# Patient Record
Sex: FEMALE | Race: BLACK OR AFRICAN AMERICAN | Employment: OTHER | URBAN - METROPOLITAN AREA
[De-identification: names, ages, dates, MRNs, and addresses within clinical notes are randomized per-mention and may not be internally consistent; named-entity substitution may affect disease eponyms.]

---

## 2017-01-10 ENCOUNTER — PROCEDURE VISIT (OUTPATIENT)
Dept: CARDIOLOGY CLINIC | Age: 82
End: 2017-01-10

## 2017-01-10 ENCOUNTER — HOSPITAL ENCOUNTER (OUTPATIENT)
Dept: CARDIOLOGY | Age: 82
Discharge: HOME | End: 2017-01-10
Attending: NURSE PRACTITIONER | Admitting: NURSE PRACTITIONER

## 2017-01-10 DIAGNOSIS — I10 ESSENTIAL HYPERTENSION: Primary | ICD-10-CM

## 2017-01-10 LAB
LV EF: 55 %
LVEF MODALITY: NORMAL

## 2017-03-30 ENCOUNTER — TELEPHONE (OUTPATIENT)
Dept: PRIMARY CARE CLINIC | Age: 82
End: 2017-03-30

## 2017-03-30 DIAGNOSIS — I10 ESSENTIAL HYPERTENSION: ICD-10-CM

## 2017-03-30 RX ORDER — SPIRONOLACTONE 25 MG/1
TABLET ORAL
Qty: 90 TABLET | Refills: 3 | Status: SHIPPED | OUTPATIENT
Start: 2017-03-30 | End: 2018-03-14 | Stop reason: SDUPTHER

## 2017-04-20 ENCOUNTER — OFFICE VISIT (OUTPATIENT)
Dept: CARDIOLOGY CLINIC | Age: 82
End: 2017-04-20

## 2017-04-20 VITALS
WEIGHT: 148.2 LBS | DIASTOLIC BLOOD PRESSURE: 74 MMHG | HEART RATE: 60 BPM | BODY MASS INDEX: 21.89 KG/M2 | SYSTOLIC BLOOD PRESSURE: 140 MMHG

## 2017-04-20 DIAGNOSIS — Z86.39 HISTORY OF GRAVES' DISEASE: ICD-10-CM

## 2017-04-20 DIAGNOSIS — I35.1 AORTIC VALVE INSUFFICIENCY, UNSPECIFIED ETIOLOGY: ICD-10-CM

## 2017-04-20 DIAGNOSIS — I10 ESSENTIAL HYPERTENSION: Primary | ICD-10-CM

## 2017-04-20 DIAGNOSIS — K90.0 CELIAC DISEASE: ICD-10-CM

## 2017-04-20 DIAGNOSIS — E05.00 GRAVES' DISEASE: ICD-10-CM

## 2017-04-20 PROCEDURE — 1123F ACP DISCUSS/DSCN MKR DOCD: CPT | Performed by: NURSE PRACTITIONER

## 2017-04-20 PROCEDURE — 99214 OFFICE O/P EST MOD 30 MIN: CPT | Performed by: NURSE PRACTITIONER

## 2017-04-20 PROCEDURE — 1036F TOBACCO NON-USER: CPT | Performed by: NURSE PRACTITIONER

## 2017-04-20 PROCEDURE — G8427 DOCREV CUR MEDS BY ELIG CLIN: HCPCS | Performed by: NURSE PRACTITIONER

## 2017-04-20 PROCEDURE — 4040F PNEUMOC VAC/ADMIN/RCVD: CPT | Performed by: NURSE PRACTITIONER

## 2017-04-20 PROCEDURE — 1090F PRES/ABSN URINE INCON ASSESS: CPT | Performed by: NURSE PRACTITIONER

## 2017-04-20 PROCEDURE — G8419 CALC BMI OUT NRM PARAM NOF/U: HCPCS | Performed by: NURSE PRACTITIONER

## 2017-04-27 RX ORDER — FELODIPINE 5 MG/1
TABLET, EXTENDED RELEASE ORAL
Qty: 90 TABLET | Refills: 2 | Status: SHIPPED | OUTPATIENT
Start: 2017-04-27 | End: 2017-07-03

## 2017-05-03 ENCOUNTER — OFFICE VISIT (OUTPATIENT)
Dept: PRIMARY CARE CLINIC | Age: 82
End: 2017-05-03

## 2017-05-03 VITALS
BODY MASS INDEX: 21.5 KG/M2 | SYSTOLIC BLOOD PRESSURE: 150 MMHG | TEMPERATURE: 97.8 F | RESPIRATION RATE: 14 BRPM | DIASTOLIC BLOOD PRESSURE: 80 MMHG | HEART RATE: 67 BPM | WEIGHT: 145.6 LBS | OXYGEN SATURATION: 98 %

## 2017-05-03 DIAGNOSIS — E55.9 VITAMIN D DEFICIENCY: ICD-10-CM

## 2017-05-03 DIAGNOSIS — D47.2 MGUS (MONOCLONAL GAMMOPATHY OF UNKNOWN SIGNIFICANCE): ICD-10-CM

## 2017-05-03 DIAGNOSIS — K21.9 GASTROESOPHAGEAL REFLUX DISEASE WITHOUT ESOPHAGITIS: ICD-10-CM

## 2017-05-03 DIAGNOSIS — J30.89 OTHER ALLERGIC RHINITIS: ICD-10-CM

## 2017-05-03 DIAGNOSIS — K64.9 ACUTE HEMORRHOID: ICD-10-CM

## 2017-05-03 DIAGNOSIS — E21.0 HYPERPARATHYROIDISM, PRIMARY (HCC): ICD-10-CM

## 2017-05-03 DIAGNOSIS — I10 ESSENTIAL HYPERTENSION: ICD-10-CM

## 2017-05-03 DIAGNOSIS — E53.8 VITAMIN B 12 DEFICIENCY: ICD-10-CM

## 2017-05-03 DIAGNOSIS — E21.3 HYPERPARATHYROIDISM (HCC): ICD-10-CM

## 2017-05-03 DIAGNOSIS — E46 PROTEIN MALNUTRITION (HCC): ICD-10-CM

## 2017-05-03 DIAGNOSIS — J45.20 MILD INTERMITTENT ASTHMA WITHOUT COMPLICATION: ICD-10-CM

## 2017-05-03 DIAGNOSIS — I10 ESSENTIAL HYPERTENSION: Primary | ICD-10-CM

## 2017-05-03 DIAGNOSIS — E05.00 GRAVES' DISEASE: ICD-10-CM

## 2017-05-03 DIAGNOSIS — I35.1 AORTIC VALVE INSUFFICIENCY, UNSPECIFIED ETIOLOGY: ICD-10-CM

## 2017-05-03 LAB
A/G RATIO: 1.8 (ref 1.1–2.2)
ALBUMIN SERPL-MCNC: 4.2 G/DL (ref 3.4–5)
ALP BLD-CCNC: 154 U/L (ref 40–129)
ALT SERPL-CCNC: 6 U/L (ref 10–40)
ANION GAP SERPL CALCULATED.3IONS-SCNC: 17 MMOL/L (ref 3–16)
AST SERPL-CCNC: 14 U/L (ref 15–37)
BILIRUB SERPL-MCNC: 0.8 MG/DL (ref 0–1)
BUN BLDV-MCNC: 11 MG/DL (ref 7–20)
CALCIUM SERPL-MCNC: 10.3 MG/DL (ref 8.3–10.6)
CHLORIDE BLD-SCNC: 104 MMOL/L (ref 99–110)
CO2: 23 MMOL/L (ref 21–32)
CREAT SERPL-MCNC: 0.8 MG/DL (ref 0.6–1.2)
GFR AFRICAN AMERICAN: >60
GFR NON-AFRICAN AMERICAN: >60
GLOBULIN: 2.3 G/DL
GLUCOSE BLD-MCNC: 82 MG/DL (ref 70–99)
PARATHYROID HORMONE INTACT: 79.2 PG/ML (ref 14–72)
POTASSIUM SERPL-SCNC: 4.3 MMOL/L (ref 3.5–5.1)
PREALBUMIN: 19.3 MG/DL (ref 20–40)
SODIUM BLD-SCNC: 144 MMOL/L (ref 136–145)
TOTAL PROTEIN: 6.5 G/DL (ref 6.4–8.2)
TSH REFLEX FT4: 4.21 UIU/ML (ref 0.27–4.2)

## 2017-05-03 PROCEDURE — 1090F PRES/ABSN URINE INCON ASSESS: CPT | Performed by: INTERNAL MEDICINE

## 2017-05-03 PROCEDURE — 99214 OFFICE O/P EST MOD 30 MIN: CPT | Performed by: INTERNAL MEDICINE

## 2017-05-03 PROCEDURE — 1036F TOBACCO NON-USER: CPT | Performed by: INTERNAL MEDICINE

## 2017-05-03 PROCEDURE — 4040F PNEUMOC VAC/ADMIN/RCVD: CPT | Performed by: INTERNAL MEDICINE

## 2017-05-03 PROCEDURE — 1123F ACP DISCUSS/DSCN MKR DOCD: CPT | Performed by: INTERNAL MEDICINE

## 2017-05-03 PROCEDURE — G8419 CALC BMI OUT NRM PARAM NOF/U: HCPCS | Performed by: INTERNAL MEDICINE

## 2017-05-03 PROCEDURE — G8427 DOCREV CUR MEDS BY ELIG CLIN: HCPCS | Performed by: INTERNAL MEDICINE

## 2017-05-03 RX ORDER — LORAZEPAM 0.5 MG/1
0.5 TABLET ORAL EVERY 6 HOURS PRN
Qty: 56 TABLET | Refills: 3 | Status: CANCELLED | OUTPATIENT
Start: 2017-05-03

## 2017-05-03 ASSESSMENT — PATIENT HEALTH QUESTIONNAIRE - PHQ9
1. LITTLE INTEREST OR PLEASURE IN DOING THINGS: 1
2. FEELING DOWN, DEPRESSED OR HOPELESS: 1
SUM OF ALL RESPONSES TO PHQ QUESTIONS 1-9: 2
SUM OF ALL RESPONSES TO PHQ9 QUESTIONS 1 & 2: 2

## 2017-05-04 DIAGNOSIS — E05.00 GRAVES' DISEASE: Primary | ICD-10-CM

## 2017-05-04 LAB
T4 FREE: 0.8 NG/DL (ref 0.9–1.8)
TSH REFLEX FT4: 4.2 UIU/ML (ref 0.27–4.2)
VITAMIN B-12: 219 PG/ML (ref 211–911)
VITAMIN D 25-HYDROXY: 50.1 NG/ML

## 2017-05-04 RX ORDER — METHIMAZOLE 5 MG/1
5 TABLET ORAL DAILY
Qty: 30 TABLET | Refills: 1 | Status: SHIPPED | OUTPATIENT
Start: 2017-05-04 | End: 2017-07-03

## 2017-05-07 RX ORDER — HYDROCORTISONE ACETATE 25 MG/1
25 SUPPOSITORY RECTAL 2 TIMES DAILY PRN
Qty: 90 SUPPOSITORY | Refills: 3 | Status: SHIPPED | OUTPATIENT
Start: 2017-05-07 | End: 2020-10-18

## 2017-05-07 RX ORDER — IBANDRONATE SODIUM 150 MG/1
150 TABLET, FILM COATED ORAL
Qty: 3 TABLET | Refills: 3 | Status: SHIPPED | OUTPATIENT
Start: 2017-05-07 | End: 2017-07-19 | Stop reason: SDUPTHER

## 2017-05-07 RX ORDER — METHIMAZOLE 5 MG/1
2.5 TABLET ORAL DAILY
Qty: 15 TABLET | Refills: 1 | Status: SHIPPED | OUTPATIENT
Start: 2017-05-07 | End: 2017-07-20 | Stop reason: SDUPTHER

## 2017-05-07 RX ORDER — PANTOPRAZOLE SODIUM 40 MG/1
TABLET, DELAYED RELEASE ORAL
Qty: 90 TABLET | Refills: 3 | Status: SHIPPED | OUTPATIENT
Start: 2017-05-07 | End: 2017-05-31 | Stop reason: SDUPTHER

## 2017-05-07 RX ORDER — ATENOLOL 50 MG/1
TABLET ORAL
Qty: 90 TABLET | Refills: 3 | Status: SHIPPED | OUTPATIENT
Start: 2017-05-07 | End: 2017-07-06

## 2017-05-07 RX ORDER — LORATADINE 10 MG/1
10 TABLET ORAL DAILY
Qty: 90 TABLET | Refills: 3 | Status: SHIPPED | OUTPATIENT
Start: 2017-05-07 | End: 2021-01-29 | Stop reason: SDUPTHER

## 2017-05-07 ASSESSMENT — ENCOUNTER SYMPTOMS
EYE ITCHING: 0
BACK PAIN: 0
EYE REDNESS: 0
STRIDOR: 0
EYE DISCHARGE: 0
NAUSEA: 0
VOMITING: 0
SHORTNESS OF BREATH: 0
DIARRHEA: 0
RHINORRHEA: 0
CHEST TIGHTNESS: 0
EYE PAIN: 0
ABDOMINAL PAIN: 0
BLOOD IN STOOL: 0
FACIAL SWELLING: 0
SORE THROAT: 0
COUGH: 0
TROUBLE SWALLOWING: 0
APNEA: 0
ALLERGIC/IMMUNOLOGIC COMMENTS: ALLERGY ON GLUTEN-FREE DIET
CONSTIPATION: 0
WHEEZING: 0
CHOKING: 0
ANAL BLEEDING: 0

## 2017-05-08 ENCOUNTER — TELEPHONE (OUTPATIENT)
Dept: PRIMARY CARE CLINIC | Age: 82
End: 2017-05-08

## 2017-05-09 ENCOUNTER — OFFICE VISIT (OUTPATIENT)
Dept: ENT CLINIC | Age: 82
End: 2017-05-09

## 2017-05-09 VITALS
DIASTOLIC BLOOD PRESSURE: 73 MMHG | TEMPERATURE: 97.4 F | BODY MASS INDEX: 21.41 KG/M2 | SYSTOLIC BLOOD PRESSURE: 155 MMHG | WEIGHT: 145 LBS

## 2017-05-09 DIAGNOSIS — H90.8 HEARING LOSS, MIXED CONDUCTIVE AND SENSORINEURAL: ICD-10-CM

## 2017-05-09 DIAGNOSIS — H61.23 BILATERAL IMPACTED CERUMEN: ICD-10-CM

## 2017-05-09 DIAGNOSIS — N89.8 VAGINAL IRRITATION: Primary | ICD-10-CM

## 2017-05-09 DIAGNOSIS — R30.0 DYSURIA: ICD-10-CM

## 2017-05-09 PROCEDURE — 1036F TOBACCO NON-USER: CPT | Performed by: OTOLARYNGOLOGY

## 2017-05-09 PROCEDURE — 69210 REMOVE IMPACTED EAR WAX UNI: CPT | Performed by: OTOLARYNGOLOGY

## 2017-05-09 RX ORDER — NYSTATIN 100000 U/G
CREAM TOPICAL
Qty: 15 G | Refills: 3 | Status: SHIPPED | OUTPATIENT
Start: 2017-05-09 | End: 2017-12-20

## 2017-05-31 RX ORDER — PANTOPRAZOLE SODIUM 40 MG/1
TABLET, DELAYED RELEASE ORAL
Qty: 90 TABLET | Refills: 2 | Status: SHIPPED | OUTPATIENT
Start: 2017-05-31 | End: 2018-02-27 | Stop reason: SDUPTHER

## 2017-06-26 ENCOUNTER — TELEPHONE (OUTPATIENT)
Dept: PRIMARY CARE CLINIC | Age: 82
End: 2017-06-26

## 2017-07-03 ENCOUNTER — OFFICE VISIT (OUTPATIENT)
Dept: PRIMARY CARE CLINIC | Age: 82
End: 2017-07-03

## 2017-07-03 VITALS
BODY MASS INDEX: 21.33 KG/M2 | HEIGHT: 69 IN | WEIGHT: 144 LBS | OXYGEN SATURATION: 99 % | HEART RATE: 69 BPM | TEMPERATURE: 97.8 F | DIASTOLIC BLOOD PRESSURE: 80 MMHG | SYSTOLIC BLOOD PRESSURE: 130 MMHG

## 2017-07-03 DIAGNOSIS — Z23 NEED FOR PROPHYLACTIC VACCINATION AGAINST DIPHTHERIA-TETANUS-PERTUSSIS (DTP): Primary | ICD-10-CM

## 2017-07-03 DIAGNOSIS — A04.72 C. DIFFICILE DIARRHEA: ICD-10-CM

## 2017-07-03 DIAGNOSIS — K90.0 CELIAC DISEASE: ICD-10-CM

## 2017-07-03 DIAGNOSIS — E05.00 GRAVES' DISEASE: ICD-10-CM

## 2017-07-03 DIAGNOSIS — K58.2 IRRITABLE BOWEL SYNDROME WITH BOTH CONSTIPATION AND DIARRHEA: ICD-10-CM

## 2017-07-03 DIAGNOSIS — I10 ESSENTIAL HYPERTENSION: ICD-10-CM

## 2017-07-03 PROCEDURE — G8420 CALC BMI NORM PARAMETERS: HCPCS | Performed by: INTERNAL MEDICINE

## 2017-07-03 PROCEDURE — 90715 TDAP VACCINE 7 YRS/> IM: CPT | Performed by: INTERNAL MEDICINE

## 2017-07-03 PROCEDURE — 1036F TOBACCO NON-USER: CPT | Performed by: INTERNAL MEDICINE

## 2017-07-03 PROCEDURE — 4040F PNEUMOC VAC/ADMIN/RCVD: CPT | Performed by: INTERNAL MEDICINE

## 2017-07-03 PROCEDURE — 1090F PRES/ABSN URINE INCON ASSESS: CPT | Performed by: INTERNAL MEDICINE

## 2017-07-03 PROCEDURE — 99214 OFFICE O/P EST MOD 30 MIN: CPT | Performed by: INTERNAL MEDICINE

## 2017-07-03 PROCEDURE — 90471 IMMUNIZATION ADMIN: CPT | Performed by: INTERNAL MEDICINE

## 2017-07-03 PROCEDURE — G8427 DOCREV CUR MEDS BY ELIG CLIN: HCPCS | Performed by: INTERNAL MEDICINE

## 2017-07-03 PROCEDURE — 1123F ACP DISCUSS/DSCN MKR DOCD: CPT | Performed by: INTERNAL MEDICINE

## 2017-07-03 RX ORDER — FELODIPINE 2.5 MG/1
2.5 TABLET, EXTENDED RELEASE ORAL DAILY
Qty: 40 TABLET | Refills: 3 | Status: SHIPPED | OUTPATIENT
Start: 2017-07-03 | End: 2017-08-09

## 2017-07-03 RX ORDER — METRONIDAZOLE 500 MG/1
TABLET ORAL
COMMUNITY
Start: 2017-06-26 | End: 2017-07-19

## 2017-07-03 ASSESSMENT — PATIENT HEALTH QUESTIONNAIRE - PHQ9
2. FEELING DOWN, DEPRESSED OR HOPELESS: 0
SUM OF ALL RESPONSES TO PHQ9 QUESTIONS 1 & 2: 0
SUM OF ALL RESPONSES TO PHQ QUESTIONS 1-9: 0
1. LITTLE INTEREST OR PLEASURE IN DOING THINGS: 0

## 2017-07-06 DIAGNOSIS — I10 ESSENTIAL HYPERTENSION: ICD-10-CM

## 2017-07-06 RX ORDER — ATENOLOL 50 MG/1
TABLET ORAL
Qty: 90 TABLET | Refills: 2 | Status: SHIPPED | OUTPATIENT
Start: 2017-07-06 | End: 2018-04-02 | Stop reason: SDUPTHER

## 2017-07-09 ASSESSMENT — ENCOUNTER SYMPTOMS
EYE REDNESS: 0
STRIDOR: 0
CHEST TIGHTNESS: 0
FACIAL SWELLING: 0
RHINORRHEA: 0
ANAL BLEEDING: 0
ABDOMINAL PAIN: 0
WHEEZING: 0
APNEA: 0
BLOOD IN STOOL: 0
DIARRHEA: 0
BACK PAIN: 0
CONSTIPATION: 0
ALLERGIC/IMMUNOLOGIC COMMENTS: ALLERGY ON GLUTEN-FREE DIET
TROUBLE SWALLOWING: 0
EYE ITCHING: 0
VOMITING: 0
CHOKING: 0
EYE PAIN: 0
COUGH: 0
SHORTNESS OF BREATH: 0
EYE DISCHARGE: 0
NAUSEA: 0
SORE THROAT: 0

## 2017-07-19 ENCOUNTER — OFFICE VISIT (OUTPATIENT)
Dept: PRIMARY CARE CLINIC | Age: 82
End: 2017-07-19

## 2017-07-19 VITALS
TEMPERATURE: 97.7 F | DIASTOLIC BLOOD PRESSURE: 72 MMHG | OXYGEN SATURATION: 100 % | HEIGHT: 69 IN | WEIGHT: 144 LBS | RESPIRATION RATE: 16 BRPM | HEART RATE: 78 BPM | SYSTOLIC BLOOD PRESSURE: 140 MMHG | BODY MASS INDEX: 21.33 KG/M2

## 2017-07-19 DIAGNOSIS — E05.00 GRAVES' DISEASE: ICD-10-CM

## 2017-07-19 DIAGNOSIS — R60.0 BILATERAL LEG EDEMA: ICD-10-CM

## 2017-07-19 DIAGNOSIS — R60.0 BILATERAL LEG EDEMA: Primary | ICD-10-CM

## 2017-07-19 DIAGNOSIS — E21.3 HYPERPARATHYROIDISM (HCC): ICD-10-CM

## 2017-07-19 LAB
ALBUMIN SERPL-MCNC: 3.7 G/DL (ref 3.4–5)
ANION GAP SERPL CALCULATED.3IONS-SCNC: 15 MMOL/L (ref 3–16)
BUN BLDV-MCNC: 17 MG/DL (ref 7–20)
CALCIUM SERPL-MCNC: 10.7 MG/DL (ref 8.3–10.6)
CHLORIDE BLD-SCNC: 107 MMOL/L (ref 99–110)
CO2: 23 MMOL/L (ref 21–32)
CREAT SERPL-MCNC: 0.6 MG/DL (ref 0.6–1.2)
GFR AFRICAN AMERICAN: >60
GFR NON-AFRICAN AMERICAN: >60
GLUCOSE BLD-MCNC: 114 MG/DL (ref 70–99)
PHOSPHORUS: 4 MG/DL (ref 2.5–4.9)
POTASSIUM SERPL-SCNC: 5 MMOL/L (ref 3.5–5.1)
PREALBUMIN: 11.3 MG/DL (ref 20–40)
SODIUM BLD-SCNC: 145 MMOL/L (ref 136–145)
TSH REFLEX FT4: <0.01 UIU/ML (ref 0.27–4.2)

## 2017-07-19 PROCEDURE — G8420 CALC BMI NORM PARAMETERS: HCPCS | Performed by: INTERNAL MEDICINE

## 2017-07-19 PROCEDURE — 99213 OFFICE O/P EST LOW 20 MIN: CPT | Performed by: INTERNAL MEDICINE

## 2017-07-19 PROCEDURE — 1123F ACP DISCUSS/DSCN MKR DOCD: CPT | Performed by: INTERNAL MEDICINE

## 2017-07-19 PROCEDURE — 4040F PNEUMOC VAC/ADMIN/RCVD: CPT | Performed by: INTERNAL MEDICINE

## 2017-07-19 PROCEDURE — G8427 DOCREV CUR MEDS BY ELIG CLIN: HCPCS | Performed by: INTERNAL MEDICINE

## 2017-07-19 PROCEDURE — 1090F PRES/ABSN URINE INCON ASSESS: CPT | Performed by: INTERNAL MEDICINE

## 2017-07-19 PROCEDURE — 1036F TOBACCO NON-USER: CPT | Performed by: INTERNAL MEDICINE

## 2017-07-19 RX ORDER — IBANDRONATE SODIUM 150 MG/1
150 TABLET, FILM COATED ORAL
Qty: 3 TABLET | Refills: 11 | Status: SHIPPED | OUTPATIENT
Start: 2017-07-19 | End: 2017-10-19 | Stop reason: SDUPTHER

## 2017-07-19 ASSESSMENT — ENCOUNTER SYMPTOMS
NAUSEA: 0
EYE ITCHING: 0
SORE THROAT: 0
RHINORRHEA: 0
CHEST TIGHTNESS: 0
DIARRHEA: 0
CHOKING: 0
BACK PAIN: 0
ABDOMINAL PAIN: 0
VOMITING: 0
SHORTNESS OF BREATH: 0
EYE REDNESS: 0
COUGH: 0
ANAL BLEEDING: 0
STRIDOR: 0
EYE DISCHARGE: 0
BLOOD IN STOOL: 0
TROUBLE SWALLOWING: 0
APNEA: 0
EYE PAIN: 0
CONSTIPATION: 0
WHEEZING: 0
FACIAL SWELLING: 0
ALLERGIC/IMMUNOLOGIC COMMENTS: ALLERGY ON GLUTEN-FREE DIET

## 2017-07-20 ENCOUNTER — TELEPHONE (OUTPATIENT)
Dept: PRIMARY CARE CLINIC | Age: 82
End: 2017-07-20

## 2017-07-20 DIAGNOSIS — E05.00 GRAVES' DISEASE: ICD-10-CM

## 2017-07-20 LAB — T4 FREE: >7.8 NG/DL (ref 0.9–1.8)

## 2017-07-20 RX ORDER — METHIMAZOLE 5 MG/1
5 TABLET ORAL DAILY
Qty: 30 TABLET | Refills: 1 | Status: SHIPPED | OUTPATIENT
Start: 2017-07-20 | End: 2017-09-07 | Stop reason: SDUPTHER

## 2017-07-21 ENCOUNTER — TELEPHONE (OUTPATIENT)
Dept: PRIMARY CARE CLINIC | Age: 82
End: 2017-07-21

## 2017-07-24 ENCOUNTER — TELEPHONE (OUTPATIENT)
Dept: PRIMARY CARE CLINIC | Age: 82
End: 2017-07-24

## 2017-07-24 DIAGNOSIS — R19.7 DIARRHEA, UNSPECIFIED TYPE: Primary | ICD-10-CM

## 2017-07-24 DIAGNOSIS — R53.1 WEAKNESS GENERALIZED: ICD-10-CM

## 2017-07-24 ASSESSMENT — PATIENT HEALTH QUESTIONNAIRE - PHQ9
SUM OF ALL RESPONSES TO PHQ QUESTIONS 1-9: 0
SUM OF ALL RESPONSES TO PHQ9 QUESTIONS 1 & 2: 0
1. LITTLE INTEREST OR PLEASURE IN DOING THINGS: 0
2. FEELING DOWN, DEPRESSED OR HOPELESS: 0

## 2017-07-26 DIAGNOSIS — A04.72 C. DIFFICILE DIARRHEA: Primary | ICD-10-CM

## 2017-07-26 LAB — C DIFFICILE TOXIN, EIA: NORMAL

## 2017-07-26 RX ORDER — VANCOMYCIN HYDROCHLORIDE 125 MG/1
125 CAPSULE ORAL 3 TIMES DAILY
Qty: 30 CAPSULE | Refills: 0 | Status: SHIPPED | OUTPATIENT
Start: 2017-07-26 | End: 2017-08-05

## 2017-07-27 ENCOUNTER — TELEPHONE (OUTPATIENT)
Dept: PRIMARY CARE CLINIC | Age: 82
End: 2017-07-27

## 2017-07-27 DIAGNOSIS — A04.71 RECURRENT CLOSTRIDIUM DIFFICILE DIARRHEA: Primary | ICD-10-CM

## 2017-07-27 LAB
CLOSTRIDIUM DIFFICILE DNA AMPLIFICATION: ABNORMAL
ORGANISM: ABNORMAL

## 2017-07-28 ENCOUNTER — CARE COORDINATION (OUTPATIENT)
Dept: CARE COORDINATION | Age: 82
End: 2017-07-28

## 2017-08-04 ENCOUNTER — TELEPHONE (OUTPATIENT)
Dept: PRIMARY CARE CLINIC | Age: 82
End: 2017-08-04

## 2017-08-07 DIAGNOSIS — L98.9 SKIN LESION: Primary | ICD-10-CM

## 2017-08-09 ENCOUNTER — OFFICE VISIT (OUTPATIENT)
Dept: PRIMARY CARE CLINIC | Age: 82
End: 2017-08-09

## 2017-08-09 VITALS
SYSTOLIC BLOOD PRESSURE: 133 MMHG | OXYGEN SATURATION: 99 % | DIASTOLIC BLOOD PRESSURE: 69 MMHG | HEART RATE: 62 BPM | RESPIRATION RATE: 16 BRPM | HEIGHT: 69 IN | TEMPERATURE: 97.8 F

## 2017-08-09 DIAGNOSIS — I35.1 NONRHEUMATIC AORTIC VALVE INSUFFICIENCY: ICD-10-CM

## 2017-08-09 DIAGNOSIS — Z91.81 AT HIGH RISK FOR FALLS: ICD-10-CM

## 2017-08-09 DIAGNOSIS — I10 ESSENTIAL HYPERTENSION: ICD-10-CM

## 2017-08-09 DIAGNOSIS — M81.0 AGE-RELATED OSTEOPOROSIS WITHOUT CURRENT PATHOLOGICAL FRACTURE: Primary | ICD-10-CM

## 2017-08-09 DIAGNOSIS — L85.3 DRY SKIN: ICD-10-CM

## 2017-08-09 DIAGNOSIS — R29.898 MUSCULAR DECONDITIONING: ICD-10-CM

## 2017-08-09 PROCEDURE — 4005F PHARM THX FOR OP RXD: CPT | Performed by: INTERNAL MEDICINE

## 2017-08-09 PROCEDURE — 4040F PNEUMOC VAC/ADMIN/RCVD: CPT | Performed by: INTERNAL MEDICINE

## 2017-08-09 PROCEDURE — G8427 DOCREV CUR MEDS BY ELIG CLIN: HCPCS | Performed by: INTERNAL MEDICINE

## 2017-08-09 PROCEDURE — 1036F TOBACCO NON-USER: CPT | Performed by: INTERNAL MEDICINE

## 2017-08-09 PROCEDURE — G8420 CALC BMI NORM PARAMETERS: HCPCS | Performed by: INTERNAL MEDICINE

## 2017-08-09 PROCEDURE — 1090F PRES/ABSN URINE INCON ASSESS: CPT | Performed by: INTERNAL MEDICINE

## 2017-08-09 PROCEDURE — 99214 OFFICE O/P EST MOD 30 MIN: CPT | Performed by: INTERNAL MEDICINE

## 2017-08-09 PROCEDURE — 1123F ACP DISCUSS/DSCN MKR DOCD: CPT | Performed by: INTERNAL MEDICINE

## 2017-08-09 RX ORDER — AMMONIUM LACTATE 12 G/100G
LOTION TOPICAL
Qty: 225 G | Refills: 5 | Status: SHIPPED | OUTPATIENT
Start: 2017-08-09 | End: 2021-01-29 | Stop reason: SDUPTHER

## 2017-08-09 RX ORDER — FELODIPINE 2.5 MG/1
2.5 TABLET, EXTENDED RELEASE ORAL DAILY
Qty: 30 TABLET | Refills: 5
Start: 2017-08-09 | End: 2018-01-26 | Stop reason: SDUPTHER

## 2017-08-13 ASSESSMENT — ENCOUNTER SYMPTOMS
TROUBLE SWALLOWING: 0
ANAL BLEEDING: 0
CONSTIPATION: 0
APNEA: 0
ALLERGIC/IMMUNOLOGIC COMMENTS: ALLERGY ON GLUTEN-FREE DIET
EYE REDNESS: 0
SORE THROAT: 0
VOMITING: 0
BLOOD IN STOOL: 0
EYE PAIN: 0
ABDOMINAL PAIN: 0
RHINORRHEA: 0
EYE DISCHARGE: 0
FACIAL SWELLING: 0
COUGH: 0
CHOKING: 0
NAUSEA: 0
BACK PAIN: 0
STRIDOR: 0
EYE ITCHING: 0
DIARRHEA: 0
CHEST TIGHTNESS: 0
WHEEZING: 0
SHORTNESS OF BREATH: 0

## 2017-09-07 ENCOUNTER — OFFICE VISIT (OUTPATIENT)
Dept: PRIMARY CARE CLINIC | Age: 82
End: 2017-09-07

## 2017-09-07 VITALS
OXYGEN SATURATION: 97 % | BODY MASS INDEX: 19.2 KG/M2 | RESPIRATION RATE: 18 BRPM | SYSTOLIC BLOOD PRESSURE: 130 MMHG | HEART RATE: 71 BPM | WEIGHT: 130 LBS | DIASTOLIC BLOOD PRESSURE: 70 MMHG | TEMPERATURE: 96.8 F

## 2017-09-07 DIAGNOSIS — E05.00 GRAVES DISEASE: ICD-10-CM

## 2017-09-07 DIAGNOSIS — E46 PROTEIN MALNUTRITION (HCC): ICD-10-CM

## 2017-09-07 DIAGNOSIS — E05.00 GRAVES' DISEASE: ICD-10-CM

## 2017-09-07 DIAGNOSIS — I10 ESSENTIAL HYPERTENSION: ICD-10-CM

## 2017-09-07 DIAGNOSIS — Z23 FLU VACCINE NEED: Primary | ICD-10-CM

## 2017-09-07 LAB
PREALBUMIN: 11.7 MG/DL (ref 20–40)
T3 FREE: 6.5 PG/ML (ref 2.3–4.2)
T4 FREE: 2.7 NG/DL (ref 0.9–1.8)
TSH REFLEX FT4: <0.01 UIU/ML (ref 0.27–4.2)

## 2017-09-07 PROCEDURE — 99214 OFFICE O/P EST MOD 30 MIN: CPT | Performed by: INTERNAL MEDICINE

## 2017-09-07 PROCEDURE — 90662 IIV NO PRSV INCREASED AG IM: CPT | Performed by: INTERNAL MEDICINE

## 2017-09-07 PROCEDURE — G0008 ADMIN INFLUENZA VIRUS VAC: HCPCS | Performed by: INTERNAL MEDICINE

## 2017-09-07 PROCEDURE — G8427 DOCREV CUR MEDS BY ELIG CLIN: HCPCS | Performed by: INTERNAL MEDICINE

## 2017-09-07 PROCEDURE — 1090F PRES/ABSN URINE INCON ASSESS: CPT | Performed by: INTERNAL MEDICINE

## 2017-09-07 PROCEDURE — 4040F PNEUMOC VAC/ADMIN/RCVD: CPT | Performed by: INTERNAL MEDICINE

## 2017-09-07 PROCEDURE — 1123F ACP DISCUSS/DSCN MKR DOCD: CPT | Performed by: INTERNAL MEDICINE

## 2017-09-07 PROCEDURE — G8420 CALC BMI NORM PARAMETERS: HCPCS | Performed by: INTERNAL MEDICINE

## 2017-09-07 PROCEDURE — 1036F TOBACCO NON-USER: CPT | Performed by: INTERNAL MEDICINE

## 2017-09-07 RX ORDER — METHIMAZOLE 5 MG/1
7.5 TABLET ORAL DAILY
Qty: 45 TABLET | Refills: 5 | Status: SHIPPED | OUTPATIENT
Start: 2017-09-07 | End: 2017-10-19 | Stop reason: SDUPTHER

## 2017-09-07 RX ORDER — MIRTAZAPINE 15 MG/1
15 TABLET, FILM COATED ORAL NIGHTLY
Qty: 30 TABLET | Refills: 3 | Status: SHIPPED | OUTPATIENT
Start: 2017-09-07 | End: 2019-12-09

## 2017-09-07 ASSESSMENT — ENCOUNTER SYMPTOMS
FACIAL SWELLING: 0
ANAL BLEEDING: 0
SORE THROAT: 0
WHEEZING: 0
CONSTIPATION: 0
COUGH: 0
ALLERGIC/IMMUNOLOGIC COMMENTS: ALLERGY ON GLUTEN-FREE DIET
STRIDOR: 0
EYE REDNESS: 0
EYE DISCHARGE: 0
DIARRHEA: 0
EYE ITCHING: 0
APNEA: 0
CHOKING: 0
EYE PAIN: 0
ABDOMINAL PAIN: 0
RHINORRHEA: 0
TROUBLE SWALLOWING: 0
VOMITING: 0
SHORTNESS OF BREATH: 0
NAUSEA: 0
BACK PAIN: 0
CHEST TIGHTNESS: 0
BLOOD IN STOOL: 0

## 2017-09-07 ASSESSMENT — PATIENT HEALTH QUESTIONNAIRE - PHQ9
2. FEELING DOWN, DEPRESSED OR HOPELESS: 1
SUM OF ALL RESPONSES TO PHQ9 QUESTIONS 1 & 2: 2
1. LITTLE INTEREST OR PLEASURE IN DOING THINGS: 1
SUM OF ALL RESPONSES TO PHQ QUESTIONS 1-9: 2

## 2017-10-19 ENCOUNTER — TELEPHONE (OUTPATIENT)
Dept: PRIMARY CARE CLINIC | Age: 82
End: 2017-10-19

## 2017-10-19 ENCOUNTER — OFFICE VISIT (OUTPATIENT)
Dept: PRIMARY CARE CLINIC | Age: 82
End: 2017-10-19

## 2017-10-19 VITALS
RESPIRATION RATE: 18 BRPM | OXYGEN SATURATION: 98 % | SYSTOLIC BLOOD PRESSURE: 142 MMHG | HEART RATE: 72 BPM | DIASTOLIC BLOOD PRESSURE: 66 MMHG | WEIGHT: 123 LBS | BODY MASS INDEX: 18.16 KG/M2 | TEMPERATURE: 98.1 F

## 2017-10-19 DIAGNOSIS — E53.8 VITAMIN B 12 DEFICIENCY: ICD-10-CM

## 2017-10-19 DIAGNOSIS — K11.7 DROOLING: ICD-10-CM

## 2017-10-19 DIAGNOSIS — R73.09 INCREASED BLOOD GLUCOSE: ICD-10-CM

## 2017-10-19 DIAGNOSIS — E05.00 GRAVES' DISEASE: Primary | ICD-10-CM

## 2017-10-19 DIAGNOSIS — R10.2 FEMALE PELVIC PAIN: ICD-10-CM

## 2017-10-19 DIAGNOSIS — E46 PROTEIN MALNUTRITION (HCC): ICD-10-CM

## 2017-10-19 DIAGNOSIS — E05.00 GRAVES' DISEASE: ICD-10-CM

## 2017-10-19 DIAGNOSIS — E55.9 VITAMIN D DEFICIENCY: ICD-10-CM

## 2017-10-19 DIAGNOSIS — M25.551 RIGHT HIP PAIN: ICD-10-CM

## 2017-10-19 DIAGNOSIS — I10 ESSENTIAL HYPERTENSION: ICD-10-CM

## 2017-10-19 DIAGNOSIS — E21.3 HYPERPARATHYROIDISM (HCC): ICD-10-CM

## 2017-10-19 DIAGNOSIS — M81.0 AGE-RELATED OSTEOPOROSIS WITHOUT CURRENT PATHOLOGICAL FRACTURE: ICD-10-CM

## 2017-10-19 LAB
A/G RATIO: 1.3 (ref 1.1–2.2)
ALBUMIN SERPL-MCNC: 3.9 G/DL (ref 3.4–5)
ALP BLD-CCNC: 159 U/L (ref 40–129)
ALT SERPL-CCNC: 7 U/L (ref 10–40)
ANION GAP SERPL CALCULATED.3IONS-SCNC: 17 MMOL/L (ref 3–16)
AST SERPL-CCNC: 15 U/L (ref 15–37)
BILIRUB SERPL-MCNC: 0.8 MG/DL (ref 0–1)
BUN BLDV-MCNC: 13 MG/DL (ref 7–20)
CALCIUM SERPL-MCNC: 10.7 MG/DL (ref 8.3–10.6)
CHLORIDE BLD-SCNC: 103 MMOL/L (ref 99–110)
CO2: 23 MMOL/L (ref 21–32)
CREAT SERPL-MCNC: 0.6 MG/DL (ref 0.6–1.2)
GFR AFRICAN AMERICAN: >60
GFR NON-AFRICAN AMERICAN: >60
GLOBULIN: 2.9 G/DL
GLUCOSE BLD-MCNC: 95 MG/DL (ref 70–99)
POTASSIUM SERPL-SCNC: 4.1 MMOL/L (ref 3.5–5.1)
PREALBUMIN: 14.5 MG/DL (ref 20–40)
SODIUM BLD-SCNC: 143 MMOL/L (ref 136–145)
T3 FREE: 4.6 PG/ML (ref 2.3–4.2)
T4 FREE: 2.1 NG/DL (ref 0.9–1.8)
T4 FREE: 2.2 NG/DL (ref 0.9–1.8)
TOTAL PROTEIN: 6.8 G/DL (ref 6.4–8.2)
TSH REFLEX FT4: <0.01 UIU/ML (ref 0.27–4.2)
VITAMIN D 25-HYDROXY: 50.7 NG/ML

## 2017-10-19 PROCEDURE — 4005F PHARM THX FOR OP RXD: CPT | Performed by: INTERNAL MEDICINE

## 2017-10-19 PROCEDURE — 1090F PRES/ABSN URINE INCON ASSESS: CPT | Performed by: INTERNAL MEDICINE

## 2017-10-19 PROCEDURE — G8427 DOCREV CUR MEDS BY ELIG CLIN: HCPCS | Performed by: INTERNAL MEDICINE

## 2017-10-19 PROCEDURE — G8484 FLU IMMUNIZE NO ADMIN: HCPCS | Performed by: INTERNAL MEDICINE

## 2017-10-19 PROCEDURE — G8419 CALC BMI OUT NRM PARAM NOF/U: HCPCS | Performed by: INTERNAL MEDICINE

## 2017-10-19 PROCEDURE — 1036F TOBACCO NON-USER: CPT | Performed by: INTERNAL MEDICINE

## 2017-10-19 PROCEDURE — 1123F ACP DISCUSS/DSCN MKR DOCD: CPT | Performed by: INTERNAL MEDICINE

## 2017-10-19 PROCEDURE — 4040F PNEUMOC VAC/ADMIN/RCVD: CPT | Performed by: INTERNAL MEDICINE

## 2017-10-19 PROCEDURE — 96372 THER/PROPH/DIAG INJ SC/IM: CPT | Performed by: INTERNAL MEDICINE

## 2017-10-19 PROCEDURE — 99214 OFFICE O/P EST MOD 30 MIN: CPT | Performed by: INTERNAL MEDICINE

## 2017-10-19 RX ORDER — IBANDRONATE SODIUM 150 MG/1
150 TABLET, FILM COATED ORAL
Qty: 3 TABLET | Refills: 11 | Status: SHIPPED | OUTPATIENT
Start: 2017-10-19 | End: 2018-04-18 | Stop reason: SDUPTHER

## 2017-10-19 RX ORDER — METHIMAZOLE 10 MG/1
10 TABLET ORAL DAILY
Qty: 30 TABLET | Refills: 1
Start: 2017-10-19 | End: 2019-06-14

## 2017-10-19 RX ORDER — CYANOCOBALAMIN 1000 UG/ML
1000 INJECTION INTRAMUSCULAR; SUBCUTANEOUS ONCE
Status: COMPLETED | OUTPATIENT
Start: 2017-10-19 | End: 2017-10-19

## 2017-10-19 RX ADMIN — CYANOCOBALAMIN 1000 MCG: 1000 INJECTION INTRAMUSCULAR; SUBCUTANEOUS at 14:35

## 2017-10-19 NOTE — TELEPHONE ENCOUNTER
Pharmacy:   [facility]   Phone: [phone]   Calling to verify the directions and qty  on the following medication:  ~Ibandronate (BONIVA) 150 MG tablet  Please return call to advise. Thank you!

## 2017-10-20 LAB
ESTIMATED AVERAGE GLUCOSE: 108.3 MG/DL
HBA1C MFR BLD: 5.4 %

## 2017-10-20 NOTE — PROGRESS NOTES
supplement. Vitamin D 25 Hydroxy   9. Right hip pain  With walking. Had a remote fall. NO swelling noted by patient . Pain moderate , not severe. XR HIP BILATERAL W AP PELVIS (2 VIEWS)   10. Female pelvic pain . No vaginal bleeding or discharge. Pain in the bones. XR HIP BILATERAL W AP PELVIS (2 VIEWS)   11. Vitamin B 12 deficiency on supplement. cyanocobalamin injection 1,000 mcg     Current Outpatient Prescriptions on File Prior to Visit   Medication Sig Dispense Refill    mirtazapine (REMERON) 15 MG tablet Take 1 tablet by mouth nightly 30 tablet 3    felodipine (PLENDIL) 2.5 MG extended release tablet Take 1 tablet by mouth daily 30 tablet 5    ammonium lactate (LAC-HYDRIN) 12 % lotion Apply topically daily. 225 g 5    atenolol (TENORMIN) 50 MG tablet TAKE 1 TABLET DAILY 90 tablet 2    pantoprazole (PROTONIX) 40 MG tablet TAKE 1 TABLET DAILY 90 tablet 2    nystatin (MYCOSTATIN) 871279 UNIT/GM cream Apply topically 2 times daily. 15 g 3    loratadine (CLARITIN) 10 MG tablet Take 1 tablet by mouth daily 90 tablet 3    hydrocortisone (ANUSOL-HC) 25 MG suppository Place 1 suppository rectally 2 times daily as needed for Hemorrhoids 90 suppository 3    Cyanocobalamin (NASCOBAL) 500 MCG/0.1ML SOLN nasal spray 1 spray by Nasal route once a week 1 Bottle 11    spironolactone (ALDACTONE) 25 MG tablet TAKE 1 TABLET DAILY 90 tablet 3    montelukast (SINGULAIR) 10 MG tablet TAKE 1 TABLET NIGHTLY 90 tablet 3    budesonide-formoterol (SYMBICORT) 160-4.5 MCG/ACT AERO Inhale 2 puffs into the lungs 2 times daily 1 Inhaler 11    fluticasone (FLONASE) 50 MCG/ACT nasal spray PLACE 1 SPRAY IN EACH NOSTRIL DAILY 3 Bottle 5    Cyanocobalamin (NASCOBAL) 500 MCG/0.1ML SOLN nasal spray 1 spray by Nasal route once a week 1 Bottle 5    Genistein 30 MG TABS Take 1 each by mouth daily 30 tablet 5    fluocinonide (LIDEX) 0.05 % cream Apply topically 2 times daily.  30 g 5    hydrocortisone (V-R HYDROCORTISONE/ALOE) 0.5 % ointment Apply topically 2 times daily. 1 Tube 0    Probiotic Product (PROBIOTIC & ACIDOPHILUS EX ST) CAPS Take 1 each by mouth 2 times daily (with meals) Any brand will apply . 60 capsule 3    ketoconazole (NIZORAL) 2 % cream Apply topically tid to the involved areas. 60 g 1    simethicone (MYLICON) 80 MG chewable tablet Take 80 mg by mouth every 6 hours as needed for Flatulence.  hydrocortisone (ANUSOL-HC) 2.5 % rectal cream Place rectally 2 times daily. 1 Tube 0    Calcium Carbonate-Vitamin D (CALTRATE 600+D PO) Take 1 tablet by mouth every other day.  aspirin (ECOTRIN) 325 MG EC tablet Take 325 mg by mouth Daily with lunch.  Multiple Vitamins-Minerals (CENTRUM SILVER) TABS Take 1 tablet by mouth three times a week. No current facility-administered medications on file prior to visit. Patient Active Problem List   Diagnosis    GERD (gastroesophageal reflux disease)    Asthma    IBS (irritable bowel syndrome)    MGUS (monoclonal gammopathy of unknown significance)    Menopause    Graves' disease    S/P colonoscopy    Left renal mass    Osteoporosis    Alkaline phosphatase elevation    Hearing loss sensory, bilateral    Bacterial overgrowth syndrome    Erythrocytosis    Arthritis of knee, right    Essential hypertension    Multiple food allergies    Protein malnutrition (HCC)    Celiac disease    Aortic regurgitation    Primary osteoarthritis of both knees    Hyperparathyroidism ([facility] [address]    History of Graves' disease    Age-related osteoporosis without current pathological fracture     Allergies   Allergen Reactions    Creon [Pancrelipase (Lip-Prot-Amyl)]      Swollen gums.  Ace Inhibitors      angioedema    Ciprocinonide [Fluocinolone]      Stiff, no nausea or rash    Dye [Iodides] Hives     Only Dye that went into eyes.     Macrobid [Nitrofurantoin Monohydrate Macrocrystals]     Sulfa Antibiotics        Review of Systems   Constitutional: Positive for fatigue. Negative for activity change, appetite change, chills, diaphoresis, fever and unexpected weight change. HENT: Negative. Negative for congestion, ear discharge, ear pain, facial swelling, hearing loss, nosebleeds, postnasal drip, rhinorrhea, sneezing, sore throat and trouble swallowing. Graves Disease being followed by endocrinology. Dr. Mitchell Carvalho is following enlarging dominant nodule. No biopsy for now due to age , but will repeat in six months. US on 444/22/16. Eyes: Negative for pain, discharge, redness and itching. 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 Asthma, controlled with prn inhaler. Cardiovascular: Negative for chest pain, palpitations and leg swelling. Hypertension       Gastrointestinal: Negative for abdominal pain, anal bleeding, blood in stool, constipation, diarrhea, nausea and vomiting. IBS and GERD with intermittent pain.   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 Endocrine: Negative. Primary hyperparathyroidism, Graves' disease   Genitourinary: Negative for dysuria, frequency, hematuria, urgency and vaginal discharge. Musculoskeletal: Negative. Negative for arthralgias, back pain, joint swelling, myalgias, neck pain and neck stiffness. Multijoint osteoarthritis. Will receive cartilage injections in both knees with ortho , this month on 10/17/14. Osteoporosis treated with Genistin  ( i cool )not a candidate for oral bisphosphonates due to GERD with frequent exacerbations. Burning in right foot    Skin: Negative for pallor and rash. Allergic/Immunologic: Negative for environmental allergies and food allergies. Allergy on gluten-free diet   Neurological: Negative. Negative for dizziness, weakness, numbness and headaches. Hematological: Negative. Psychiatric/Behavioral: Negative. Objective:   Physical Exam   Constitutional: She is oriented to person, place, and time. She appears well-developed and well-nourished. No distress. HENT:   Head: Normocephalic and atraumatic. Right Ear: External ear normal.   Left Ear: External ear normal.   Nose: Nose normal.   Mouth/Throat: Oropharynx is clear and moist.   Upper and lower dentures   Eyes: Conjunctivae and EOM are normal. Pupils are equal, round, and reactive to light. Right eye exhibits no discharge. Left eye exhibits no discharge. No scleral icterus. Neck: Normal range of motion. Neck supple. No JVD present. No tracheal deviation present. No thyromegaly present. Cardiovascular: Normal rate, regular rhythm, normal heart sounds and intact distal pulses. Exam reveals no friction rub. Pulmonary/Chest: Effort normal and breath sounds normal. No respiratory distress. She has no wheezes. She has no rales. She exhibits no tenderness. Normal breast exam and no axillary adenopathy. Abdominal: Soft. Bowel sounds are normal. She exhibits no distension and no mass. There is no tenderness. There is no rebound and no guarding. Musculoskeletal: She exhibits no edema or tenderness. Marked deformity of right knee and painful with ROM and can not straighten knee. Edema of bilateral lower legs resolved. Lymphadenopathy:     She has no cervical adenopathy. Neurological: She is alert and oriented to person, place, and time. She has normal reflexes. No cranial nerve deficit. She exhibits normal muscle tone. Coordination normal.   Skin: Skin is warm and dry. No rash noted. She is not diaphoretic. No erythema. No pallor. Psychiatric: She has a normal mood and affect. Her behavior is normal. Judgment and thought content normal.   Nursing note and vitals reviewed. Assessment:       1. Graves' disease improving. Increase methizole form 7.5 to 10 mg qd.   TSH WITH REFLEX TO FT4    T4, FREE    T3, FREE    methimazole (TAPAZOLE) 10 MG tablet   2. Essential hypertension controlled,  Continue meds and monitor renal function and lytes. Comprehensive Metabolic Panel   3. Drooling with no sign of stroke and not related to medication. Mild , will monitor for now. 4. Hyperparathyroidism ([address] take bisphosphonate regularly. 5. Increased blood glucose  Hemoglobin A1C   6. Protein malnutrition ([address] improving with decreased edema on exam.  Continue to treat Graves's Disease and increase protein in diet. PREALBUMIN   7. Age-related osteoporosis without current pathological fracture aggrevated by Graves's Disease and Hyperparthyroidism. ibandronate (BONIVA) 150 MG tablet   8. Vitamin D deficiency on supplement, monitor level with goal 50-80. Vitamin D 25 Hydroxy   9. Right hip pain  XR HIP BILATERAL W AP PELVIS (2 VIEWS)   10. Female pelvic pain , bone pain will xray. Remote fall. XR HIP BILATERAL W AP PELVIS (2 VIEWS)   11. Vitamin B 12 deficiency  cyanocobalamin injection 1,000 mcg           Plan:      Caryle Barba received counseling on the following healthy behaviors: medication adherence    Patient given educational materials on Nutrition    I have instructed Stephjohanna Vila to complete a self tracking handout on Blood Pressures  and Weights and instructed them to bring it with them to her next appointment. Discussed use, benefit, and side effects of prescribed medications. Barriers to medication compliance addressed. All patient questions answered. Pt voiced understanding. Patient is taking over the counter meds and discussed as to how they interact with prescription medications. return in two months.

## 2017-10-29 ASSESSMENT — PATIENT HEALTH QUESTIONNAIRE - PHQ9
1. LITTLE INTEREST OR PLEASURE IN DOING THINGS: 1
2. FEELING DOWN, DEPRESSED OR HOPELESS: 1
SUM OF ALL RESPONSES TO PHQ9 QUESTIONS 1 & 2: 2
SUM OF ALL RESPONSES TO PHQ QUESTIONS 1-9: 2

## 2017-10-29 ASSESSMENT — ENCOUNTER SYMPTOMS
SHORTNESS OF BREATH: 0
WHEEZING: 0
STRIDOR: 0
TROUBLE SWALLOWING: 0
EYE DISCHARGE: 0
DIARRHEA: 0
BACK PAIN: 0
CHOKING: 0
RHINORRHEA: 0
CONSTIPATION: 0
EYE ITCHING: 0
SORE THROAT: 0
CHEST TIGHTNESS: 0
EYE REDNESS: 0
VOMITING: 0
COUGH: 0
ANAL BLEEDING: 0
BLOOD IN STOOL: 0
ALLERGIC/IMMUNOLOGIC COMMENTS: ALLERGY ON GLUTEN-FREE DIET
APNEA: 0
NAUSEA: 0
ABDOMINAL PAIN: 0
EYE PAIN: 0
FACIAL SWELLING: 0

## 2017-11-01 RX ORDER — HYDROXYZINE HYDROCHLORIDE 10 MG/1
10 TABLET, FILM COATED ORAL 3 TIMES DAILY PRN
Qty: 90 TABLET | Refills: 3 | Status: SHIPPED | OUTPATIENT
Start: 2017-11-01 | End: 2017-11-11

## 2017-11-02 NOTE — TELEPHONE ENCOUNTER
Please let patient know that atarax 10 mg tid prn was sent in to replace ativan. It is much safer. Ativan is no longer recommended for seniors because it increases the risk of falling. [place] no longer recommends this medication for long term use.

## 2017-12-13 ENCOUNTER — OFFICE VISIT (OUTPATIENT)
Dept: CARDIOLOGY CLINIC | Age: 82
End: 2017-12-13

## 2017-12-13 VITALS
HEART RATE: 67 BPM | DIASTOLIC BLOOD PRESSURE: 58 MMHG | WEIGHT: 123 LBS | BODY MASS INDEX: 18.16 KG/M2 | SYSTOLIC BLOOD PRESSURE: 124 MMHG

## 2017-12-13 DIAGNOSIS — I10 ESSENTIAL HYPERTENSION: Primary | ICD-10-CM

## 2017-12-13 DIAGNOSIS — Z86.39 HISTORY OF GRAVES' DISEASE: ICD-10-CM

## 2017-12-13 DIAGNOSIS — I35.1 NONRHEUMATIC AORTIC VALVE INSUFFICIENCY: ICD-10-CM

## 2017-12-13 PROCEDURE — 1036F TOBACCO NON-USER: CPT | Performed by: INTERNAL MEDICINE

## 2017-12-13 PROCEDURE — 1123F ACP DISCUSS/DSCN MKR DOCD: CPT | Performed by: INTERNAL MEDICINE

## 2017-12-13 PROCEDURE — G8484 FLU IMMUNIZE NO ADMIN: HCPCS | Performed by: INTERNAL MEDICINE

## 2017-12-13 PROCEDURE — 99214 OFFICE O/P EST MOD 30 MIN: CPT | Performed by: INTERNAL MEDICINE

## 2017-12-13 PROCEDURE — G8419 CALC BMI OUT NRM PARAM NOF/U: HCPCS | Performed by: INTERNAL MEDICINE

## 2017-12-13 PROCEDURE — G8427 DOCREV CUR MEDS BY ELIG CLIN: HCPCS | Performed by: INTERNAL MEDICINE

## 2017-12-13 PROCEDURE — 1090F PRES/ABSN URINE INCON ASSESS: CPT | Performed by: INTERNAL MEDICINE

## 2017-12-13 PROCEDURE — 4040F PNEUMOC VAC/ADMIN/RCVD: CPT | Performed by: INTERNAL MEDICINE

## 2017-12-13 NOTE — PROGRESS NOTES
[address]   Dr Fuad Daniels. Radha Dao MD, [facility]    Outpatient Follow Up Note    12/13/2017  Subjective:   CHIEF COMPLAINT / HPI:  Follow Up secondary to hypertension, hyperlipidemia and coronary artery disease     Jennifer Raymundo is 80 y.o. female who presents today for   Follow-up. She is about to be 89 on the day after Noelle. Denies any chest pains or shortness of breath. Apparently did have some GI issues with bloating and did have some weight loss. She is now eating better. Denies any chest pains and no palpitations. 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    PCR    Clostridium difficile infection 1/27/15    AG positive    Degenerative joint disease of knee     Depression     Diverticulitis     GERD (gastroesophageal reflux disease)     Grave's disease 4/2/2012    Heart disease     Hypertension     IBS (irritable bowel syndrome)     Macular degeneration     MGUS (monoclonal gammopathy of unknown significance)     Simple cyst of kidney      Past Surgical History  Past Surgical History:   Procedure Laterality Date    CHOLECYSTECTOMY      COLONOSCOPY  10/29/2009    **see scanned report- [facility]    HEMORRHOID SURGERY      HYSTERECTOMY  in 40's    rudolph/bso     Social History:       History   Smoking Status    Never Smoker   Smokeless Tobacco    Never Used     Current Medications:  Prior to Visit Medications    Medication Sig Taking?  Authorizing Provider   ibandronate (BONIVA) 150 MG tablet Take 1 tablet by mouth every 30 days  Valerie Springer MD   methimazole (TAPAZOLE) 10 MG tablet Take 1 tablet by mouth daily  Valerie Springer MD   mirtazapine (REMERON) 15 MG tablet Take 1 tablet by mouth nightly  Valerie Springer MD   felodipine (PLENDIL) 2.5 MG extended release tablet Take 1 tablet by mouth daily Ally Victoria MD   ammonium lactate (LAC-HYDRIN) 12 % lotion Apply topically daily. Ally Victoria MD   atenolol (TENORMIN) 50 MG tablet TAKE 1 TABLET DAILY  Ally Victoria MD   pantoprazole (PROTONIX) 40 MG tablet TAKE 1 TABLET DAILY  Ally Victoria MD   nystatin (MYCOSTATIN) [id number] UNIT/GM cream Apply topically 2 times daily. Ally Victoria MD   loratadine (CLARITIN) 10 MG tablet Take 1 tablet by mouth daily  Ally Victoria MD   hydrocortisone (ANUSOL-HC) 25 MG suppository Place 1 suppository rectally 2 times daily as needed for Hemorrhoids  Ally Victoria MD   Cyanocobalamin (NASCOBAL) 500 MCG/0.1ML SOLN nasal spray 1 spray by Nasal route once a week  Ally Victoria MD   spironolactone (ALDACTONE) 25 MG tablet TAKE 1 TABLET DAILY  Ally Victoria MD   montelukast (SINGULAIR) 10 MG tablet TAKE 1 TABLET NIGHTLY  Ally Victoria MD   budesonide-formoterol (SYMBICORT) 160-4.5 MCG/ACT AERO Inhale 2 puffs into the lungs 2 times daily  Ally Victoria MD   fluticasone (FLONASE) 50 MCG/ACT nasal spray PLACE 1 SPRAY IN EACH NOSTRIL DAILY  Ally Victoria MD   Cyanocobalamin (NASCOBAL) 500 MCG/0.1ML SOLN nasal spray 1 spray by Nasal route once a week  Ally Victoria MD   Genistein 30 MG TABS Take 1 each by mouth daily  Ally Victoria MD   fluocinonide (LIDEX) 0.05 % cream Apply topically 2 times daily. Ally Vcitoria MD   hydrocortisone (V-R HYDROCORTISONE/ALOE) 0.5 % ointment Apply topically 2 times daily. Chris Lanier III, MD   Probiotic Product (PROBIOTIC & ACIDOPHILUS EX ST) CAPS Take 1 each by mouth 2 times daily (with meals) Any brand will apply . Ally Victoria MD   ketoconazole (NIZORAL) 2 % cream Apply topically tid to the involved areas. Ally Victoria MD   simethicone (MYLICON) 80 MG chewable tablet Take 80 mg by mouth every 6 hours as needed for Flatulence.   Historical Provider, MD   hydrocortisone (ANUSOL-HC) 2.5 % rectal cream Place rectally 2 times daily. [facility] Monalisabjorn Raysa Amaliaabigailshyanne, [address]   Calcium Carbonate-Vitamin D (CALTRATE 600+D PO) Take 1 tablet by mouth every other day. Historical Provider, MD   aspirin (ECOTRIN) 325 MG EC tablet Take 325 mg by mouth Daily with lunch. Historical Provider, MD   Multiple Vitamins-Minerals (CENTRUM SILVER) TABS Take 1 tablet by mouth three times a week. Historical Provider, MD     Family History  Family History   Problem Relation Age of Onset    Heart Disease Mother     High Blood Pressure Mother     Stroke Mother     Cancer Father      multiple myeloma       Current Medications  Current Outpatient Prescriptions   Medication Sig Dispense Refill    ibandronate (BONIVA) 150 MG tablet Take 1 tablet by mouth every 30 days 3 tablet 11    methimazole (TAPAZOLE) 10 MG tablet Take 1 tablet by mouth daily 30 tablet 1    mirtazapine (REMERON) 15 MG tablet Take 1 tablet by mouth nightly 30 tablet 3    felodipine (PLENDIL) 2.5 MG extended release tablet Take 1 tablet by mouth daily 30 tablet 5    ammonium lactate (LAC-HYDRIN) 12 % lotion Apply topically daily. 225 g 5    atenolol (TENORMIN) 50 MG tablet TAKE 1 TABLET DAILY 90 tablet 2    pantoprazole (PROTONIX) 40 MG tablet TAKE 1 TABLET DAILY 90 tablet 2    nystatin (MYCOSTATIN) [id number] UNIT/GM cream Apply topically 2 times daily.  15 g 3    loratadine (CLARITIN) 10 MG tablet Take 1 tablet by mouth daily 90 tablet 3    hydrocortisone (ANUSOL-HC) 25 MG suppository Place 1 suppository rectally 2 times daily as needed for Hemorrhoids 90 suppository 3    Cyanocobalamin (NASCOBAL) 500 MCG/0.1ML SOLN nasal spray 1 spray by Nasal route once a week 1 Bottle 11    spironolactone (ALDACTONE) 25 MG tablet TAKE 1 TABLET DAILY 90 tablet 3    montelukast (SINGULAIR) 10 MG tablet TAKE 1 TABLET NIGHTLY 90 tablet 3    budesonide-formoterol (SYMBICORT) 160-4.5 MCG/ACT AERO Inhale 2 puffs into the lungs 2 times 12/31/2014     Lab Results   Component Value Date    LDLCALC 79 07/15/2016    LDLCALC 84 07/02/2015    LDLCALC 62 12/31/2014     Lab Results   Component Value Date    LABVLDL 15 07/15/2016    LABVLDL 16 07/02/2015    LABVLDL 14 12/31/2014     Radiology Review:  Pertinent images / reports were reviewed as a part of this visit and reveals the following:    Last Echo:  Conclusions 4/15/15    Summary  Left ventricle size is normal with basal septal hypertrophy and normal Left  ventricular function with ejection fraction estimated at 55 %. Diastolic filling parameters suggests grade I diastolic dysfunction . Mild to moderate mitral regurgitation is present. Moderate aortic regurgitation is present. Mild pulmonic regurgitation present   Last Stress Test / Angiogram:    Last ECG: 3/15/15  Sinus  Bradycardia  -Second degree A-V block  (Type I)   -Left atrial enlargement. Voltage criteria for LVH  (R(I)+S(III) exceeds 2.50 mV)  -Voltage criteria w/o ST/T abnormality may be normal.    -Anterolateral ST-elevation -repolarization variant.    -  T-abnormality  -Possible inferior ischemia. EKG today December 13, 2017 sinus rhythm with sinus arrhythmia. Nonspecific ST and T wave changes. Possibly some early repolarization pattern. Assessment:    CAD  Hypertension. Plan:    From a cardiac perspective she seems fairly stable. Her blood pressure and vitals are stable. Her weight is down at 123. We will continue her current therapy as listed. Encourage nutritional and protein intake. She is a very active lady and very sharp at 80. Return in 4 months. Please call if we can assist further [phone]. Miranda Gilliam.  Joelle CABRERA, [facility]      This note was likely completed using voice recognition technology and may contain unintended errors

## 2017-12-20 ENCOUNTER — OFFICE VISIT (OUTPATIENT)
Dept: PRIMARY CARE CLINIC | Age: 82
End: 2017-12-20

## 2017-12-20 VITALS
WEIGHT: 121.6 LBS | DIASTOLIC BLOOD PRESSURE: 67 MMHG | HEART RATE: 60 BPM | BODY MASS INDEX: 17.96 KG/M2 | SYSTOLIC BLOOD PRESSURE: 123 MMHG | OXYGEN SATURATION: 96 % | TEMPERATURE: 97 F

## 2017-12-20 DIAGNOSIS — I10 ESSENTIAL HYPERTENSION: ICD-10-CM

## 2017-12-20 DIAGNOSIS — E53.8 VITAMIN B 12 DEFICIENCY: ICD-10-CM

## 2017-12-20 DIAGNOSIS — I06.1 RHEUMATIC AORTIC VALVE INSUFFICIENCY: ICD-10-CM

## 2017-12-20 DIAGNOSIS — F41.9 ANXIETY: ICD-10-CM

## 2017-12-20 DIAGNOSIS — E05.00 GRAVES' DISEASE: Primary | ICD-10-CM

## 2017-12-20 DIAGNOSIS — E46 PROTEIN MALNUTRITION (HCC): ICD-10-CM

## 2017-12-20 LAB
A/G RATIO: 1.6 (ref 1.1–2.2)
ALBUMIN SERPL-MCNC: 4.3 G/DL (ref 3.4–5)
ALP BLD-CCNC: 190 U/L (ref 40–129)
ALT SERPL-CCNC: 7 U/L (ref 10–40)
ANION GAP SERPL CALCULATED.3IONS-SCNC: 13 MMOL/L (ref 3–16)
AST SERPL-CCNC: 15 U/L (ref 15–37)
BASOPHILS ABSOLUTE: 0 K/UL (ref 0–0.2)
BASOPHILS RELATIVE PERCENT: 0.4 %
BILIRUB SERPL-MCNC: 1.3 MG/DL (ref 0–1)
BUN BLDV-MCNC: 10 MG/DL (ref 7–20)
CALCIUM SERPL-MCNC: 10.9 MG/DL (ref 8.3–10.6)
CHLORIDE BLD-SCNC: 106 MMOL/L (ref 99–110)
CO2: 26 MMOL/L (ref 21–32)
CREAT SERPL-MCNC: 0.8 MG/DL (ref 0.6–1.2)
EOSINOPHILS ABSOLUTE: 0.1 K/UL (ref 0–0.6)
EOSINOPHILS RELATIVE PERCENT: 0.6 %
GFR AFRICAN AMERICAN: >60
GFR NON-AFRICAN AMERICAN: >60
GLOBULIN: 2.7 G/DL
GLUCOSE BLD-MCNC: 86 MG/DL (ref 70–99)
HCT VFR BLD CALC: 42.7 % (ref 36–48)
HEMOGLOBIN: 13.5 G/DL (ref 12–16)
LYMPHOCYTES ABSOLUTE: 2.6 K/UL (ref 1–5.1)
LYMPHOCYTES RELATIVE PERCENT: 28.3 %
MCH RBC QN AUTO: 24.9 PG (ref 26–34)
MCHC RBC AUTO-ENTMCNC: 31.6 G/DL (ref 31–36)
MCV RBC AUTO: 78.6 FL (ref 80–100)
MONOCYTES ABSOLUTE: 0.8 K/UL (ref 0–1.3)
MONOCYTES RELATIVE PERCENT: 8.6 %
NEUTROPHILS ABSOLUTE: 5.6 K/UL (ref 1.7–7.7)
NEUTROPHILS RELATIVE PERCENT: 62.1 %
PDW BLD-RTO: 15.9 % (ref 12.4–15.4)
PLATELET # BLD: 229 K/UL (ref 135–450)
PMV BLD AUTO: 9.9 FL (ref 5–10.5)
POTASSIUM SERPL-SCNC: 4.7 MMOL/L (ref 3.5–5.1)
PREALBUMIN: 16.4 MG/DL (ref 20–40)
RBC # BLD: 5.43 M/UL (ref 4–5.2)
SODIUM BLD-SCNC: 145 MMOL/L (ref 136–145)
TOTAL PROTEIN: 7 G/DL (ref 6.4–8.2)
VITAMIN B-12: 378 PG/ML (ref 211–911)
WBC # BLD: 9.1 K/UL (ref 4–11)

## 2017-12-20 PROCEDURE — G8484 FLU IMMUNIZE NO ADMIN: HCPCS | Performed by: INTERNAL MEDICINE

## 2017-12-20 PROCEDURE — 1123F ACP DISCUSS/DSCN MKR DOCD: CPT | Performed by: INTERNAL MEDICINE

## 2017-12-20 PROCEDURE — G8427 DOCREV CUR MEDS BY ELIG CLIN: HCPCS | Performed by: INTERNAL MEDICINE

## 2017-12-20 PROCEDURE — 99214 OFFICE O/P EST MOD 30 MIN: CPT | Performed by: INTERNAL MEDICINE

## 2017-12-20 PROCEDURE — G8419 CALC BMI OUT NRM PARAM NOF/U: HCPCS | Performed by: INTERNAL MEDICINE

## 2017-12-20 PROCEDURE — 4040F PNEUMOC VAC/ADMIN/RCVD: CPT | Performed by: INTERNAL MEDICINE

## 2017-12-20 PROCEDURE — 1036F TOBACCO NON-USER: CPT | Performed by: INTERNAL MEDICINE

## 2017-12-20 PROCEDURE — 1090F PRES/ABSN URINE INCON ASSESS: CPT | Performed by: INTERNAL MEDICINE

## 2017-12-20 RX ORDER — HYDROXYZINE HYDROCHLORIDE 10 MG/1
10 TABLET, FILM COATED ORAL 3 TIMES DAILY PRN
Qty: 30 TABLET | Refills: 5 | Status: SHIPPED | OUTPATIENT
Start: 2017-12-20 | End: 2017-12-30

## 2017-12-20 NOTE — PROGRESS NOTES
of Graves' disease    Age-related osteoporosis without current pathological fracture     Allergies   Allergen Reactions    Creon [Pancrelipase (Lip-Prot-Amyl)]      Swollen gums.  Ace Inhibitors      angioedema    Ciprocinonide [Fluocinolone]      Stiff, no nausea or rash    Dye [Iodides] Hives     Only Dye that went into eyes.  Macrobid [Nitrofurantoin Monohydrate Macrocrystals]     Sulfa Antibiotics          Review of Systems   Constitutional: Positive for fatigue. Negative for activity change, appetite change, chills, diaphoresis, fever and unexpected weight change. Decreasing fatigue   HENT: Negative. Negative for congestion, ear discharge, ear pain, facial swelling, hearing loss, nosebleeds, postnasal drip, rhinorrhea, sneezing, sore throat and trouble swallowing. Graves Disease being followed by endocrinology. Eyes: Negative for pain, discharge, redness and itching. 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 Asthma, controlled with prn inhaler. Cardiovascular: Negative for chest pain, palpitations and leg swelling. Hypertension       Gastrointestinal: Negative for abdominal pain, anal bleeding, blood in stool, constipation, diarrhea, nausea and vomiting. IBS and GERD with intermittent pain.   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 Endocrine: Negative. Primary hyperparathyroidism, Graves' disease   Genitourinary: Negative for dysuria, frequency, hematuria, urgency and vaginal discharge. Musculoskeletal: Negative. Negative for arthralgias, back pain, joint swelling, myalgias, neck pain and neck stiffness. Multijoint osteoarthritis. Will receive cartilage injections in both knees with ortho , this month on 10/17/14.     Osteoporosis treated with Genistin  ( i cool )not a candidate for oral bisphosphonates due to GERD with frequent exacerbations. Burning in right foot    Skin: Negative for pallor and rash. Allergic/Immunologic: Negative for environmental allergies and food allergies. Allergy on gluten-free diet   Neurological: Negative. Negative for dizziness, weakness, numbness and headaches. Hematological: Negative. Psychiatric/Behavioral: Negative. Objective:   Physical Exam   Constitutional: She is oriented to person, place, and time. She appears well-developed and well-nourished. No distress. HENT:   Head: Normocephalic and atraumatic. Right Ear: External ear normal.   Left Ear: External ear normal.   Nose: Nose normal.   Mouth/Throat: Oropharynx is clear and moist.   Upper and lower dentures   Eyes: Conjunctivae and EOM are normal. Pupils are equal, round, and reactive to light. Right eye exhibits no discharge. Left eye exhibits no discharge. No scleral icterus. Neck: Normal range of motion. Neck supple. No JVD present. No tracheal deviation present. No thyromegaly present. Cardiovascular: Normal rate, regular rhythm, normal heart sounds and intact distal pulses. Exam reveals no friction rub. Pulmonary/Chest: Effort normal and breath sounds normal. No respiratory distress. She has no wheezes. She has no rales. She exhibits no tenderness. Normal breast exam and no axillary adenopathy. Abdominal: Soft. Bowel sounds are normal. She exhibits no distension and no mass. There is no tenderness. There is no rebound and no guarding. Musculoskeletal: She exhibits no edema or tenderness. Marked deformity of right knee and painful with ROM and can not straighten knee. Lymphadenopathy:     She has no cervical adenopathy. Neurological: She is alert and oriented to person, place, and time. She has normal reflexes. No cranial nerve deficit. She exhibits normal muscle tone. Coordination normal.   Skin: Skin is warm and dry. No rash noted. She is not diaphoretic. No erythema.  No

## 2017-12-26 ASSESSMENT — ENCOUNTER SYMPTOMS
WHEEZING: 0
EYE DISCHARGE: 0
DIARRHEA: 0
ANAL BLEEDING: 0
SHORTNESS OF BREATH: 0
STRIDOR: 0
COUGH: 0
ALLERGIC/IMMUNOLOGIC COMMENTS: ALLERGY ON GLUTEN-FREE DIET
APNEA: 0
BLOOD IN STOOL: 0
FACIAL SWELLING: 0
NAUSEA: 0
BACK PAIN: 0
TROUBLE SWALLOWING: 0
VOMITING: 0
CONSTIPATION: 0
EYE REDNESS: 0
SORE THROAT: 0
ABDOMINAL PAIN: 0
EYE PAIN: 0
RHINORRHEA: 0
CHOKING: 0
EYE ITCHING: 0
CHEST TIGHTNESS: 0

## 2017-12-26 ASSESSMENT — PATIENT HEALTH QUESTIONNAIRE - PHQ9
SUM OF ALL RESPONSES TO PHQ9 QUESTIONS 1 & 2: 0
2. FEELING DOWN, DEPRESSED OR HOPELESS: 0
SUM OF ALL RESPONSES TO PHQ QUESTIONS 1-9: 0
1. LITTLE INTEREST OR PLEASURE IN DOING THINGS: 0

## 2018-01-05 ENCOUNTER — OFFICE VISIT (OUTPATIENT)
Dept: DERMATOLOGY | Age: 83
End: 2018-01-05

## 2018-01-05 DIAGNOSIS — L89.151 DECUBITUS ULCER OF SACRAL REGION, STAGE 1: Primary | ICD-10-CM

## 2018-01-05 DIAGNOSIS — J30.89 OTHER ALLERGIC RHINITIS: ICD-10-CM

## 2018-01-05 DIAGNOSIS — L85.3 XEROSIS OF SKIN: ICD-10-CM

## 2018-01-05 DIAGNOSIS — Z78.9 NON-TOBACCO USER: ICD-10-CM

## 2018-01-05 PROCEDURE — 99202 OFFICE O/P NEW SF 15 MIN: CPT | Performed by: DERMATOLOGY

## 2018-01-05 PROCEDURE — 1036F TOBACCO NON-USER: CPT | Performed by: DERMATOLOGY

## 2018-01-05 PROCEDURE — 1123F ACP DISCUSS/DSCN MKR DOCD: CPT | Performed by: DERMATOLOGY

## 2018-01-05 PROCEDURE — G8427 DOCREV CUR MEDS BY ELIG CLIN: HCPCS | Performed by: DERMATOLOGY

## 2018-01-05 PROCEDURE — 1090F PRES/ABSN URINE INCON ASSESS: CPT | Performed by: DERMATOLOGY

## 2018-01-05 PROCEDURE — G8484 FLU IMMUNIZE NO ADMIN: HCPCS | Performed by: DERMATOLOGY

## 2018-01-05 PROCEDURE — G8419 CALC BMI OUT NRM PARAM NOF/U: HCPCS | Performed by: DERMATOLOGY

## 2018-01-05 PROCEDURE — 4040F PNEUMOC VAC/ADMIN/RCVD: CPT | Performed by: DERMATOLOGY

## 2018-01-05 NOTE — PROGRESS NOTES
lotion Apply topically daily. 225 g 5    atenolol (TENORMIN) 50 MG tablet TAKE 1 TABLET DAILY 90 tablet 2    pantoprazole (PROTONIX) 40 MG tablet TAKE 1 TABLET DAILY 90 tablet 2    loratadine (CLARITIN) 10 MG tablet Take 1 tablet by mouth daily 90 tablet 3    hydrocortisone (ANUSOL-HC) 25 MG suppository Place 1 suppository rectally 2 times daily as needed for Hemorrhoids 90 suppository 3    Cyanocobalamin (NASCOBAL) 500 MCG/0.1ML SOLN nasal spray 1 spray by Nasal route once a week 1 Bottle 11    spironolactone (ALDACTONE) 25 MG tablet TAKE 1 TABLET DAILY 90 tablet 3    montelukast (SINGULAIR) 10 MG tablet TAKE 1 TABLET NIGHTLY 90 tablet 3    budesonide-formoterol (SYMBICORT) 160-4.5 MCG/ACT AERO Inhale 2 puffs into the lungs 2 times daily 1 Inhaler 11    fluticasone (FLONASE) 50 MCG/ACT nasal spray PLACE 1 SPRAY IN EACH NOSTRIL DAILY 3 Bottle 5    hydrocortisone (V-R HYDROCORTISONE/ALOE) 0.5 % ointment Apply topically 2 times daily. 1 Tube 0    Calcium Carbonate-Vitamin D (CALTRATE 600+D PO) Take 1 tablet by mouth every other day.  aspirin (ECOTRIN) 325 MG EC tablet Take 325 mg by mouth Daily with lunch. Social History:   Social History     Social History    Marital status: Single     Spouse name: N/A    Number of children: N/A    Years of education: N/A     Occupational History    Not on file. Social History Main Topics    Smoking status: Never Smoker    Smokeless tobacco: Never Used    Alcohol use No    Drug use: No    Sexual activity: Not Currently     Other Topics Concern    Not on file     Social History Narrative    No narrative on file       Physical Examination     The following were examined and determined to be normal: Psych/Neuro, Back, RUE, LUE, RLE, LLE and Nails/digits. Groin/genitalia not examined. The following were examined and determined to be abnormal: buttocks. -General: NAD, underweight, well-developed. Areas of skin examined as listed above:   1.

## 2018-01-07 RX ORDER — MONTELUKAST SODIUM 10 MG/1
TABLET ORAL
Qty: 90 TABLET | Refills: 3 | Status: SHIPPED | OUTPATIENT
Start: 2018-01-07 | End: 2019-12-09 | Stop reason: SDUPTHER

## 2018-01-26 DIAGNOSIS — I10 ESSENTIAL HYPERTENSION: ICD-10-CM

## 2018-01-26 RX ORDER — FELODIPINE 2.5 MG/1
2.5 TABLET, EXTENDED RELEASE ORAL DAILY
Qty: 90 TABLET | Refills: 5 | Status: SHIPPED | OUTPATIENT
Start: 2018-01-26 | End: 2019-12-09 | Stop reason: SDUPTHER

## 2018-02-07 ENCOUNTER — TELEPHONE (OUTPATIENT)
Dept: PRIMARY CARE CLINIC | Age: 83
End: 2018-02-07

## 2018-02-27 RX ORDER — PANTOPRAZOLE SODIUM 40 MG/1
TABLET, DELAYED RELEASE ORAL
Qty: 90 TABLET | Refills: 2 | Status: SHIPPED | OUTPATIENT
Start: 2018-02-27 | End: 2018-07-18 | Stop reason: SDUPTHER

## 2018-03-14 DIAGNOSIS — I10 ESSENTIAL HYPERTENSION: ICD-10-CM

## 2018-03-14 RX ORDER — SPIRONOLACTONE 25 MG/1
TABLET ORAL
Qty: 90 TABLET | Refills: 3 | Status: SHIPPED | OUTPATIENT
Start: 2018-03-14 | End: 2019-03-11 | Stop reason: SDUPTHER

## 2018-04-02 DIAGNOSIS — I10 ESSENTIAL HYPERTENSION: ICD-10-CM

## 2018-04-05 RX ORDER — ATENOLOL 50 MG/1
TABLET ORAL
Qty: 90 TABLET | Refills: 2 | Status: SHIPPED | OUTPATIENT
Start: 2018-04-05 | End: 2018-12-28 | Stop reason: SDUPTHER

## 2018-04-18 ENCOUNTER — OFFICE VISIT (OUTPATIENT)
Dept: PRIMARY CARE CLINIC | Age: 83
End: 2018-04-18

## 2018-04-18 VITALS
RESPIRATION RATE: 18 BRPM | HEART RATE: 59 BPM | SYSTOLIC BLOOD PRESSURE: 133 MMHG | BODY MASS INDEX: 18.31 KG/M2 | OXYGEN SATURATION: 98 % | TEMPERATURE: 97.7 F | DIASTOLIC BLOOD PRESSURE: 78 MMHG | WEIGHT: 124 LBS

## 2018-04-18 DIAGNOSIS — E55.9 VITAMIN D DEFICIENCY: ICD-10-CM

## 2018-04-18 DIAGNOSIS — E53.8 VITAMIN B 12 DEFICIENCY: ICD-10-CM

## 2018-04-18 DIAGNOSIS — E05.00 GRAVES' DISEASE: Primary | ICD-10-CM

## 2018-04-18 DIAGNOSIS — E46 PROTEIN MALNUTRITION (HCC): ICD-10-CM

## 2018-04-18 DIAGNOSIS — M81.0 AGE-RELATED OSTEOPOROSIS WITHOUT CURRENT PATHOLOGICAL FRACTURE: ICD-10-CM

## 2018-04-18 DIAGNOSIS — E21.3 HYPERPARATHYROIDISM (HCC): ICD-10-CM

## 2018-04-18 DIAGNOSIS — D47.2 MGUS (MONOCLONAL GAMMOPATHY OF UNKNOWN SIGNIFICANCE): ICD-10-CM

## 2018-04-18 DIAGNOSIS — I10 ESSENTIAL HYPERTENSION: ICD-10-CM

## 2018-04-18 DIAGNOSIS — K90.0 CELIAC DISEASE: ICD-10-CM

## 2018-04-18 DIAGNOSIS — J30.89 OTHER ALLERGIC RHINITIS: ICD-10-CM

## 2018-04-18 LAB
A/G RATIO: 1.6 (ref 1.1–2.2)
ALBUMIN SERPL-MCNC: 4.3 G/DL (ref 3.4–5)
ALP BLD-CCNC: 158 U/L (ref 40–129)
ALT SERPL-CCNC: 6 U/L (ref 10–40)
ANION GAP SERPL CALCULATED.3IONS-SCNC: 16 MMOL/L (ref 3–16)
AST SERPL-CCNC: 15 U/L (ref 15–37)
BASOPHILS ABSOLUTE: 0.1 K/UL (ref 0–0.2)
BASOPHILS RELATIVE PERCENT: 0.8 %
BILIRUB SERPL-MCNC: 0.7 MG/DL (ref 0–1)
BUN BLDV-MCNC: 14 MG/DL (ref 7–20)
CALCIUM SERPL-MCNC: 10.2 MG/DL (ref 8.3–10.6)
CHLORIDE BLD-SCNC: 102 MMOL/L (ref 99–110)
CO2: 24 MMOL/L (ref 21–32)
CREAT SERPL-MCNC: 0.9 MG/DL (ref 0.6–1.2)
EOSINOPHILS ABSOLUTE: 0.1 K/UL (ref 0–0.6)
EOSINOPHILS RELATIVE PERCENT: 1.2 %
GFR AFRICAN AMERICAN: >60
GFR NON-AFRICAN AMERICAN: 59
GLOBULIN: 2.7 G/DL
GLUCOSE BLD-MCNC: 82 MG/DL (ref 70–99)
HCT VFR BLD CALC: 42.9 % (ref 36–48)
HEMOGLOBIN: 13.8 G/DL (ref 12–16)
LYMPHOCYTES ABSOLUTE: 2.9 K/UL (ref 1–5.1)
LYMPHOCYTES RELATIVE PERCENT: 31.6 %
MCH RBC QN AUTO: 26 PG (ref 26–34)
MCHC RBC AUTO-ENTMCNC: 32.2 G/DL (ref 31–36)
MCV RBC AUTO: 80.6 FL (ref 80–100)
MONOCYTES ABSOLUTE: 0.9 K/UL (ref 0–1.3)
MONOCYTES RELATIVE PERCENT: 9.5 %
NEUTROPHILS ABSOLUTE: 5.3 K/UL (ref 1.7–7.7)
NEUTROPHILS RELATIVE PERCENT: 56.9 %
PDW BLD-RTO: 17.2 % (ref 12.4–15.4)
PLATELET # BLD: 267 K/UL (ref 135–450)
PMV BLD AUTO: 9.1 FL (ref 5–10.5)
POTASSIUM SERPL-SCNC: 4.5 MMOL/L (ref 3.5–5.1)
PREALBUMIN: 20.4 MG/DL (ref 20–40)
PROTEIN PROTEIN: <0.004 G/DL
PROTEIN PROTEIN: <4 MG/DL
RBC # BLD: 5.32 M/UL (ref 4–5.2)
SODIUM BLD-SCNC: 142 MMOL/L (ref 136–145)
VITAMIN D 25-HYDROXY: 36.4 NG/ML
WBC # BLD: 9.3 K/UL (ref 4–11)

## 2018-04-18 PROCEDURE — 4040F PNEUMOC VAC/ADMIN/RCVD: CPT | Performed by: INTERNAL MEDICINE

## 2018-04-18 PROCEDURE — 1036F TOBACCO NON-USER: CPT | Performed by: INTERNAL MEDICINE

## 2018-04-18 PROCEDURE — G8427 DOCREV CUR MEDS BY ELIG CLIN: HCPCS | Performed by: INTERNAL MEDICINE

## 2018-04-18 PROCEDURE — 1123F ACP DISCUSS/DSCN MKR DOCD: CPT | Performed by: INTERNAL MEDICINE

## 2018-04-18 PROCEDURE — G8419 CALC BMI OUT NRM PARAM NOF/U: HCPCS | Performed by: INTERNAL MEDICINE

## 2018-04-18 PROCEDURE — 99214 OFFICE O/P EST MOD 30 MIN: CPT | Performed by: INTERNAL MEDICINE

## 2018-04-18 PROCEDURE — 1090F PRES/ABSN URINE INCON ASSESS: CPT | Performed by: INTERNAL MEDICINE

## 2018-04-18 PROCEDURE — 96372 THER/PROPH/DIAG INJ SC/IM: CPT | Performed by: INTERNAL MEDICINE

## 2018-04-18 RX ORDER — CYANOCOBALAMIN 1000 UG/ML
1000 INJECTION INTRAMUSCULAR; SUBCUTANEOUS ONCE
Status: COMPLETED | OUTPATIENT
Start: 2018-04-18 | End: 2018-04-18

## 2018-04-18 RX ORDER — IBANDRONATE SODIUM 150 MG/1
150 TABLET, FILM COATED ORAL
Qty: 3 TABLET | Refills: 3 | Status: SHIPPED | OUTPATIENT
Start: 2018-04-18 | End: 2018-07-18 | Stop reason: SDUPTHER

## 2018-04-18 RX ORDER — FLUTICASONE PROPIONATE 50 MCG
SPRAY, SUSPENSION (ML) NASAL
Qty: 3 BOTTLE | Refills: 5 | Status: SHIPPED | OUTPATIENT
Start: 2018-04-18 | End: 2021-01-29 | Stop reason: SDUPTHER

## 2018-04-18 RX ADMIN — CYANOCOBALAMIN 1000 MCG: 1000 INJECTION INTRAMUSCULAR; SUBCUTANEOUS at 12:13

## 2018-04-18 ASSESSMENT — ENCOUNTER SYMPTOMS
EYE DISCHARGE: 0
DIARRHEA: 0
RHINORRHEA: 0
FACIAL SWELLING: 0
ALLERGIC/IMMUNOLOGIC COMMENTS: ALLERGY ON GLUTEN-FREE DIET
ANAL BLEEDING: 0
CHOKING: 0
TROUBLE SWALLOWING: 0
NAUSEA: 0
EYE PAIN: 0
CONSTIPATION: 0
BACK PAIN: 0
APNEA: 0
STRIDOR: 0
SORE THROAT: 0
SHORTNESS OF BREATH: 0
WHEEZING: 0
EYE REDNESS: 0
EYE ITCHING: 0
VOMITING: 0
BLOOD IN STOOL: 0
COUGH: 0
ABDOMINAL PAIN: 0
CHEST TIGHTNESS: 0

## 2018-04-20 LAB
ALBUMIN SERPL-MCNC: 3.5 G/DL (ref 3.1–4.9)
ALPHA-1-GLOBULIN: 0.3 G/DL (ref 0.2–0.4)
ALPHA-2-GLOBULIN: 0.8 G/DL (ref 0.4–1.1)
BETA GLOBULIN: 1.2 G/DL (ref 0.9–1.6)
GAMMA GLOBULIN: 1.3 G/DL (ref 0.6–1.8)
SPE/IFE INTERPRETATION: NORMAL
TOTAL PROTEIN: 7 G/DL (ref 6.4–8.2)
URINE ELECTROPHORESIS INTERP: NORMAL

## 2018-06-28 ENCOUNTER — OFFICE VISIT (OUTPATIENT)
Dept: CARDIOLOGY CLINIC | Age: 83
End: 2018-06-28

## 2018-06-28 VITALS
BODY MASS INDEX: 18.61 KG/M2 | WEIGHT: 126 LBS | HEART RATE: 72 BPM | SYSTOLIC BLOOD PRESSURE: 132 MMHG | DIASTOLIC BLOOD PRESSURE: 70 MMHG

## 2018-06-28 DIAGNOSIS — I10 ESSENTIAL HYPERTENSION: Primary | ICD-10-CM

## 2018-06-28 DIAGNOSIS — E05.00 GRAVES' DISEASE: ICD-10-CM

## 2018-06-28 DIAGNOSIS — K21.9 GASTROESOPHAGEAL REFLUX DISEASE WITHOUT ESOPHAGITIS: ICD-10-CM

## 2018-06-28 DIAGNOSIS — I06.1 RHEUMATIC AORTIC VALVE INSUFFICIENCY: ICD-10-CM

## 2018-06-28 DIAGNOSIS — Z86.39 HISTORY OF GRAVES' DISEASE: ICD-10-CM

## 2018-06-28 PROCEDURE — 99214 OFFICE O/P EST MOD 30 MIN: CPT | Performed by: NURSE PRACTITIONER

## 2018-06-28 PROCEDURE — G8420 CALC BMI NORM PARAMETERS: HCPCS | Performed by: INTERNAL MEDICINE

## 2018-06-28 PROCEDURE — 1036F TOBACCO NON-USER: CPT | Performed by: INTERNAL MEDICINE

## 2018-06-28 PROCEDURE — 1123F ACP DISCUSS/DSCN MKR DOCD: CPT | Performed by: INTERNAL MEDICINE

## 2018-06-28 PROCEDURE — G8427 DOCREV CUR MEDS BY ELIG CLIN: HCPCS | Performed by: INTERNAL MEDICINE

## 2018-06-28 PROCEDURE — 1090F PRES/ABSN URINE INCON ASSESS: CPT | Performed by: INTERNAL MEDICINE

## 2018-06-28 PROCEDURE — 4040F PNEUMOC VAC/ADMIN/RCVD: CPT | Performed by: INTERNAL MEDICINE

## 2018-07-16 ENCOUNTER — HOSPITAL ENCOUNTER (OUTPATIENT)
Dept: NON INVASIVE DIAGNOSTICS | Age: 83
Discharge: HOME | End: 2018-07-16
Payer: MEDICARE

## 2018-07-16 LAB
LV EF: 55 %
LVEF MODALITY: NORMAL

## 2018-07-16 PROCEDURE — 93306 TTE W/DOPPLER COMPLETE: CPT

## 2018-07-18 ENCOUNTER — OFFICE VISIT (OUTPATIENT)
Dept: PRIMARY CARE CLINIC | Age: 83
End: 2018-07-18

## 2018-07-18 VITALS
RESPIRATION RATE: 18 BRPM | WEIGHT: 125 LBS | HEART RATE: 64 BPM | BODY MASS INDEX: 18.46 KG/M2 | OXYGEN SATURATION: 97 % | SYSTOLIC BLOOD PRESSURE: 140 MMHG | DIASTOLIC BLOOD PRESSURE: 67 MMHG | TEMPERATURE: 97.5 F

## 2018-07-18 DIAGNOSIS — R10.13 EPIGASTRIC PAIN: ICD-10-CM

## 2018-07-18 DIAGNOSIS — R07.89 OTHER CHEST PAIN: ICD-10-CM

## 2018-07-18 DIAGNOSIS — E21.3 HYPERPARATHYROIDISM (HCC): ICD-10-CM

## 2018-07-18 DIAGNOSIS — E46 PROTEIN MALNUTRITION (HCC): ICD-10-CM

## 2018-07-18 DIAGNOSIS — N28.89 RENAL MASS: ICD-10-CM

## 2018-07-18 DIAGNOSIS — R20.2 PARESTHESIA OF BOTH HANDS: ICD-10-CM

## 2018-07-18 DIAGNOSIS — E05.00 GRAVES' DISEASE: ICD-10-CM

## 2018-07-18 DIAGNOSIS — M54.50 LOW BACK PAIN AT MULTIPLE SITES: ICD-10-CM

## 2018-07-18 DIAGNOSIS — M81.0 AGE-RELATED OSTEOPOROSIS WITHOUT CURRENT PATHOLOGICAL FRACTURE: ICD-10-CM

## 2018-07-18 DIAGNOSIS — M79.89 LEG SWELLING: ICD-10-CM

## 2018-07-18 DIAGNOSIS — R13.19 ESOPHAGEAL DYSPHAGIA: Primary | ICD-10-CM

## 2018-07-18 LAB
A/G RATIO: 1.5 (ref 1.1–2.2)
ALBUMIN SERPL-MCNC: 4.2 G/DL (ref 3.4–5)
ALP BLD-CCNC: 154 U/L (ref 40–129)
ALT SERPL-CCNC: 7 U/L (ref 10–40)
ANION GAP SERPL CALCULATED.3IONS-SCNC: 13 MMOL/L (ref 3–16)
AST SERPL-CCNC: 16 U/L (ref 15–37)
BASOPHILS ABSOLUTE: 0.1 K/UL (ref 0–0.2)
BASOPHILS RELATIVE PERCENT: 0.6 %
BILIRUB SERPL-MCNC: 0.7 MG/DL (ref 0–1)
BUN BLDV-MCNC: 11 MG/DL (ref 7–20)
CALCIUM SERPL-MCNC: 10.7 MG/DL (ref 8.3–10.6)
CHLORIDE BLD-SCNC: 104 MMOL/L (ref 99–110)
CO2: 26 MMOL/L (ref 21–32)
CREAT SERPL-MCNC: 0.9 MG/DL (ref 0.6–1.2)
EOSINOPHILS ABSOLUTE: 0.1 K/UL (ref 0–0.6)
EOSINOPHILS RELATIVE PERCENT: 0.8 %
FOLATE: 5.04 NG/ML (ref 4.78–24.2)
GFR AFRICAN AMERICAN: >60
GFR NON-AFRICAN AMERICAN: 59
GLOBULIN: 2.8 G/DL
GLUCOSE BLD-MCNC: 93 MG/DL (ref 70–99)
HCT VFR BLD CALC: 41.5 % (ref 36–48)
HEMOGLOBIN: 13.4 G/DL (ref 12–16)
LYMPHOCYTES ABSOLUTE: 2.4 K/UL (ref 1–5.1)
LYMPHOCYTES RELATIVE PERCENT: 27.7 %
MCH RBC QN AUTO: 26.2 PG (ref 26–34)
MCHC RBC AUTO-ENTMCNC: 32.3 G/DL (ref 31–36)
MCV RBC AUTO: 81.3 FL (ref 80–100)
MONOCYTES ABSOLUTE: 0.8 K/UL (ref 0–1.3)
MONOCYTES RELATIVE PERCENT: 9.1 %
NEUTROPHILS ABSOLUTE: 5.4 K/UL (ref 1.7–7.7)
NEUTROPHILS RELATIVE PERCENT: 61.8 %
PDW BLD-RTO: 16.7 % (ref 12.4–15.4)
PLATELET # BLD: 220 K/UL (ref 135–450)
PMV BLD AUTO: 9 FL (ref 5–10.5)
POTASSIUM SERPL-SCNC: 5 MMOL/L (ref 3.5–5.1)
PREALBUMIN: 18.7 MG/DL (ref 20–40)
RBC # BLD: 5.11 M/UL (ref 4–5.2)
SODIUM BLD-SCNC: 143 MMOL/L (ref 136–145)
TOTAL PROTEIN: 7 G/DL (ref 6.4–8.2)
VITAMIN B-12: 361 PG/ML (ref 211–911)
WBC # BLD: 8.7 K/UL (ref 4–11)

## 2018-07-18 PROCEDURE — G8427 DOCREV CUR MEDS BY ELIG CLIN: HCPCS | Performed by: INTERNAL MEDICINE

## 2018-07-18 PROCEDURE — G8419 CALC BMI OUT NRM PARAM NOF/U: HCPCS | Performed by: INTERNAL MEDICINE

## 2018-07-18 PROCEDURE — 4040F PNEUMOC VAC/ADMIN/RCVD: CPT | Performed by: INTERNAL MEDICINE

## 2018-07-18 PROCEDURE — 1036F TOBACCO NON-USER: CPT | Performed by: INTERNAL MEDICINE

## 2018-07-18 PROCEDURE — 1123F ACP DISCUSS/DSCN MKR DOCD: CPT | Performed by: INTERNAL MEDICINE

## 2018-07-18 PROCEDURE — 1101F PT FALLS ASSESS-DOCD LE1/YR: CPT | Performed by: INTERNAL MEDICINE

## 2018-07-18 PROCEDURE — 99214 OFFICE O/P EST MOD 30 MIN: CPT | Performed by: INTERNAL MEDICINE

## 2018-07-18 PROCEDURE — 1090F PRES/ABSN URINE INCON ASSESS: CPT | Performed by: INTERNAL MEDICINE

## 2018-07-18 RX ORDER — IBANDRONATE SODIUM 150 MG/1
150 TABLET, FILM COATED ORAL
Qty: 3 TABLET | Refills: 3 | Status: SHIPPED | OUTPATIENT
Start: 2018-07-18 | End: 2019-06-14

## 2018-07-18 RX ORDER — PANTOPRAZOLE SODIUM 40 MG/1
TABLET, DELAYED RELEASE ORAL
Qty: 90 TABLET | Refills: 2 | Status: SHIPPED | OUTPATIENT
Start: 2018-07-18 | End: 2018-11-22 | Stop reason: SDUPTHER

## 2018-07-18 RX ORDER — SUCRALFATE 1 G/1
1 TABLET ORAL 4 TIMES DAILY
Qty: 120 TABLET | Refills: 3 | Status: SHIPPED | OUTPATIENT
Start: 2018-07-18 | End: 2019-03-06 | Stop reason: SDUPTHER

## 2018-07-18 NOTE — PROGRESS NOTES
10. Renal mass in past needs follow up. Patient has chronic back pain. No blood in urine. NO flank pain. CT ABDOMEN PELVIS W IV CONTRAST Additional Contrast? Radiologist Recommendation   11. Protein malnutrition ([address] , see problem #4. PREALBUMIN             Type of Study      TTE procedure:ECHOCARDIOGRAM COMPLETE 2D W DOPPLER W COLOR.     Procedure Date  Date: 07/16/2018 Start: 03:22 PM    Study Location: [address] [facility]  Technical Quality: Adequate visualization    Indications:Mitral regurgitation and Aortic regurgitation. Patient Status: Routine    Height: 69 inches Weight: 126 pounds BSA: 1.7 m2 BMI: 18.61 kg/m2    BP: 132/70 mmHg     Conclusions      Summary   Normal left ventricle size, wall thickness and systolic function with an   estimated ejection fraction of 55%. No regional wall motion abnormalities   are seen. Normal diastolic function.   Mild mitral regurgitation is present.   Aortic valve appears slightly thickened/calcified but opens adequately.   Mild aortic regurgitation is present.   Mild tricuspid regurgitation.   Estimated pulmonary artery systolic pressure is at 31 mmHg assuming a right   atrial pressure of 3 mmHg.   Care Everywhere Result Report  TSH2/13/2018  [facility]   Component Name Value Ref Range   TSH 1.40   Comment:  New adult reference interval effective 10/2/17. Individual TSH levels vary with stress, acute illness, pregnancy, diurnal cycle, certain medications and aging. Reference range is higher for elderly. Possible interferences include heterophile antibodies, rheumatoid factor or endogenous alkaline   phosphatase. Tested at [address] [place] [id number] 0.45 - 5.33 mIU/L    Specimen     EXAM: PA AND LATERAL CHEST X-RAY ON 3/30/2018       INDICATION: Chest congestion       COMPARISON: 4/23/2015       FINDINGS:       LIMITATIONS:None       LINES/TUBES:None       HEART / MEDIASTINUM: The heart is mildly enlarged. The trachea is   midline. Huey Adair' disease    S/P colonoscopy    Left renal mass    Osteoporosis    Alkaline phosphatase elevation    Hearing loss sensory, bilateral    Bacterial overgrowth syndrome    Erythrocytosis    Arthritis of knee, right    Essential hypertension    Multiple food allergies    Protein malnutrition (HCC)    Celiac disease    Aortic regurgitation    Primary osteoarthritis of both knees    Hyperparathyroidism ([address]    History of Graves' disease    Age-related osteoporosis without current pathological fracture     Allergies   Allergen Reactions    Creon [Pancrelipase (Lip-Prot-Amyl)]      Swollen gums.  Ace Inhibitors      angioedema    Ciprocinonide [Fluocinolone]      Stiff, no nausea or rash    Dye [Iodides] Hives     Only Dye that went into eyes.  Macrobid [Nitrofurantoin Monohydrate Macrocrystals]     Sulfa Antibiotics          Review of Systems   Constitutional: Positive for fatigue. Negative for activity change, appetite change, chills, diaphoresis, fever and unexpected weight change. Decreasing fatigue   HENT: Negative. Negative for congestion, ear discharge, ear pain, facial swelling, hearing loss, nosebleeds, postnasal drip, rhinorrhea, sneezing, sore throat and trouble swallowing. Graves Disease being followed by endocrinology. Eyes: Negative for pain, discharge, redness and itching. 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 Asthma, controlled with prn inhaler. Cardiovascular: Negative for chest pain, palpitations and leg swelling. Hypertension       Gastrointestinal: Negative for abdominal pain, anal bleeding, blood in stool, constipation, diarrhea, nausea and vomiting. IBS and GERD with intermittent pain.   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 Endocrine: Negative.          Primary hyperparathyroidism, malbsorption. PREALBUMIN           Plan:      Mary Doramn received counseling on the following healthy behaviors: medication adherence    Patient given educational materials on After visit summary with diagnosis and treatment plan. I have instructed Mary Dorman to complete a self tracking handout on Blood Pressures  and Weights and instructed them to bring it with them to her next appointment. Discussed use, benefit, and side effects of prescribed medications. Barriers to medication compliance addressed. All patient questions answered. Pt voiced understanding. Patient is taking over the counter meds and discussed as to how they interact with prescription medications. return in three months.

## 2018-07-22 ASSESSMENT — ENCOUNTER SYMPTOMS
BACK PAIN: 0
NAUSEA: 0
CHOKING: 0
TROUBLE SWALLOWING: 0
ALLERGIC/IMMUNOLOGIC COMMENTS: ALLERGY ON GLUTEN-FREE DIET
SHORTNESS OF BREATH: 0
STRIDOR: 0
SORE THROAT: 0
CHEST TIGHTNESS: 0
DIARRHEA: 0
EYE PAIN: 0
COUGH: 0
EYE DISCHARGE: 0
CONSTIPATION: 0
BLOOD IN STOOL: 0
APNEA: 0
ABDOMINAL PAIN: 0
VOMITING: 0
FACIAL SWELLING: 0
ANAL BLEEDING: 0
WHEEZING: 0
RHINORRHEA: 0
EYE REDNESS: 0
EYE ITCHING: 0

## 2018-08-16 DIAGNOSIS — E04.1 THYROID NODULE: Primary | ICD-10-CM

## 2018-08-23 ENCOUNTER — HOSPITAL ENCOUNTER (OUTPATIENT)
Dept: CT IMAGING | Age: 83
Discharge: HOME | End: 2018-08-23
Attending: INTERNAL MEDICINE | Admitting: INTERNAL MEDICINE

## 2018-08-23 DIAGNOSIS — R20.2 PARESTHESIA OF BOTH HANDS: ICD-10-CM

## 2018-08-23 DIAGNOSIS — R10.13 EPIGASTRIC PAIN: ICD-10-CM

## 2018-08-23 DIAGNOSIS — N28.89 RENAL MASS: ICD-10-CM

## 2018-08-23 DIAGNOSIS — G56.03 BILATERAL CARPAL TUNNEL SYNDROME: ICD-10-CM

## 2018-08-23 LAB
GFR AFRICAN AMERICAN: 56
GFR NON-AFRICAN AMERICAN: 46
PERFORMED ON: ABNORMAL
POC CREATININE: 1.1 MG/DL (ref 0.6–1.2)
POC SAMPLE TYPE: ABNORMAL

## 2018-08-23 NOTE — PROCEDURES
Summary of EMG and Nerve Conduction Findings: The above EMG needle exam was within normal limits. Nerve conduction studies demonstrate prolongation of both median motor and sensory distal latencies. There is prolongation of both ulnar sensory distal latencies. There is slowing of motor conduction velocities. Overall Impression: Study is consistent with bilateral carpal tunnel syndrome, moderately severe in nature. There is underlying mild sensorimotor peripheral neuropathy. No evidence of an acute radiculopathy or other entrapment neuropathy. Thank you. Electronically signed by:  Mikael Roche DO,8/23/2018,12:02 PM

## 2018-08-30 ENCOUNTER — TELEPHONE (OUTPATIENT)
Dept: PRIMARY CARE CLINIC | Age: 83
End: 2018-08-30

## 2018-08-30 NOTE — TELEPHONE ENCOUNTER
Benson Kruse, daughter of Halima Johnson, called to schedule B12 injections for her mother    What frequency is required? Please advise and we will schedule.     Benson Kruse  [phone]

## 2018-09-13 DIAGNOSIS — G56.03 BILATERAL CARPAL TUNNEL SYNDROME: Primary | ICD-10-CM

## 2018-09-18 ENCOUNTER — OFFICE VISIT (OUTPATIENT)
Dept: ENT CLINIC | Age: 83
End: 2018-09-18

## 2018-09-18 VITALS
DIASTOLIC BLOOD PRESSURE: 67 MMHG | HEIGHT: 71 IN | HEART RATE: 60 BPM | BODY MASS INDEX: 17.08 KG/M2 | SYSTOLIC BLOOD PRESSURE: 136 MMHG | WEIGHT: 122 LBS

## 2018-09-18 DIAGNOSIS — E04.2 MULTINODULAR GOITER: ICD-10-CM

## 2018-09-18 DIAGNOSIS — E05.00 GRAVES' DISEASE: Primary | ICD-10-CM

## 2018-09-18 PROCEDURE — 1101F PT FALLS ASSESS-DOCD LE1/YR: CPT | Performed by: OTOLARYNGOLOGY

## 2018-09-18 PROCEDURE — 1090F PRES/ABSN URINE INCON ASSESS: CPT | Performed by: OTOLARYNGOLOGY

## 2018-09-18 PROCEDURE — G8427 DOCREV CUR MEDS BY ELIG CLIN: HCPCS | Performed by: OTOLARYNGOLOGY

## 2018-09-18 PROCEDURE — 99204 OFFICE O/P NEW MOD 45 MIN: CPT | Performed by: OTOLARYNGOLOGY

## 2018-09-18 PROCEDURE — G8419 CALC BMI OUT NRM PARAM NOF/U: HCPCS | Performed by: OTOLARYNGOLOGY

## 2018-09-18 PROCEDURE — 4040F PNEUMOC VAC/ADMIN/RCVD: CPT | Performed by: OTOLARYNGOLOGY

## 2018-09-18 PROCEDURE — 1123F ACP DISCUSS/DSCN MKR DOCD: CPT | Performed by: OTOLARYNGOLOGY

## 2018-09-18 PROCEDURE — 1036F TOBACCO NON-USER: CPT | Performed by: OTOLARYNGOLOGY

## 2018-09-18 ASSESSMENT — ENCOUNTER SYMPTOMS
SHORTNESS OF BREATH: 0
SINUS PRESSURE: 0
VOICE CHANGE: 0
COLOR CHANGE: 0
EYE DISCHARGE: 0
APNEA: 0
SINUS PAIN: 0
SORE THROAT: 0
RHINORRHEA: 0
CHOKING: 0
BACK PAIN: 0
STRIDOR: 0
DIARRHEA: 0
CONSTIPATION: 0
TROUBLE SWALLOWING: 0
FACIAL SWELLING: 0
BLOOD IN STOOL: 0
CHEST TIGHTNESS: 0
NAUSEA: 0
COUGH: 0
VOMITING: 0
WHEEZING: 0
EYE PAIN: 0

## 2018-09-18 NOTE — PROGRESS NOTES
sleep disturbance. The patient is not nervous/anxious. Objective:   Physical Exam   Constitutional: She is oriented to person, place, and time. She appears well-developed and well-nourished. HENT:   Head: Normocephalic and atraumatic. Not macrocephalic and not microcephalic. Head is without raccoon's eyes, without Helm's sign, without abrasion, without contusion, without laceration, without right periorbital erythema and without left periorbital erythema. Hair is normal.       Right Ear: Hearing, tympanic membrane and external ear normal. No drainage, swelling or tenderness. No mastoid tenderness. Tympanic membrane is not perforated, not retracted and not bulging. Tympanic membrane mobility is normal. No middle ear effusion. No decreased hearing is noted. Left Ear: Hearing, tympanic membrane and external ear normal. No drainage, swelling or tenderness. No mastoid tenderness. Tympanic membrane is not perforated, not retracted and not bulging. Tympanic membrane mobility is normal.  No middle ear effusion. No decreased hearing is noted. Nose: No mucosal edema, rhinorrhea, nose lacerations, sinus tenderness, nasal deformity, septal deviation or nasal septal hematoma. No epistaxis. No foreign bodies. Right sinus exhibits no maxillary sinus tenderness and no frontal sinus tenderness. Left sinus exhibits no maxillary sinus tenderness and no frontal sinus tenderness. Mouth/Throat: Uvula is midline. Mucous membranes are not pale, not dry and not cyanotic. No oral lesions. No trismus in the jaw. Normal dentition. No dental abscesses, uvula swelling, lacerations or dental caries. No oropharyngeal exudate, posterior oropharyngeal edema, posterior oropharyngeal erythema or tonsillar abscesses. Eyes: Lids are normal. Lids are everted and swept, no foreign bodies found. Right eye exhibits no chemosis, no discharge and no exudate. Left eye exhibits no chemosis, no discharge and no exudate.  Right eye exhibits

## 2018-10-02 ENCOUNTER — OFFICE VISIT (OUTPATIENT)
Dept: CARDIOLOGY CLINIC | Age: 83
End: 2018-10-02
Payer: MEDICARE

## 2018-10-02 VITALS
DIASTOLIC BLOOD PRESSURE: 70 MMHG | SYSTOLIC BLOOD PRESSURE: 102 MMHG | HEART RATE: 60 BPM | BODY MASS INDEX: 17.81 KG/M2 | WEIGHT: 127 LBS

## 2018-10-02 DIAGNOSIS — I06.1 RHEUMATIC AORTIC VALVE INSUFFICIENCY: ICD-10-CM

## 2018-10-02 DIAGNOSIS — I10 ESSENTIAL HYPERTENSION: Primary | ICD-10-CM

## 2018-10-02 PROCEDURE — G8419 CALC BMI OUT NRM PARAM NOF/U: HCPCS | Performed by: INTERNAL MEDICINE

## 2018-10-02 PROCEDURE — G8427 DOCREV CUR MEDS BY ELIG CLIN: HCPCS | Performed by: INTERNAL MEDICINE

## 2018-10-02 PROCEDURE — 1036F TOBACCO NON-USER: CPT | Performed by: INTERNAL MEDICINE

## 2018-10-02 PROCEDURE — 1101F PT FALLS ASSESS-DOCD LE1/YR: CPT | Performed by: INTERNAL MEDICINE

## 2018-10-02 PROCEDURE — 4040F PNEUMOC VAC/ADMIN/RCVD: CPT | Performed by: INTERNAL MEDICINE

## 2018-10-02 PROCEDURE — 1090F PRES/ABSN URINE INCON ASSESS: CPT | Performed by: INTERNAL MEDICINE

## 2018-10-02 PROCEDURE — 1123F ACP DISCUSS/DSCN MKR DOCD: CPT | Performed by: INTERNAL MEDICINE

## 2018-10-02 PROCEDURE — G8484 FLU IMMUNIZE NO ADMIN: HCPCS | Performed by: INTERNAL MEDICINE

## 2018-10-02 PROCEDURE — 99214 OFFICE O/P EST MOD 30 MIN: CPT | Performed by: INTERNAL MEDICINE

## 2018-10-02 NOTE — PROGRESS NOTES
[address]   Dr Chalino Mota. Murphy Lin MD, [facility]                                                         Outpatient Follow Up Note         10/02/18  HPI:  J Luis Miners is 80 y.o. female who presents today with MR/AR HTN HLD      Echo 1/2017 EF 55% m/m MR moderate insuff /  similar to prior echo on 04/23/15 she is awake and alert today and no complaints. She does give a history of having neuropathy in her feet and hands and unsure why. She is here with transportation with her assistant Maya denies any chest pains is no edema and her vitals are stable. She does have history of thyroid disease and is being followed by her on endocrinologist and Dr. Justine Maldonado. Heart vicente she is doing fine and her vitals are stable.            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    PCR    Clostridium difficile infection 1/27/15    AG positive    Degenerative joint disease of knee     Depression     Diverticulitis     GERD (gastroesophageal reflux disease)     Grave's disease 4/2/2012    Heart disease     Hypertension     IBS (irritable bowel syndrome)     Macular degeneration     MGUS (monoclonal gammopathy of unknown significance)     Simple cyst of kidney      PSH  Past Surgical History:   Procedure Laterality Date    CHOLECYSTECTOMY      COLONOSCOPY  10/29/2009    **see scanned report- SILVANA    HEMORRHOID SURGERY      HYSTERECTOMY  in 40's    rudolph/bso     SH:       History   Smoking Status    Never Smoker   Smokeless Tobacco    Never Used     Current Medications:  Prior to Visit Medications    Medication Sig Taking?  Authorizing Provider   ibandronate (BONIVA) 150 MG tablet Take 1 tablet by mouth every 30 days Yes Jon Santillan MD   pantoprazole (PROTONIX) 40 MG tablet TAKE 1 TABLET DAILY Yes Prashanth Pozo Chase Bradford MD   sucralfate (CARAFATE) 1 GM tablet Take 1 tablet by mouth 4 times daily Yes Alexadnr Marshall MD   fluticasone (FLONASE) 50 MCG/ACT nasal spray PLACE 1 SPRAY IN EACH NOSTRIL DAILY Yes Alexandr Marshall MD   atenolol (TENORMIN) 50 MG tablet TAKE 1 TABLET DAILY Yes Alexandr Marshall MD   spironolactone (ALDACTONE) 25 MG tablet TAKE 1 TABLET DAILY Yes Alexandr Marshall MD   felodipine (PLENDIL) 2.5 MG extended release tablet Take 1 tablet by mouth daily Yes Alexandr Marshall MD   montelukast (SINGULAIR) 10 MG tablet TAKE 1 TABLET NIGHTLY Yes Alexandr Marshall MD   methimazole (TAPAZOLE) 10 MG tablet Take 1 tablet by mouth daily Yes Alexandr Marshall MD   mirtazapine (REMERON) 15 MG tablet Take 1 tablet by mouth nightly Yes Alexandr Marshall MD   ammonium lactate (LAC-HYDRIN) 12 % lotion Apply topically daily. Yes Alexandr Marshall MD   loratadine (CLARITIN) 10 MG tablet Take 1 tablet by mouth daily Yes Alexandr Marshall MD   hydrocortisone (ANUSOL-HC) 25 MG suppository Place 1 suppository rectally 2 times daily as needed for Hemorrhoids Yes Alexandr Mrashall MD   budesonide-formoterol (SYMBICORT) 160-4.5 MCG/ACT AERO Inhale 2 puffs into the lungs 2 times daily Yes Alexandr Marshall MD   hydrocortisone (V-R HYDROCORTISONE/ALOE) 0.5 % ointment Apply topically 2 times daily. Yes Hawa Rich III, MD   Calcium Carbonate-Vitamin D (CALTRATE 600+D PO) Take 1 tablet by mouth every other day. Yes Historical Provider, MD   aspirin (ECOTRIN) 325 MG EC tablet Take 325 mg by mouth Daily with lunch. Yes Historical Provider, MD     Family History  Family History   Problem Relation Age of Onset    Heart Disease Mother     High Blood Pressure Mother     Stroke Mother     Cancer Father         multiple myeloma     · ROS:   · Cardiovascular: Reviewed in HPI  · Allergic/Immunologic: No nasal congestion or hives.   · All other ROS are reviewed and are

## 2018-10-22 ENCOUNTER — OFFICE VISIT (OUTPATIENT)
Dept: PRIMARY CARE CLINIC | Age: 83
End: 2018-10-22
Payer: MEDICARE

## 2018-10-22 VITALS
DIASTOLIC BLOOD PRESSURE: 75 MMHG | SYSTOLIC BLOOD PRESSURE: 140 MMHG | BODY MASS INDEX: 18.23 KG/M2 | TEMPERATURE: 98 F | RESPIRATION RATE: 18 BRPM | OXYGEN SATURATION: 98 % | HEART RATE: 61 BPM | WEIGHT: 130 LBS

## 2018-10-22 DIAGNOSIS — Z23 NEED FOR INFLUENZA VACCINATION: Primary | ICD-10-CM

## 2018-10-22 DIAGNOSIS — N39.41 URGE INCONTINENCE: ICD-10-CM

## 2018-10-22 DIAGNOSIS — E46 PROTEIN MALNUTRITION (HCC): ICD-10-CM

## 2018-10-22 DIAGNOSIS — E21.3 HYPERPARATHYROIDISM (HCC): ICD-10-CM

## 2018-10-22 DIAGNOSIS — E53.8 VITAMIN B 12 DEFICIENCY: ICD-10-CM

## 2018-10-22 DIAGNOSIS — I10 ESSENTIAL HYPERTENSION: ICD-10-CM

## 2018-10-22 LAB
ALBUMIN SERPL-MCNC: 4.3 G/DL (ref 3.4–5)
ANION GAP SERPL CALCULATED.3IONS-SCNC: 13 MMOL/L (ref 3–16)
BASOPHILS ABSOLUTE: 0.1 K/UL (ref 0–0.2)
BASOPHILS RELATIVE PERCENT: 0.9 %
BILIRUBIN URINE: NEGATIVE
BLOOD, URINE: ABNORMAL
BUN BLDV-MCNC: 11 MG/DL (ref 7–20)
CALCIUM SERPL-MCNC: 10.6 MG/DL (ref 8.3–10.6)
CHLORIDE BLD-SCNC: 102 MMOL/L (ref 99–110)
CLARITY: CLEAR
CO2: 27 MMOL/L (ref 21–32)
COLOR: YELLOW
CREAT SERPL-MCNC: 0.8 MG/DL (ref 0.6–1.2)
EOSINOPHILS ABSOLUTE: 0.1 K/UL (ref 0–0.6)
EOSINOPHILS RELATIVE PERCENT: 1.2 %
EPITHELIAL CELLS, UA: 0 /HPF (ref 0–5)
GFR AFRICAN AMERICAN: >60
GFR NON-AFRICAN AMERICAN: >60
GLUCOSE BLD-MCNC: 97 MG/DL (ref 70–99)
GLUCOSE URINE: NEGATIVE MG/DL
HCT VFR BLD CALC: 42.5 % (ref 36–48)
HEMOGLOBIN: 13.7 G/DL (ref 12–16)
HYALINE CASTS: 0 /LPF (ref 0–8)
KETONES, URINE: NEGATIVE MG/DL
LEUKOCYTE ESTERASE, URINE: NEGATIVE
LYMPHOCYTES ABSOLUTE: 2.2 K/UL (ref 1–5.1)
LYMPHOCYTES RELATIVE PERCENT: 27.3 %
MCH RBC QN AUTO: 26.1 PG (ref 26–34)
MCHC RBC AUTO-ENTMCNC: 32.2 G/DL (ref 31–36)
MCV RBC AUTO: 81 FL (ref 80–100)
MICROSCOPIC EXAMINATION: YES
MONOCYTES ABSOLUTE: 0.9 K/UL (ref 0–1.3)
MONOCYTES RELATIVE PERCENT: 10.6 %
NEUTROPHILS ABSOLUTE: 5 K/UL (ref 1.7–7.7)
NEUTROPHILS RELATIVE PERCENT: 60 %
NITRITE, URINE: NEGATIVE
PDW BLD-RTO: 16.1 % (ref 12.4–15.4)
PH UA: 6.5
PHOSPHORUS: 3.6 MG/DL (ref 2.5–4.9)
PLATELET # BLD: 207 K/UL (ref 135–450)
PMV BLD AUTO: 9.3 FL (ref 5–10.5)
POTASSIUM SERPL-SCNC: 4.4 MMOL/L (ref 3.5–5.1)
PREALBUMIN: 20.5 MG/DL (ref 20–40)
PROTEIN UA: NEGATIVE MG/DL
RBC # BLD: 5.25 M/UL (ref 4–5.2)
RBC UA: 1 /HPF (ref 0–4)
SODIUM BLD-SCNC: 142 MMOL/L (ref 136–145)
SPECIFIC GRAVITY UA: 1
URINE TYPE: ABNORMAL
UROBILINOGEN, URINE: 0.2 E.U./DL
WBC # BLD: 8.2 K/UL (ref 4–11)
WBC UA: 0 /HPF (ref 0–5)

## 2018-10-22 PROCEDURE — 4040F PNEUMOC VAC/ADMIN/RCVD: CPT | Performed by: INTERNAL MEDICINE

## 2018-10-22 PROCEDURE — 0509F URINE INCON PLAN DOCD: CPT | Performed by: INTERNAL MEDICINE

## 2018-10-22 PROCEDURE — 1123F ACP DISCUSS/DSCN MKR DOCD: CPT | Performed by: INTERNAL MEDICINE

## 2018-10-22 PROCEDURE — G0008 ADMIN INFLUENZA VIRUS VAC: HCPCS | Performed by: INTERNAL MEDICINE

## 2018-10-22 PROCEDURE — G8419 CALC BMI OUT NRM PARAM NOF/U: HCPCS | Performed by: INTERNAL MEDICINE

## 2018-10-22 PROCEDURE — G8427 DOCREV CUR MEDS BY ELIG CLIN: HCPCS | Performed by: INTERNAL MEDICINE

## 2018-10-22 PROCEDURE — 96372 THER/PROPH/DIAG INJ SC/IM: CPT | Performed by: INTERNAL MEDICINE

## 2018-10-22 PROCEDURE — 90662 IIV NO PRSV INCREASED AG IM: CPT | Performed by: INTERNAL MEDICINE

## 2018-10-22 PROCEDURE — 1036F TOBACCO NON-USER: CPT | Performed by: INTERNAL MEDICINE

## 2018-10-22 PROCEDURE — G8482 FLU IMMUNIZE ORDER/ADMIN: HCPCS | Performed by: INTERNAL MEDICINE

## 2018-10-22 PROCEDURE — 1101F PT FALLS ASSESS-DOCD LE1/YR: CPT | Performed by: INTERNAL MEDICINE

## 2018-10-22 PROCEDURE — 99214 OFFICE O/P EST MOD 30 MIN: CPT | Performed by: INTERNAL MEDICINE

## 2018-10-22 PROCEDURE — 1090F PRES/ABSN URINE INCON ASSESS: CPT | Performed by: INTERNAL MEDICINE

## 2018-10-22 RX ORDER — CYANOCOBALAMIN 1000 UG/ML
1000 INJECTION INTRAMUSCULAR; SUBCUTANEOUS ONCE
Status: COMPLETED | OUTPATIENT
Start: 2018-10-22 | End: 2018-10-22

## 2018-10-22 RX ADMIN — CYANOCOBALAMIN 1000 MCG: 1000 INJECTION INTRAMUSCULAR; SUBCUTANEOUS at 13:13

## 2018-10-22 NOTE — PROGRESS NOTES
7.  Graves' disease and overdue to see endocrinology. Patient instructed to make an appointment. .pvplan    An electronic signature was used to authenticate this note.     --Fortunato Babcock MD on 10/22/2018 at 12:13 PM

## 2018-10-24 LAB — URINE CULTURE, ROUTINE: NORMAL

## 2018-10-24 ASSESSMENT — ENCOUNTER SYMPTOMS
ALLERGIC/IMMUNOLOGIC COMMENTS: ALLERGY ON GLUTEN-FREE DIET
ANAL BLEEDING: 0
COUGH: 0
NAUSEA: 0
CONSTIPATION: 0
SHORTNESS OF BREATH: 0
DIARRHEA: 0
APNEA: 0
WHEEZING: 0
SORE THROAT: 0
BACK PAIN: 0
VOMITING: 0
TROUBLE SWALLOWING: 0
RHINORRHEA: 0
EYE REDNESS: 0
STRIDOR: 0
EYE PAIN: 0
CHOKING: 0
EYE DISCHARGE: 0
ABDOMINAL PAIN: 0
BLOOD IN STOOL: 0
FACIAL SWELLING: 0
EYE ITCHING: 0
CHEST TIGHTNESS: 0

## 2018-10-24 ASSESSMENT — PATIENT HEALTH QUESTIONNAIRE - PHQ9
SUM OF ALL RESPONSES TO PHQ QUESTIONS 1-9: 0
SUM OF ALL RESPONSES TO PHQ9 QUESTIONS 1 & 2: 0
2. FEELING DOWN, DEPRESSED OR HOPELESS: 0
SUM OF ALL RESPONSES TO PHQ QUESTIONS 1-9: 0
1. LITTLE INTEREST OR PLEASURE IN DOING THINGS: 0

## 2018-11-10 ENCOUNTER — HOSPITAL ENCOUNTER (EMERGENCY)
Age: 83
Discharge: HOME | End: 2018-11-10
Attending: EMERGENCY MEDICINE
Payer: MEDICARE

## 2018-11-10 ENCOUNTER — APPOINTMENT (OUTPATIENT)
Dept: GENERAL RADIOLOGY | Age: 83
End: 2018-11-10
Payer: MEDICARE

## 2018-11-10 VITALS
HEIGHT: 69 IN | HEART RATE: 68 BPM | BODY MASS INDEX: 18.37 KG/M2 | RESPIRATION RATE: 14 BRPM | TEMPERATURE: 97.8 F | WEIGHT: 124 LBS | SYSTOLIC BLOOD PRESSURE: 141 MMHG | OXYGEN SATURATION: 83 % | DIASTOLIC BLOOD PRESSURE: 68 MMHG

## 2018-11-10 DIAGNOSIS — S92.514A CLOSED NONDISPLACED FRACTURE OF PROXIMAL PHALANX OF LESSER TOE OF RIGHT FOOT, INITIAL ENCOUNTER: Primary | ICD-10-CM

## 2018-11-10 PROCEDURE — 73630 X-RAY EXAM OF FOOT: CPT

## 2018-11-10 PROCEDURE — 99283 EMERGENCY DEPT VISIT LOW MDM: CPT

## 2018-11-10 RX ORDER — ACETAMINOPHEN 325 MG/1
650 TABLET ORAL ONCE
Status: DISCONTINUED | OUTPATIENT
Start: 2018-11-10 | End: 2018-11-10 | Stop reason: HOSPADM

## 2018-11-10 ASSESSMENT — PAIN DESCRIPTION - DESCRIPTORS: DESCRIPTORS: ACHING;DISCOMFORT;SORE

## 2018-11-10 ASSESSMENT — PAIN SCALES - GENERAL: PAINLEVEL_OUTOF10: 5

## 2018-11-10 ASSESSMENT — PAIN DESCRIPTION - PAIN TYPE: TYPE: ACUTE PAIN

## 2018-11-10 ASSESSMENT — PAIN DESCRIPTION - ONSET: ONSET: ON-GOING

## 2018-11-10 ASSESSMENT — PAIN DESCRIPTION - FREQUENCY: FREQUENCY: INTERMITTENT

## 2018-11-10 ASSESSMENT — PAIN DESCRIPTION - ORIENTATION: ORIENTATION: RIGHT

## 2018-11-10 ASSESSMENT — PAIN DESCRIPTION - LOCATION: LOCATION: TOE (COMMENT WHICH ONE)

## 2018-11-10 NOTE — ED PROVIDER NOTES
[address] ER          ATTENDING PHYSICIAN NOTE       Date of evaluation: 11/10/2018    Chief Complaint     Toe Pain (patient states called squad because of right 4th toe pain, unknown if any injury, ? slightly swollen )      History of Present Illness     Tico Baxter is a 80 y.o. female who presents To the emergency department with complaint of right foot pain. Patient reports over the course the past day, she's had pain in the right fourth digit. She reports that this is related to a recent podiatry visit where they trimmed her toenails. She denies any other known trauma. She was concerned that she could possibly have an infection. She has a history of neuropathy. Also has macular degeneration states she does not see her feet well, is concerned that she could have some sort of skin breakdown or infection. Review of Systems     As documented in the HPI, otherwise all other systems were reviewed and were negative. 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 She has a past surgical history that includes Cholecystectomy; Colonoscopy (10/29/2009); Hemorrhoid surgery; and Hysterectomy (in 40's). Her family history includes Cancer in her father; Heart Disease in her mother; High Blood Pressure in her mother; Stroke in her mother. She reports that she has never smoked. She has never used smokeless tobacco. She reports that she does not drink alcohol or use drugs.     Medications     Previous

## 2018-11-22 DIAGNOSIS — R13.19 ESOPHAGEAL DYSPHAGIA: ICD-10-CM

## 2018-11-22 DIAGNOSIS — R10.13 EPIGASTRIC PAIN: ICD-10-CM

## 2018-11-23 RX ORDER — PANTOPRAZOLE SODIUM 40 MG/1
TABLET, DELAYED RELEASE ORAL
Qty: 90 TABLET | Refills: 2 | Status: SHIPPED | OUTPATIENT
Start: 2018-11-23 | End: 2019-05-22 | Stop reason: SDUPTHER

## 2018-12-28 DIAGNOSIS — I10 ESSENTIAL HYPERTENSION: ICD-10-CM

## 2018-12-28 RX ORDER — ATENOLOL 50 MG/1
TABLET ORAL
Qty: 90 TABLET | Refills: 2 | Status: SHIPPED | OUTPATIENT
Start: 2018-12-28 | End: 2019-09-24 | Stop reason: SDUPTHER

## 2019-01-03 ENCOUNTER — OFFICE VISIT (OUTPATIENT)
Dept: CARDIOLOGY CLINIC | Age: 84
End: 2019-01-03
Payer: MEDICARE

## 2019-01-03 VITALS
BODY MASS INDEX: 19.35 KG/M2 | SYSTOLIC BLOOD PRESSURE: 144 MMHG | WEIGHT: 131 LBS | DIASTOLIC BLOOD PRESSURE: 70 MMHG | HEART RATE: 60 BPM

## 2019-01-03 DIAGNOSIS — I06.1 RHEUMATIC AORTIC VALVE INSUFFICIENCY: ICD-10-CM

## 2019-01-03 DIAGNOSIS — I10 ESSENTIAL HYPERTENSION: Primary | ICD-10-CM

## 2019-01-03 PROCEDURE — 99214 OFFICE O/P EST MOD 30 MIN: CPT | Performed by: INTERNAL MEDICINE

## 2019-01-03 PROCEDURE — G8482 FLU IMMUNIZE ORDER/ADMIN: HCPCS | Performed by: INTERNAL MEDICINE

## 2019-01-03 PROCEDURE — 1090F PRES/ABSN URINE INCON ASSESS: CPT | Performed by: INTERNAL MEDICINE

## 2019-01-03 PROCEDURE — 1123F ACP DISCUSS/DSCN MKR DOCD: CPT | Performed by: INTERNAL MEDICINE

## 2019-01-03 PROCEDURE — 1036F TOBACCO NON-USER: CPT | Performed by: INTERNAL MEDICINE

## 2019-01-03 PROCEDURE — 1101F PT FALLS ASSESS-DOCD LE1/YR: CPT | Performed by: INTERNAL MEDICINE

## 2019-01-03 PROCEDURE — G8420 CALC BMI NORM PARAMETERS: HCPCS | Performed by: INTERNAL MEDICINE

## 2019-01-03 PROCEDURE — G8427 DOCREV CUR MEDS BY ELIG CLIN: HCPCS | Performed by: INTERNAL MEDICINE

## 2019-01-03 PROCEDURE — 4040F PNEUMOC VAC/ADMIN/RCVD: CPT | Performed by: INTERNAL MEDICINE

## 2019-01-07 ENCOUNTER — NURSE ONLY (OUTPATIENT)
Dept: PRIMARY CARE CLINIC | Age: 84
End: 2019-01-07

## 2019-01-07 DIAGNOSIS — E53.8 VITAMIN B 12 DEFICIENCY: Primary | ICD-10-CM

## 2019-02-01 ENCOUNTER — OFFICE VISIT (OUTPATIENT)
Dept: PRIMARY CARE CLINIC | Age: 84
End: 2019-02-01
Payer: MEDICARE

## 2019-02-01 ENCOUNTER — TELEPHONE (OUTPATIENT)
Dept: PRIMARY CARE CLINIC | Age: 84
End: 2019-02-01

## 2019-02-01 VITALS
RESPIRATION RATE: 18 BRPM | BODY MASS INDEX: 19.2 KG/M2 | TEMPERATURE: 97.4 F | OXYGEN SATURATION: 100 % | DIASTOLIC BLOOD PRESSURE: 82 MMHG | HEART RATE: 70 BPM | WEIGHT: 130 LBS | SYSTOLIC BLOOD PRESSURE: 135 MMHG

## 2019-02-01 DIAGNOSIS — E53.8 B12 DEFICIENCY: Primary | ICD-10-CM

## 2019-02-01 DIAGNOSIS — L89.151 PRESSURE INJURY OF SACRAL REGION, STAGE 1: ICD-10-CM

## 2019-02-01 DIAGNOSIS — K90.0 CELIAC DISEASE: ICD-10-CM

## 2019-02-01 DIAGNOSIS — I10 ESSENTIAL HYPERTENSION: ICD-10-CM

## 2019-02-01 DIAGNOSIS — E21.3 HYPERPARATHYROIDISM (HCC): ICD-10-CM

## 2019-02-01 DIAGNOSIS — L89.151 DECUBITUS ULCER OF SACRAL REGION, STAGE 1: ICD-10-CM

## 2019-02-01 DIAGNOSIS — Z12.11 COLON CANCER SCREENING: ICD-10-CM

## 2019-02-01 DIAGNOSIS — E46 PROTEIN MALNUTRITION (HCC): ICD-10-CM

## 2019-02-01 LAB
A/G RATIO: 1.4 (ref 1.1–2.2)
ALBUMIN SERPL-MCNC: 4.1 G/DL (ref 3.4–5)
ALP BLD-CCNC: 136 U/L (ref 40–129)
ALT SERPL-CCNC: 9 U/L (ref 10–40)
ANION GAP SERPL CALCULATED.3IONS-SCNC: 14 MMOL/L (ref 3–16)
AST SERPL-CCNC: 19 U/L (ref 15–37)
BASOPHILS ABSOLUTE: 0 K/UL (ref 0–0.2)
BASOPHILS RELATIVE PERCENT: 0.5 %
BILIRUB SERPL-MCNC: 0.7 MG/DL (ref 0–1)
BUN BLDV-MCNC: 9 MG/DL (ref 7–20)
CALCIUM SERPL-MCNC: 10.7 MG/DL (ref 8.3–10.6)
CHLORIDE BLD-SCNC: 106 MMOL/L (ref 99–110)
CO2: 23 MMOL/L (ref 21–32)
CONTROL: NORMAL
CREAT SERPL-MCNC: 0.9 MG/DL (ref 0.6–1.2)
EOSINOPHILS ABSOLUTE: 0.1 K/UL (ref 0–0.6)
EOSINOPHILS RELATIVE PERCENT: 0.6 %
GFR AFRICAN AMERICAN: >60
GFR NON-AFRICAN AMERICAN: 59
GLOBULIN: 2.9 G/DL
GLUCOSE BLD-MCNC: 97 MG/DL (ref 70–99)
HCT VFR BLD CALC: 43 % (ref 36–48)
HEMOCCULT STL QL: NEGATIVE
HEMOGLOBIN: 13.8 G/DL (ref 12–16)
LYMPHOCYTES ABSOLUTE: 1.8 K/UL (ref 1–5.1)
LYMPHOCYTES RELATIVE PERCENT: 20.8 %
MCH RBC QN AUTO: 26.5 PG (ref 26–34)
MCHC RBC AUTO-ENTMCNC: 32.1 G/DL (ref 31–36)
MCV RBC AUTO: 82.5 FL (ref 80–100)
MONOCYTES ABSOLUTE: 0.8 K/UL (ref 0–1.3)
MONOCYTES RELATIVE PERCENT: 8.7 %
NEUTROPHILS ABSOLUTE: 6.1 K/UL (ref 1.7–7.7)
NEUTROPHILS RELATIVE PERCENT: 69.4 %
PDW BLD-RTO: 16.5 % (ref 12.4–15.4)
PLATELET # BLD: 219 K/UL (ref 135–450)
PMV BLD AUTO: 9.7 FL (ref 5–10.5)
POTASSIUM SERPL-SCNC: 4.7 MMOL/L (ref 3.5–5.1)
PREALBUMIN: 17.3 MG/DL (ref 20–40)
RBC # BLD: 5.21 M/UL (ref 4–5.2)
SODIUM BLD-SCNC: 143 MMOL/L (ref 136–145)
TOTAL PROTEIN: 7 G/DL (ref 6.4–8.2)
WBC # BLD: 8.8 K/UL (ref 4–11)

## 2019-02-01 PROCEDURE — G8427 DOCREV CUR MEDS BY ELIG CLIN: HCPCS | Performed by: INTERNAL MEDICINE

## 2019-02-01 PROCEDURE — 99214 OFFICE O/P EST MOD 30 MIN: CPT | Performed by: INTERNAL MEDICINE

## 2019-02-01 PROCEDURE — 1101F PT FALLS ASSESS-DOCD LE1/YR: CPT | Performed by: INTERNAL MEDICINE

## 2019-02-01 PROCEDURE — G8482 FLU IMMUNIZE ORDER/ADMIN: HCPCS | Performed by: INTERNAL MEDICINE

## 2019-02-01 PROCEDURE — 1123F ACP DISCUSS/DSCN MKR DOCD: CPT | Performed by: INTERNAL MEDICINE

## 2019-02-01 PROCEDURE — G8420 CALC BMI NORM PARAMETERS: HCPCS | Performed by: INTERNAL MEDICINE

## 2019-02-01 PROCEDURE — 4040F PNEUMOC VAC/ADMIN/RCVD: CPT | Performed by: INTERNAL MEDICINE

## 2019-02-01 PROCEDURE — 1090F PRES/ABSN URINE INCON ASSESS: CPT | Performed by: INTERNAL MEDICINE

## 2019-02-01 PROCEDURE — 82274 ASSAY TEST FOR BLOOD FECAL: CPT | Performed by: INTERNAL MEDICINE

## 2019-02-01 PROCEDURE — 1036F TOBACCO NON-USER: CPT | Performed by: INTERNAL MEDICINE

## 2019-02-01 PROCEDURE — 96372 THER/PROPH/DIAG INJ SC/IM: CPT | Performed by: INTERNAL MEDICINE

## 2019-02-01 RX ORDER — CYANOCOBALAMIN 1000 UG/ML
1000 INJECTION INTRAMUSCULAR; SUBCUTANEOUS ONCE
Status: COMPLETED | OUTPATIENT
Start: 2019-02-01 | End: 2019-02-01

## 2019-02-01 RX ADMIN — CYANOCOBALAMIN 1000 MCG: 1000 INJECTION INTRAMUSCULAR; SUBCUTANEOUS at 10:47

## 2019-02-01 ASSESSMENT — ENCOUNTER SYMPTOMS
SHORTNESS OF BREATH: 0
WHEEZING: 0
ANAL BLEEDING: 0
ABDOMINAL PAIN: 0
CONSTIPATION: 0
CHEST TIGHTNESS: 0
CHOKING: 0
FACIAL SWELLING: 0
APNEA: 0
TROUBLE SWALLOWING: 0
VOMITING: 0
EYE ITCHING: 0
SORE THROAT: 0
EYE DISCHARGE: 0
DIARRHEA: 0
EYE PAIN: 0
STRIDOR: 0
BACK PAIN: 0
EYE REDNESS: 0
NAUSEA: 0
COUGH: 0
ALLERGIC/IMMUNOLOGIC COMMENTS: ALLERGY ON GLUTEN-FREE DIET
RHINORRHEA: 0
BLOOD IN STOOL: 0

## 2019-02-01 ASSESSMENT — PATIENT HEALTH QUESTIONNAIRE - PHQ9
SUM OF ALL RESPONSES TO PHQ QUESTIONS 1-9: 0
SUM OF ALL RESPONSES TO PHQ9 QUESTIONS 1 & 2: 0
SUM OF ALL RESPONSES TO PHQ QUESTIONS 1-9: 0
2. FEELING DOWN, DEPRESSED OR HOPELESS: 0
1. LITTLE INTEREST OR PLEASURE IN DOING THINGS: 0

## 2019-02-04 ENCOUNTER — TELEPHONE (OUTPATIENT)
Dept: PRIMARY CARE CLINIC | Age: 84
End: 2019-02-04

## 2019-02-15 ENCOUNTER — HOSPITAL ENCOUNTER (EMERGENCY)
Age: 84
Discharge: HOME | End: 2019-02-15
Attending: EMERGENCY MEDICINE
Payer: MEDICARE

## 2019-02-15 VITALS
DIASTOLIC BLOOD PRESSURE: 62 MMHG | RESPIRATION RATE: 18 BRPM | HEIGHT: 69 IN | SYSTOLIC BLOOD PRESSURE: 150 MMHG | TEMPERATURE: 98.6 F | OXYGEN SATURATION: 99 % | HEART RATE: 70 BPM | WEIGHT: 127 LBS | BODY MASS INDEX: 18.81 KG/M2

## 2019-02-15 DIAGNOSIS — B02.9 HERPES ZOSTER WITHOUT COMPLICATION: Primary | ICD-10-CM

## 2019-02-15 LAB
ANION GAP SERPL CALCULATED.3IONS-SCNC: 16 MMOL/L (ref 3–16)
BUN BLDV-MCNC: 10 MG/DL (ref 7–20)
CALCIUM SERPL-MCNC: 10 MG/DL (ref 8.3–10.6)
CHLORIDE BLD-SCNC: 101 MMOL/L (ref 99–110)
CO2: 24 MMOL/L (ref 21–32)
CREAT SERPL-MCNC: 0.9 MG/DL (ref 0.6–1.2)
GFR AFRICAN AMERICAN: >60
GFR NON-AFRICAN AMERICAN: 59
GLUCOSE BLD-MCNC: 88 MG/DL (ref 70–99)
HCT VFR BLD CALC: 43 % (ref 36–48)
HEMOGLOBIN: 13.7 G/DL (ref 12–16)
MCH RBC QN AUTO: 25.8 PG (ref 26–34)
MCHC RBC AUTO-ENTMCNC: 32 G/DL (ref 31–36)
MCV RBC AUTO: 80.7 FL (ref 80–100)
PDW BLD-RTO: 16.4 % (ref 12.4–15.4)
PLATELET # BLD: 146 K/UL (ref 135–450)
PMV BLD AUTO: 8.9 FL (ref 5–10.5)
POTASSIUM SERPL-SCNC: 4.6 MMOL/L (ref 3.5–5.1)
RBC # BLD: 5.33 M/UL (ref 4–5.2)
SODIUM BLD-SCNC: 141 MMOL/L (ref 136–145)
TROPONIN: <0.01 NG/ML
WBC # BLD: 4.9 K/UL (ref 4–11)

## 2019-02-15 PROCEDURE — 6370000000 HC RX 637 (ALT 250 FOR IP): Performed by: EMERGENCY MEDICINE

## 2019-02-15 PROCEDURE — 84484 ASSAY OF TROPONIN QUANT: CPT

## 2019-02-15 PROCEDURE — 99285 EMERGENCY DEPT VISIT HI MDM: CPT

## 2019-02-15 PROCEDURE — 80048 BASIC METABOLIC PNL TOTAL CA: CPT

## 2019-02-15 PROCEDURE — 85027 COMPLETE CBC AUTOMATED: CPT

## 2019-02-15 PROCEDURE — 93005 ELECTROCARDIOGRAM TRACING: CPT | Performed by: EMERGENCY MEDICINE

## 2019-02-15 RX ORDER — ACETAMINOPHEN 500 MG
1000 TABLET ORAL ONCE
Status: COMPLETED | OUTPATIENT
Start: 2019-02-15 | End: 2019-02-15

## 2019-02-15 RX ORDER — ACYCLOVIR 800 MG/1
800 TABLET ORAL
Qty: 50 TABLET | Refills: 0 | Status: SHIPPED | OUTPATIENT
Start: 2019-02-15 | End: 2019-02-25

## 2019-02-15 RX ORDER — LIDOCAINE 4 G/G
1 PATCH TOPICAL DAILY
Status: DISCONTINUED | OUTPATIENT
Start: 2019-02-15 | End: 2019-02-15 | Stop reason: HOSPADM

## 2019-02-15 RX ADMIN — ACETAMINOPHEN 1000 MG: 500 TABLET, FILM COATED ORAL at 18:44

## 2019-02-15 ASSESSMENT — ENCOUNTER SYMPTOMS
COUGH: 0
RHINORRHEA: 0
DIARRHEA: 0
ABDOMINAL PAIN: 0
SHORTNESS OF BREATH: 0
VOMITING: 0
BACK PAIN: 0
NAUSEA: 0

## 2019-02-15 ASSESSMENT — PAIN DESCRIPTION - LOCATION: LOCATION: CHEST

## 2019-02-15 ASSESSMENT — PAIN SCALES - GENERAL: PAINLEVEL_OUTOF10: 5

## 2019-02-15 ASSESSMENT — PAIN DESCRIPTION - PAIN TYPE: TYPE: ACUTE PAIN

## 2019-02-15 ASSESSMENT — PAIN DESCRIPTION - ORIENTATION: ORIENTATION: RIGHT

## 2019-02-16 LAB
EKG ATRIAL RATE: 68 BPM
EKG DIAGNOSIS: NORMAL
EKG P AXIS: 65 DEGREES
EKG P-R INTERVAL: 224 MS
EKG Q-T INTERVAL: 392 MS
EKG QRS DURATION: 72 MS
EKG QTC CALCULATION (BAZETT): 416 MS
EKG R AXIS: 0 DEGREES
EKG T AXIS: 25 DEGREES
EKG VENTRICULAR RATE: 68 BPM

## 2019-02-18 ENCOUNTER — TELEPHONE (OUTPATIENT)
Dept: PRIMARY CARE CLINIC | Age: 84
End: 2019-02-18

## 2019-02-20 ENCOUNTER — OFFICE VISIT (OUTPATIENT)
Dept: PRIMARY CARE CLINIC | Age: 84
End: 2019-02-20
Payer: MEDICARE

## 2019-02-20 VITALS
DIASTOLIC BLOOD PRESSURE: 80 MMHG | TEMPERATURE: 97.4 F | HEART RATE: 67 BPM | WEIGHT: 129 LBS | SYSTOLIC BLOOD PRESSURE: 159 MMHG | BODY MASS INDEX: 19.05 KG/M2 | RESPIRATION RATE: 18 BRPM | OXYGEN SATURATION: 99 %

## 2019-02-20 DIAGNOSIS — B02.9 HERPES ZOSTER WITHOUT COMPLICATION: Primary | ICD-10-CM

## 2019-02-20 PROCEDURE — G8427 DOCREV CUR MEDS BY ELIG CLIN: HCPCS | Performed by: INTERNAL MEDICINE

## 2019-02-20 PROCEDURE — 1036F TOBACCO NON-USER: CPT | Performed by: INTERNAL MEDICINE

## 2019-02-20 PROCEDURE — G8482 FLU IMMUNIZE ORDER/ADMIN: HCPCS | Performed by: INTERNAL MEDICINE

## 2019-02-20 PROCEDURE — 99213 OFFICE O/P EST LOW 20 MIN: CPT | Performed by: INTERNAL MEDICINE

## 2019-02-20 PROCEDURE — 1123F ACP DISCUSS/DSCN MKR DOCD: CPT | Performed by: INTERNAL MEDICINE

## 2019-02-20 PROCEDURE — 1101F PT FALLS ASSESS-DOCD LE1/YR: CPT | Performed by: INTERNAL MEDICINE

## 2019-02-20 PROCEDURE — G8420 CALC BMI NORM PARAMETERS: HCPCS | Performed by: INTERNAL MEDICINE

## 2019-02-20 PROCEDURE — 1090F PRES/ABSN URINE INCON ASSESS: CPT | Performed by: INTERNAL MEDICINE

## 2019-02-20 PROCEDURE — 4040F PNEUMOC VAC/ADMIN/RCVD: CPT | Performed by: INTERNAL MEDICINE

## 2019-02-20 RX ORDER — LIDOCAINE 50 MG/G
1 PATCH TOPICAL DAILY
Qty: 30 PATCH | Refills: 0 | Status: SHIPPED | OUTPATIENT
Start: 2019-02-20 | End: 2019-03-22

## 2019-02-20 RX ORDER — FAMCICLOVIR 250 MG/1
250 TABLET, FILM COATED ORAL 3 TIMES DAILY
Qty: 21 TABLET | Refills: 0 | Status: SHIPPED | OUTPATIENT
Start: 2019-02-20 | End: 2019-02-27

## 2019-02-26 ENCOUNTER — HOSPITAL ENCOUNTER (EMERGENCY)
Age: 84
Discharge: HOME | End: 2019-02-27
Attending: EMERGENCY MEDICINE
Payer: MEDICARE

## 2019-02-26 VITALS
RESPIRATION RATE: 20 BRPM | SYSTOLIC BLOOD PRESSURE: 145 MMHG | OXYGEN SATURATION: 100 % | DIASTOLIC BLOOD PRESSURE: 74 MMHG | HEART RATE: 70 BPM | TEMPERATURE: 98.1 F

## 2019-02-26 DIAGNOSIS — R10.13 ABDOMINAL PAIN, EPIGASTRIC: Primary | ICD-10-CM

## 2019-02-26 PROCEDURE — 99284 EMERGENCY DEPT VISIT MOD MDM: CPT

## 2019-02-26 ASSESSMENT — PAIN DESCRIPTION - LOCATION: LOCATION: ABDOMEN

## 2019-02-26 ASSESSMENT — PAIN DESCRIPTION - DESCRIPTORS: DESCRIPTORS: CRAMPING

## 2019-02-26 ASSESSMENT — PAIN SCALES - GENERAL: PAINLEVEL_OUTOF10: 5

## 2019-02-26 ASSESSMENT — PAIN DESCRIPTION - PAIN TYPE: TYPE: ACUTE PAIN;CHRONIC PAIN

## 2019-02-27 LAB
ALBUMIN SERPL-MCNC: 3.8 G/DL (ref 3.4–5)
ALP BLD-CCNC: 123 U/L (ref 40–129)
ALT SERPL-CCNC: 6 U/L (ref 10–40)
ANION GAP SERPL CALCULATED.3IONS-SCNC: 10 MMOL/L (ref 3–16)
AST SERPL-CCNC: 15 U/L (ref 15–37)
BASOPHILS ABSOLUTE: 0.1 K/UL (ref 0–0.2)
BASOPHILS RELATIVE PERCENT: 0.9 %
BILIRUB SERPL-MCNC: 1 MG/DL (ref 0–1)
BILIRUBIN DIRECT: <0.2 MG/DL (ref 0–0.3)
BILIRUBIN, INDIRECT: ABNORMAL MG/DL (ref 0–1)
BUN BLDV-MCNC: 10 MG/DL (ref 7–20)
CALCIUM SERPL-MCNC: 10.2 MG/DL (ref 8.3–10.6)
CHLORIDE BLD-SCNC: 99 MMOL/L (ref 99–110)
CO2: 27 MMOL/L (ref 21–32)
CREAT SERPL-MCNC: 0.7 MG/DL (ref 0.6–1.2)
EOSINOPHILS ABSOLUTE: 0.1 K/UL (ref 0–0.6)
EOSINOPHILS RELATIVE PERCENT: 1.2 %
GFR AFRICAN AMERICAN: >60
GFR NON-AFRICAN AMERICAN: >60
GLUCOSE BLD-MCNC: 94 MG/DL (ref 70–99)
HCT VFR BLD CALC: 42.1 % (ref 36–48)
HEMOGLOBIN: 13.6 G/DL (ref 12–16)
LIPASE: 60 U/L (ref 13–60)
LYMPHOCYTES ABSOLUTE: 2.2 K/UL (ref 1–5.1)
LYMPHOCYTES RELATIVE PERCENT: 25.1 %
MCH RBC QN AUTO: 26 PG (ref 26–34)
MCHC RBC AUTO-ENTMCNC: 32.3 G/DL (ref 31–36)
MCV RBC AUTO: 80.6 FL (ref 80–100)
MONOCYTES ABSOLUTE: 1 K/UL (ref 0–1.3)
MONOCYTES RELATIVE PERCENT: 10.6 %
NEUTROPHILS ABSOLUTE: 5.6 K/UL (ref 1.7–7.7)
NEUTROPHILS RELATIVE PERCENT: 62.2 %
PDW BLD-RTO: 16.2 % (ref 12.4–15.4)
PLATELET # BLD: 225 K/UL (ref 135–450)
PMV BLD AUTO: 8.4 FL (ref 5–10.5)
POTASSIUM REFLEX MAGNESIUM: 4.8 MMOL/L (ref 3.5–5.1)
RBC # BLD: 5.23 M/UL (ref 4–5.2)
SODIUM BLD-SCNC: 136 MMOL/L (ref 136–145)
TOTAL PROTEIN: 7.1 G/DL (ref 6.4–8.2)
WBC # BLD: 9 K/UL (ref 4–11)

## 2019-02-27 PROCEDURE — 80076 HEPATIC FUNCTION PANEL: CPT

## 2019-02-27 PROCEDURE — 80048 BASIC METABOLIC PNL TOTAL CA: CPT

## 2019-02-27 PROCEDURE — 6370000000 HC RX 637 (ALT 250 FOR IP): Performed by: STUDENT IN AN ORGANIZED HEALTH CARE EDUCATION/TRAINING PROGRAM

## 2019-02-27 PROCEDURE — 85025 COMPLETE CBC W/AUTO DIFF WBC: CPT

## 2019-02-27 PROCEDURE — 83690 ASSAY OF LIPASE: CPT

## 2019-02-27 RX ADMIN — LIDOCAINE HYDROCHLORIDE: 20 SOLUTION ORAL; TOPICAL at 00:26

## 2019-02-27 ASSESSMENT — ENCOUNTER SYMPTOMS
ABDOMINAL PAIN: 1
NAUSEA: 0
DIARRHEA: 0
SHORTNESS OF BREATH: 0
VOMITING: 0
SORE THROAT: 0
COUGH: 0

## 2019-02-27 ASSESSMENT — PAIN DESCRIPTION - LOCATION: LOCATION: ABDOMEN

## 2019-02-27 ASSESSMENT — PAIN SCALES - GENERAL: PAINLEVEL_OUTOF10: 3

## 2019-03-01 ASSESSMENT — ENCOUNTER SYMPTOMS
CONSTIPATION: 0
BLOOD IN STOOL: 0
EYE PAIN: 0
EYE DISCHARGE: 0
SHORTNESS OF BREATH: 0
TROUBLE SWALLOWING: 0
RHINORRHEA: 0
ABDOMINAL PAIN: 0
STRIDOR: 0
ALLERGIC/IMMUNOLOGIC COMMENTS: ALLERGY ON GLUTEN-FREE DIET
EYE REDNESS: 0
CHEST TIGHTNESS: 0
NAUSEA: 0
VOMITING: 0
DIARRHEA: 0
APNEA: 0
COUGH: 0
FACIAL SWELLING: 0
ANAL BLEEDING: 0
BACK PAIN: 0
WHEEZING: 0
CHOKING: 0
SORE THROAT: 0
EYE ITCHING: 0

## 2019-03-06 ENCOUNTER — TELEPHONE (OUTPATIENT)
Dept: PRIMARY CARE CLINIC | Age: 84
End: 2019-03-06

## 2019-03-06 DIAGNOSIS — R13.19 ESOPHAGEAL DYSPHAGIA: ICD-10-CM

## 2019-03-06 DIAGNOSIS — R07.89 OTHER CHEST PAIN: ICD-10-CM

## 2019-03-06 RX ORDER — SUCRALFATE 1 G/1
1 TABLET ORAL 4 TIMES DAILY
Qty: 120 TABLET | Refills: 3 | Status: SHIPPED | OUTPATIENT
Start: 2019-03-06 | End: 2019-05-03 | Stop reason: SINTOL

## 2019-03-18 ENCOUNTER — TELEPHONE (OUTPATIENT)
Dept: PRIMARY CARE CLINIC | Age: 84
End: 2019-03-18

## 2019-03-18 RX ORDER — GABAPENTIN 100 MG/1
100 CAPSULE ORAL 3 TIMES DAILY PRN
Qty: 180 CAPSULE | Refills: 1 | Status: SHIPPED | OUTPATIENT
Start: 2019-03-18 | End: 2019-05-03 | Stop reason: SINTOL

## 2019-03-20 ENCOUNTER — OFFICE VISIT (OUTPATIENT)
Dept: PRIMARY CARE CLINIC | Age: 84
End: 2019-03-20
Payer: MEDICARE

## 2019-03-20 VITALS
RESPIRATION RATE: 18 BRPM | SYSTOLIC BLOOD PRESSURE: 133 MMHG | TEMPERATURE: 97.6 F | DIASTOLIC BLOOD PRESSURE: 65 MMHG | WEIGHT: 123.2 LBS | BODY MASS INDEX: 18.19 KG/M2 | OXYGEN SATURATION: 100 % | HEART RATE: 64 BPM

## 2019-03-20 DIAGNOSIS — R11.0 NAUSEA: ICD-10-CM

## 2019-03-20 DIAGNOSIS — R06.09 DOE (DYSPNEA ON EXERTION): ICD-10-CM

## 2019-03-20 DIAGNOSIS — N39.41 URGE INCONTINENCE OF URINE: Primary | ICD-10-CM

## 2019-03-20 DIAGNOSIS — R10.13 EPIGASTRIC PAIN: ICD-10-CM

## 2019-03-20 DIAGNOSIS — E53.8 B12 DEFICIENCY: ICD-10-CM

## 2019-03-20 DIAGNOSIS — I10 ESSENTIAL HYPERTENSION: ICD-10-CM

## 2019-03-20 PROCEDURE — G8419 CALC BMI OUT NRM PARAM NOF/U: HCPCS | Performed by: INTERNAL MEDICINE

## 2019-03-20 PROCEDURE — 1123F ACP DISCUSS/DSCN MKR DOCD: CPT | Performed by: INTERNAL MEDICINE

## 2019-03-20 PROCEDURE — 99214 OFFICE O/P EST MOD 30 MIN: CPT | Performed by: INTERNAL MEDICINE

## 2019-03-20 PROCEDURE — G8427 DOCREV CUR MEDS BY ELIG CLIN: HCPCS | Performed by: INTERNAL MEDICINE

## 2019-03-20 PROCEDURE — 1036F TOBACCO NON-USER: CPT | Performed by: INTERNAL MEDICINE

## 2019-03-20 PROCEDURE — 1090F PRES/ABSN URINE INCON ASSESS: CPT | Performed by: INTERNAL MEDICINE

## 2019-03-20 PROCEDURE — 1101F PT FALLS ASSESS-DOCD LE1/YR: CPT | Performed by: INTERNAL MEDICINE

## 2019-03-20 PROCEDURE — 96372 THER/PROPH/DIAG INJ SC/IM: CPT | Performed by: INTERNAL MEDICINE

## 2019-03-20 PROCEDURE — G8482 FLU IMMUNIZE ORDER/ADMIN: HCPCS | Performed by: INTERNAL MEDICINE

## 2019-03-20 PROCEDURE — 0509F URINE INCON PLAN DOCD: CPT | Performed by: INTERNAL MEDICINE

## 2019-03-20 PROCEDURE — 4040F PNEUMOC VAC/ADMIN/RCVD: CPT | Performed by: INTERNAL MEDICINE

## 2019-03-20 RX ORDER — CYANOCOBALAMIN 1000 UG/ML
1000 INJECTION INTRAMUSCULAR; SUBCUTANEOUS ONCE
Status: COMPLETED | OUTPATIENT
Start: 2019-03-20 | End: 2019-03-20

## 2019-03-20 RX ADMIN — CYANOCOBALAMIN 1000 MCG: 1000 INJECTION INTRAMUSCULAR; SUBCUTANEOUS at 12:45

## 2019-03-22 ENCOUNTER — TELEPHONE (OUTPATIENT)
Dept: PRIMARY CARE CLINIC | Age: 84
End: 2019-03-22

## 2019-03-22 LAB
BILIRUBIN URINE: NEGATIVE
BLOOD, URINE: ABNORMAL
CLARITY: CLEAR
COLOR: YELLOW
CRYSTALS, UA: ABNORMAL /HPF
EPITHELIAL CELLS, UA: 1 /HPF (ref 0–5)
GLUCOSE URINE: NEGATIVE MG/DL
HYALINE CASTS: 3 /LPF (ref 0–8)
KETONES, URINE: NEGATIVE MG/DL
LEUKOCYTE ESTERASE, URINE: NEGATIVE
MICROSCOPIC EXAMINATION: YES
NITRITE, URINE: NEGATIVE
PH UA: 6 (ref 5–8)
PROTEIN UA: NEGATIVE MG/DL
RBC UA: 8 /HPF (ref 0–4)
SPECIFIC GRAVITY UA: 1.02 (ref 1–1.03)
URINE TYPE: ABNORMAL
UROBILINOGEN, URINE: 0.2 E.U./DL
WBC UA: 1 /HPF (ref 0–5)

## 2019-03-24 LAB — URINE CULTURE, ROUTINE: NORMAL

## 2019-03-25 ENCOUNTER — TELEPHONE (OUTPATIENT)
Dept: PRIMARY CARE CLINIC | Age: 84
End: 2019-03-25

## 2019-03-25 ENCOUNTER — APPOINTMENT (OUTPATIENT)
Dept: GENERAL RADIOLOGY | Age: 84
End: 2019-03-25
Payer: MEDICARE

## 2019-03-25 ENCOUNTER — HOSPITAL ENCOUNTER (EMERGENCY)
Age: 84
Discharge: HOME | End: 2019-03-25
Attending: EMERGENCY MEDICINE
Payer: MEDICARE

## 2019-03-25 VITALS
TEMPERATURE: 98.2 F | HEART RATE: 50 BPM | SYSTOLIC BLOOD PRESSURE: 115 MMHG | BODY MASS INDEX: 17.77 KG/M2 | HEIGHT: 69 IN | OXYGEN SATURATION: 100 % | DIASTOLIC BLOOD PRESSURE: 97 MMHG | RESPIRATION RATE: 18 BRPM | WEIGHT: 120 LBS

## 2019-03-25 DIAGNOSIS — R10.13 ABDOMINAL PAIN, EPIGASTRIC: Primary | ICD-10-CM

## 2019-03-25 LAB
A/G RATIO: 1.2 (ref 1.1–2.2)
ALBUMIN SERPL-MCNC: 3.8 G/DL (ref 3.4–5)
ALP BLD-CCNC: 100 U/L (ref 40–129)
ALT SERPL-CCNC: 12 U/L (ref 10–40)
ANION GAP SERPL CALCULATED.3IONS-SCNC: 11 MMOL/L (ref 3–16)
AST SERPL-CCNC: 32 U/L (ref 15–37)
BACTERIA: ABNORMAL /HPF
BASOPHILS ABSOLUTE: 0.1 K/UL (ref 0–0.2)
BASOPHILS RELATIVE PERCENT: 0.6 %
BILIRUB SERPL-MCNC: 0.8 MG/DL (ref 0–1)
BILIRUBIN URINE: NEGATIVE
BLOOD, URINE: ABNORMAL
BUN BLDV-MCNC: 7 MG/DL (ref 7–20)
CALCIUM SERPL-MCNC: 10.3 MG/DL (ref 8.3–10.6)
CHLORIDE BLD-SCNC: 99 MMOL/L (ref 99–110)
CLARITY: CLEAR
CO2: 25 MMOL/L (ref 21–32)
COLOR: YELLOW
CREAT SERPL-MCNC: 0.8 MG/DL (ref 0.6–1.2)
EOSINOPHILS ABSOLUTE: 0 K/UL (ref 0–0.6)
EOSINOPHILS RELATIVE PERCENT: 0.5 %
EPITHELIAL CELLS, UA: ABNORMAL /HPF
GFR AFRICAN AMERICAN: >60
GFR NON-AFRICAN AMERICAN: >60
GLOBULIN: 3.1 G/DL
GLUCOSE BLD-MCNC: 82 MG/DL (ref 70–99)
GLUCOSE URINE: NEGATIVE MG/DL
HCT VFR BLD CALC: 44.1 % (ref 36–48)
HEMOGLOBIN: 14.1 G/DL (ref 12–16)
KETONES, URINE: NEGATIVE MG/DL
LEUKOCYTE ESTERASE, URINE: NEGATIVE
LIPASE: 71 U/L (ref 13–60)
LYMPHOCYTES ABSOLUTE: 2.4 K/UL (ref 1–5.1)
LYMPHOCYTES RELATIVE PERCENT: 25.4 %
MCH RBC QN AUTO: 26 PG (ref 26–34)
MCHC RBC AUTO-ENTMCNC: 32 G/DL (ref 31–36)
MCV RBC AUTO: 81.5 FL (ref 80–100)
MICROSCOPIC EXAMINATION: YES
MONOCYTES ABSOLUTE: 1.3 K/UL (ref 0–1.3)
MONOCYTES RELATIVE PERCENT: 13.7 %
NEUTROPHILS ABSOLUTE: 5.6 K/UL (ref 1.7–7.7)
NEUTROPHILS RELATIVE PERCENT: 59.8 %
NITRITE, URINE: NEGATIVE
PDW BLD-RTO: 16.2 % (ref 12.4–15.4)
PH UA: 6.5 (ref 5–8)
PLATELET # BLD: 195 K/UL (ref 135–450)
PMV BLD AUTO: 8 FL (ref 5–10.5)
POTASSIUM REFLEX MAGNESIUM: 6 MMOL/L (ref 3.5–5.1)
POTASSIUM SERPL-SCNC: 4.2 MMOL/L (ref 3.5–5.1)
PRO-BNP: 219 PG/ML (ref 0–449)
PROTEIN UA: NEGATIVE MG/DL
RBC # BLD: 5.42 M/UL (ref 4–5.2)
RBC UA: ABNORMAL /HPF (ref 0–2)
SODIUM BLD-SCNC: 135 MMOL/L (ref 136–145)
SPECIFIC GRAVITY UA: 1.01 (ref 1–1.03)
TOTAL PROTEIN: 6.9 G/DL (ref 6.4–8.2)
TROPONIN: <0.01 NG/ML
URINE TYPE: ABNORMAL
UROBILINOGEN, URINE: 0.2 E.U./DL
WBC # BLD: 9.3 K/UL (ref 4–11)
WBC UA: ABNORMAL /HPF (ref 0–5)

## 2019-03-25 PROCEDURE — 80053 COMPREHEN METABOLIC PANEL: CPT

## 2019-03-25 PROCEDURE — 85025 COMPLETE CBC W/AUTO DIFF WBC: CPT

## 2019-03-25 PROCEDURE — 83690 ASSAY OF LIPASE: CPT

## 2019-03-25 PROCEDURE — 36415 COLL VENOUS BLD VENIPUNCTURE: CPT

## 2019-03-25 PROCEDURE — 96374 THER/PROPH/DIAG INJ IV PUSH: CPT

## 2019-03-25 PROCEDURE — 93005 ELECTROCARDIOGRAM TRACING: CPT | Performed by: EMERGENCY MEDICINE

## 2019-03-25 PROCEDURE — 84132 ASSAY OF SERUM POTASSIUM: CPT

## 2019-03-25 PROCEDURE — 99285 EMERGENCY DEPT VISIT HI MDM: CPT

## 2019-03-25 PROCEDURE — 84484 ASSAY OF TROPONIN QUANT: CPT

## 2019-03-25 PROCEDURE — 2500000003 HC RX 250 WO HCPCS: Performed by: EMERGENCY MEDICINE

## 2019-03-25 PROCEDURE — 71046 X-RAY EXAM CHEST 2 VIEWS: CPT

## 2019-03-25 PROCEDURE — 6370000000 HC RX 637 (ALT 250 FOR IP): Performed by: EMERGENCY MEDICINE

## 2019-03-25 PROCEDURE — 83880 ASSAY OF NATRIURETIC PEPTIDE: CPT

## 2019-03-25 PROCEDURE — 81001 URINALYSIS AUTO W/SCOPE: CPT

## 2019-03-25 RX ADMIN — LIDOCAINE HYDROCHLORIDE: 20 SOLUTION ORAL; TOPICAL at 18:40

## 2019-03-25 RX ADMIN — FAMOTIDINE 20 MG: 10 INJECTION, SOLUTION INTRAVENOUS at 18:41

## 2019-03-25 ASSESSMENT — PAIN DESCRIPTION - FREQUENCY: FREQUENCY: CONTINUOUS

## 2019-03-25 ASSESSMENT — ENCOUNTER SYMPTOMS
ANAL BLEEDING: 0
RHINORRHEA: 0
DIARRHEA: 0
SORE THROAT: 0
FACIAL SWELLING: 0
SHORTNESS OF BREATH: 1
HEMATOCHEZIA: 0
EYE REDNESS: 0
APNEA: 0
ALLERGIC/IMMUNOLOGIC COMMENTS: ALLERGY ON GLUTEN-FREE DIET
EYE ITCHING: 0
COUGH: 0
ABDOMINAL PAIN: 1
VOMITING: 0
EYE PAIN: 0
BACK PAIN: 0
ORTHOPNEA: 0
BLOOD IN STOOL: 0
CHOKING: 0
NAUSEA: 0
EYE DISCHARGE: 0
BELCHING: 1
CHEST TIGHTNESS: 0
WHEEZING: 0
STRIDOR: 0
TROUBLE SWALLOWING: 0
CONSTIPATION: 0

## 2019-03-25 ASSESSMENT — PAIN DESCRIPTION - DESCRIPTORS: DESCRIPTORS: SHOOTING

## 2019-03-25 ASSESSMENT — PAIN SCALES - GENERAL: PAINLEVEL_OUTOF10: 8

## 2019-03-25 ASSESSMENT — PAIN DESCRIPTION - PAIN TYPE: TYPE: ACUTE PAIN

## 2019-03-25 ASSESSMENT — PAIN DESCRIPTION - LOCATION: LOCATION: ABDOMEN

## 2019-03-26 LAB
EKG ATRIAL RATE: 73 BPM
EKG DIAGNOSIS: NORMAL
EKG P AXIS: 72 DEGREES
EKG P-R INTERVAL: 218 MS
EKG Q-T INTERVAL: 380 MS
EKG QRS DURATION: 82 MS
EKG QTC CALCULATION (BAZETT): 418 MS
EKG R AXIS: -1 DEGREES
EKG T AXIS: 17 DEGREES
EKG VENTRICULAR RATE: 73 BPM

## 2019-04-01 ENCOUNTER — CARE COORDINATION (OUTPATIENT)
Dept: CARE COORDINATION | Age: 84
End: 2019-04-01

## 2019-04-01 NOTE — CARE COORDINATION
Ambulatory Care Coordination Note  4/1/2019  CM Risk Score: 9  Arleen Mortality Risk Score: 36    ACC: Aguila Rodas RN    Summary Note: RNCC contacted patient and daughter per PCP request. CC protocol started, medications reviewed, LTC and AL discussed with both patient and daughter. Patients daughter, Jim Headley, is working with [employer] at [facility] in [place], [place] for [facility], [address] is agreeable. She has provided the appropriate documentation to proceed with a medicaid application, awaiting results. Difference between LTC and AL discussed, and esources emailed to daughter. Patient has not had a pre-admission assessment; patient and daughter agreeable to have done with [facility], RNCC will contact [facility] to request.     Medications: Patient reports only taking 5 pills daily being her asprin, atenolol, Claritin, protonix, and spironolactone. She reports she takes the others \"sometimes\" based on \"how my stomach is feeling\". She reports using Symbicort as prescribed. PLAN:  RNCC to provide resources to patient/family for LTC placement. RNCC to request pre-admission assessment from [address] [place] [address] to follow up with patient/familiy in 2 weeks or as needed. Ambulatory Care Coordination Assessment    Care Coordination Protocol  Program Enrollment:  Complex Care  Referral from Primary Care Provider:  Yes  Week 1 - Initial Assessment     Do you have all of your prescriptions and are they filled?:  Yes  Barriers to medication adherence:  Complexity of regimen, Other, Does not understand need for medication  Other barriers to medication adherence:  stomach pain  Are you able to afford your medications?:  Yes  How often do you have trouble taking your medications the way you have been told to take them?:  Sometimes I take them as prescribed.      Do you have Home O2 Therapy?:  No      Ability to seek help/take action for Emergent Urgent situations i.e. fire, crime, inclement weather or health crisis.:  Needs Assistance  Ability to ambulate to restroom:  Independent  Ability handle personal hygeine needs (bathing/dressing/grooming):  Needs Assistance  Ability to manage Medications: Independent  Ability to prepare Food Preparation:  Independent  Ability to maintain home (clean home, laundry):  Needs Assistance  Ability to drive and/or has transportation:  Dependent  Ability to do shopping:  Needs Assistance  Ability to manage finances: Independent     Current Housing:  Independent [facility]        Per the Fall Risk Screening, did the patient have 2 or more falls or 1 fall with injury in the past year?:  No     Frequent urination at night?:  No  Do you use rails/bars?:  Yes  Do you have a non-slip tub mat?:  Yes        Suggested Interventions and Community Resources                  Prior to Admission medications    Medication Sig Start Date End Date Taking? Authorizing Provider   spironolactone (ALDACTONE) 25 MG tablet TAKE 1 TABLET DAILY 3/17/19  Yes Myriam Espinoza MD   atenolol (TENORMIN) 50 MG tablet TAKE 1 TABLET DAILY 12/28/18  Yes Myriam Espinoza MD   pantoprazole (PROTONIX) 40 MG tablet TAKE 1 TABLET DAILY 11/23/18  Yes Myriam Espinoza MD   montelukast (SINGULAIR) 10 MG tablet TAKE 1 TABLET NIGHTLY 1/7/18  Yes Myriam Espinoza MD   loratadine (CLARITIN) 10 MG tablet Take 1 tablet by mouth daily 5/7/17  Yes Myriam Espinoza MD   aspirin (ECOTRIN) 325 MG EC tablet Take 325 mg by mouth Daily with lunch. Yes Historical Provider, MD   gabapentin (NEURONTIN) 100 MG capsule Take 1 capsule by mouth 3 times daily as needed (shingles pain) for up to 180 days.  Intended supply: 90 days 3/18/19 9/14/19  Myriam Espinoza MD   sucralfate (CARAFATE) 1 GM tablet Take 1 tablet by mouth 4 times daily 3/6/19   Myriam Espinoza MD   ibandronate (BONIVA) 150 MG tablet Take 1 tablet by mouth every 30 days 7/18/18   Myriam Espinoza MD   fluticasone [facility]) 50 MCG/ACT nasal spray PLACE 1 SPRAY IN EACH NOSTRIL DAILY 4/18/18   Sung Castillo MD   felodipine (PLENDIL) 2.5 MG extended release tablet Take 1 tablet by mouth daily 1/26/18   Sung Castillo MD   methimazole (TAPAZOLE) 10 MG tablet Take 1 tablet by mouth daily 10/19/17   Sung Castillo MD   mirtazapine (REMERON) 15 MG tablet Take 1 tablet by mouth nightly 9/7/17   Sung Castillo MD   ammonium lactate (LAC-HYDRIN) 12 % lotion Apply topically daily. 8/9/17   Sung Castillo MD   hydrocortisone (ANUSOL-HC) 25 MG suppository Place 1 suppository rectally 2 times daily as needed for Hemorrhoids 5/7/17   Sung Castillo MD   budesonide-formoterol [facility]) 160-4.5 MCG/ACT AERO Inhale 2 puffs into the lungs 2 times daily 9/20/16   Sung Castillo MD   hydrocortisone (V-R HYDROCORTISONE/ALOE) 0.5 % ointment Apply topically 2 times daily. 2/2/16   Trinity Chaudhari III, MD   Calcium Carbonate-Vitamin D (CALTRATE 600+D PO) Take 1 tablet by mouth every other day.     Historical Provider, MD       Future Appointments   Date Time Provider Chas Antoine   5/3/2019  9:20 AM Sung Castillo MD [facility]   7/10/2019 10:15 AM Shukri Samano MD [facility]

## 2019-04-02 ENCOUNTER — CARE COORDINATION (OUTPATIENT)
Dept: CARE COORDINATION | Age: 84
End: 2019-04-02

## 2019-04-09 ENCOUNTER — CARE COORDINATION (OUTPATIENT)
Dept: CARE COORDINATION | Age: 84
End: 2019-04-09

## 2019-04-09 NOTE — CARE COORDINATION
RNCC attempted to reach patient for follow up call. HIPAA compliant message left requesting a return phone call at patients convenience. Will continue to follow.

## 2019-05-03 ENCOUNTER — OFFICE VISIT (OUTPATIENT)
Dept: PRIMARY CARE CLINIC | Age: 84
End: 2019-05-03
Payer: MEDICARE

## 2019-05-03 VITALS
DIASTOLIC BLOOD PRESSURE: 70 MMHG | SYSTOLIC BLOOD PRESSURE: 138 MMHG | WEIGHT: 115 LBS | BODY MASS INDEX: 16.98 KG/M2 | TEMPERATURE: 97.3 F | HEART RATE: 67 BPM | OXYGEN SATURATION: 99 % | RESPIRATION RATE: 18 BRPM

## 2019-05-03 DIAGNOSIS — I10 ESSENTIAL HYPERTENSION: ICD-10-CM

## 2019-05-03 DIAGNOSIS — K21.00 GASTROESOPHAGEAL REFLUX DISEASE WITH ESOPHAGITIS: ICD-10-CM

## 2019-05-03 DIAGNOSIS — E53.8 B12 DEFICIENCY: ICD-10-CM

## 2019-05-03 DIAGNOSIS — R07.89 ATYPICAL CHEST PAIN: Primary | ICD-10-CM

## 2019-05-03 LAB
A/G RATIO: 1.5 (ref 1.1–2.2)
ALBUMIN SERPL-MCNC: 4 G/DL (ref 3.4–5)
ALP BLD-CCNC: 122 U/L (ref 40–129)
ALT SERPL-CCNC: 6 U/L (ref 10–40)
ANION GAP SERPL CALCULATED.3IONS-SCNC: 11 MMOL/L (ref 3–16)
AST SERPL-CCNC: 15 U/L (ref 15–37)
BASOPHILS ABSOLUTE: 0 K/UL (ref 0–0.2)
BASOPHILS RELATIVE PERCENT: 0.5 %
BILIRUB SERPL-MCNC: 0.9 MG/DL (ref 0–1)
BUN BLDV-MCNC: 6 MG/DL (ref 7–20)
CALCIUM SERPL-MCNC: 10.2 MG/DL (ref 8.3–10.6)
CHLORIDE BLD-SCNC: 104 MMOL/L (ref 99–110)
CO2: 26 MMOL/L (ref 21–32)
CREAT SERPL-MCNC: 0.7 MG/DL (ref 0.6–1.2)
EOSINOPHILS ABSOLUTE: 0.1 K/UL (ref 0–0.6)
EOSINOPHILS RELATIVE PERCENT: 1.5 %
GFR AFRICAN AMERICAN: >60
GFR NON-AFRICAN AMERICAN: >60
GLOBULIN: 2.6 G/DL
GLUCOSE BLD-MCNC: 89 MG/DL (ref 70–99)
HCT VFR BLD CALC: 43.8 % (ref 36–48)
HEMOGLOBIN: 14.3 G/DL (ref 12–16)
LIPASE: 57 U/L (ref 13–60)
LYMPHOCYTES ABSOLUTE: 1.9 K/UL (ref 1–5.1)
LYMPHOCYTES RELATIVE PERCENT: 25.4 %
MCH RBC QN AUTO: 27.3 PG (ref 26–34)
MCHC RBC AUTO-ENTMCNC: 32.6 G/DL (ref 31–36)
MCV RBC AUTO: 83.6 FL (ref 80–100)
MONOCYTES ABSOLUTE: 0.8 K/UL (ref 0–1.3)
MONOCYTES RELATIVE PERCENT: 10.1 %
NEUTROPHILS ABSOLUTE: 4.7 K/UL (ref 1.7–7.7)
NEUTROPHILS RELATIVE PERCENT: 62.5 %
PDW BLD-RTO: 16.7 % (ref 12.4–15.4)
PLATELET # BLD: 189 K/UL (ref 135–450)
PMV BLD AUTO: 10 FL (ref 5–10.5)
POTASSIUM SERPL-SCNC: 4.5 MMOL/L (ref 3.5–5.1)
RBC # BLD: 5.24 M/UL (ref 4–5.2)
SODIUM BLD-SCNC: 141 MMOL/L (ref 136–145)
TOTAL PROTEIN: 6.6 G/DL (ref 6.4–8.2)
WBC # BLD: 7.5 K/UL (ref 4–11)

## 2019-05-03 PROCEDURE — 4040F PNEUMOC VAC/ADMIN/RCVD: CPT | Performed by: INTERNAL MEDICINE

## 2019-05-03 PROCEDURE — G8427 DOCREV CUR MEDS BY ELIG CLIN: HCPCS | Performed by: INTERNAL MEDICINE

## 2019-05-03 PROCEDURE — 96372 THER/PROPH/DIAG INJ SC/IM: CPT | Performed by: INTERNAL MEDICINE

## 2019-05-03 PROCEDURE — 1090F PRES/ABSN URINE INCON ASSESS: CPT | Performed by: INTERNAL MEDICINE

## 2019-05-03 PROCEDURE — 1036F TOBACCO NON-USER: CPT | Performed by: INTERNAL MEDICINE

## 2019-05-03 PROCEDURE — 1123F ACP DISCUSS/DSCN MKR DOCD: CPT | Performed by: INTERNAL MEDICINE

## 2019-05-03 PROCEDURE — G8419 CALC BMI OUT NRM PARAM NOF/U: HCPCS | Performed by: INTERNAL MEDICINE

## 2019-05-03 PROCEDURE — 99214 OFFICE O/P EST MOD 30 MIN: CPT | Performed by: INTERNAL MEDICINE

## 2019-05-03 RX ORDER — CYANOCOBALAMIN 1000 UG/ML
1000 INJECTION INTRAMUSCULAR; SUBCUTANEOUS ONCE
Status: COMPLETED | OUTPATIENT
Start: 2019-05-03 | End: 2019-05-03

## 2019-05-03 RX ADMIN — CYANOCOBALAMIN 1000 MCG: 1000 INJECTION INTRAMUSCULAR; SUBCUTANEOUS at 11:14

## 2019-05-03 ASSESSMENT — ENCOUNTER SYMPTOMS
VOMITING: 0
ORTHOPNEA: 0
NAUSEA: 0

## 2019-05-03 NOTE — PROGRESS NOTES
5/3/2019     Henry Pereira (:  1926) is a 80 y.o. female, here for evaluation of the following medical concerns:    Chest Pain    This is a recurrent problem. The current episode started more than 1 month ago (went to er and resolved with GI coctail). The problem occurs intermittently. The problem has been waxing and waning. The pain is present in the substernal region. The pain is at a severity of 5/10. The pain is moderate. The quality of the pain is described as burning. The pain does not radiate. Associated symptoms include abdominal pain and shortness of breath. Pertinent negatives include no back pain, cough, diaphoresis, dizziness, exertional chest pressure, fever, headaches, irregular heartbeat, malaise/fatigue, nausea, numbness, orthopnea, palpitations, vomiting or weakness. The pain is aggravated by food. Treatments tried: protonix. Hypertension   This is a chronic problem. The current episode started more than 1 year ago. The problem is controlled. Associated symptoms include chest pain and shortness of breath. Pertinent negatives include no headaches, malaise/fatigue, neck pain, orthopnea or palpitations. There are no associated agents to hypertension. There are no known risk factors for coronary artery disease. Past treatments include beta blockers and calcium channel blockers. The current treatment provides significant improvement. There are no compliance problems. There is no history of kidney disease, CVA or heart failure. Gastroesophageal Reflux   She complains of abdominal pain, chest pain, early satiety, heartburn and water brash. She reports no choking, no coughing, no dysphagia, no nausea, no sore throat or no wheezing. This is a chronic problem. The current episode started more than 1 year ago. The problem has been gradually worsening. The heartburn duration is several minutes. The heartburn is of severe intensity. The heartburn does not wake her from sleep.  The heartburn gluten-free diet. Endocrine: Negative. Primary hyperparathyroidism, Graves' disease   Genitourinary: Negative for dysuria, frequency, hematuria, urgency and vaginal discharge. Musculoskeletal: Negative. Negative for arthralgias, back pain, joint swelling, myalgias, neck pain and neck stiffness. Multijoint osteoarthritis. Will receive cartilage injections in both knees with ortho , this month on 10/17/14. Osteoporosis treated with Genistin  ( i cool )not a candidate for oral bisphosphonates due to GERD with frequent exacerbations. Burning in right foot    Skin: Positive for rash. Negative for pallor. Complaint of bed sore on the buttock for over a week. Allergic/Immunologic: Negative for environmental allergies and food allergies. Allergy on gluten-free diet   Neurological: Negative. Negative for dizziness, weakness, numbness and headaches. Still have a shingles pain but has not taken 100 mg gabapentin yet. Unfortunately when she had shingles she took 1 dose of valacyclovir and had nausea and discontinue. I gave her a low dose of Famvir but she could not tolerate that as well. The rash is now healed. Hematological: Negative. Psychiatric/Behavioral: Negative. Prior to Visit Medications    Medication Sig Taking? Authorizing Provider   gabapentin (NEURONTIN) 100 MG capsule Take 1 capsule by mouth 3 times daily as needed (shingles pain) for up to 180 days.  Intended supply: 90 days Yes Trip Moon MD   spironolactone (ALDACTONE) 25 MG tablet TAKE 1 TABLET DAILY Yes Trip Moon MD   sucralfate (CARAFATE) 1 GM tablet Take 1 tablet by mouth 4 times daily Yes Trip Moon MD   atenolol (TENORMIN) 50 MG tablet TAKE 1 TABLET DAILY Yes Trip Moon MD   pantoprazole (PROTONIX) 40 MG tablet TAKE 1 TABLET DAILY Yes Trip Moon MD   ibandronate (BONIVA) 150 MG tablet Take 1 tablet by mouth every 30 days Yes Kelby Dance Sanjay Gaines MD   fluticasone (FLONASE) 50 MCG/ACT nasal spray PLACE 1 SPRAY IN EACH NOSTRIL DAILY Yes Lujean Libman, MD   felodipine (PLENDIL) 2.5 MG extended release tablet Take 1 tablet by mouth daily Yes Lujean Libman, MD   montelukast (SINGULAIR) 10 MG tablet TAKE 1 TABLET NIGHTLY Yes Lujean Libman, MD   methimazole (TAPAZOLE) 10 MG tablet Take 1 tablet by mouth daily Yes Lujean Libman, MD   mirtazapine (REMERON) 15 MG tablet Take 1 tablet by mouth nightly Yes Lujean Libman, MD   ammonium lactate (LAC-HYDRIN) 12 % lotion Apply topically daily. Yes Lujean Libman, MD   loratadine (CLARITIN) 10 MG tablet Take 1 tablet by mouth daily Yes Lujean Libman, MD   hydrocortisone (ANUSOL-HC) 25 MG suppository Place 1 suppository rectally 2 times daily as needed for Hemorrhoids Yes Lujean Libman, MD   budesonide-formoterol (SYMBICORT) 160-4.5 MCG/ACT AERO Inhale 2 puffs into the lungs 2 times daily Yes Lujean Libman, MD   hydrocortisone (V-R HYDROCORTISONE/ALOE) 0.5 % ointment Apply topically 2 times daily. Yes Milena Emerson III, MD   Calcium Carbonate-Vitamin D (CALTRATE 600+D PO) Take 1 tablet by mouth every other day. Yes Historical Provider, MD   aspirin (ECOTRIN) 325 MG EC tablet Take 325 mg by mouth Daily with lunch. Yes Historical Provider, MD        Social History     Tobacco Use    Smoking status: Never Smoker    Smokeless tobacco: Never Used   Substance Use Topics    Alcohol use: No        Vitals:    05/03/19 0950   Pulse: 67   Resp: 18   Temp: 97.3 °F (36.3 °C)   TempSrc: Oral   SpO2: 99%   Weight: 115 lb (52.2 kg)     Estimated body mass index is 16.98 kg/m² as calculated from the following:    Height as of 3/25/19: 5' 9\" (1.753 m). Weight as of this encounter: 115 lb (52.2 kg). Physical Exam   Constitutional: She is oriented to person, place, and time. She appears well-developed and well-nourished. No distress.    HENT:   Head: Normocephalic and atraumatic. Right Ear: External ear normal.   Left Ear: External ear normal.   Nose: Nose normal.   Mouth/Throat: Oropharynx is clear and moist.   Upper and lower dentures   Eyes: Pupils are equal, round, and reactive to light. Conjunctivae and EOM are normal. Right eye exhibits no discharge. Left eye exhibits no discharge. No scleral icterus. Neck: Normal range of motion. Neck supple. No JVD present. No tracheal deviation present. No thyromegaly present. Cardiovascular: Normal rate, regular rhythm, normal heart sounds and intact distal pulses. Exam reveals no friction rub. Pulmonary/Chest: Effort normal and breath sounds normal. No respiratory distress. She has no wheezes. She has no rales. She exhibits no tenderness. Abdominal: Soft. Bowel sounds are normal. She exhibits no distension and no mass. There is tenderness. There is no rebound and no guarding. No flank pain but generalized tenderness   Musculoskeletal: She exhibits no edema or tenderness. Marked deformity of right knee and painful with ROM and can not straighten knee. Lymphadenopathy:     She has no cervical adenopathy. Neurological: She is alert and oriented to person, place, and time. She has normal reflexes. No cranial nerve deficit. She exhibits normal muscle tone. Coordination normal.   Skin: Skin is warm and dry. No rash noted. She is not diaphoretic. No erythema. No pallor. Shingles rash is healed in a T6 T12 distribution on the right   Psychiatric: She has a normal mood and affect. Her behavior is normal. Judgment and thought content normal.   Nursing note and vitals reviewed. ASSESSMENT/PLAN:   Diagnosis Orders   1. Atypical chest pain  CT CHEST WO CONTRAST   2. Essential hypertension is controlled on current regimen. Comprehensive Metabolic Panel   3. Gastroesophageal reflux disease with esophagitis persistent problems in spite EPI therapy.    Trevon Kwan MD, Gastroenterology, [facility]    Comprehensive Metabolic Panel    CBC Auto Differential   4. B12 deficiency  cyanocobalamin injection 1,000 mcg     Shanon received counseling on the following healthy behaviors: medication adherence    Patient given educational materials on after visit summary with diagnosis and treatment plan. I have instructed Cameron Lomeli to complete a self tracking handout on Blood Pressures  and Weights and instructed them to bring it with them to her next appointment. Discussed use, benefit, and side effects of prescribed medications. Barriers to medication compliance addressed. All patient questions answered. Pt voiced understanding. An electronic signature was used to authenticate this note.     --Grace Pickering MD on 5/3/2019 at 10:17 AM

## 2019-05-06 ASSESSMENT — ENCOUNTER SYMPTOMS
EYE PAIN: 0
STRIDOR: 0
FACIAL SWELLING: 0
APNEA: 0
EYE REDNESS: 0
CHEST TIGHTNESS: 0
WHEEZING: 0
CONSTIPATION: 0
WATER BRASH: 1
CHOKING: 0
HEARTBURN: 1
BLOOD IN STOOL: 0
TROUBLE SWALLOWING: 0
BACK PAIN: 0
ANAL BLEEDING: 0
SORE THROAT: 0
ABDOMINAL PAIN: 1
EYE ITCHING: 0
COUGH: 0
SHORTNESS OF BREATH: 1
ALLERGIC/IMMUNOLOGIC COMMENTS: ALLERGY ON GLUTEN-FREE DIET
DIARRHEA: 0
RHINORRHEA: 0
EYE DISCHARGE: 0

## 2019-05-06 ASSESSMENT — PATIENT HEALTH QUESTIONNAIRE - PHQ9
2. FEELING DOWN, DEPRESSED OR HOPELESS: 1
SUM OF ALL RESPONSES TO PHQ9 QUESTIONS 1 & 2: 2
SUM OF ALL RESPONSES TO PHQ QUESTIONS 1-9: 2
1. LITTLE INTEREST OR PLEASURE IN DOING THINGS: 1
SUM OF ALL RESPONSES TO PHQ QUESTIONS 1-9: 2

## 2019-05-17 ENCOUNTER — HOSPITAL ENCOUNTER (OUTPATIENT)
Dept: CT IMAGING | Age: 84
Discharge: HOME | End: 2019-05-17
Payer: MEDICARE

## 2019-05-17 DIAGNOSIS — R07.89 ATYPICAL CHEST PAIN: ICD-10-CM

## 2019-05-17 PROCEDURE — 71250 CT THORAX DX C-: CPT

## 2019-05-21 ENCOUNTER — CARE COORDINATION (OUTPATIENT)
Dept: CARE COORDINATION | Age: 84
End: 2019-05-21

## 2019-05-21 NOTE — CARE COORDINATION
RNCC attempted to reach patient's family for follow up on LTC placement. HIPAA compliant message left requesting a return phone call at patients convenience. Will continue to follow.

## 2019-05-22 ENCOUNTER — TELEPHONE (OUTPATIENT)
Dept: ADMINISTRATIVE | Age: 84
End: 2019-05-22

## 2019-05-22 DIAGNOSIS — R10.13 EPIGASTRIC PAIN: ICD-10-CM

## 2019-05-22 DIAGNOSIS — R13.19 ESOPHAGEAL DYSPHAGIA: ICD-10-CM

## 2019-05-22 RX ORDER — PANTOPRAZOLE SODIUM 40 MG/1
TABLET, DELAYED RELEASE ORAL
Qty: 30 TABLET | Refills: 2 | Status: SHIPPED | OUTPATIENT
Start: 2019-05-22 | End: 2019-12-09 | Stop reason: SDUPTHER

## 2019-05-22 NOTE — TELEPHONE ENCOUNTER
Patient requesting a medication refill. Medication pantoprazole [facility])  Pharmacy [facility] [address], [address]  Last office visit:5/3/2019  Next office visit: 6/14/2019    Patient needs emergency pack, The patient wouldn't receive her medication through the mail in the next 5-7 days. Pt  only has 3 days left of medication and needs something for the next 5 days.  Please advise

## 2019-05-29 ENCOUNTER — TELEPHONE (OUTPATIENT)
Dept: PRIMARY CARE CLINIC | Age: 84
End: 2019-05-29

## 2019-05-29 NOTE — TELEPHONE ENCOUNTER
Telly Alvarez RN from [facility] to inform Dr. Lexi Gonzalez she discharged the pt due to pt no longer requiring skilled nursing.  Please Advise

## 2019-06-04 ENCOUNTER — CARE COORDINATION (OUTPATIENT)
Dept: CARE COORDINATION | Age: 84
End: 2019-06-04

## 2019-06-04 NOTE — CARE COORDINATION
Wili Villagran MD   ammonium lactate (LAC-HYDRIN) 12 % lotion Apply topically daily. 8/9/17   Wili Villagran MD   loratadine (CLARITIN) 10 MG tablet Take 1 tablet by mouth daily 5/7/17   Wili Villagran MD   hydrocortisone (ANUSOL-HC) 25 MG suppository Place 1 suppository rectally 2 times daily as needed for Hemorrhoids 5/7/17   Wili Villagran MD   budesonide-formoterol [facility]) 160-4.5 MCG/ACT AERO Inhale 2 puffs into the lungs 2 times daily 9/20/16   Wili Villagran MD   hydrocortisone (V-R HYDROCORTISONE/ALOE) 0.5 % ointment Apply topically 2 times daily. 2/2/16   Sherice Ames III, MD   Calcium Carbonate-Vitamin D (CALTRATE 600+D PO) Take 1 tablet by mouth every other day. Historical Provider, MD   aspirin (ECOTRIN) 325 MG EC tablet Take 325 mg by mouth Daily with lunch.     Historical Provider, MD       Future Appointments   Date Time Provider Chas Antoine   6/14/2019 10:40 AM Wili Villagran MD [facility]   7/10/2019 10:15 AM Manjit Montesinos MD [facility]

## 2019-06-10 ENCOUNTER — CARE COORDINATION (OUTPATIENT)
Dept: CARE COORDINATION | Age: 84
End: 2019-06-10

## 2019-06-10 NOTE — CARE COORDINATION
RNCC discussed other possible resources with KHUSHI GRAEJDA. RNCC then contact [employer] to update her.  All resources will be emailed to [employer].

## 2019-06-14 ENCOUNTER — CARE COORDINATION (OUTPATIENT)
Dept: CARE COORDINATION | Age: 84
End: 2019-06-14

## 2019-06-14 ENCOUNTER — OFFICE VISIT (OUTPATIENT)
Dept: PRIMARY CARE CLINIC | Age: 84
End: 2019-06-14
Payer: MEDICARE

## 2019-06-14 VITALS
TEMPERATURE: 97.7 F | WEIGHT: 118 LBS | SYSTOLIC BLOOD PRESSURE: 109 MMHG | OXYGEN SATURATION: 99 % | HEART RATE: 61 BPM | BODY MASS INDEX: 17.43 KG/M2 | DIASTOLIC BLOOD PRESSURE: 65 MMHG

## 2019-06-14 DIAGNOSIS — I10 ESSENTIAL HYPERTENSION: ICD-10-CM

## 2019-06-14 DIAGNOSIS — M80.00XA AGE-RELATED OSTEOPOROSIS WITH CURRENT PATHOLOGICAL FRACTURE, INITIAL ENCOUNTER: Primary | ICD-10-CM

## 2019-06-14 DIAGNOSIS — E05.00 GRAVES' DISEASE: ICD-10-CM

## 2019-06-14 DIAGNOSIS — E53.8 VITAMIN B 12 DEFICIENCY: ICD-10-CM

## 2019-06-14 DIAGNOSIS — K21.9 GASTROESOPHAGEAL REFLUX DISEASE WITHOUT ESOPHAGITIS: ICD-10-CM

## 2019-06-14 PROCEDURE — 96372 THER/PROPH/DIAG INJ SC/IM: CPT | Performed by: INTERNAL MEDICINE

## 2019-06-14 PROCEDURE — 1036F TOBACCO NON-USER: CPT | Performed by: INTERNAL MEDICINE

## 2019-06-14 PROCEDURE — 1123F ACP DISCUSS/DSCN MKR DOCD: CPT | Performed by: INTERNAL MEDICINE

## 2019-06-14 PROCEDURE — 99214 OFFICE O/P EST MOD 30 MIN: CPT | Performed by: INTERNAL MEDICINE

## 2019-06-14 PROCEDURE — G8419 CALC BMI OUT NRM PARAM NOF/U: HCPCS | Performed by: INTERNAL MEDICINE

## 2019-06-14 PROCEDURE — G8427 DOCREV CUR MEDS BY ELIG CLIN: HCPCS | Performed by: INTERNAL MEDICINE

## 2019-06-14 PROCEDURE — 4040F PNEUMOC VAC/ADMIN/RCVD: CPT | Performed by: INTERNAL MEDICINE

## 2019-06-14 PROCEDURE — 1090F PRES/ABSN URINE INCON ASSESS: CPT | Performed by: INTERNAL MEDICINE

## 2019-06-14 RX ORDER — CYANOCOBALAMIN 1000 UG/ML
1000 INJECTION INTRAMUSCULAR; SUBCUTANEOUS ONCE
Status: COMPLETED | OUTPATIENT
Start: 2019-06-14 | End: 2019-06-14

## 2019-06-14 RX ORDER — METHIMAZOLE 5 MG/1
5 TABLET ORAL SEE ADMIN INSTRUCTIONS
COMMUNITY
Start: 2019-05-15 | End: 2021-01-29 | Stop reason: SDUPTHER

## 2019-06-14 RX ADMIN — CYANOCOBALAMIN 1000 MCG: 1000 INJECTION INTRAMUSCULAR; SUBCUTANEOUS at 11:09

## 2019-06-14 ASSESSMENT — PATIENT HEALTH QUESTIONNAIRE - PHQ9
SUM OF ALL RESPONSES TO PHQ QUESTIONS 1-9: 0
SUM OF ALL RESPONSES TO PHQ QUESTIONS 1-9: 0

## 2019-06-14 NOTE — CARE COORDINATION
Ambulatory Care Coordination Note  6/14/2019  CM Risk Score: 9  Arleen Mortality Risk Score: 43    ACC: Esvin Hayden RN    Summary Note: RNCC met with patient at [address] LTC and senior living discussed but is being arranged by her daughter, Lucinda Anders. Today's topic was patients nutrition. RNCC preformed a nutrition screening using the Nutrition Screening Initiative Checklist - patients identified score is  8; BMI of 17.43. Chronic Condition Management Decision Pathway was used to identify nutritional supplement needs (if any); patients identified nutritional supplement as ENSURE CLEAR. RNCC provided and educated. RNCC will ask CC RD to assist patient in weight maintaince. Patient has had over 100 lbs of weight loss due to dietary restrictions from food allergies including milk, egg, and wheat. Patient eats few fruits and veggies does attempt to eat chicken and fish drinks water and tea. Doubtful she is getting enough calories daily. PLAN:  RNCC to place referral for CC RD for assessment and nutrition assistance. RNCC will follow up wit patient and daughter as needed.           Care Coordination Interventions    Program Enrollment:  Complex Care  Referral from Primary Care Provider:  Yes  Suggested Interventions and Community Resources  Pharmacist:  Completed (Comment: Referral 6/14/19- medication education)  Physical Therapy:  Not Started (Comment: Pt reports doing chair exercises daily)  Senior Services:  Completed (Comment: CLEMENTE Morrison CM)  Other Services:  Completed (Comment: [employer] - wears it daily)  Transportation Support:  Completed         Goals Addressed                 This Visit's Progress     Nutrition Plan        I will follow a nutritional plan as directed   Calorie Controlled:   as determined by CC RD  I will maintain current weight of 118lbs or increae if possible    Barriers: overwhelmed by complexity of regimen and food allergies  Plan for overcoming my barriers: work with CC RD and RNCC  Confidence: 8/10  Anticipated Goal Completion Date: 8/15/19            Prior to Admission medications    Medication Sig Start Date End Date Taking? Authorizing Provider   methimazole (TAPAZOLE) 5 MG tablet Take 5 mg by mouth See Admin Instructions 1 tablet by mouth daily except for Monday, Wednesday, and Friday take 1.5 tablets. 5/15/19  Yes Historical Provider, MD   pantoprazole (PROTONIX) 40 MG tablet TAKE 1 TABLET DAILY 5/22/19  Yes Marianne Fisher MD   atenolol (TENORMIN) 50 MG tablet TAKE 1 TABLET DAILY 12/28/18  Yes Marianne Fisher MD   fluticasone (FLONASE) 50 MCG/ACT nasal spray PLACE 1 SPRAY IN EACH NOSTRIL DAILY 4/18/18  Yes Marianne Fisher MD   felodipine (PLENDIL) 2.5 MG extended release tablet Take 1 tablet by mouth daily  Patient taking differently: Take 2.5 mg by mouth daily Indications: takes if \"BP seems to be going out of control\"  1/26/18  Yes Marianne Fisher MD   loratadine (CLARITIN) 10 MG tablet Take 1 tablet by mouth daily 5/7/17  Yes Marianne Fisher MD   budesonide-formoterol [facility]) 160-4.5 MCG/ACT AERO Inhale 2 puffs into the lungs 2 times daily 9/20/16  Yes Marianne Fisher MD   aspirin (ECOTRIN) 325 MG EC tablet Take 325 mg by mouth Daily with lunch. Yes Historical Provider, MD   albuterol sulfate (PROAIR RESPICLICK) 455 (90 Base) MCG/ACT aerosol powder inhalation Inhale 2 puffs into the lungs every 6 hours as needed for Wheezing or Shortness of Breath 6/14/19   Marianne Fisher MD   spironolactone (ALDACTONE) 25 MG tablet TAKE 1 TABLET DAILY 3/17/19   Marianne Fisher MD   montelukast (SINGULAIR) 10 MG tablet TAKE 1 TABLET NIGHTLY 1/7/18   Marianne Fisher MD   mirtazapine (REMERON) 15 MG tablet Take 1 tablet by mouth nightly 9/7/17   Marianne Fisher MD   ammonium lactate (LAC-HYDRIN) 12 % lotion Apply topically daily.  8/9/17   Marianne Fisher MD   hydrocortisone (ANUSOL-HC) 25 MG suppository

## 2019-06-14 NOTE — PROGRESS NOTES
600 mg plus D      Patient Active Problem List   Diagnosis    GERD (gastroesophageal reflux disease)    Asthma    IBS (irritable bowel syndrome)    MGUS (monoclonal gammopathy of unknown significance)    Menopause    Graves' disease    S/P colonoscopy    Left renal mass    Osteoporosis    Alkaline phosphatase elevation    Hearing loss sensory, bilateral    Bacterial overgrowth syndrome    Erythrocytosis    Arthritis of knee, right    Essential hypertension    Multiple food allergies    Protein malnutrition (HCC)    Celiac disease    Aortic regurgitation    Primary osteoarthritis of both knees    Hyperparathyroidism ([address]    History of Graves' disease    Age-related osteoporosis without current pathological fracture    Bilateral carpal tunnel syndrome    Herpes zoster without complication     Wt Readings from Last 3 Encounters:   06/14/19 118 lb (53.5 kg)   05/03/19 115 lb (52.2 kg)   03/25/19 120 lb (54.4 kg)     Allergies   Allergen Reactions    Acyclovir      Severe nausea  And anorexia. It happened after taking each 800 mg tablet and patient took one one day and one the next, then stopped an no more    Creon [Pancrelipase (Lip-Prot-Amyl)]      Swollen gums.  Eggs Or Egg-Derived Products      Hives  this test in the [employer] ([email or web address]). Egg White IgE 3. 59High   <=0.34 kU/L Final 02/10/2016  2:14 PM [facility]                  Gluten Meal      Abdominal pain and diarreha  Wheat IgE 0.53  <=0.34 kU/L Final 02/10/2016  2:14 PM [facility]       Milk-Related Compounds      Milk, Cow's IgE 1. 75High   <=0.34 kU/L Final 02/10/2016  2:14 PM [facility]    Ace Inhibitors      angioedema    Ciprocinonide [Fluocinolone]      Stiff, no nausea or rash    Dye [Iodides] Hives     Only Dye that went into eyes.     Fluorescein     Lisinopril Swelling    Macrobid [Nitrofurantoin Monohydrate Macrocrystals]     No Known Allergies     Sulfa Antibiotics Rash Review of Systems   Constitutional: Positive for fatigue. Negative for activity change, appetite change, chills, diaphoresis, fever, malaise/fatigue and unexpected weight change. Decreasing fatigue   HENT: Negative. Negative for congestion, ear discharge, ear pain, facial swelling, hearing loss, nosebleeds, postnasal drip, rhinorrhea, sneezing, sore throat and trouble swallowing. Graves Disease being followed by endocrinology. Eyes: Negative for blurred vision, pain, discharge, redness and itching. 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 Asthma, controlled with prn inhaler. Cardiovascular: Positive for chest pain. Negative for palpitations, orthopnea, leg swelling and PND. Hypertension       Gastrointestinal: Positive for abdominal pain. Negative for anal bleeding, blood in stool, constipation, diarrhea, nausea and vomiting. IBS and GERD with intermittent pain.   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 Endocrine: Negative. Primary hyperparathyroidism, Graves' disease   Genitourinary: Negative for dysuria, frequency, hematuria, urgency and vaginal discharge. Musculoskeletal: Negative. Negative for arthralgias, back pain, joint swelling, myalgias, neck pain and neck stiffness. Multijoint osteoarthritis. Will receive cartilage injections in both knees with ortho , this month on 10/17/14. Osteoporosis treated with Genistin  ( i cool )not a candidate for oral bisphosphonates due to GERD with frequent exacerbations. Burning in right foot    Skin: Positive for rash. Negative for pallor. Complaint of bed sore on the buttock for over a week. Allergic/Immunologic: Negative for environmental allergies and food allergies. Allergy on gluten-free diet   Neurological: Negative.   Negative for dizziness, weakness, numbness and headaches. Still have a shingles pain but has not taken 100 mg gabapentin yet. Unfortunately when she had shingles she took 1 dose of valacyclovir and had nausea and discontinue. I gave her a low dose of Famvir but she could not tolerate that as well. The rash is now healed. Hematological: Negative. Psychiatric/Behavioral: Negative. Prior to Visit Medications    Medication Sig Taking? Authorizing Provider   pantoprazole (PROTONIX) 40 MG tablet TAKE 1 TABLET DAILY Yes Tamir Romeo MD   spironolactone (ALDACTONE) 25 MG tablet TAKE 1 TABLET DAILY Yes Tamir Romeo MD   atenolol (TENORMIN) 50 MG tablet TAKE 1 TABLET DAILY Yes Tamir Romeo MD   fluticasone (FLONASE) 50 MCG/ACT nasal spray PLACE 1 SPRAY IN EACH NOSTRIL DAILY Yes Tamir Romeo MD   felodipine (PLENDIL) 2.5 MG extended release tablet Take 1 tablet by mouth daily  Patient taking differently: Take 2.5 mg by mouth daily Indications: takes if \"BP seems to be going out of control\"  Yes Tamir Romeo MD   montelukast (SINGULAIR) 10 MG tablet TAKE 1 TABLET NIGHTLY Yes Tamir Romeo MD   mirtazapine (REMERON) 15 MG tablet Take 1 tablet by mouth nightly Yes Tamir Romeo MD   ammonium lactate (LAC-HYDRIN) 12 % lotion Apply topically daily. Yes Tamir Romeo MD   loratadine (CLARITIN) 10 MG tablet Take 1 tablet by mouth daily Yes Tamir Romeo MD   hydrocortisone (ANUSOL-HC) 25 MG suppository Place 1 suppository rectally 2 times daily as needed for Hemorrhoids Yes Tamir Romeo MD   budesonide-formoterol (SYMBICORT) 160-4.5 MCG/ACT AERO Inhale 2 puffs into the lungs 2 times daily Yes Tamir Romeo MD   hydrocortisone (V-R HYDROCORTISONE/ALOE) 0.5 % ointment Apply topically 2 times daily. Yes Satya Mcnulty III, MD   Calcium Carbonate-Vitamin D (CALTRATE 600+D PO) Take 1 tablet by mouth every other day.  Yes Historical Provider,

## 2019-06-14 NOTE — CARE COORDINATION
[facility] contacted patient's daughter, Osmar Guerrier, for a update on Shanon's plan of care.

## 2019-06-17 ENCOUNTER — CARE COORDINATION (OUTPATIENT)
Dept: CARE COORDINATION | Age: 84
End: 2019-06-17

## 2019-06-17 NOTE — CARE COORDINATION
RD outreach to schedule initial nutrition MNT appointment. Appointment is scheduled for Wednesday, June 26 at noon at PCP office. RD signed onto care team and will notify Ashtyn Sanchez.

## 2019-06-19 ENCOUNTER — CARE COORDINATION (OUTPATIENT)
Dept: CARE COORDINATION | Age: 84
End: 2019-06-19

## 2019-06-25 ENCOUNTER — CARE COORDINATION (OUTPATIENT)
Dept: CARE COORDINATION | Age: 84
End: 2019-06-25

## 2019-06-25 NOTE — CARE COORDINATION
RD outreach to remind patient of appointment tomorrow 6/26/19 at noon at PCP office. Patient daughter confirmed. Patient daughter also request that I call her after appointment (she will not be in attendance) to f/u with what was discussed at appointment. Will notify ACM of outreach.

## 2019-06-26 ENCOUNTER — CARE COORDINATION (OUTPATIENT)
Dept: CARE COORDINATION | Age: 84
End: 2019-06-26

## 2019-06-26 NOTE — CARE COORDINATION
90 tablet 3    atenolol (TENORMIN) 50 MG tablet TAKE 1 TABLET DAILY 90 tablet 2    fluticasone (FLONASE) 50 MCG/ACT nasal spray PLACE 1 SPRAY IN EACH NOSTRIL DAILY 3 Bottle 5    felodipine (PLENDIL) 2.5 MG extended release tablet Take 1 tablet by mouth daily (Patient taking differently: Take 2.5 mg by mouth daily Indications: takes if \"BP seems to be going out of control\" ) 90 tablet 5    montelukast (SINGULAIR) 10 MG tablet TAKE 1 TABLET NIGHTLY 90 tablet 3    mirtazapine (REMERON) 15 MG tablet Take 1 tablet by mouth nightly 30 tablet 3    ammonium lactate (LAC-HYDRIN) 12 % lotion Apply topically daily. 225 g 5    loratadine (CLARITIN) 10 MG tablet Take 1 tablet by mouth daily 90 tablet 3    hydrocortisone (ANUSOL-HC) 25 MG suppository Place 1 suppository rectally 2 times daily as needed for Hemorrhoids 90 suppository 3    budesonide-formoterol (SYMBICORT) 160-4.5 MCG/ACT AERO Inhale 2 puffs into the lungs 2 times daily 1 Inhaler 11    hydrocortisone (V-R HYDROCORTISONE/ALOE) 0.5 % ointment Apply topically 2 times daily. 1 Tube 0    Calcium Carbonate-Vitamin D (CALTRATE 600+D PO) Take 1 tablet by mouth every other day.  aspirin (ECOTRIN) 325 MG EC tablet Take 325 mg by mouth Daily with lunch. No current facility-administered medications for this visit.           Nutrition Assessment    Biochemical Data, Medical Tests and Procedures:    Lab Results   Component Value Date    LABA1C 5.4 10/19/2017     Lab Results   Component Value Date    .3 10/19/2017       Lab Results   Component Value Date    CHOL 146 07/15/2016    CHOL 157 07/02/2015    CHOL 115 12/31/2014     Lab Results   Component Value Date    TRIG 76 07/15/2016    TRIG 82 07/02/2015    TRIG 70 12/31/2014     Lab Results   Component Value Date    HDL 52 07/15/2016    HDL 57 07/02/2015    HDL 39 (L) 12/31/2014     Lab Results   Component Value Date    LDLCALC 79 07/15/2016    LDLCALC 84 07/02/2015    LDLCALC 62 12/31/2014     Lab Results   Component Value Date    LABVLDL 15 07/15/2016    LABVLDL 16 07/02/2015    LABVLDL 14 12/31/2014     No results found for: [facility]    Lab Results   Component Value Date    WBC 7.5 05/03/2019    HGB 14.3 05/03/2019    HCT 43.8 05/03/2019    MCV 83.6 05/03/2019     05/03/2019       Lab Results   Component Value Date    CREATININE 0.7 05/03/2019    BUN 6 (L) 05/03/2019     05/03/2019    K 4.5 05/03/2019     05/03/2019    CO2 26 05/03/2019         Anthropometric Measurements:    Height: 5'9\" (69 in.)  Weight: 118 lbs. (54 kg)  BMI: 17  IBW: 66.2 kg (146 lbs.)  %IBW: -19%    Physical Exam Findings:    Ambulates with walker    Food and Nutrition History:    24-Hour Diet Recall    Breakfast  Consumed: chicken soup with rice, applesauce    AM Snack  Consumed: jelly beans, potato chips    Lunch  Consumed: chicken/fish, canned peaches/black cherries/applesauce, GF bread with PB and honey    PM Snack  Consumed: same as above    Dinner  Consumed: same as lunch, baby food vegetables (peas/carrots/green beans)    Bedtime Snack  Consumed: PB/bread, fruit    Beverages: water and tea    Frequency of meals away from home: rarely    Exercise: chair exercises daily, 30 minutes    Blood Sugar Checks: N/A    Summary of Appointment: Patient was seen today for weight gain nutrition education session. Patient aide also in attendance. RD will call patient daughter who lives in [place] to discuss appointment. We discussed patient's current intake and eating habits (listed above in food recall). Patient stated that she prepares her own food. She has macular degeneration and eye sight is not good. She boils protein and then freezes/microwaves it. Education was provided on weight gain with IBS/Celiac disease. Patient stated that she hasn't had much of an appetite since February when diagnosed with Shingles. Educated patient on small/frequent meals throughout the day to help increase nutrients.  Also discussed variety of protein options that patient can consume. RD provided food options to try and to get a variety of nutrients. Patient consumes baby food and RD discussed other baby food products she could try. Also discussed Ensure Clear and coupons were given. All patient's questions, comments, and concerns were addressed and answered. Patient verbalized understanding of all information that was discussed. Educational Resources Provided: Celiac Disease MNT, IBS MNT, Sample meal plans, Ensure Clear coupons    Nutrition Diagnosis    #1 Problem Underweight       Etiology related to decrease energy intake       Signs/Symptoms as evidenced by BMI: 17, food recall, limited food consumption d/t allergies     Nutrition Intervention    Nutrition Prescription:    regular diet providing 1,200 kcals to promote wt gain ([address] Equation). Estimated daily CHO Needs: 135 g (based on 45-65% total calorie intake)  Estimated daily Protein Needs: 43 g (based on 0.8 g/kg)  Estimated daily Fluid Needs: 1,620 mL based on 30 mL/kg (54 oz.)    Intervention #1    Nutrition Counseling: Used open-ended questions to assess patients perceived susceptibility and severity of disease state. Discussed potential impact of health threat on patient's lifestyle. Used open-ended questions to assess patient's perception regarding benefits of and barriers to implementation of nutrition therapy. Goal(s): Patient will apply constructs discussed during nutrition counseling to initiate and encourage behavior change to positively affect disease state/nutrition status. Nutrition Monitoring and Evaluation    Indicator/Goal Criteria   #1 Meal Times  #1 Consume small/frequent meals to increase nutrient consumption    #2 Protein Intake #2 Consume protein at each meal and snack       Follow Up: Will call in two weeks.       Hiram Valencia RDN, LD, CDE  Care Coordination Team Dietitian  [phone]

## 2019-07-02 ENCOUNTER — CARE COORDINATION (OUTPATIENT)
Dept: CARE COORDINATION | Age: 84
End: 2019-07-02

## 2019-07-02 DIAGNOSIS — D35.01 ADRENAL ADENOMA, RIGHT: ICD-10-CM

## 2019-07-02 DIAGNOSIS — D35.02 ADRENAL ADENOMA, LEFT: Primary | ICD-10-CM

## 2019-07-02 DIAGNOSIS — E04.2 MULTIPLE THYROID NODULES: ICD-10-CM

## 2019-07-03 ENCOUNTER — NURSE ONLY (OUTPATIENT)
Dept: PRIMARY CARE CLINIC | Age: 84
End: 2019-07-03
Payer: MEDICARE

## 2019-07-03 DIAGNOSIS — E53.8 B12 DEFICIENCY: Primary | ICD-10-CM

## 2019-07-03 PROCEDURE — 96372 THER/PROPH/DIAG INJ SC/IM: CPT | Performed by: INTERNAL MEDICINE

## 2019-07-03 RX ORDER — CYANOCOBALAMIN 1000 UG/ML
1000 INJECTION INTRAMUSCULAR; SUBCUTANEOUS ONCE
Status: COMPLETED | OUTPATIENT
Start: 2019-07-03 | End: 2019-07-03

## 2019-07-03 RX ADMIN — CYANOCOBALAMIN 1000 MCG: 1000 INJECTION INTRAMUSCULAR; SUBCUTANEOUS at 12:00

## 2019-07-03 ASSESSMENT — ENCOUNTER SYMPTOMS
ANAL BLEEDING: 0
CHEST TIGHTNESS: 0
NAUSEA: 0
ALLERGIC/IMMUNOLOGIC COMMENTS: ALLERGY ON GLUTEN-FREE DIET
STRIDOR: 0
ORTHOPNEA: 0
EYE REDNESS: 0
FACIAL SWELLING: 0
EYE PAIN: 0
VOMITING: 0
SORE THROAT: 0
WHEEZING: 0
CONSTIPATION: 0
RHINORRHEA: 0
EYE DISCHARGE: 0
BACK PAIN: 0
COUGH: 0
EYE ITCHING: 0
DIARRHEA: 0
APNEA: 0
TROUBLE SWALLOWING: 0
BLURRED VISION: 0
SHORTNESS OF BREATH: 0
CHOKING: 0
BLOOD IN STOOL: 0
ABDOMINAL PAIN: 1

## 2019-07-08 ENCOUNTER — CARE COORDINATION (OUTPATIENT)
Dept: CARE COORDINATION | Age: 84
End: 2019-07-08

## 2019-07-10 ENCOUNTER — OFFICE VISIT (OUTPATIENT)
Dept: CARDIOLOGY CLINIC | Age: 84
End: 2019-07-10
Payer: MEDICARE

## 2019-07-10 VITALS
HEART RATE: 81 BPM | DIASTOLIC BLOOD PRESSURE: 70 MMHG | SYSTOLIC BLOOD PRESSURE: 110 MMHG | WEIGHT: 117.4 LBS | BODY MASS INDEX: 17.34 KG/M2

## 2019-07-10 DIAGNOSIS — R07.9 CHEST PAIN, UNSPECIFIED TYPE: ICD-10-CM

## 2019-07-10 DIAGNOSIS — I10 ESSENTIAL HYPERTENSION: Primary | ICD-10-CM

## 2019-07-10 PROCEDURE — 93000 ELECTROCARDIOGRAM COMPLETE: CPT | Performed by: INTERNAL MEDICINE

## 2019-07-10 PROCEDURE — G8427 DOCREV CUR MEDS BY ELIG CLIN: HCPCS | Performed by: INTERNAL MEDICINE

## 2019-07-10 PROCEDURE — G8419 CALC BMI OUT NRM PARAM NOF/U: HCPCS | Performed by: INTERNAL MEDICINE

## 2019-07-10 PROCEDURE — 1036F TOBACCO NON-USER: CPT | Performed by: INTERNAL MEDICINE

## 2019-07-10 PROCEDURE — 99214 OFFICE O/P EST MOD 30 MIN: CPT | Performed by: INTERNAL MEDICINE

## 2019-07-10 PROCEDURE — 1123F ACP DISCUSS/DSCN MKR DOCD: CPT | Performed by: INTERNAL MEDICINE

## 2019-07-10 PROCEDURE — 4040F PNEUMOC VAC/ADMIN/RCVD: CPT | Performed by: INTERNAL MEDICINE

## 2019-07-10 PROCEDURE — 1090F PRES/ABSN URINE INCON ASSESS: CPT | Performed by: INTERNAL MEDICINE

## 2019-07-10 NOTE — PROGRESS NOTES
[address]   Dr Dionisio Delacruz. Oral Newton MD, [facility]                                                                     Outpatient Follow Up Note         07/10/19  HPI:  Yulisa Yadav is 80 y.o. female who presents today with MR/AR HTN HLD this patient is stable today. She complains of having chest pain but I think is mostly postherpetic neuralgia from her shingles about 6 months ago. I do not see any lesions. She is quite tender on the skin area in her right chest and around under her right breast.  She is legally blind and therefore limited mobility. Her vitals are stable the lungs are clear and she actually looks quite well. Cardiac wise she is stable. 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    PCR    Clostridium difficile infection 1/27/15    AG positive    Degenerative joint disease of knee     Depression     Diverticulitis     GERD (gastroesophageal reflux disease)     Grave's disease 4/2/2012    Heart disease     Hypertension     IBS (irritable bowel syndrome)     Macular degeneration     MGUS (monoclonal gammopathy of unknown significance)     Poor vision     d/t macular degeneration    Simple cyst of kidney      PSH  Past Surgical History:   Procedure Laterality Date    CHOLECYSTECTOMY      COLONOSCOPY  10/29/2009    **see scanned report- SILVANA    HEMORRHOID SURGERY      HYSTERECTOMY  in 40's    rudolph/bso     SH:       Social History     Tobacco Use   Smoking Status Never Smoker   Smokeless Tobacco Never Used     Current Medications:  Prior to Visit Medications    Medication Sig Taking? PO) Take 1 tablet by mouth every other day. Historical Provider, MD     Family History  Family History   Problem Relation Age of Onset    Heart Disease Mother     High Blood Pressure Mother     Stroke Mother     Cancer Father         multiple myeloma     · ROS:   · Cardiovascular: Reviewed in HPI  · Allergic/Immunologic: No nasal congestion or hives. · All other ROS are reviewed and are unremarkable. PHYSICAL EXAM:      Wt Readings from Last 3 Encounters:   07/10/19 117 lb 6.4 oz (53.3 kg)   19 118 lb (53.5 kg)   19 115 lb (52.2 kg)       BP Readings from Last 3 Encounters:   07/10/19 110/70   19 109/65   19 138/70       Pulse Readings from Last 3 Encounters:   07/10/19 81   19 61   19 67       Exam   ENT: Cranial nerves II through XII intact grossly except that she is legally blind. Head eyes ears nose and throat unremarkable. NEUROLOGIC: Awake and alert and oriented x3. She is able to recognize me. Moves all extremities well for 80year-old. PSYCH: Alert and oriented x3. SKIN: Warm and dry. LUNGS:  No increased work of breathing and clear to auscultation, no crackles or wheezing  CARDIOVASCULAR: RRR 2/6 systolic murmur LLSB   ABDOMEN: soft  EXT: no edema     Assessment:    MR / AR   Echo 2017 EF 55% m/m MR moderate insuff /  similar to prior echo on    LV Diastolic Dimension: 3.9 cm LV Systolic Dimension: 2.7 cm   The aortic valve appears normal in structure and function.   The aortic valve appears tricuspid.   Moderate aortic regurgitation is present.   No evidence of aortic stenosis. On BB     EK18  55 BPM 18   NJ interval 218 ms  QRS duration 88 ms  QT/QTc 434/415 ms  P-R-T axes 67 8 33    HTN  Optimal  EKG on January 3, 2018 sinus rhythm 61/m. Nonspecific lateral ST and T wave changes. Stable pattern.   PLAN:   Patient education on touch screen in room   Discussed heart heathy lifestyle including nutrition, exercise & importance of nonsmoking,          This patient is stable from our perspective. No reason to change anything. We'll have her come back and see us in about 6 months. Anna Real M.D.  [facility]

## 2019-07-12 ENCOUNTER — CARE COORDINATION (OUTPATIENT)
Dept: CARE COORDINATION | Age: 84
End: 2019-07-12

## 2019-07-24 ENCOUNTER — TELEPHONE (OUTPATIENT)
Dept: PRIMARY CARE CLINIC | Age: 84
End: 2019-07-24

## 2019-08-01 ENCOUNTER — CARE COORDINATION (OUTPATIENT)
Dept: CARE COORDINATION | Age: 84
End: 2019-08-01

## 2019-08-01 NOTE — CARE COORDINATION
Ambulatory Care Coordination Note  8/1/2019  CM Risk Score: 9  Charlson 10 Year Mortality Risk Score: 98%     ACC: Basilia Hyman, RN    Summary Note: ACM contacted patient for general follow up and graduation discussion. Romario Nowak states she has been eating \"as much as possible\". Reports using supplements such as Ensure Enlive on occasion. Romario Nowak reports that [facility] CM visited last week to replace life alert button. Romario Nowak denies any additional CC needs at this time. Patient is ready for Care Coordination as demonstrated by the following:  * [facility] has no admissions related to chronic disease(s) in last 3 - 6 months. * Patient has verbalized and/or demonstrated understanding of zone tool   * Patient has no identified barriers to medication adherence   * Patient is engage in self-management activities and PCP  * Patient has contact for resources as needed  * Patient has transportation with Medicare and no other identified barriers to plan of care  * Patient has scheduled OV 8/5/19.            Care Coordination Interventions    Program Enrollment:  Complex Care  Referral from Primary Care Provider:  Yes  Suggested Interventions and Community Resources  Pharmacist:  Completed (Comment: Referral 6/14/19- medication education)  Physical Therapy:  Not Started (Comment: Pt reports doing chair exercises daily)  Senior Services:  Completed (Comment: [facility] - Hernan Felipe CM)  Other Services:  Completed (Comment: Life Alert - wears it daily)  Transportation Support:  Completed         Goals Addressed                 This Visit's Progress     COMPLETED: Nutrition Plan   On track     I will follow a nutritional plan as directed   Calorie Controlled:   as determined by CC CAMILO  I will maintain current weight of 118lbs or increae if possible    Barriers: overwhelmed by complexity of regimen and food allergies  Plan for overcoming my barriers: work with KHUSHI PAGE and [facility]  Confidence: 8/10  Anticipated Goal Completion Date: 8/15/19            Prior to Admission medications    Medication Sig Start Date End Date Taking? Authorizing Provider   methimazole (TAPAZOLE) 5 MG tablet Take 5 mg by mouth See Admin Instructions 1 tablet by mouth daily except for Monday, Wednesday, and Friday take 1.5 tablets. 5/15/19   Historical Provider, MD   albuterol sulfate (PROAIR RESPICLICK) 647 (90 Base) MCG/ACT aerosol powder inhalation Inhale 2 puffs into the lungs every 6 hours as needed for Wheezing or Shortness of Breath 6/14/19   Demetri Wagner MD   pantoprazole (PROTONIX) 40 MG tablet TAKE 1 TABLET DAILY 5/22/19   Demetri Wagner MD   spironolactone (ALDACTONE) 25 MG tablet TAKE 1 TABLET DAILY 3/17/19   Demetri Wagner MD   atenolol (TENORMIN) 50 MG tablet TAKE 1 TABLET DAILY 12/28/18   Demetri Wagner MD   fluticasone (FLONASE) 50 MCG/ACT nasal spray PLACE 1 SPRAY IN EACH NOSTRIL DAILY 4/18/18   Demetri Wagner MD   felodipine (PLENDIL) 2.5 MG extended release tablet Take 1 tablet by mouth daily  Patient taking differently: Take 2.5 mg by mouth daily Indications: takes if \"BP seems to be going out of control\"  1/26/18   Demetri Wagner MD   montelukast (SINGULAIR) 10 MG tablet TAKE 1 TABLET NIGHTLY 1/7/18   Demetri Wagner MD   mirtazapine (REMERON) 15 MG tablet Take 1 tablet by mouth nightly 9/7/17   Demetri Wagner MD   ammonium lactate (LAC-HYDRIN) 12 % lotion Apply topically daily.  8/9/17   Demetri Wagner MD   loratadine (CLARITIN) 10 MG tablet Take 1 tablet by mouth daily 5/7/17   Demetri Wagner MD   hydrocortisone (ANUSOL-HC) 25 MG suppository Place 1 suppository rectally 2 times daily as needed for Hemorrhoids 5/7/17   Demetri Wagner MD   budesonide-formoterol [facility]) 160-4.5 MCG/ACT AERO Inhale 2 puffs into the lungs 2 times daily 9/20/16   Demetri Wagner MD   hydrocortisone (V-R HYDROCORTISONE/ALOE) 0.5 % ointment Apply topically 2 times daily. 2/2/16   Sina Barahona III, MD   Calcium Carbonate-Vitamin D (CALTRATE 600+D PO) Take 1 tablet by mouth every other day. Historical Provider, MD   aspirin (ECOTRIN) 325 MG EC tablet Take 325 mg by mouth Daily with lunch.     Historical Provider, MD       Future Appointments   Date Time Provider Chas Antoine   8/5/2019 11:00 AM [facility] RD [facility] RD [facility]   1/29/2020 11:15 AM Ra Watson MD [facility]

## 2019-08-05 ENCOUNTER — NURSE ONLY (OUTPATIENT)
Dept: PRIMARY CARE CLINIC | Age: 84
End: 2019-08-05
Payer: MEDICARE

## 2019-08-05 DIAGNOSIS — E53.8 B12 DEFICIENCY: Primary | ICD-10-CM

## 2019-08-05 PROCEDURE — 96372 THER/PROPH/DIAG INJ SC/IM: CPT | Performed by: INTERNAL MEDICINE

## 2019-08-05 RX ORDER — CYANOCOBALAMIN 1000 UG/ML
1000 INJECTION INTRAMUSCULAR; SUBCUTANEOUS ONCE
Status: COMPLETED | OUTPATIENT
Start: 2019-08-05 | End: 2019-08-05

## 2019-08-05 RX ADMIN — CYANOCOBALAMIN 1000 MCG: 1000 INJECTION INTRAMUSCULAR; SUBCUTANEOUS at 10:19

## 2019-08-13 ENCOUNTER — HOSPITAL ENCOUNTER (EMERGENCY)
Age: 84
Discharge: HOME | End: 2019-08-13
Attending: EMERGENCY MEDICINE
Payer: MEDICARE

## 2019-08-13 ENCOUNTER — APPOINTMENT (OUTPATIENT)
Dept: GENERAL RADIOLOGY | Age: 84
End: 2019-08-13
Payer: MEDICARE

## 2019-08-13 VITALS
OXYGEN SATURATION: 97 % | SYSTOLIC BLOOD PRESSURE: 144 MMHG | HEART RATE: 72 BPM | BODY MASS INDEX: 17.33 KG/M2 | TEMPERATURE: 97.8 F | WEIGHT: 117 LBS | HEIGHT: 69 IN | RESPIRATION RATE: 14 BRPM | DIASTOLIC BLOOD PRESSURE: 95 MMHG

## 2019-08-13 DIAGNOSIS — R06.02 SHORTNESS OF BREATH: ICD-10-CM

## 2019-08-13 DIAGNOSIS — I10 ESSENTIAL HYPERTENSION: Primary | ICD-10-CM

## 2019-08-13 LAB
ANION GAP SERPL CALCULATED.3IONS-SCNC: 9 MMOL/L (ref 3–16)
BACTERIA: ABNORMAL /HPF
BASOPHILS ABSOLUTE: 0 K/UL (ref 0–0.2)
BASOPHILS RELATIVE PERCENT: 0.6 %
BILIRUBIN URINE: NEGATIVE
BLOOD, URINE: ABNORMAL
BUN BLDV-MCNC: 13 MG/DL (ref 7–20)
CALCIUM SERPL-MCNC: 10.5 MG/DL (ref 8.3–10.6)
CHLORIDE BLD-SCNC: 103 MMOL/L (ref 99–110)
CLARITY: CLEAR
CO2: 27 MMOL/L (ref 21–32)
COLOR: YELLOW
CREAT SERPL-MCNC: 0.8 MG/DL (ref 0.6–1.2)
EKG ATRIAL RATE: 68 BPM
EKG DIAGNOSIS: NORMAL
EKG P-R INTERVAL: 240 MS
EKG Q-T INTERVAL: 388 MS
EKG QRS DURATION: 86 MS
EKG QTC CALCULATION (BAZETT): 412 MS
EKG R AXIS: -4 DEGREES
EKG T AXIS: 20 DEGREES
EKG VENTRICULAR RATE: 68 BPM
EOSINOPHILS ABSOLUTE: 0.1 K/UL (ref 0–0.6)
EOSINOPHILS RELATIVE PERCENT: 1.4 %
EPITHELIAL CELLS, UA: ABNORMAL /HPF
GFR AFRICAN AMERICAN: >60
GFR NON-AFRICAN AMERICAN: >60
GLUCOSE BLD-MCNC: 105 MG/DL (ref 70–99)
GLUCOSE URINE: NEGATIVE MG/DL
HCT VFR BLD CALC: 43 % (ref 36–48)
HEMOGLOBIN: 14 G/DL (ref 12–16)
KETONES, URINE: NEGATIVE MG/DL
LEUKOCYTE ESTERASE, URINE: ABNORMAL
LYMPHOCYTES ABSOLUTE: 1.9 K/UL (ref 1–5.1)
LYMPHOCYTES RELATIVE PERCENT: 25.3 %
MCH RBC QN AUTO: 28 PG (ref 26–34)
MCHC RBC AUTO-ENTMCNC: 32.5 G/DL (ref 31–36)
MCV RBC AUTO: 86.2 FL (ref 80–100)
MICROSCOPIC EXAMINATION: YES
MONOCYTES ABSOLUTE: 0.9 K/UL (ref 0–1.3)
MONOCYTES RELATIVE PERCENT: 11.7 %
NEUTROPHILS ABSOLUTE: 4.5 K/UL (ref 1.7–7.7)
NEUTROPHILS RELATIVE PERCENT: 61 %
NITRITE, URINE: NEGATIVE
PDW BLD-RTO: 14.7 % (ref 12.4–15.4)
PH UA: 7 (ref 5–8)
PLATELET # BLD: 160 K/UL (ref 135–450)
PMV BLD AUTO: 9.8 FL (ref 5–10.5)
POTASSIUM REFLEX MAGNESIUM: 4.4 MMOL/L (ref 3.5–5.1)
PRO-BNP: 241 PG/ML (ref 0–449)
PROTEIN UA: NEGATIVE MG/DL
RBC # BLD: 4.99 M/UL (ref 4–5.2)
RBC UA: ABNORMAL /HPF (ref 0–2)
SODIUM BLD-SCNC: 139 MMOL/L (ref 136–145)
SPECIFIC GRAVITY UA: <=1.005 (ref 1–1.03)
TROPONIN: <0.01 NG/ML
URINE REFLEX TO CULTURE: YES
URINE TYPE: ABNORMAL
UROBILINOGEN, URINE: 0.2 E.U./DL
WBC # BLD: 7.3 K/UL (ref 4–11)
WBC UA: ABNORMAL /HPF (ref 0–5)

## 2019-08-13 PROCEDURE — 87086 URINE CULTURE/COLONY COUNT: CPT

## 2019-08-13 PROCEDURE — 93005 ELECTROCARDIOGRAM TRACING: CPT | Performed by: EMERGENCY MEDICINE

## 2019-08-13 PROCEDURE — 84484 ASSAY OF TROPONIN QUANT: CPT

## 2019-08-13 PROCEDURE — 71046 X-RAY EXAM CHEST 2 VIEWS: CPT

## 2019-08-13 PROCEDURE — 83880 ASSAY OF NATRIURETIC PEPTIDE: CPT

## 2019-08-13 PROCEDURE — 99285 EMERGENCY DEPT VISIT HI MDM: CPT

## 2019-08-13 PROCEDURE — 85025 COMPLETE CBC W/AUTO DIFF WBC: CPT

## 2019-08-13 PROCEDURE — 80048 BASIC METABOLIC PNL TOTAL CA: CPT

## 2019-08-13 PROCEDURE — 81001 URINALYSIS AUTO W/SCOPE: CPT

## 2019-08-13 ASSESSMENT — PAIN DESCRIPTION - FREQUENCY: FREQUENCY: CONTINUOUS

## 2019-08-13 ASSESSMENT — PAIN DESCRIPTION - LOCATION: LOCATION: GENERALIZED

## 2019-08-13 ASSESSMENT — PAIN DESCRIPTION - DESCRIPTORS: DESCRIPTORS: ACHING;SHARP

## 2019-08-13 ASSESSMENT — PAIN SCALES - GENERAL: PAINLEVEL_OUTOF10: 8

## 2019-08-13 ASSESSMENT — PAIN DESCRIPTION - PAIN TYPE: TYPE: CHRONIC PAIN

## 2019-08-14 LAB — URINE CULTURE, ROUTINE: NORMAL

## 2019-09-03 ENCOUNTER — TELEPHONE (OUTPATIENT)
Dept: PRIMARY CARE CLINIC | Age: 84
End: 2019-09-03

## 2019-09-04 DIAGNOSIS — B02.29 POSTHERPETIC NEURALGIA: Primary | ICD-10-CM

## 2019-09-04 NOTE — TELEPHONE ENCOUNTER
Status: Signed      Johnnie Lin would like a referral for Pain management from shingles & would like to know if medicinal marijuana is an option for her mother        Please call patient's daughter and give referral information for patient to see Dr. Tsang Staff.   Referral placed in epic

## 2019-09-06 ENCOUNTER — TELEPHONE (OUTPATIENT)
Dept: PAIN MANAGEMENT | Age: 84
End: 2019-09-06

## 2019-09-11 ENCOUNTER — OFFICE VISIT (OUTPATIENT)
Dept: PAIN MANAGEMENT | Age: 84
End: 2019-09-11

## 2019-09-11 ENCOUNTER — OFFICE VISIT (OUTPATIENT)
Dept: PAIN MANAGEMENT | Age: 84
End: 2019-09-11
Payer: MEDICARE

## 2019-09-11 VITALS
BODY MASS INDEX: 17.89 KG/M2 | WEIGHT: 120.8 LBS | DIASTOLIC BLOOD PRESSURE: 79 MMHG | SYSTOLIC BLOOD PRESSURE: 142 MMHG | HEIGHT: 69 IN | HEART RATE: 79 BPM

## 2019-09-11 DIAGNOSIS — B02.29 POSTHERPETIC NEURALGIA: Primary | ICD-10-CM

## 2019-09-11 DIAGNOSIS — R54 ADVANCED AGE: ICD-10-CM

## 2019-09-11 DIAGNOSIS — G89.4 CHRONIC PAIN SYNDROME: ICD-10-CM

## 2019-09-11 PROCEDURE — 1036F TOBACCO NON-USER: CPT | Performed by: ANESTHESIOLOGY

## 2019-09-11 PROCEDURE — 99999 PR OFFICE/OUTPT VISIT,PROCEDURE ONLY: CPT | Performed by: PSYCHOLOGIST

## 2019-09-11 PROCEDURE — G8427 DOCREV CUR MEDS BY ELIG CLIN: HCPCS | Performed by: ANESTHESIOLOGY

## 2019-09-11 PROCEDURE — G8419 CALC BMI OUT NRM PARAM NOF/U: HCPCS | Performed by: ANESTHESIOLOGY

## 2019-09-11 PROCEDURE — 4040F PNEUMOC VAC/ADMIN/RCVD: CPT | Performed by: ANESTHESIOLOGY

## 2019-09-11 PROCEDURE — 99203 OFFICE O/P NEW LOW 30 MIN: CPT | Performed by: ANESTHESIOLOGY

## 2019-09-11 PROCEDURE — 1123F ACP DISCUSS/DSCN MKR DOCD: CPT | Performed by: ANESTHESIOLOGY

## 2019-09-11 PROCEDURE — 1090F PRES/ABSN URINE INCON ASSESS: CPT | Performed by: ANESTHESIOLOGY

## 2019-09-11 RX ORDER — LIDOCAINE 50 MG/G
OINTMENT TOPICAL
Qty: 2 TUBE | Refills: 1 | Status: SHIPPED | OUTPATIENT
Start: 2019-09-11 | End: 2021-01-29 | Stop reason: SDUPTHER

## 2019-09-11 ASSESSMENT — PATIENT HEALTH QUESTIONNAIRE - PHQ9
1. LITTLE INTEREST OR PLEASURE IN DOING THINGS: 1
DEPRESSION UNABLE TO ASSESS: URGENT/EMERGENT SITUATION
SUM OF ALL RESPONSES TO PHQ QUESTIONS 1-9: 13
9. THOUGHTS THAT YOU WOULD BE BETTER OFF DEAD, OR OF HURTING YOURSELF: 1
2. FEELING DOWN, DEPRESSED OR HOPELESS: 1
3. TROUBLE FALLING OR STAYING ASLEEP: 3
7. TROUBLE CONCENTRATING ON THINGS, SUCH AS READING THE NEWSPAPER OR WATCHING TELEVISION: 3
10. IF YOU CHECKED OFF ANY PROBLEMS, HOW DIFFICULT HAVE THESE PROBLEMS MADE IT FOR YOU TO DO YOUR WORK, TAKE CARE OF THINGS AT HOME, OR GET ALONG WITH OTHER PEOPLE: 1
4. FEELING TIRED OR HAVING LITTLE ENERGY: 1
SUM OF ALL RESPONSES TO PHQ9 QUESTIONS 1 & 2: 2
5. POOR APPETITE OR OVEREATING: 3
SUM OF ALL RESPONSES TO PHQ QUESTIONS 1-9: 13
8. MOVING OR SPEAKING SO SLOWLY THAT OTHER PEOPLE COULD HAVE NOTICED. OR THE OPPOSITE, BEING SO FIGETY OR RESTLESS THAT YOU HAVE BEEN MOVING AROUND A LOT MORE THAN USUAL: 0
6. FEELING BAD ABOUT YOURSELF - OR THAT YOU ARE A FAILURE OR HAVE LET YOURSELF OR YOUR FAMILY DOWN: 0

## 2019-09-11 ASSESSMENT — ENCOUNTER SYMPTOMS
EYE REDNESS: 0
COUGH: 0
EYE DISCHARGE: 0
CONSTIPATION: 0
ABDOMINAL PAIN: 0
BACK PAIN: 0
WHEEZING: 0
NAUSEA: 0
EYE PAIN: 0
SHORTNESS OF BREATH: 0
VOMITING: 0

## 2019-09-11 ASSESSMENT — ANXIETY QUESTIONNAIRES
1. FEELING NERVOUS, ANXIOUS, OR ON EDGE: 3-NEARLY EVERY DAY
5. BEING SO RESTLESS THAT IT IS HARD TO SIT STILL: 1-SEVERAL DAYS
GAD7 TOTAL SCORE: 12
3. WORRYING TOO MUCH ABOUT DIFFERENT THINGS: 0-NOT AT ALL SURE
7. FEELING AFRAID AS IF SOMETHING AWFUL MIGHT HAPPEN: 3-NEARLY EVERY DAY
2. NOT BEING ABLE TO STOP OR CONTROL WORRYING: 3-NEARLY EVERY DAY
6. BECOMING EASILY ANNOYED OR IRRITABLE: 1-SEVERAL DAYS
4. TROUBLE RELAXING: 1-SEVERAL DAYS

## 2019-09-11 NOTE — PATIENT INSTRUCTIONS
pain over the long run. Try low- or no-impact exercises such as walking, swimming, and stationary biking. Do stretches to stay flexible. · Try heat, cold packs, and massage. · Get enough sleep. Chronic pain can make you tired and drain your energy. Talk with your doctor if you have trouble sleeping because of pain. · Think positive. Your thoughts can affect your pain level. Do things that you enjoy to distract yourself when you have pain instead of focusing on the pain. See a movie, read a book, listen to music, or spend time with a friend. · If you think you are depressed, talk to your doctor about treatment. · Keep a daily pain diary. Record how your moods, thoughts, sleep patterns, activities, and medicine affect your pain. You may find that your pain is worse during or after certain activities or when you are feeling a certain emotion. Having a record of your pain can help you and your doctor find the best ways to treat your pain. · Take pain medicines exactly as directed. ? If the doctor gave you a prescription medicine for pain, take it as prescribed. ? If you are not taking a prescription pain medicine, ask your doctor if you can take an over-the-counter medicine. Reducing constipation caused by pain medicine  · Include fruits, vegetables, beans, and whole grains in your diet each day. These foods are high in fiber. · Drink plenty of fluids, enough so that your urine is light yellow or clear like water. If you have kidney, heart, or liver disease and have to limit fluids, talk with your doctor before you increase the amount of fluids you drink. · If your doctor recommends it, get more exercise. Walking is a good choice. Bit by bit, increase the amount you walk every day. Try for at least 30 minutes on most days of the week. · Schedule time each day for a bowel movement. A daily routine may help. Take your time and do not strain when having a bowel movement. When should you call for help?   Call

## 2019-09-11 NOTE — PROGRESS NOTES
gabapentin   Muscle Relaxants: Yes / N/A   Opioids: No / N/A    4. 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    PCR    Clostridium difficile infection 1/27/15    AG positive    Degenerative joint disease of knee     Depression     Diverticulitis     GERD (gastroesophageal reflux disease)     Grave's disease 4/2/2012    Heart disease     Hypertension     IBS (irritable bowel syndrome)     Macular degeneration     MGUS (monoclonal gammopathy of unknown significance)     Poor vision     d/t macular degeneration    Simple cyst of kidney        Past Surgical History:   Procedure Laterality Date    CHOLECYSTECTOMY      COLONOSCOPY  10/29/2009    **see scanned report- SILVANA    HEMORRHOID SURGERY      HYSTERECTOMY  in 40's    rudolph/bso       Allergies   Allergen Reactions    Acyclovir      Severe nausea  And anorexia. It happened after taking each 800 mg tablet and patient took one one day and one the next, then stopped an no more    Creon [Pancrelipase (Lip-Prot-Amyl)]      Swollen gums.  Eggs Or Egg-Derived Products      Hives  this test in the [employer] ([email or web address]). Egg White IgE 3. 59High   <=0.34 kU/L Final 02/10/2016  2:14 PM [facility]                  Gluten Meal      Abdominal pain and diarreha  Wheat IgE 0.53  <=0.34 kU/L Final 02/10/2016  2:14 PM [facility]       Milk-Related Compounds      Milk, Cow's IgE 1. 75High   <=0.34 kU/L Final 02/10/2016  2:14 PM [facility]    Ace Inhibitors      angioedema    Ciprocinonide [Fluocinolone]      Stiff, no nausea or rash    Dye [Iodides] Hives     Only Dye that went into eyes.     Fluorescein     Lisinopril Swelling    Macrobid [Nitrofurantoin Monohydrate Macrocrystals]     No Known Allergies     Sulfa Antibiotics Rash questions or concerns.             Elver Doe MD  Board Certified in Anesthesiology and Pain Medicine

## 2019-09-24 DIAGNOSIS — I10 ESSENTIAL HYPERTENSION: ICD-10-CM

## 2019-09-25 RX ORDER — ATENOLOL 50 MG/1
TABLET ORAL
Qty: 90 TABLET | Refills: 4 | Status: SHIPPED | OUTPATIENT
Start: 2019-09-25 | End: 2020-12-18

## 2019-11-08 ENCOUNTER — NURSE ONLY (OUTPATIENT)
Dept: PRIMARY CARE CLINIC | Age: 84
End: 2019-11-08
Payer: MEDICARE

## 2019-11-08 DIAGNOSIS — E53.8 B12 DEFICIENCY: ICD-10-CM

## 2019-11-08 DIAGNOSIS — Z23 NEED FOR INFLUENZA VACCINATION: Primary | ICD-10-CM

## 2019-11-08 PROCEDURE — 90653 IIV ADJUVANT VACCINE IM: CPT | Performed by: INTERNAL MEDICINE

## 2019-11-08 PROCEDURE — 96372 THER/PROPH/DIAG INJ SC/IM: CPT | Performed by: INTERNAL MEDICINE

## 2019-11-08 PROCEDURE — G0008 ADMIN INFLUENZA VIRUS VAC: HCPCS | Performed by: INTERNAL MEDICINE

## 2019-11-08 RX ORDER — CYANOCOBALAMIN 1000 UG/ML
1000 INJECTION INTRAMUSCULAR; SUBCUTANEOUS ONCE
Status: COMPLETED | OUTPATIENT
Start: 2019-11-08 | End: 2019-11-08

## 2019-11-08 RX ADMIN — CYANOCOBALAMIN 1000 MCG: 1000 INJECTION INTRAMUSCULAR; SUBCUTANEOUS at 10:52

## 2019-12-09 ENCOUNTER — OFFICE VISIT (OUTPATIENT)
Dept: PRIMARY CARE CLINIC | Age: 84
End: 2019-12-09
Payer: MEDICARE

## 2019-12-09 VITALS
TEMPERATURE: 97 F | HEART RATE: 65 BPM | BODY MASS INDEX: 18.37 KG/M2 | RESPIRATION RATE: 12 BRPM | SYSTOLIC BLOOD PRESSURE: 151 MMHG | WEIGHT: 124 LBS | DIASTOLIC BLOOD PRESSURE: 77 MMHG | OXYGEN SATURATION: 97 % | HEIGHT: 69 IN

## 2019-12-09 DIAGNOSIS — R10.13 EPIGASTRIC PAIN: ICD-10-CM

## 2019-12-09 DIAGNOSIS — K90.0 CELIAC DISEASE: ICD-10-CM

## 2019-12-09 DIAGNOSIS — E53.8 B12 DEFICIENCY: ICD-10-CM

## 2019-12-09 DIAGNOSIS — N64.4 BREAST PAIN, RIGHT: ICD-10-CM

## 2019-12-09 DIAGNOSIS — R30.0 DYSURIA: ICD-10-CM

## 2019-12-09 DIAGNOSIS — B02.29 POST HERPETIC NEURALGIA: Primary | ICD-10-CM

## 2019-12-09 DIAGNOSIS — J30.89 OTHER ALLERGIC RHINITIS: ICD-10-CM

## 2019-12-09 DIAGNOSIS — I10 ESSENTIAL HYPERTENSION: ICD-10-CM

## 2019-12-09 DIAGNOSIS — R13.19 ESOPHAGEAL DYSPHAGIA: ICD-10-CM

## 2019-12-09 LAB
BILIRUBIN URINE: NEGATIVE
BLOOD, URINE: ABNORMAL
CLARITY: CLEAR
COLOR: YELLOW
EPITHELIAL CELLS, UA: 0 /HPF (ref 0–5)
GLUCOSE URINE: NEGATIVE MG/DL
HYALINE CASTS: 0 /LPF (ref 0–8)
KETONES, URINE: NEGATIVE MG/DL
LEUKOCYTE ESTERASE, URINE: ABNORMAL
MICROSCOPIC EXAMINATION: YES
NITRITE, URINE: NEGATIVE
PH UA: 6.5 (ref 5–8)
PROTEIN UA: NEGATIVE MG/DL
RBC UA: 6 /HPF (ref 0–4)
SPECIFIC GRAVITY UA: 1.01 (ref 1–1.03)
URINE TYPE: ABNORMAL
UROBILINOGEN, URINE: 0.2 E.U./DL
WBC UA: 1 /HPF (ref 0–5)

## 2019-12-09 PROCEDURE — G8419 CALC BMI OUT NRM PARAM NOF/U: HCPCS | Performed by: INTERNAL MEDICINE

## 2019-12-09 PROCEDURE — G8482 FLU IMMUNIZE ORDER/ADMIN: HCPCS | Performed by: INTERNAL MEDICINE

## 2019-12-09 PROCEDURE — 1123F ACP DISCUSS/DSCN MKR DOCD: CPT | Performed by: INTERNAL MEDICINE

## 2019-12-09 PROCEDURE — 4040F PNEUMOC VAC/ADMIN/RCVD: CPT | Performed by: INTERNAL MEDICINE

## 2019-12-09 PROCEDURE — 1090F PRES/ABSN URINE INCON ASSESS: CPT | Performed by: INTERNAL MEDICINE

## 2019-12-09 PROCEDURE — 99214 OFFICE O/P EST MOD 30 MIN: CPT | Performed by: INTERNAL MEDICINE

## 2019-12-09 PROCEDURE — G8427 DOCREV CUR MEDS BY ELIG CLIN: HCPCS | Performed by: INTERNAL MEDICINE

## 2019-12-09 PROCEDURE — 1036F TOBACCO NON-USER: CPT | Performed by: INTERNAL MEDICINE

## 2019-12-09 PROCEDURE — 96372 THER/PROPH/DIAG INJ SC/IM: CPT | Performed by: INTERNAL MEDICINE

## 2019-12-09 RX ORDER — MONTELUKAST SODIUM 10 MG/1
TABLET ORAL
Qty: 90 TABLET | Refills: 3 | Status: SHIPPED | OUTPATIENT
Start: 2019-12-09 | End: 2020-12-09

## 2019-12-09 RX ORDER — SPIRONOLACTONE 25 MG/1
TABLET ORAL
Qty: 90 TABLET | Refills: 3 | Status: SHIPPED | OUTPATIENT
Start: 2019-12-09 | End: 2020-03-09

## 2019-12-09 RX ORDER — PANTOPRAZOLE SODIUM 40 MG/1
TABLET, DELAYED RELEASE ORAL
Qty: 30 TABLET | Refills: 2 | Status: ON HOLD
Start: 2019-12-09 | End: 2020-06-22 | Stop reason: CLARIF

## 2019-12-09 RX ORDER — BUDESONIDE AND FORMOTEROL FUMARATE DIHYDRATE 160; 4.5 UG/1; UG/1
2 AEROSOL RESPIRATORY (INHALATION) 2 TIMES DAILY
Qty: 1 INHALER | Refills: 11 | Status: SHIPPED | OUTPATIENT
Start: 2019-12-09 | End: 2021-01-29 | Stop reason: SDUPTHER

## 2019-12-09 RX ORDER — FELODIPINE 2.5 MG/1
2.5 TABLET, EXTENDED RELEASE ORAL DAILY
Qty: 90 TABLET | Refills: 5 | Status: ON HOLD
Start: 2019-12-09 | End: 2020-06-22 | Stop reason: CLARIF

## 2019-12-09 RX ORDER — CYANOCOBALAMIN 1000 UG/ML
1000 INJECTION INTRAMUSCULAR; SUBCUTANEOUS ONCE
Status: COMPLETED | OUTPATIENT
Start: 2019-12-09 | End: 2019-12-09

## 2019-12-09 RX ORDER — LIDOCAINE 50 MG/G
2 PATCH TOPICAL DAILY
Qty: 60 PATCH | Refills: 5 | Status: SHIPPED | OUTPATIENT
Start: 2019-12-09 | End: 2020-12-08

## 2019-12-09 RX ADMIN — CYANOCOBALAMIN 1000 MCG: 1000 INJECTION INTRAMUSCULAR; SUBCUTANEOUS at 15:36

## 2019-12-10 ASSESSMENT — ENCOUNTER SYMPTOMS
DIARRHEA: 0
TROUBLE SWALLOWING: 0
CONSTIPATION: 0
EYE REDNESS: 0
SORE THROAT: 0
CHOKING: 0
NAUSEA: 0
EYE PAIN: 0
BLOOD IN STOOL: 0
ABDOMINAL PAIN: 0
EYE DISCHARGE: 0
ANAL BLEEDING: 0
BACK PAIN: 0
COUGH: 0
RHINORRHEA: 0
FACIAL SWELLING: 0
VOMITING: 0
APNEA: 0
ALLERGIC/IMMUNOLOGIC COMMENTS: ALLERGY ON GLUTEN-FREE DIET
STRIDOR: 0
CHEST TIGHTNESS: 0
EYE ITCHING: 0
WHEEZING: 0
SHORTNESS OF BREATH: 0

## 2019-12-10 ASSESSMENT — PATIENT HEALTH QUESTIONNAIRE - PHQ9
1. LITTLE INTEREST OR PLEASURE IN DOING THINGS: 1
SUM OF ALL RESPONSES TO PHQ9 QUESTIONS 1 & 2: 2
SUM OF ALL RESPONSES TO PHQ QUESTIONS 1-9: 2
2. FEELING DOWN, DEPRESSED OR HOPELESS: 1
SUM OF ALL RESPONSES TO PHQ QUESTIONS 1-9: 2

## 2019-12-11 LAB — URINE CULTURE, ROUTINE: NORMAL

## 2019-12-12 ENCOUNTER — TELEPHONE (OUTPATIENT)
Dept: ORTHOPEDIC SURGERY | Age: 84
End: 2019-12-12

## 2019-12-20 ENCOUNTER — TELEPHONE (OUTPATIENT)
Dept: PRIMARY CARE CLINIC | Age: 84
End: 2019-12-20

## 2019-12-21 ENCOUNTER — HOSPITAL ENCOUNTER (OUTPATIENT)
Age: 84
Setting detail: OBSERVATION
Discharge: HOME | End: 2019-12-22
Attending: EMERGENCY MEDICINE | Admitting: INTERNAL MEDICINE
Payer: MEDICARE

## 2019-12-21 DIAGNOSIS — R11.0 NAUSEA: Primary | ICD-10-CM

## 2019-12-21 DIAGNOSIS — R94.31 ACUTE ELECTROCARDIOGRAM CHANGES: ICD-10-CM

## 2019-12-21 DIAGNOSIS — D23.9 DYSPLASTIC NEVUS: Primary | ICD-10-CM

## 2019-12-21 PROBLEM — R07.9 CHEST PAIN: Status: ACTIVE | Noted: 2019-12-21

## 2019-12-21 LAB
ALBUMIN SERPL-MCNC: 3.9 G/DL (ref 3.4–5)
ALP BLD-CCNC: 109 U/L (ref 40–129)
ALT SERPL-CCNC: 8 U/L (ref 10–40)
ANION GAP SERPL CALCULATED.3IONS-SCNC: 11 MMOL/L (ref 3–16)
AST SERPL-CCNC: 18 U/L (ref 15–37)
BACTERIA: ABNORMAL /HPF
BASOPHILS ABSOLUTE: 0 K/UL (ref 0–0.2)
BASOPHILS RELATIVE PERCENT: 0.4 %
BILIRUB SERPL-MCNC: 0.8 MG/DL (ref 0–1)
BILIRUBIN DIRECT: <0.2 MG/DL (ref 0–0.3)
BILIRUBIN URINE: NEGATIVE
BILIRUBIN, INDIRECT: ABNORMAL MG/DL (ref 0–1)
BLOOD, URINE: ABNORMAL
BUN BLDV-MCNC: 13 MG/DL (ref 7–20)
CALCIUM SERPL-MCNC: 10.6 MG/DL (ref 8.3–10.6)
CHLORIDE BLD-SCNC: 107 MMOL/L (ref 99–110)
CLARITY: CLEAR
CO2: 26 MMOL/L (ref 21–32)
COLOR: YELLOW
CREAT SERPL-MCNC: 0.9 MG/DL (ref 0.6–1.2)
EKG ATRIAL RATE: 59 BPM
EKG ATRIAL RATE: 62 BPM
EKG DIAGNOSIS: NORMAL
EKG DIAGNOSIS: NORMAL
EKG P AXIS: 40 DEGREES
EKG P-R INTERVAL: 226 MS
EKG P-R INTERVAL: 240 MS
EKG Q-T INTERVAL: 412 MS
EKG Q-T INTERVAL: 422 MS
EKG QRS DURATION: 84 MS
EKG QRS DURATION: 96 MS
EKG QTC CALCULATION (BAZETT): 417 MS
EKG QTC CALCULATION (BAZETT): 418 MS
EKG R AXIS: -9 DEGREES
EKG R AXIS: 7 DEGREES
EKG T AXIS: -12 DEGREES
EKG T AXIS: 30 DEGREES
EKG VENTRICULAR RATE: 59 BPM
EKG VENTRICULAR RATE: 62 BPM
EOSINOPHILS ABSOLUTE: 0.1 K/UL (ref 0–0.6)
EOSINOPHILS RELATIVE PERCENT: 0.9 %
GFR AFRICAN AMERICAN: >60
GFR NON-AFRICAN AMERICAN: 58
GLUCOSE BLD-MCNC: 92 MG/DL (ref 70–99)
GLUCOSE URINE: NEGATIVE MG/DL
HCT VFR BLD CALC: 42.3 % (ref 36–48)
HEMOGLOBIN: 13.8 G/DL (ref 12–16)
KETONES, URINE: NEGATIVE MG/DL
LEUKOCYTE ESTERASE, URINE: NEGATIVE
LIPASE: 56 U/L (ref 13–60)
LYMPHOCYTES ABSOLUTE: 1.4 K/UL (ref 1–5.1)
LYMPHOCYTES RELATIVE PERCENT: 23.3 %
MCH RBC QN AUTO: 27.8 PG (ref 26–34)
MCHC RBC AUTO-ENTMCNC: 32.7 G/DL (ref 31–36)
MCV RBC AUTO: 85 FL (ref 80–100)
MICROSCOPIC EXAMINATION: YES
MONOCYTES ABSOLUTE: 0.7 K/UL (ref 0–1.3)
MONOCYTES RELATIVE PERCENT: 11.8 %
NEUTROPHILS ABSOLUTE: 3.8 K/UL (ref 1.7–7.7)
NEUTROPHILS RELATIVE PERCENT: 63.6 %
NITRITE, URINE: NEGATIVE
PDW BLD-RTO: 14.6 % (ref 12.4–15.4)
PH UA: 7.5 (ref 5–8)
PLATELET # BLD: 165 K/UL (ref 135–450)
PMV BLD AUTO: 8.8 FL (ref 5–10.5)
POTASSIUM REFLEX MAGNESIUM: 4.2 MMOL/L (ref 3.5–5.1)
PROTEIN UA: NEGATIVE MG/DL
RBC # BLD: 4.98 M/UL (ref 4–5.2)
RBC UA: ABNORMAL /HPF (ref 0–2)
SODIUM BLD-SCNC: 144 MMOL/L (ref 136–145)
SPECIFIC GRAVITY UA: 1.01 (ref 1–1.03)
TOTAL PROTEIN: 6.5 G/DL (ref 6.4–8.2)
TROPONIN: <0.01 NG/ML
TSH REFLEX: 0.69 UIU/ML (ref 0.27–4.2)
URINE TYPE: ABNORMAL
UROBILINOGEN, URINE: 0.2 E.U./DL
WBC # BLD: 6 K/UL (ref 4–11)
WBC UA: ABNORMAL /HPF (ref 0–5)

## 2019-12-21 PROCEDURE — 93005 ELECTROCARDIOGRAM TRACING: CPT | Performed by: EMERGENCY MEDICINE

## 2019-12-21 PROCEDURE — 80048 BASIC METABOLIC PNL TOTAL CA: CPT

## 2019-12-21 PROCEDURE — 83036 HEMOGLOBIN GLYCOSYLATED A1C: CPT

## 2019-12-21 PROCEDURE — 81001 URINALYSIS AUTO W/SCOPE: CPT

## 2019-12-21 PROCEDURE — 83690 ASSAY OF LIPASE: CPT

## 2019-12-21 PROCEDURE — 36415 COLL VENOUS BLD VENIPUNCTURE: CPT

## 2019-12-21 PROCEDURE — 85025 COMPLETE CBC W/AUTO DIFF WBC: CPT

## 2019-12-21 PROCEDURE — G0378 HOSPITAL OBSERVATION PER HR: HCPCS

## 2019-12-21 PROCEDURE — 99284 EMERGENCY DEPT VISIT MOD MDM: CPT

## 2019-12-21 PROCEDURE — 80076 HEPATIC FUNCTION PANEL: CPT

## 2019-12-21 PROCEDURE — 6370000000 HC RX 637 (ALT 250 FOR IP): Performed by: INTERNAL MEDICINE

## 2019-12-21 PROCEDURE — 84443 ASSAY THYROID STIM HORMONE: CPT

## 2019-12-21 PROCEDURE — 2580000003 HC RX 258: Performed by: INTERNAL MEDICINE

## 2019-12-21 PROCEDURE — 84484 ASSAY OF TROPONIN QUANT: CPT

## 2019-12-21 RX ORDER — CALCIUM CARBONATE 200(500)MG
1000 TABLET,CHEWABLE ORAL 3 TIMES DAILY PRN
Status: DISCONTINUED | OUTPATIENT
Start: 2019-12-21 | End: 2019-12-22 | Stop reason: HOSPADM

## 2019-12-21 RX ORDER — CETIRIZINE HYDROCHLORIDE 10 MG/1
5 TABLET ORAL DAILY
Status: DISCONTINUED | OUTPATIENT
Start: 2019-12-21 | End: 2019-12-22 | Stop reason: HOSPADM

## 2019-12-21 RX ORDER — METHIMAZOLE 5 MG/1
7.5 TABLET ORAL
Status: DISCONTINUED | OUTPATIENT
Start: 2019-12-23 | End: 2019-12-22 | Stop reason: HOSPADM

## 2019-12-21 RX ORDER — MONTELUKAST SODIUM 10 MG/1
10 TABLET ORAL NIGHTLY
Status: DISCONTINUED | OUTPATIENT
Start: 2019-12-21 | End: 2019-12-22 | Stop reason: HOSPADM

## 2019-12-21 RX ORDER — PANTOPRAZOLE SODIUM 40 MG/1
40 TABLET, DELAYED RELEASE ORAL DAILY
Status: DISCONTINUED | OUTPATIENT
Start: 2019-12-21 | End: 2019-12-22 | Stop reason: HOSPADM

## 2019-12-21 RX ORDER — ASPIRIN 81 MG/1
81 TABLET, CHEWABLE ORAL DAILY
Status: DISCONTINUED | OUTPATIENT
Start: 2019-12-21 | End: 2019-12-21

## 2019-12-21 RX ORDER — ATENOLOL 50 MG/1
50 TABLET ORAL DAILY
Status: DISCONTINUED | OUTPATIENT
Start: 2019-12-21 | End: 2019-12-22 | Stop reason: HOSPADM

## 2019-12-21 RX ORDER — SODIUM CHLORIDE 0.9 % (FLUSH) 0.9 %
10 SYRINGE (ML) INJECTION EVERY 12 HOURS SCHEDULED
Status: DISCONTINUED | OUTPATIENT
Start: 2019-12-21 | End: 2019-12-22 | Stop reason: HOSPADM

## 2019-12-21 RX ORDER — ONDANSETRON 2 MG/ML
4 INJECTION INTRAMUSCULAR; INTRAVENOUS EVERY 6 HOURS PRN
Status: DISCONTINUED | OUTPATIENT
Start: 2019-12-21 | End: 2019-12-22 | Stop reason: HOSPADM

## 2019-12-21 RX ORDER — METHIMAZOLE 5 MG/1
5 TABLET ORAL
Status: DISCONTINUED | OUTPATIENT
Start: 2019-12-24 | End: 2019-12-22 | Stop reason: HOSPADM

## 2019-12-21 RX ORDER — SODIUM CHLORIDE 0.9 % (FLUSH) 0.9 %
10 SYRINGE (ML) INJECTION PRN
Status: DISCONTINUED | OUTPATIENT
Start: 2019-12-21 | End: 2019-12-22 | Stop reason: HOSPADM

## 2019-12-21 RX ORDER — ACETAMINOPHEN 325 MG/1
650 TABLET ORAL EVERY 4 HOURS PRN
Status: DISCONTINUED | OUTPATIENT
Start: 2019-12-21 | End: 2019-12-22 | Stop reason: HOSPADM

## 2019-12-21 RX ORDER — HYDRALAZINE HYDROCHLORIDE 20 MG/ML
10 INJECTION INTRAMUSCULAR; INTRAVENOUS EVERY 6 HOURS PRN
Status: DISCONTINUED | OUTPATIENT
Start: 2019-12-21 | End: 2019-12-22 | Stop reason: HOSPADM

## 2019-12-21 RX ADMIN — ASPIRIN 325 MG: 325 TABLET, DELAYED RELEASE ORAL at 15:57

## 2019-12-21 RX ADMIN — CETIRIZINE HYDROCHLORIDE 5 MG: 10 TABLET, FILM COATED ORAL at 16:05

## 2019-12-21 RX ADMIN — MONTELUKAST 10 MG: 10 TABLET, FILM COATED ORAL at 21:12

## 2019-12-21 RX ADMIN — ANTACID TABLETS 1000 MG: 500 TABLET, CHEWABLE ORAL at 16:05

## 2019-12-21 RX ADMIN — Medication 10 ML: at 21:12

## 2019-12-21 RX ADMIN — ACETAMINOPHEN 650 MG: 325 TABLET ORAL at 21:12

## 2019-12-21 RX ADMIN — PANTOPRAZOLE SODIUM 40 MG: 40 TABLET, DELAYED RELEASE ORAL at 15:57

## 2019-12-21 ASSESSMENT — PAIN SCALES - GENERAL
PAINLEVEL_OUTOF10: 3
PAINLEVEL_OUTOF10: 0

## 2019-12-21 ASSESSMENT — ENCOUNTER SYMPTOMS
NAUSEA: 1
COUGH: 0
SHORTNESS OF BREATH: 0

## 2019-12-21 NOTE — TELEPHONE ENCOUNTER
Stevie Tapia, [address] [place] at 12/20/2019  5:33 PM     Status: Signed      Please advise       Liz Yeboah at 12/20/2019  2:32 PM     Status: Signed      Pt's daughter, Ceasar Curtis, thought Dr. Esteban Colin was going to refer pt to a dermatologist, but I do not see a referral in [address] [place] [address] Rd. Please advise.        I did mention to her about the mammo referral and she will call to set up appt.      ARSALAN MONTEZ:  [phone]        Dermatology referral placed.   Explain to daughter appointment wait time is 3-6 months due to shortage of dermatologist.

## 2019-12-22 VITALS
HEART RATE: 67 BPM | SYSTOLIC BLOOD PRESSURE: 154 MMHG | WEIGHT: 120 LBS | HEIGHT: 69 IN | OXYGEN SATURATION: 98 % | TEMPERATURE: 96.6 F | BODY MASS INDEX: 17.77 KG/M2 | RESPIRATION RATE: 18 BRPM | DIASTOLIC BLOOD PRESSURE: 73 MMHG

## 2019-12-22 PROBLEM — R07.9 CHEST PAIN: Status: RESOLVED | Noted: 2019-12-21 | Resolved: 2019-12-22

## 2019-12-22 LAB
ANION GAP SERPL CALCULATED.3IONS-SCNC: 12 MMOL/L (ref 3–16)
BUN BLDV-MCNC: 14 MG/DL (ref 7–20)
CALCIUM SERPL-MCNC: 10.2 MG/DL (ref 8.3–10.6)
CHLORIDE BLD-SCNC: 106 MMOL/L (ref 99–110)
CO2: 21 MMOL/L (ref 21–32)
CREAT SERPL-MCNC: 0.8 MG/DL (ref 0.6–1.2)
ESTIMATED AVERAGE GLUCOSE: 114 MG/DL
GFR AFRICAN AMERICAN: >60
GFR NON-AFRICAN AMERICAN: >60
GLUCOSE BLD-MCNC: 90 MG/DL (ref 70–99)
HBA1C MFR BLD: 5.6 %
HCT VFR BLD CALC: 43.3 % (ref 36–48)
HEMOGLOBIN: 13.9 G/DL (ref 12–16)
MCH RBC QN AUTO: 27.7 PG (ref 26–34)
MCHC RBC AUTO-ENTMCNC: 32.1 G/DL (ref 31–36)
MCV RBC AUTO: 86.2 FL (ref 80–100)
PDW BLD-RTO: 14.7 % (ref 12.4–15.4)
PLATELET # BLD: 161 K/UL (ref 135–450)
PMV BLD AUTO: 8.8 FL (ref 5–10.5)
POTASSIUM REFLEX MAGNESIUM: 3.9 MMOL/L (ref 3.5–5.1)
RBC # BLD: 5.02 M/UL (ref 4–5.2)
SODIUM BLD-SCNC: 139 MMOL/L (ref 136–145)
WBC # BLD: 7.5 K/UL (ref 4–11)

## 2019-12-22 PROCEDURE — 85027 COMPLETE CBC AUTOMATED: CPT

## 2019-12-22 PROCEDURE — 6370000000 HC RX 637 (ALT 250 FOR IP): Performed by: INTERNAL MEDICINE

## 2019-12-22 PROCEDURE — 36415 COLL VENOUS BLD VENIPUNCTURE: CPT

## 2019-12-22 PROCEDURE — 99214 OFFICE O/P EST MOD 30 MIN: CPT | Performed by: INTERNAL MEDICINE

## 2019-12-22 PROCEDURE — 80048 BASIC METABOLIC PNL TOTAL CA: CPT

## 2019-12-22 PROCEDURE — 2580000003 HC RX 258: Performed by: INTERNAL MEDICINE

## 2019-12-22 PROCEDURE — G0378 HOSPITAL OBSERVATION PER HR: HCPCS

## 2019-12-22 RX ORDER — ASPIRIN 81 MG/1
81 TABLET ORAL DAILY
Status: DISCONTINUED | OUTPATIENT
Start: 2019-12-22 | End: 2019-12-22 | Stop reason: HOSPADM

## 2019-12-22 RX ORDER — AMLODIPINE BESYLATE 5 MG/1
5 TABLET ORAL DAILY
Status: DISCONTINUED | OUTPATIENT
Start: 2019-12-22 | End: 2019-12-22

## 2019-12-22 RX ORDER — ASPIRIN 81 MG/1
81 TABLET, CHEWABLE ORAL DAILY
Status: DISCONTINUED | OUTPATIENT
Start: 2019-12-23 | End: 2019-12-22

## 2019-12-22 RX ORDER — ASPIRIN 81 MG/1
81 TABLET, CHEWABLE ORAL DAILY
Qty: 30 TABLET | Refills: 3 | Status: ON HOLD | OUTPATIENT
Start: 2019-12-23 | End: 2020-06-22 | Stop reason: CLARIF

## 2019-12-22 RX ORDER — AMLODIPINE BESYLATE 2.5 MG/1
2.5 TABLET ORAL DAILY
Status: DISCONTINUED | OUTPATIENT
Start: 2019-12-22 | End: 2019-12-22 | Stop reason: HOSPADM

## 2019-12-22 RX ADMIN — PANTOPRAZOLE SODIUM 40 MG: 40 TABLET, DELAYED RELEASE ORAL at 07:14

## 2019-12-22 RX ADMIN — ATENOLOL 50 MG: 50 TABLET ORAL at 09:45

## 2019-12-22 RX ADMIN — CETIRIZINE HYDROCHLORIDE 5 MG: 10 TABLET, FILM COATED ORAL at 09:45

## 2019-12-22 RX ADMIN — AMLODIPINE BESYLATE 2.5 MG: 2.5 TABLET ORAL at 13:21

## 2019-12-22 RX ADMIN — Medication 10 ML: at 09:46

## 2019-12-22 RX ADMIN — ANTACID TABLETS 1000 MG: 500 TABLET, CHEWABLE ORAL at 09:45

## 2019-12-22 ASSESSMENT — PAIN SCALES - GENERAL: PAINLEVEL_OUTOF10: 0

## 2019-12-23 ENCOUNTER — TELEPHONE (OUTPATIENT)
Dept: PRIMARY CARE CLINIC | Age: 84
End: 2019-12-23

## 2019-12-30 ENCOUNTER — OFFICE VISIT (OUTPATIENT)
Dept: CARDIOLOGY CLINIC | Age: 84
End: 2019-12-30
Payer: MEDICARE

## 2019-12-30 VITALS
BODY MASS INDEX: 18.7 KG/M2 | SYSTOLIC BLOOD PRESSURE: 153 MMHG | DIASTOLIC BLOOD PRESSURE: 75 MMHG | WEIGHT: 126.6 LBS | HEART RATE: 62 BPM

## 2019-12-30 DIAGNOSIS — I50.22 CHRONIC SYSTOLIC CONGESTIVE HEART FAILURE (HCC): ICD-10-CM

## 2019-12-30 DIAGNOSIS — I10 ESSENTIAL HYPERTENSION: Primary | ICD-10-CM

## 2019-12-30 PROCEDURE — 4040F PNEUMOC VAC/ADMIN/RCVD: CPT | Performed by: INTERNAL MEDICINE

## 2019-12-30 PROCEDURE — 99214 OFFICE O/P EST MOD 30 MIN: CPT | Performed by: INTERNAL MEDICINE

## 2019-12-30 PROCEDURE — G8482 FLU IMMUNIZE ORDER/ADMIN: HCPCS | Performed by: INTERNAL MEDICINE

## 2019-12-30 PROCEDURE — 1036F TOBACCO NON-USER: CPT | Performed by: INTERNAL MEDICINE

## 2019-12-30 PROCEDURE — G8427 DOCREV CUR MEDS BY ELIG CLIN: HCPCS | Performed by: INTERNAL MEDICINE

## 2019-12-30 PROCEDURE — 1090F PRES/ABSN URINE INCON ASSESS: CPT | Performed by: INTERNAL MEDICINE

## 2019-12-30 PROCEDURE — G8420 CALC BMI NORM PARAMETERS: HCPCS | Performed by: INTERNAL MEDICINE

## 2019-12-30 PROCEDURE — 1123F ACP DISCUSS/DSCN MKR DOCD: CPT | Performed by: INTERNAL MEDICINE

## 2020-01-08 ENCOUNTER — HOSPITAL ENCOUNTER (OUTPATIENT)
Dept: ULTRASOUND IMAGING | Age: 85
Discharge: HOME | End: 2020-01-08
Payer: MEDICARE

## 2020-01-08 ENCOUNTER — HOSPITAL ENCOUNTER (OUTPATIENT)
Dept: MAMMOGRAPHY | Age: 85
Discharge: HOME | End: 2020-01-08
Payer: MEDICARE

## 2020-01-08 PROCEDURE — 76642 ULTRASOUND BREAST LIMITED: CPT

## 2020-01-08 PROCEDURE — 77066 DX MAMMO INCL CAD BI: CPT

## 2020-01-08 PROCEDURE — 76641 ULTRASOUND BREAST COMPLETE: CPT

## 2020-02-21 ENCOUNTER — OFFICE VISIT (OUTPATIENT)
Dept: PRIMARY CARE CLINIC | Age: 85
End: 2020-02-21
Payer: MEDICARE

## 2020-02-21 VITALS
DIASTOLIC BLOOD PRESSURE: 60 MMHG | SYSTOLIC BLOOD PRESSURE: 128 MMHG | HEART RATE: 55 BPM | OXYGEN SATURATION: 100 % | RESPIRATION RATE: 14 BRPM | TEMPERATURE: 96.8 F | BODY MASS INDEX: 18.75 KG/M2 | WEIGHT: 127 LBS

## 2020-02-21 PROCEDURE — 1123F ACP DISCUSS/DSCN MKR DOCD: CPT | Performed by: INTERNAL MEDICINE

## 2020-02-21 PROCEDURE — G8420 CALC BMI NORM PARAMETERS: HCPCS | Performed by: INTERNAL MEDICINE

## 2020-02-21 PROCEDURE — 99214 OFFICE O/P EST MOD 30 MIN: CPT | Performed by: INTERNAL MEDICINE

## 2020-02-21 PROCEDURE — 1090F PRES/ABSN URINE INCON ASSESS: CPT | Performed by: INTERNAL MEDICINE

## 2020-02-21 PROCEDURE — 96372 THER/PROPH/DIAG INJ SC/IM: CPT | Performed by: INTERNAL MEDICINE

## 2020-02-21 PROCEDURE — G8482 FLU IMMUNIZE ORDER/ADMIN: HCPCS | Performed by: INTERNAL MEDICINE

## 2020-02-21 PROCEDURE — 1036F TOBACCO NON-USER: CPT | Performed by: INTERNAL MEDICINE

## 2020-02-21 PROCEDURE — 4040F PNEUMOC VAC/ADMIN/RCVD: CPT | Performed by: INTERNAL MEDICINE

## 2020-02-21 PROCEDURE — 1111F DSCHRG MED/CURRENT MED MERGE: CPT | Performed by: INTERNAL MEDICINE

## 2020-02-21 PROCEDURE — G8427 DOCREV CUR MEDS BY ELIG CLIN: HCPCS | Performed by: INTERNAL MEDICINE

## 2020-02-21 RX ORDER — CYANOCOBALAMIN 1000 UG/ML
1000 INJECTION INTRAMUSCULAR; SUBCUTANEOUS ONCE
Status: COMPLETED | OUTPATIENT
Start: 2020-02-21 | End: 2020-02-21

## 2020-02-21 RX ADMIN — CYANOCOBALAMIN 1000 MCG: 1000 INJECTION INTRAMUSCULAR; SUBCUTANEOUS at 09:18

## 2020-02-21 ASSESSMENT — PATIENT HEALTH QUESTIONNAIRE - PHQ9
SUM OF ALL RESPONSES TO PHQ QUESTIONS 1-9: 0
SUM OF ALL RESPONSES TO PHQ9 QUESTIONS 1 & 2: 0
SUM OF ALL RESPONSES TO PHQ QUESTIONS 1-9: 0
1. LITTLE INTEREST OR PLEASURE IN DOING THINGS: 0
2. FEELING DOWN, DEPRESSED OR HOPELESS: 0
DEPRESSION UNABLE TO ASSESS: FUNCTIONAL CAPACITY MOTIVATION LIMITS ACCURACY

## 2020-02-21 NOTE — PROGRESS NOTES
Post-Discharge Transitional Care Management Services or Hospital Follow Up      Shanon Hsieh        Diagnosis Orders   1. B12 deficiency , unable to absorb, comes in for monthly injections. NO numbness of feet, has fatigue. 2. Essential hypertension is controlled on felodipine 2.5 mg qd, spironolactone 25 mg qd and atenolol 50 mg qd. No complaint of headaches or leg edema, or shortness of breath. BP Readings from Last 3 Encounters:   02/21/20 128/60   12/30/19 (!) 153/75   12/22/19 (!) 154/73           3. Gastroesophageal reflux disease without esophagitis : Chronic problem on protonix 40 mg qd. Intermittent GERD     4. Herpes zoster without complication  But has chronic shingles pain in chest. Was unable to to tolerate low dose gabapentin or lyrica. Ingridt has lidoderm, encourage to use two to cover the area for 12 hours on and 12 hours off. Pain shart and stabbing without cough , diaphoresis or shortness of breath    5. Celiac disease stable with avoiding gluten. Wt Readings from Last 3 Encounters:   02/21/20 127 lb (57.6 kg)   12/30/19 126 lb 9.6 oz (57.4 kg)   12/21/19 120 lb (54.4 kg)       No longer losing weight or complaint of diarrhea. Has gas pains regularly if does not eat on time . discribes as a diffuse cramping. NO melena or hematochezia. YOB: 1926    Date of Office Visit:  2/21/2020  Date of Hospital Admission: 12/21/19  Date of Hospital Discharge: 12/22/19  Readmission Risk Score(high >=14%.  Medium >=10%):Readmission Risk Score: 0      Care management risk score Rising risk (score 2-5) and Complex Care (Scores >=6): 9     Non face to face  following discharge, date last encounter closed (first attempt may have been earlier): *No documented post hospital discharge outreach found in the last 14 days *No documented post hospital discharge outreach found in the last 14 days    Call initiated 2 business days of discharge: *No response recorded in the last 14 days     Patient Active Problem List   Diagnosis    GERD (gastroesophageal reflux disease)    Asthma    IBS (irritable bowel syndrome)    MGUS (monoclonal gammopathy of unknown significance)    Menopause    Graves' disease    S/P colonoscopy    Left renal mass    Osteoporosis    Alkaline phosphatase elevation    Hearing loss sensory, bilateral    Bacterial overgrowth syndrome    Erythrocytosis    Arthritis of knee, right    Essential hypertension    Multiple food allergies    Protein malnutrition (HCC)    Celiac disease    Aortic regurgitation    Primary osteoarthritis of both knees    Hyperparathyroidism ([address]    History of Graves' disease    Age-related osteoporosis without current pathological fracture    Bilateral carpal tunnel syndrome    Herpes zoster without complication    Multiple thyroid nodules    Adrenal adenoma, left    Adrenal adenoma, right    Postherpetic neuralgia    Chronic pain syndrome    Advanced age       Allergies   Allergen Reactions    Acyclovir      Severe nausea  And anorexia. It happened after taking each 800 mg tablet and patient took one one day and one the next, then stopped an no more    Creon [Pancrelipase (Lip-Prot-Amyl)]      Swollen gums.  Eggs Or Egg-Derived Products      Hives  this test in the [employer] ([email or web address]). Egg White IgE 3. 59High   <=0.34 kU/L Final 02/10/2016  2:14 PM [facility]                  Gluten Meal      Abdominal pain and diarreha  Wheat IgE 0.53  <=0.34 kU/L Final 02/10/2016  2:14 PM [facility]       Milk-Related Compounds      Milk, Cow's IgE 1. 75High   <=0.34 kU/L Final 02/10/2016  2:14 PM [facility]    Ace Inhibitors      angioedema    Ciprocinonide [Fluocinolone]      Stiff, no nausea or rash    Dye [Iodides] Hives     Only Dye that went into eyes.     Fluorescein     Lisinopril Swelling    Macrobid [Nitrofurantoin Monohydrate Macrocrystals]     No Known Allergies     Sulfa Antibiotics Rash       Medications listed as ordered at the time of discharge from hospital   [facility], [address] Medication Instructions [facility]:    Printed on:02/21/20 7237   Medication Information                      albuterol sulfate (PROAIR RESPICLICK) 498 (90 Base) MCG/ACT aerosol powder inhalation  Inhale 2 puffs into the lungs every 6 hours as needed for Wheezing or Shortness of Breath             ammonium lactate (LAC-HYDRIN) 12 % lotion  Apply topically daily. aspirin 81 MG chewable tablet  Take 1 tablet by mouth daily             atenolol (TENORMIN) 50 MG tablet  TAKE 1 TABLET DAILY             budesonide-formoterol (SYMBICORT) 160-4.5 MCG/ACT AERO  Inhale 2 puffs into the lungs 2 times daily             Calcium Carbonate-Vitamin D (CALTRATE 600+D PO)  Take 1 tablet by mouth every other day. felodipine (PLENDIL) 2.5 MG extended release tablet  Take 1 tablet by mouth daily             fluticasone (FLONASE) 50 MCG/ACT nasal spray  PLACE 1 SPRAY IN EACH NOSTRIL DAILY             hydrocortisone (ANUSOL-HC) 25 MG suppository  Place 1 suppository rectally 2 times daily as needed for Hemorrhoids             hydrocortisone (V-R HYDROCORTISONE/ALOE) 0.5 % ointment  Apply topically 2 times daily. lidocaine (LIDODERM) 5 %  Place 2 patches onto the skin daily 12 hours on, 12 hours off.             lidocaine (XYLOCAINE) 5 % ointment  Apply topically three times a day, as needed. loratadine (CLARITIN) 10 MG tablet  Take 1 tablet by mouth daily             methimazole (TAPAZOLE) 5 MG tablet  Take 5 mg by mouth See Admin Instructions 1 tablet by mouth daily except for Monday, Wednesday, and Friday take 1.5 tablets.              montelukast (SINGULAIR) 10 MG tablet  TAKE 1 TABLET NIGHTLY             pantoprazole (PROTONIX) 40 MG tablet  TAKE 1 TABLET DAILY             spironolactone (ALDACTONE) 25 MG tablet  TAKE 1 TABLET DAILY                   Medications marked \"taking\" at ordered at time of hospital discharge: Yes    No chief complaint on file. HPI    Inpatient course: Discharge summary reviewed- see chart. Interval history/Current status: improved. Review of Systems   Constitutional: Positive for fatigue. Negative for activity change, appetite change, chills, diaphoresis, fever and unexpected weight change. Decreasing fatigue   HENT: Negative. Negative for congestion, ear discharge, ear pain, facial swelling, hearing loss, nosebleeds, postnasal drip, rhinorrhea, sneezing, sore throat and trouble swallowing. Graves Disease being followed by endocrinology. Eyes: Negative for pain, discharge, redness and itching. 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 Asthma, controlled with prn inhaler. Cardiovascular: Positive for chest pain. Negative for palpitations and leg swelling. Hypertension    Right chest and breast pain in area where she had shingles that remained after shingles resolved. Gastrointestinal: Negative for abdominal pain, anal bleeding, blood in stool, constipation, diarrhea, nausea and vomiting. IBS and GERD with intermittent pain.   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 Endocrine: Negative. Primary hyperparathyroidism, Graves' disease   Genitourinary: Negative for dysuria, frequency, hematuria, urgency and vaginal discharge. Musculoskeletal: Negative. Negative for arthralgias, back pain, joint swelling, myalgias, neck pain and neck stiffness. Multijoint osteoarthritis. Will receive cartilage injections in both knees with ortho , this month on 10/17/14. Osteoporosis treated with Genistin  ( i cool )not a candidate for oral bisphosphonates due to GERD with frequent exacerbations. Burning in right foot    Skin: Positive for rash. Negative for pallor. Complaint of mole right lower leg. Allergic/Immunologic: Negative for environmental allergies and food allergies. Allergy on gluten-free diet   Neurological: Negative. Negative for dizziness, weakness, numbness and headaches. Still have a shingles pain but has not taken 100 mg gabapentin yet. Unfortunately when she had shingles she took 1 dose of valacyclovir and had nausea and discontinue. I gave her a low dose of Famvir but she could not tolerate that as well. The rash is now healed. Hematological: Negative. Psychiatric/Behavioral: Negative. Vitals:    02/21/20 0913   BP: (!) 153/76   Site: Left Upper Arm   Position: Sitting   Cuff Size: Large Adult   Pulse: 55   Resp: 14   Temp: 96.8 °F (36 °C)   TempSrc: Oral   SpO2: 100%   Weight: 127 lb (57.6 kg)     Body mass index is 18.75 kg/m². Wt Readings from Last 3 Encounters:   02/21/20 127 lb (57.6 kg)   12/30/19 126 lb 9.6 oz (57.4 kg)   12/21/19 120 lb (54.4 kg)     BP Readings from Last 3 Encounters:   02/21/20 (!) 153/76   12/30/19 (!) 153/75   12/22/19 (!) 154/73       Physical Exam  Vitals signs and nursing note reviewed. Constitutional:       General: She is not in acute distress. Appearance: She is well-developed. She is not diaphoretic. HENT:      Head: Normocephalic and atraumatic. Right Ear: External ear normal.      Left Ear: External ear normal.      Nose: Nose normal.   Eyes:      General: No scleral icterus. Right eye: No discharge. Left eye: No discharge. Conjunctiva/sclera: Conjunctivae normal.      Pupils: Pupils are equal, round, and reactive to light. Neck:      Musculoskeletal: Normal range of motion and neck supple. Thyroid: No thyromegaly. Vascular: No JVD. Trachea: No tracheal deviation. Cardiovascular:      Rate and Rhythm: Normal rate and regular rhythm. Heart sounds: Normal heart sounds. No friction rub.    Pulmonary:      Effort: Pulmonary effort is normal. No respiratory distress. Breath sounds: Normal breath sounds. No wheezing or rales. Chest:      Chest wall: No tenderness. Abdominal:      General: Bowel sounds are normal. There is no distension. Palpations: Abdomen is soft. There is no mass. Tenderness: There is abdominal tenderness. There is no guarding or rebound. Comments: No flank pain but generalized tenderness   Musculoskeletal:         General: No tenderness. Comments: Marked deformity of right knee and painful with ROM and can not straighten knee. Lymphadenopathy:      Cervical: No cervical adenopathy. Skin:     General: Skin is warm and dry. Coloration: Skin is not pale. Findings: No erythema or rash. Comments: Dysplastic nevus, right lateral lower leg. Neurological:      Mental Status: She is alert and oriented to person, place, and time. Cranial Nerves: No cranial nerve deficit. Motor: No abnormal muscle tone. Coordination: Coordination normal.      Deep Tendon Reflexes: Reflexes are normal and symmetric. Psychiatric:         Behavior: Behavior normal.         Thought Content: Thought content normal.         Judgment: Judgment normal.             Assessment/Plan:   Diagnosis Orders   1. B12 deficiency  cyanocobalamin injection 1,000 mcg    LA DISCHARGE MEDS RECONCILED W/ CURRENT OUTPATIENT MED LIST   2. Essential hypertension  LA DISCHARGE MEDS RECONCILED W/ CURRENT OUTPATIENT MED LIST    Lipid Panel   3. Gastroesophageal reflux disease without esophagitis  LA DISCHARGE MEDS RECONCILED W/ CURRENT OUTPATIENT MED LIST   4. Herpes zoster without complication  LA DISCHARGE MEDS RECONCILED W/ CURRENT OUTPATIENT MED LIST   5.  Celiac disease  LA DISCHARGE MEDS RECONCILED W/ CURRENT OUTPATIENT MED LIST

## 2020-02-23 ASSESSMENT — ENCOUNTER SYMPTOMS
EYE REDNESS: 0
EYE ITCHING: 0
FACIAL SWELLING: 0
COUGH: 0
STRIDOR: 0
DIARRHEA: 0
APNEA: 0
CHEST TIGHTNESS: 0
EYE DISCHARGE: 0
SORE THROAT: 0
WHEEZING: 0
NAUSEA: 0
CHOKING: 0
BLOOD IN STOOL: 0
ALLERGIC/IMMUNOLOGIC COMMENTS: ALLERGY ON GLUTEN-FREE DIET
ANAL BLEEDING: 0
ABDOMINAL PAIN: 0
SHORTNESS OF BREATH: 0
VOMITING: 0
TROUBLE SWALLOWING: 0
RHINORRHEA: 0
BACK PAIN: 0
CONSTIPATION: 0
EYE PAIN: 0

## 2020-03-09 ENCOUNTER — TELEPHONE (OUTPATIENT)
Dept: PRIMARY CARE CLINIC | Age: 85
End: 2020-03-09

## 2020-03-09 RX ORDER — SPIRONOLACTONE 25 MG/1
TABLET ORAL
Qty: 90 TABLET | Refills: 3 | Status: SHIPPED | OUTPATIENT
Start: 2020-03-09 | End: 2021-01-29 | Stop reason: SDUPTHER

## 2020-03-14 RX ORDER — ANTACID TABLETS 648 MG/1
1 TABLET, CHEWABLE ORAL 2 TIMES DAILY
Qty: 60 TABLET | Refills: 5 | Status: ON HOLD | COMMUNITY
Start: 2020-03-14 | End: 2020-06-22 | Stop reason: CLARIF

## 2020-06-21 ENCOUNTER — APPOINTMENT (OUTPATIENT)
Dept: GENERAL RADIOLOGY | Age: 85
DRG: 176 | End: 2020-06-21
Payer: MEDICARE

## 2020-06-21 ENCOUNTER — APPOINTMENT (OUTPATIENT)
Dept: CT IMAGING | Age: 85
DRG: 176 | End: 2020-06-21
Payer: MEDICARE

## 2020-06-21 ENCOUNTER — HOSPITAL ENCOUNTER (INPATIENT)
Age: 85
LOS: 1 days | Discharge: HOME | DRG: 176 | End: 2020-06-23
Attending: EMERGENCY MEDICINE
Payer: MEDICARE

## 2020-06-21 PROBLEM — I26.99 PULMONARY EMBOLISM ON RIGHT (HCC): Status: ACTIVE | Noted: 2020-06-21

## 2020-06-21 LAB
ANION GAP SERPL CALCULATED.3IONS-SCNC: 8 MMOL/L (ref 3–16)
BASE EXCESS VENOUS: 2.5 MMOL/L (ref -2–3)
BASOPHILS ABSOLUTE: 0 K/UL (ref 0–0.2)
BASOPHILS RELATIVE PERCENT: 0.4 %
BUN BLDV-MCNC: 16 MG/DL (ref 7–20)
CALCIUM SERPL-MCNC: 10.5 MG/DL (ref 8.3–10.6)
CARBOXYHEMOGLOBIN: 1 % (ref 0–1.5)
CHLORIDE BLD-SCNC: 104 MMOL/L (ref 99–110)
CO2: 27 MMOL/L (ref 21–32)
CREAT SERPL-MCNC: 1 MG/DL (ref 0.6–1.2)
D DIMER: 255 NG/ML DDU (ref 0–229)
EOSINOPHILS ABSOLUTE: 0 K/UL (ref 0–0.6)
EOSINOPHILS RELATIVE PERCENT: 0.7 %
GFR AFRICAN AMERICAN: >60
GFR NON-AFRICAN AMERICAN: 52
GLUCOSE BLD-MCNC: 93 MG/DL (ref 70–99)
HCO3 VENOUS: 28.3 MMOL/L (ref 24–28)
HCT VFR BLD CALC: 42.7 % (ref 36–48)
HEMOGLOBIN, VEN, REDUCED: 65.8 %
HEMOGLOBIN: 14.1 G/DL (ref 12–16)
LACTIC ACID: 0.8 MMOL/L (ref 0.4–2)
LYMPHOCYTES ABSOLUTE: 1.7 K/UL (ref 1–5.1)
LYMPHOCYTES RELATIVE PERCENT: 24.1 %
MAGNESIUM: 1.9 MG/DL (ref 1.8–2.4)
MCH RBC QN AUTO: 27.8 PG (ref 26–34)
MCHC RBC AUTO-ENTMCNC: 32.9 G/DL (ref 31–36)
MCV RBC AUTO: 84.3 FL (ref 80–100)
METHEMOGLOBIN VENOUS: 0.8 % (ref 0–1.5)
MONOCYTES ABSOLUTE: 0.8 K/UL (ref 0–1.3)
MONOCYTES RELATIVE PERCENT: 11.6 %
NEUTROPHILS ABSOLUTE: 4.6 K/UL (ref 1.7–7.7)
NEUTROPHILS RELATIVE PERCENT: 63.2 %
O2 SAT, VEN: 33 %
PCO2, VEN: 50.9 MMHG (ref 41–51)
PDW BLD-RTO: 14.9 % (ref 12.4–15.4)
PH VENOUS: 7.36 (ref 7.35–7.45)
PHOSPHORUS: 2.9 MG/DL (ref 2.5–4.9)
PLATELET # BLD: 169 K/UL (ref 135–450)
PMV BLD AUTO: 9 FL (ref 5–10.5)
PO2, VEN: 24.7 MMHG (ref 25–40)
POTASSIUM REFLEX MAGNESIUM: 4.4 MMOL/L (ref 3.5–5.1)
PRO-BNP: 441 PG/ML (ref 0–449)
RBC # BLD: 5.07 M/UL (ref 4–5.2)
SODIUM BLD-SCNC: 139 MMOL/L (ref 136–145)
T4 TOTAL: 3.1 UG/DL (ref 4.5–10.9)
TCO2 CALC VENOUS: 30 MMOL/L
TROPONIN: <0.01 NG/ML
TSH SERPL DL<=0.05 MIU/L-ACNC: 6.71 UIU/ML (ref 0.27–4.2)
WBC # BLD: 7.2 K/UL (ref 4–11)

## 2020-06-21 PROCEDURE — 84100 ASSAY OF PHOSPHORUS: CPT

## 2020-06-21 PROCEDURE — 85379 FIBRIN DEGRADATION QUANT: CPT

## 2020-06-21 PROCEDURE — 93005 ELECTROCARDIOGRAM TRACING: CPT | Performed by: STUDENT IN AN ORGANIZED HEALTH CARE EDUCATION/TRAINING PROGRAM

## 2020-06-21 PROCEDURE — 6370000000 HC RX 637 (ALT 250 FOR IP)

## 2020-06-21 PROCEDURE — 83880 ASSAY OF NATRIURETIC PEPTIDE: CPT

## 2020-06-21 PROCEDURE — G0378 HOSPITAL OBSERVATION PER HR: HCPCS

## 2020-06-21 PROCEDURE — U0003 INFECTIOUS AGENT DETECTION BY NUCLEIC ACID (DNA OR RNA); SEVERE ACUTE RESPIRATORY SYNDROME CORONAVIRUS 2 (SARS-COV-2) (CORONAVIRUS DISEASE [COVID-19]), AMPLIFIED PROBE TECHNIQUE, MAKING USE OF HIGH THROUGHPUT TECHNOLOGIES AS DESCRIBED BY CMS-2020-01-R: HCPCS

## 2020-06-21 PROCEDURE — 71046 X-RAY EXAM CHEST 2 VIEWS: CPT

## 2020-06-21 PROCEDURE — 84484 ASSAY OF TROPONIN QUANT: CPT

## 2020-06-21 PROCEDURE — 82803 BLOOD GASES ANY COMBINATION: CPT

## 2020-06-21 PROCEDURE — 84436 ASSAY OF TOTAL THYROXINE: CPT

## 2020-06-21 PROCEDURE — 83735 ASSAY OF MAGNESIUM: CPT

## 2020-06-21 PROCEDURE — 6370000000 HC RX 637 (ALT 250 FOR IP): Performed by: STUDENT IN AN ORGANIZED HEALTH CARE EDUCATION/TRAINING PROGRAM

## 2020-06-21 PROCEDURE — 84443 ASSAY THYROID STIM HORMONE: CPT

## 2020-06-21 PROCEDURE — 83605 ASSAY OF LACTIC ACID: CPT

## 2020-06-21 PROCEDURE — 6360000004 HC RX CONTRAST MEDICATION: Performed by: STUDENT IN AN ORGANIZED HEALTH CARE EDUCATION/TRAINING PROGRAM

## 2020-06-21 PROCEDURE — 85025 COMPLETE CBC W/AUTO DIFF WBC: CPT

## 2020-06-21 PROCEDURE — 80048 BASIC METABOLIC PNL TOTAL CA: CPT

## 2020-06-21 PROCEDURE — 71275 CT ANGIOGRAPHY CHEST: CPT

## 2020-06-21 PROCEDURE — 6360000002 HC RX W HCPCS: Performed by: EMERGENCY MEDICINE

## 2020-06-21 PROCEDURE — 99285 EMERGENCY DEPT VISIT HI MDM: CPT

## 2020-06-21 PROCEDURE — 96372 THER/PROPH/DIAG INJ SC/IM: CPT

## 2020-06-21 RX ORDER — PANTOPRAZOLE SODIUM 40 MG/1
40 TABLET, DELAYED RELEASE ORAL
Status: DISCONTINUED | OUTPATIENT
Start: 2020-06-22 | End: 2020-06-21

## 2020-06-21 RX ORDER — BUDESONIDE AND FORMOTEROL FUMARATE DIHYDRATE 160; 4.5 UG/1; UG/1
2 AEROSOL RESPIRATORY (INHALATION) 2 TIMES DAILY
Status: DISCONTINUED | OUTPATIENT
Start: 2020-06-21 | End: 2020-06-23 | Stop reason: HOSPADM

## 2020-06-21 RX ORDER — SODIUM CHLORIDE 0.9 % (FLUSH) 0.9 %
10 SYRINGE (ML) INJECTION PRN
Status: DISCONTINUED | OUTPATIENT
Start: 2020-06-21 | End: 2020-06-23 | Stop reason: HOSPADM

## 2020-06-21 RX ORDER — METHIMAZOLE 5 MG/1
5 TABLET ORAL SEE ADMIN INSTRUCTIONS
Status: DISCONTINUED | OUTPATIENT
Start: 2020-06-21 | End: 2020-06-22 | Stop reason: SDUPTHER

## 2020-06-21 RX ORDER — ATENOLOL 50 MG/1
50 TABLET ORAL DAILY
Status: DISCONTINUED | OUTPATIENT
Start: 2020-06-22 | End: 2020-06-23 | Stop reason: HOSPADM

## 2020-06-21 RX ORDER — SIMETHICONE 80 MG
180 TABLET,CHEWABLE ORAL DAILY PRN
Status: DISCONTINUED | OUTPATIENT
Start: 2020-06-21 | End: 2020-06-23 | Stop reason: HOSPADM

## 2020-06-21 RX ORDER — PANTOPRAZOLE SODIUM 40 MG/1
40 TABLET, DELAYED RELEASE ORAL 2 TIMES DAILY
Status: DISCONTINUED | OUTPATIENT
Start: 2020-06-21 | End: 2020-06-23 | Stop reason: HOSPADM

## 2020-06-21 RX ORDER — ASPIRIN 81 MG/1
81 TABLET, CHEWABLE ORAL DAILY
Status: DISCONTINUED | OUTPATIENT
Start: 2020-06-22 | End: 2020-06-23 | Stop reason: HOSPADM

## 2020-06-21 RX ORDER — MONTELUKAST SODIUM 10 MG/1
10 TABLET ORAL NIGHTLY
Status: DISCONTINUED | OUTPATIENT
Start: 2020-06-21 | End: 2020-06-23 | Stop reason: HOSPADM

## 2020-06-21 RX ORDER — PROMETHAZINE HYDROCHLORIDE 25 MG/1
12.5 TABLET ORAL EVERY 6 HOURS PRN
Status: DISCONTINUED | OUTPATIENT
Start: 2020-06-21 | End: 2020-06-23 | Stop reason: HOSPADM

## 2020-06-21 RX ORDER — NITROGLYCERIN 0.4 MG/1
0.4 TABLET SUBLINGUAL EVERY 5 MIN PRN
Status: COMPLETED | OUTPATIENT
Start: 2020-06-21 | End: 2020-06-21

## 2020-06-21 RX ORDER — SIMETHICONE 180 MG
CAPSULE ORAL
COMMUNITY
End: 2021-01-29 | Stop reason: SDUPTHER

## 2020-06-21 RX ORDER — SODIUM CHLORIDE 0.9 % (FLUSH) 0.9 %
10 SYRINGE (ML) INJECTION EVERY 12 HOURS SCHEDULED
Status: DISCONTINUED | OUTPATIENT
Start: 2020-06-21 | End: 2020-06-23 | Stop reason: HOSPADM

## 2020-06-21 RX ORDER — SPIRONOLACTONE 25 MG/1
25 TABLET ORAL DAILY
Status: DISCONTINUED | OUTPATIENT
Start: 2020-06-21 | End: 2020-06-23 | Stop reason: HOSPADM

## 2020-06-21 RX ORDER — FLUTICASONE PROPIONATE 50 MCG
1 SPRAY, SUSPENSION (ML) NASAL DAILY
Status: DISCONTINUED | OUTPATIENT
Start: 2020-06-21 | End: 2020-06-23 | Stop reason: HOSPADM

## 2020-06-21 RX ORDER — POLYETHYLENE GLYCOL 3350 17 G/17G
17 POWDER, FOR SOLUTION ORAL DAILY PRN
Status: DISCONTINUED | OUTPATIENT
Start: 2020-06-21 | End: 2020-06-23 | Stop reason: HOSPADM

## 2020-06-21 RX ORDER — ACETAMINOPHEN 325 MG/1
650 TABLET ORAL EVERY 6 HOURS PRN
Status: DISCONTINUED | OUTPATIENT
Start: 2020-06-21 | End: 2020-06-23 | Stop reason: HOSPADM

## 2020-06-21 RX ORDER — ONDANSETRON 2 MG/ML
4 INJECTION INTRAMUSCULAR; INTRAVENOUS EVERY 6 HOURS PRN
Status: DISCONTINUED | OUTPATIENT
Start: 2020-06-21 | End: 2020-06-23 | Stop reason: HOSPADM

## 2020-06-21 RX ORDER — ACETAMINOPHEN 650 MG/1
650 SUPPOSITORY RECTAL EVERY 6 HOURS PRN
Status: DISCONTINUED | OUTPATIENT
Start: 2020-06-21 | End: 2020-06-23 | Stop reason: HOSPADM

## 2020-06-21 RX ORDER — ALBUTEROL SULFATE 90 UG/1
2 AEROSOL, METERED RESPIRATORY (INHALATION) EVERY 6 HOURS PRN
Status: DISCONTINUED | OUTPATIENT
Start: 2020-06-21 | End: 2020-06-23 | Stop reason: HOSPADM

## 2020-06-21 RX ORDER — CETIRIZINE HYDROCHLORIDE 10 MG/1
10 TABLET ORAL DAILY
Status: DISCONTINUED | OUTPATIENT
Start: 2020-06-21 | End: 2020-06-23 | Stop reason: HOSPADM

## 2020-06-21 RX ORDER — AMLODIPINE BESYLATE 2.5 MG/1
2.5 TABLET ORAL DAILY
Status: DISCONTINUED | OUTPATIENT
Start: 2020-06-21 | End: 2020-06-23 | Stop reason: HOSPADM

## 2020-06-21 RX ORDER — POLYETHYLENE GLYCOL 3350 17 G/17G
17 POWDER, FOR SOLUTION ORAL DAILY
COMMUNITY
End: 2020-07-28 | Stop reason: SDUPTHER

## 2020-06-21 RX ADMIN — NITROGLYCERIN 0.4 MG: 0.4 TABLET SUBLINGUAL at 12:31

## 2020-06-21 RX ADMIN — ENOXAPARIN SODIUM 60 MG: 60 INJECTION SUBCUTANEOUS at 16:01

## 2020-06-21 RX ADMIN — ACETAMINOPHEN 650 MG: 650 SUPPOSITORY RECTAL at 22:47

## 2020-06-21 RX ADMIN — LIDOCAINE HYDROCHLORIDE: 20 SOLUTION ORAL; TOPICAL at 11:47

## 2020-06-21 RX ADMIN — IOPAMIDOL 80 ML: 755 INJECTION, SOLUTION INTRAVENOUS at 13:23

## 2020-06-21 RX ADMIN — PANTOPRAZOLE SODIUM 40 MG: 40 TABLET, DELAYED RELEASE ORAL at 22:01

## 2020-06-21 RX ADMIN — SPIRONOLACTONE 25 MG: 25 TABLET, FILM COATED ORAL at 22:01

## 2020-06-21 ASSESSMENT — PAIN DESCRIPTION - PAIN TYPE
TYPE: ACUTE PAIN;CHRONIC PAIN;NEUROPATHIC PAIN
TYPE: ACUTE PAIN

## 2020-06-21 ASSESSMENT — PAIN DESCRIPTION - LOCATION: LOCATION: CHEST

## 2020-06-21 ASSESSMENT — PAIN DESCRIPTION - ORIENTATION: ORIENTATION: RIGHT

## 2020-06-21 ASSESSMENT — PAIN SCALES - GENERAL
PAINLEVEL_OUTOF10: 4

## 2020-06-21 NOTE — ED PROVIDER NOTES
[id number] [facility] RESIDENT NOTE       Date of evaluation: 6/21/2020    Chief Complaint     Chest Pain and Arm Pain    History of Present Illness     Chaz Gonzales is a 80 y.o. female with history of Graves' disease, HTN, aortic regurgitation, GERD, asthma, irritable bowel syndrome, MGUS, celiac disease, hyperparathyroid who presents for chest pain. Yesterday the patient had some pain in her right arm which resolved on its own. About 2 or 3 hours prior to arrival today she had left-sided chest pain that was also felt in her left arm. She denies any nausea, vomiting, shortness of breath, abdominal pain. She has hx shingles on the right side, this does not feel like shingles to her. Review of Systems     Review of Systems   Complete review of systems was negative except as noted in HPI. 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 She has a past surgical history that includes Cholecystectomy; Colonoscopy (10/29/2009); Hemorrhoid surgery; and Hysterectomy (in 40's). Her family history includes Cancer in her father; Heart Disease in her mother; High Blood Pressure in her mother; Stroke in her mother. She reports that she has never smoked. She has never used smokeless tobacco. She reports that she does not drink alcohol or use drugs.     Medications     Previous Medications    ALBUTEROL SULFATE (PROAIR RESPICLICK) 878 (90 BASE) MCG/ACT AEROSOL POWDER INHALATION    Inhale 2 puffs into the lungs every 6 hours as needed for Wheezing or Shortness of Breath    AMMONIUM LACTATE (LAC-HYDRIN) 12 % LOTION    Apply topically daily. ASPIRIN 81 MG CHEWABLE TABLET    Take 1 tablet by mouth daily    ATENOLOL (TENORMIN) 50 MG TABLET    TAKE 1 TABLET DAILY    BUDESONIDE-FORMOTEROL (SYMBICORT) 160-4.5 MCG/ACT AERO    Inhale 2 puffs into the lungs 2 times daily    CALCIUM CARBONATE 648 MG TABS    Take 1 tablet by mouth 2 times daily    FELODIPINE (PLENDIL) 2.5 MG EXTENDED RELEASE TABLET    Take 1 tablet by mouth daily    FLUTICASONE (FLONASE) 50 MCG/ACT NASAL SPRAY    PLACE 1 SPRAY IN EACH NOSTRIL DAILY    HYDROCORTISONE (ANUSOL-HC) 25 MG SUPPOSITORY    Place 1 suppository rectally 2 times daily as needed for Hemorrhoids    HYDROCORTISONE (V-R HYDROCORTISONE/ALOE) 0.5 % OINTMENT    Apply topically 2 times daily. LIDOCAINE (LIDODERM) 5 %    Place 2 patches onto the skin daily 12 hours on, 12 hours off. LIDOCAINE (XYLOCAINE) 5 % OINTMENT    Apply topically three times a day, as needed. LORATADINE (CLARITIN) 10 MG TABLET    Take 1 tablet by mouth daily    METHIMAZOLE (TAPAZOLE) 5 MG TABLET    Take 5 mg by mouth See Admin Instructions 1 tablet by mouth daily except for Monday, Wednesday, and Friday take 1.5 tablets. MONTELUKAST (SINGULAIR) 10 MG TABLET    TAKE 1 TABLET NIGHTLY    PANTOPRAZOLE (PROTONIX) 40 MG TABLET    TAKE 1 TABLET DAILY    SPIRONOLACTONE (ALDACTONE) 25 MG TABLET    TAKE 1 TABLET DAILY       Allergies     She is allergic to acyclovir; creon [pancrelipase (lip-prot-amyl)]; eggs or egg-derived products; gluten meal; milk-related compounds; ace inhibitors; ciprocinonide [fluocinolone]; dye [iodides]; fluorescein; lisinopril; macrobid [nitrofurantoin monohydrate macrocrystals]; no known allergies; and sulfa antibiotics.     Physical Exam     INITIAL VITALS: BP: (!) 165/64, Temp: 98.4 °F (36.9 °C), Pulse: 67, Resp: 14, SpO2: 96 %   Physical Exam  Constitutional:       General: She is not in acute distress. Appearance: She is well-developed. HENT:      Head: Normocephalic and atraumatic. Right Ear: External ear normal.      Left Ear: External ear normal.      Nose: No congestion. Mouth/Throat:      Mouth: Mucous membranes are moist.      Pharynx: No oropharyngeal exudate. Eyes:      Conjunctiva/sclera: Conjunctivae normal.      Pupils: Pupils are equal, round, and reactive to light. Neck:      Musculoskeletal: Neck supple. Cardiovascular:      Rate and Rhythm: Normal rate and regular rhythm. Heart sounds: No murmur. No friction rub. No gallop. Pulmonary:      Effort: Pulmonary effort is normal.      Breath sounds: Normal breath sounds. No wheezing or rales. Comments: No rashes or tenderness of chest wall  Abdominal:      General: There is no distension. Palpations: Abdomen is soft. Tenderness: There is no abdominal tenderness. Lymphadenopathy:      Cervical: No cervical adenopathy. Skin:     General: Skin is warm and dry. Capillary Refill: Capillary refill takes less than 2 seconds. Findings: No erythema or rash. Neurological:      Mental Status: She is alert and oriented to person, place, and time. DiagnosticResults     EKG   Interpreted in conjunction with emergencydepartment physician No att. providers found  Normal sinus rhythm with prolonged ME interval, normal axis, normal intervals,    RADIOLOGY:  CTA PULMONARY W CONTRAST   Final Result      1. Single right upper lobe subsegmental acute pulmonary embolism. The clinical significance is uncertain. 2. Otherwise, no evidence for acute cardiopulmonary disease      XR CHEST STANDARD (2 VW)   Final Result      No evidence for acute cardiopulmonary disease.                 LABS:   Results for orders placed or performed during the hospital encounter of 06/21/20   CBC Auto Differential   Result Value Ref Range    WBC 7.2 4.0 - 11.0 K/uL    RBC 5.07 4.00 - 5.20 M/uL Hemoglobin 14.1 12.0 - 16.0 g/dL    Hematocrit 42.7 36.0 - 48.0 %    MCV 84.3 80.0 - 100.0 fL    MCH 27.8 26.0 - 34.0 pg    MCHC 32.9 31.0 - 36.0 g/dL    RDW 14.9 12.4 - 15.4 %    Platelets 430 721 - 548 K/uL    MPV 9.0 5.0 - 10.5 fL    Neutrophils % 63.2 %    Lymphocytes % 24.1 %    Monocytes % 11.6 %    Eosinophils % 0.7 %    Basophils % 0.4 %    Neutrophils Absolute 4.6 1.7 - 7.7 K/uL    Lymphocytes Absolute 1.7 1.0 - 5.1 K/uL    Monocytes Absolute 0.8 0.0 - 1.3 K/uL    Eosinophils Absolute 0.0 0.0 - 0.6 K/uL    Basophils Absolute 0.0 0.0 - 0.2 K/uL   Basic Metabolic Panel w/ Reflex to MG   Result Value Ref Range    Sodium 139 136 - 145 mmol/L    Potassium reflex Magnesium 4.4 3.5 - 5.1 mmol/L    Chloride 104 99 - 110 mmol/L    CO2 27 21 - 32 mmol/L    Anion Gap 8 3 - 16    Glucose 93 70 - 99 mg/dL    BUN 16 7 - 20 mg/dL    CREATININE 1.0 0.6 - 1.2 mg/dL    GFR Non-African American 52 (A) >60    GFR African American >60 >60    Calcium 10.5 8.3 - 10.6 mg/dL   Troponin   Result Value Ref Range    Troponin <0.01 <0.01 ng/mL   Brain Natriuretic Peptide   Result Value Ref Range    Pro- 0 - 449 pg/mL   Blood Gas, Venous   Result Value Ref Range    pH, David 7.364 7.350 - 7.450    pCO2, David 50.9 41.0 - 51.0 mmHg    pO2, David 24.7 (L) 25.0 - 40.0 mmHg    HCO3, Venous 28.3 (H) 24.0 - 28.0 mmol/L    Base Excess, David 2.5 -2.0 - 3.0 mmol/L    O2 Sat, David 33 Not established %    Carboxyhemoglobin 1.0 0.0 - 1.5 %    MetHgb, David 0.8 0.0 - 1.5 %    TC02 (Calc), David 30 mmol/L    Hemoglobin, David, Reduced 65.80 %   TSH without Reflex   Result Value Ref Range    TSH 6.71 (H) 0.27 - 4.20 uIU/mL   T4   Result Value Ref Range    T4, Total 3.1 (L) 4.5 - 10.9 ug/dL   Lactic Acid, Plasma   Result Value Ref Range    Lactic Acid 0.8 0.4 - 2.0 mmol/L   D-Dimer, Quantitative   Result Value Ref Range    D-Dimer, Quant 255 (H) 0 - 229 ng/mL DDU   Magnesium   Result Value Ref Range    Magnesium 1.90 1.80 - 2.40 mg/dL   Phosphorus   Result Value Ref Range    Phosphorus 2.9 2.5 - 4.9 mg/dL     ED BEDSIDE ULTRASOUND:  None    RECENT VITALS:  BP: (!) 150/72, Temp: 98.4 °F (36.9 °C), Pulse: 53,Resp: 19, SpO2: 98 %     Procedures     None    ED Course     Nursing Notes, Past Medical Hx, Past Surgical Hx, Social Hx, Allergies, and Family Hx were reviewed. The patient was given the followingmedications:  Orders Placed This Encounter   Medications    aluminum & magnesium hydroxide-simethicone (MAALOX) 30 mL, lidocaine viscous hcl (XYLOCAINE) 5 mL (GI COCKTAIL)    nitroGLYCERIN (NITROSTAT) SL tablet 0.4 mg    iopamidol (ISOVUE-370) 76 % injection 80 mL    enoxaparin (LOVENOX) injection 60 mg    DISCONTD: enoxaparin (LOVENOX) injection 60 mg     CONSULTS:  IP CONSULT TO HOSPITALIST  IP CONSULT TO PHARMACY  IP CONSULT TO CASE MANAGEMENT    MEDICAL DECISION MAKING / ASSESSMENT / Chavez Black is a 80 y.o. female presenting with acute onset chest pain. On exam, well-appearing no distress normal vitals. Ddx includes AcS, PE, dissection, ptx, pna, pericarditis. Will perform workup including labs, trop/BNP, CXR, EKG, CTA chest.     CTA showed subsegmental PE. Labs are essentially unremarkable. Will admit for further cardiac workup and tx of subsegmental PE with Lovenox. This patient was also evaluated by the attending physician. All care plans werediscussed and agreed upon. Clinical Impression     1.  Single subsegmental pulmonary embolism without acute cor pulmonale        Disposition     PATIENT REFERRED TO:  Fartun Torres MD  [facility] [id number] [phone]            DISCHARGE MEDICATIONS:  New Prescriptions    No medications on file       DISPOSITION Admitted 06/21/2020 04:31:15 PM        Ike Sanabria MD  Resident  06/21/20 [id number]

## 2020-06-21 NOTE — ED PROVIDER NOTES
ED Attending Attestation Note     Date of evaluation: 6/21/2020    This patient was seen by the resident. I have seen and examined the patient, agree with the workup, evaluation, management and diagnosis. The care plan has been discussed. I have reviewed the ECG and concur with the resident's interpretation. My assessment reveals a 66-year-old female with PMH of Graves' disease, HTN, aortic regurgitation, GERD, asthma, irritable bowel syndrome/SIBO, MGUS, celiac disease, hyperparathyroid  who presents for left-sided chest pain. Patient states that yesterday she had right arm pain. She states the day she has been having left-sided chest pain that she describes a dull sensation that radiates to her left arm and is under her breast.  She has a history of herpes zoster of the right side 1 year ago but states that this sensation feels different. She denies a rash. She denies any known cardiac issues outside of aortic regurg. She also reports that she has been having bowel issues which seems to be chronic for her stating that she often has diarrhea and then constipation. Patient states that the pain is constant and that there are no aggravating or alleviating factors. She denies any history of blood clots and is not on any anticoagulation outside of an aspirin. On exam she is well-appearing. She has a 2 out of 6 diastolic murmur to the aortic region. Lung sounds are clear and she has no peripheral swelling or edema and she has no focal abdominal tenderness. She has no rash on examination of left-sided back and chest wall. Unclear etiology of her pain but given her history would be most concerned for cardiac etiology. Seems lower risk for PE but will send a d-dimer. This is also a good rule out for aortic dissection which I also think is less likely. Would be most concerned about an atypical acute coronary syndrome. EKG obtained with no significant ST or T wave changes normal sinus with PACs.   We will plan to admit patient for cardiac work-up. 2:40 PM  Pt with a PE. I discussed with her and she does not have any apparent contraindications to anticoagulation. We will start her on Lovenox and admit. Nathan Diaz MD   Emergency Physicians    This chart was generated in part by using Dragon Dictation system and may contain errors related to that system including errors in grammar, punctuation, and spelling, as well as words and phrases that may be inappropriate. When dictating, effort is made to correct spelling/grammar errors. If there are any questions or concerns please feel free to contact the dictating provider for clarification.           Patience Morales MD  06/21/20 [address], MD  06/21/20 Αμαλίdeepti Capps MD  06/21/20 [id number]

## 2020-06-21 NOTE — H&P
Internal Medicine  PGY 2  History & Physical      CC chest pain    History Obtained From:  patient    HISTORY OF PRESENT ILLNESS: 79 yo F PMH Graves' disease, HTN, post-herpetic neuralgia, GERD, asthma, irritable bowel syndrome/SIBO, MGUS, celiac disease, hyperparathyroidism p/w left sided chest pain. Pain is constant without any aggravating or alleviating factors. Says pain is mostly located on the left of the chest with radiation to the arm. Not exacerbated by movement, exertion, or inspiration. Had an episode of right sided chest pain earlier however it was in the area of the previous zoster infection. Pt says this pain feels different from her shingles infection and she has not noticed any rashes. 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    PCR    Clostridium difficile infection 1/27/15    AG positive    Degenerative joint disease of knee     Depression     Diverticulitis     GERD (gastroesophageal reflux disease)     Grave's disease 4/2/2012    Heart disease     Hypertension     IBS (irritable bowel syndrome)     Macular degeneration     MGUS (monoclonal gammopathy of unknown significance)     Poor vision     d/t macular degeneration    Simple cyst of kidney    ·     Past Surgical History:        Procedure Laterality Date    CHOLECYSTECTOMY      COLONOSCOPY  10/29/2009    **see scanned report- SUNDEEP&PETER    HEMORRHOID SURGERY      HYSTERECTOMY  in 40's    rudolph/bso   ·     Medications Priorto Admission:    No current facility-administered medications on file prior to encounter.       Current Outpatient Medications on File Prior to Encounter   Medication Sig Dispense Refill    calcium carbonate 648 MG TABS Take 1 tablet by mouth 2 times daily 60 tablet 5    spironolactone (ALDACTONE) 25 MG tablet TAKE 1 TABLET DAILY 90 tablet 3    aspirin 81 MG chewable tablet Take 1 tablet by mouth daily 30 tablet 3    lidocaine (LIDODERM) 5 % Place 2 patches onto the skin daily 12 hours on, 12 hours off. 60 patch 5    albuterol sulfate (PROAIR RESPICLICK) 780 (90 Base) MCG/ACT aerosol powder inhalation Inhale 2 puffs into the lungs every 6 hours as needed for Wheezing or Shortness of Breath 1 Inhaler 5    pantoprazole (PROTONIX) 40 MG tablet TAKE 1 TABLET DAILY 30 tablet 2    felodipine (PLENDIL) 2.5 MG extended release tablet Take 1 tablet by mouth daily 90 tablet 5    montelukast (SINGULAIR) 10 MG tablet TAKE 1 TABLET NIGHTLY 90 tablet 3    budesonide-formoterol (SYMBICORT) 160-4.5 MCG/ACT AERO Inhale 2 puffs into the lungs 2 times daily 1 Inhaler 11    atenolol (TENORMIN) 50 MG tablet TAKE 1 TABLET DAILY 90 tablet 4    lidocaine (XYLOCAINE) 5 % ointment Apply topically three times a day, as needed. 2 Tube 1    methimazole (TAPAZOLE) 5 MG tablet Take 5 mg by mouth See Admin Instructions 1 tablet by mouth daily except for Monday, Wednesday, and Friday take 1.5 tablets.  fluticasone (FLONASE) 50 MCG/ACT nasal spray PLACE 1 SPRAY IN EACH NOSTRIL DAILY 3 Bottle 5    ammonium lactate (LAC-HYDRIN) 12 % lotion Apply topically daily. 225 g 5    loratadine (CLARITIN) 10 MG tablet Take 1 tablet by mouth daily 90 tablet 3    hydrocortisone (ANUSOL-HC) 25 MG suppository Place 1 suppository rectally 2 times daily as needed for Hemorrhoids 90 suppository 3    hydrocortisone (V-R HYDROCORTISONE/ALOE) 0.5 % ointment Apply topically 2 times daily. 1 Tube 0   ·     Allergies:  Acyclovir; Creon [pancrelipase (lip-prot-amyl)]; Eggs or egg-derived products; Gluten meal; Milk-related compounds; Ace inhibitors; Ciprocinonide [fluocinolone]; Dye [iodides]; Fluorescein; Lisinopril; Macrobid [nitrofurantoin monohydrate macrocrystals]; No known allergies; and Sulfa antibiotics    Social History:   · TOBACCO:   reports that she has never smoked.  She has never used tomorrow with safe DC plan.     PT/OT eval.    Daniel Selby MD

## 2020-06-22 PROBLEM — I26.99 PULMONARY EMBOLISM AND INFARCTION (HCC): Status: ACTIVE | Noted: 2020-06-22

## 2020-06-22 LAB
ANION GAP SERPL CALCULATED.3IONS-SCNC: 9 MMOL/L (ref 3–16)
BASOPHILS ABSOLUTE: 0 K/UL (ref 0–0.2)
BASOPHILS RELATIVE PERCENT: 0.5 %
BUN BLDV-MCNC: 12 MG/DL (ref 7–20)
CALCIUM SERPL-MCNC: 10.6 MG/DL (ref 8.3–10.6)
CHLORIDE BLD-SCNC: 103 MMOL/L (ref 99–110)
CO2: 25 MMOL/L (ref 21–32)
CREAT SERPL-MCNC: 0.7 MG/DL (ref 0.6–1.2)
EKG ATRIAL RATE: 65 BPM
EKG DIAGNOSIS: NORMAL
EKG P AXIS: 52 DEGREES
EKG P-R INTERVAL: 222 MS
EKG Q-T INTERVAL: 404 MS
EKG QRS DURATION: 84 MS
EKG QTC CALCULATION (BAZETT): 420 MS
EKG R AXIS: -6 DEGREES
EKG T AXIS: 11 DEGREES
EKG VENTRICULAR RATE: 65 BPM
EOSINOPHILS ABSOLUTE: 0.1 K/UL (ref 0–0.6)
EOSINOPHILS RELATIVE PERCENT: 0.9 %
GFR AFRICAN AMERICAN: >60
GFR NON-AFRICAN AMERICAN: >60
GLUCOSE BLD-MCNC: 85 MG/DL (ref 70–99)
HCT VFR BLD CALC: 45.3 % (ref 36–48)
HEMOGLOBIN: 15.1 G/DL (ref 12–16)
LYMPHOCYTES ABSOLUTE: 2.1 K/UL (ref 1–5.1)
LYMPHOCYTES RELATIVE PERCENT: 27.2 %
MCH RBC QN AUTO: 27.8 PG (ref 26–34)
MCHC RBC AUTO-ENTMCNC: 33.3 G/DL (ref 31–36)
MCV RBC AUTO: 83.6 FL (ref 80–100)
MONOCYTES ABSOLUTE: 0.9 K/UL (ref 0–1.3)
MONOCYTES RELATIVE PERCENT: 11 %
NEUTROPHILS ABSOLUTE: 4.7 K/UL (ref 1.7–7.7)
NEUTROPHILS RELATIVE PERCENT: 60.4 %
PDW BLD-RTO: 14.3 % (ref 12.4–15.4)
PLATELET # BLD: 142 K/UL (ref 135–450)
PMV BLD AUTO: 8.6 FL (ref 5–10.5)
POTASSIUM REFLEX MAGNESIUM: 4.2 MMOL/L (ref 3.5–5.1)
RBC # BLD: 5.42 M/UL (ref 4–5.2)
SARS-COV-2, PCR: NOT DETECTED
SODIUM BLD-SCNC: 137 MMOL/L (ref 136–145)
WBC # BLD: 7.8 K/UL (ref 4–11)

## 2020-06-22 PROCEDURE — 6370000000 HC RX 637 (ALT 250 FOR IP)

## 2020-06-22 PROCEDURE — 6370000000 HC RX 637 (ALT 250 FOR IP): Performed by: STUDENT IN AN ORGANIZED HEALTH CARE EDUCATION/TRAINING PROGRAM

## 2020-06-22 PROCEDURE — 85025 COMPLETE CBC W/AUTO DIFF WBC: CPT

## 2020-06-22 PROCEDURE — 97116 GAIT TRAINING THERAPY: CPT

## 2020-06-22 PROCEDURE — 2580000003 HC RX 258

## 2020-06-22 PROCEDURE — 94640 AIRWAY INHALATION TREATMENT: CPT

## 2020-06-22 PROCEDURE — 97166 OT EVAL MOD COMPLEX 45 MIN: CPT

## 2020-06-22 PROCEDURE — 97162 PT EVAL MOD COMPLEX 30 MIN: CPT

## 2020-06-22 PROCEDURE — 36415 COLL VENOUS BLD VENIPUNCTURE: CPT

## 2020-06-22 PROCEDURE — 94761 N-INVAS EAR/PLS OXIMETRY MLT: CPT

## 2020-06-22 PROCEDURE — 80048 BASIC METABOLIC PNL TOTAL CA: CPT

## 2020-06-22 PROCEDURE — 1200000000 HC SEMI PRIVATE

## 2020-06-22 PROCEDURE — 97530 THERAPEUTIC ACTIVITIES: CPT

## 2020-06-22 PROCEDURE — 99220 PR INITIAL OBSERVATION CARE/DAY 70 MINUTES: CPT | Performed by: INTERNAL MEDICINE

## 2020-06-22 PROCEDURE — 97535 SELF CARE MNGMENT TRAINING: CPT

## 2020-06-22 RX ORDER — FELODIPINE 2.5 MG/1
2.5 TABLET, EXTENDED RELEASE ORAL DAILY PRN
COMMUNITY
End: 2020-09-14 | Stop reason: SDUPTHER

## 2020-06-22 RX ORDER — PANTOPRAZOLE SODIUM 40 MG/1
40 TABLET, DELAYED RELEASE ORAL 2 TIMES DAILY
COMMUNITY
End: 2020-09-14 | Stop reason: SDUPTHER

## 2020-06-22 RX ORDER — METHIMAZOLE 5 MG/1
5 TABLET ORAL
Status: DISCONTINUED | OUTPATIENT
Start: 2020-06-23 | End: 2020-06-23 | Stop reason: HOSPADM

## 2020-06-22 RX ORDER — HYDRALAZINE HYDROCHLORIDE 20 MG/ML
5 INJECTION INTRAMUSCULAR; INTRAVENOUS EVERY 6 HOURS PRN
Status: DISCONTINUED | OUTPATIENT
Start: 2020-06-22 | End: 2020-06-23 | Stop reason: HOSPADM

## 2020-06-22 RX ORDER — ASPIRIN 81 MG/1
81 TABLET ORAL DAILY
COMMUNITY
End: 2020-10-18

## 2020-06-22 RX ORDER — CALCIUM CARBONATE 200(500)MG
500 TABLET,CHEWABLE ORAL 3 TIMES DAILY PRN
Status: DISCONTINUED | OUTPATIENT
Start: 2020-06-22 | End: 2020-06-23 | Stop reason: HOSPADM

## 2020-06-22 RX ORDER — METHIMAZOLE 5 MG/1
7.5 TABLET ORAL
Status: DISCONTINUED | OUTPATIENT
Start: 2020-06-22 | End: 2020-06-23 | Stop reason: HOSPADM

## 2020-06-22 RX ADMIN — Medication 10 ML: at 08:57

## 2020-06-22 RX ADMIN — APIXABAN 10 MG: 5 TABLET, FILM COATED ORAL at 08:55

## 2020-06-22 RX ADMIN — PANTOPRAZOLE SODIUM 40 MG: 40 TABLET, DELAYED RELEASE ORAL at 17:24

## 2020-06-22 RX ADMIN — BUDESONIDE AND FORMOTEROL FUMARATE DIHYDRATE 2 PUFF: 160; 4.5 AEROSOL RESPIRATORY (INHALATION) at 08:07

## 2020-06-22 RX ADMIN — APIXABAN 10 MG: 5 TABLET, FILM COATED ORAL at 20:20

## 2020-06-22 RX ADMIN — ATENOLOL 50 MG: 50 TABLET ORAL at 08:55

## 2020-06-22 RX ADMIN — AMLODIPINE BESYLATE 2.5 MG: 2.5 TABLET ORAL at 08:55

## 2020-06-22 RX ADMIN — METHIMAZOLE 7.5 MG: 5 TABLET ORAL at 08:55

## 2020-06-22 RX ADMIN — PANTOPRAZOLE SODIUM 40 MG: 40 TABLET, DELAYED RELEASE ORAL at 09:05

## 2020-06-22 RX ADMIN — SPIRONOLACTONE 25 MG: 25 TABLET, FILM COATED ORAL at 08:55

## 2020-06-22 RX ADMIN — BUDESONIDE AND FORMOTEROL FUMARATE DIHYDRATE 2 PUFF: 160; 4.5 AEROSOL RESPIRATORY (INHALATION) at 20:55

## 2020-06-22 RX ADMIN — SIMETHICONE CHEW TAB 80 MG 180 MG: 80 TABLET ORAL at 12:08

## 2020-06-22 RX ADMIN — SIMETHICONE CHEW TAB 80 MG 180 MG: 80 TABLET ORAL at 04:42

## 2020-06-22 RX ADMIN — MONTELUKAST 10 MG: 10 TABLET, FILM COATED ORAL at 20:21

## 2020-06-22 RX ADMIN — FLUTICASONE PROPIONATE 1 SPRAY: 50 SPRAY, METERED NASAL at 09:07

## 2020-06-22 RX ADMIN — ANTACID TABLETS 500 MG: 500 TABLET, CHEWABLE ORAL at 15:30

## 2020-06-22 ASSESSMENT — PAIN DESCRIPTION - PAIN TYPE: TYPE: ACUTE PAIN

## 2020-06-22 ASSESSMENT — PAIN SCALES - GENERAL: PAINLEVEL_OUTOF10: 7

## 2020-06-22 ASSESSMENT — PAIN DESCRIPTION - LOCATION: LOCATION: GENERALIZED

## 2020-06-22 NOTE — PROGRESS NOTES
Occupational Therapy   Occupational Therapy Initial Assessment, treatment and D/c Note   Date: 2020   Patient Name: Atilio Yeager  MRN: [id number]     : 1926    Date of Service: 2020    Discharge [address] scored a 23/24 on the AM-[facility] ADL Inpatient form. At this time, no further OT is recommended upon discharge. Recommend patient returns to prior setting with prior services. Home with assist PRN  OT Equipment Recommendations  Equipment Needed: No    Assessment   Assessment: Pt from home -senior apt. Pt demo functioning very close to functional baseline. Pt has HHA 4x week for IADls. No acute OT needs. No DME needs. Will sign off from OT services. Pt plans for home at d/c. Prognosis: Good  Decision Making: Medium Complexity  OT Education: Plan of Care;OT Role;IADL Safety  Patient Education: verb understanding   REQUIRES OT FOLLOW UP: No  Activity Tolerance  Activity Tolerance: Patient Tolerated treatment well  Activity Tolerance: No QUINTERO noted   Safety Devices  Safety Devices in place: Yes  Type of devices: Call light within reach; Chair alarm in place; Left in chair;Nurse notified           Patient Diagnosis(es): The encounter diagnosis was Single subsegmental pulmonary embolism without acute cor pulmonale. 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 has a past surgical history that includes Cholecystectomy; Colonoscopy (10/29/2009); Hemorrhoid surgery; and Hysterectomy (in 40's).            Restrictions  Position Activity Restriction  Other position/activity restrictions: Up with assist    Subjective   General  Chart Reviewed: Yes  Additional Pertinent Hx: Admit 6/21 with  PE   CT Chest -Single right upper lobe subsegmental acute pulmonary embolism       Cxray-neg             PMHX: Graves' disease, HTN, post-herpetic neuralgia, GERD, asthma, irritable bowel syndrome/SIBO, MGUS, celiac disease, hyperparathyroidism   Family / Caregiver Present: No  Diagnosis: Right Upper lobe/  PE   Subjective  Subjective: \" I have visual impairment but I do okay' pt in bed agreeable for OOB/OT eval and tx. Pt reports feels better today and waiting on a few tests before going home   Patient Currently in Pain: Denies  Vital Signs  Level of Consciousness: Alert  Patient Currently in Pain: Denies    Social/Functional History  Social/Functional History  Lives With: Alone  Type of Home: Apartment(Senior )  Home Layout: One level  Home Access: Level entry, Elevator  Bathroom Shower/Tub: Tub/Shower unit(sinks bathes 2/2 safety )  Bathroom Equipment: Shower chair  Home Equipment: Rolling walker, Cane, [address] Help From: Home health(4 x weeks for 3 hrs )  ADL Assistance: Independent  Ambulation Assistance: Independent(with RW )  Transfer Assistance: Independent  Active : No  Occupation: Retired  Additional Comments: Pt reports has family to help with shopping and medical transport to MD appts 2/2 vision        Objective  Treatment included functional transfer training, ADL's and pt. education.     Vision: Impaired  Vision Exceptions: Legally blind(mac dengeration )  Hearing: Within functional limits    Orientation  Overall Orientation Status: Within Functional Limits     Balance  Sitting Balance: Independent  Standing Balance: Modified independent (with walker )  Standing Balance  Time: 4 mins x 2 and 5 mins x 1 and 2 minsx 1   Activity: functional transfers/stance at sink for ADls/ functional mobility in room / bathroom   Comment: pt is legally blind with macular degeneration. Functional Mobility  Functional - Mobility Device: Rolling Walker  Activity: To/from bathroom; Other  Assist Level: Modified independent   Functional Mobility Comments: Pt able to walk several loops in room   Toilet Transfers  Toilet - Technique: Ambulating  Equipment Used: Standard toilet  Toilet Transfer: Modified independent; Independent  Toilet Transfers Comments: has similar setup with rail at [address] Transfers Comments: Pt sponge bathes only -- waits until aid in in her apt for safety   ADL  Feeding: Setup  Grooming: Setup(stance at sink for washing hands / face )  LE Dressing: Setup;Modified independent (sitting at EOB-- donned pants / socks )  Toileting: Modified independent (using rail for steadying assist has similar at home )  Tone RUE  RUE Tone: Normotonic  Tone LUE  LUE Tone: Normotonic  Coordination  Movements Are Fluid And Coordinated: Yes     Bed mobility  Supine to Sit: Modified independent(use of rail)  Transfers  Sit to stand: Modified independent  Stand to sit: Modified independent  Vision - Basic Assessment  Prior Vision: Other (add comment)  Visual History: Macular degeneration  Cognition  Overall Cognitive Status: WFL    LUE AROM (degrees)  LUE AROM : WFL  Left Hand AROM (degrees)  Left Hand AROM: WFL  RUE AROM (degrees)  RUE AROM : WFL  Right Hand AROM (degrees)  Right Hand AROM: WFL  LUE Strength  Gross LUE Strength: WFL  RUE Strength  Gross RUE Strength: WFL     Hand Dominance  Hand Dominance: Right    PLAN  - Discharge Acute OT services     AM-PAC Score  AM-PAC Inpatient Daily Activity Raw Score: 23 (06/22/20 1444)  AM-PAC Inpatient ADL T-Scale Score : 51.12 (06/22/20 1444)  ADL Inpatient CMS 0-100% Score: 15.86 (06/22/20 1444)  ADL Inpatient CMS G-Code Modifier : CI (06/22/20 1444)  Therapy Time   Individual Concurrent Group Co-treatment   Time In 1308         Time Out 1348         Minutes 40               Timed Code Treatment Minutes:   23 mins     Total Treatment Minutes:  40 mins     Ajay Mayers, OT

## 2020-06-22 NOTE — PROGRESS NOTES
RESPIRATORY THERAPY ASSESSMENT    Name:  Elaine Patterson  Medical Record Number:  [id number]  Age: 80 y.o. Gender: female  : 1926  Today's Date:  2020  Room:  3100/9908-23    Assessment     Is the patient being admitted for a COPD or Asthma exacerbation? No   (If yes the patient will be seen every 4 hours for the first 24 hours and then reassessed)    Patient Admission Diagnosis      Allergies  Allergies   Allergen Reactions    Acyclovir      Severe nausea  And anorexia. It happened after taking each 800 mg tablet and patient took one one day and one the next, then stopped an no more    Creon [Pancrelipase (Lip-Prot-Amyl)]      Swollen gums.  Eggs Or Egg-Derived Products      Hives  this test in the [employer] ([email or web address]). Egg White IgE 3. 59High   <=0.34 kU/L Final 02/10/2016  2:14 PM [facility]                  Gluten Meal      Abdominal pain and diarreha  Wheat IgE 0.53  <=0.34 kU/L Final 02/10/2016  2:14 PM [facility]       Milk-Related Compounds      Milk, Cow's IgE 1. 75High   <=0.34 kU/L Final 02/10/2016  2:14 PM [facility]    Ace Inhibitors      angioedema    Ciprocinonide [Fluocinolone]      Stiff, no nausea or rash    Dye [Iodides] Hives     Only reacts to dye that went into eyes, OK with IV contrast    Fluorescein     Lisinopril Swelling    Macrobid [Nitrofurantoin Monohydrate Macrocrystals]     No Known Allergies     Sulfa Antibiotics Rash       Minimum Predicted Vital Capacity:     748          Actual Vital Capacity:      n/a              Pulmonary History:Asthma  Home Oxygen Therapy:  room air  Home Respiratory Therapy:Albuterol and Budesonide/Formoterol    Current Respiratory Therapy:  mdi albuterol prn, mdi symbicort bid          Respiratory Severity Index(RSI)   Patients with orders for inhalation medications, oxygen, or any therapeutic treatment modality will be placed on Respiratory Protocol.   They will be assessed with the first treatment and at least every 72 hours thereafter. The following severity scale will be used to determine frequency of treatment intervention. Smoking History: Pulmonary Disease or Smoking History, Greater than 15 pack year = 2    Social History  Social History     Tobacco Use    Smoking status: Never Smoker    Smokeless tobacco: Never Used   Substance Use Topics    Alcohol use: No    Drug use: No       Recent Surgical History: None = 0  Past Surgical History  Past Surgical History:   Procedure Laterality Date    CHOLECYSTECTOMY      COLONOSCOPY  10/29/2009    **see scanned report- OGI&LI    HEMORRHOID SURGERY      HYSTERECTOMY  in 40's    rudolph/bso       Level of Consciousness: Alert, Oriented, and Cooperative = 0    Level of Activity: Mostly sedentary, minimal walking = 2    Respiratory Pattern: Regular Pattern; RR 8-20 = 0    Breath Sounds: Clear = 0    Sputum   ,  ,    Cough: Strong, spontaneous, non-productive = 0    Vital Signs   /70   Pulse 80   Temp 97.9 °F (36.6 °C) (Oral)   Resp 16   Ht 5' 9\" (1.753 m)   Wt 120 lb (54.4 kg)   LMP  (LMP Unknown)   SpO2 98%   Breastfeeding No   BMI 17.72 kg/m²   SPO2 (COPD values may differ): Greater than or equal to 92% on room air = 0    Peak Flow (asthma only): not applicable = 0    RSI: 0-4 = See once and convert to home regimen or discontinue        Plan       Goals: medication delivery    Patient/caregiver was educated on the proper method of use for Respiratory Care Devices:  Yes      Level of patient/caregiver understanding able to:   ? Verbalize understanding   ? Demonstrate understanding       ? Teach back        ? Needs reinforcement       ? No available caregiver               ? Other:     Response to education:  Excellent     Is patient being placed on Home Treatment Regimen? Yes     Does the patient have everything they need prior to discharge?   Yes     Comments: admits with chest pain    Plan of Care: mdi albuterol prn, mdi symbicort bid    Electronically signed by Mojgan Olson RCP on 6/21/2020 at 8:41 PM    Respiratory Protocol Guidelines     1. Assessment and treatment by Respiratory Therapy will be initiated for medication and therapeutic interventions upon initiation of aerosolized medication. 2. Physician will be contacted for respiratory rate (RR) greater than 35 breaths per minute. Therapy will be held for heart rate (HR) greater than 140 beats per minute, pending direction from physician. 3. Bronchodilators will be administered via Metered Dose Inhaler (MDI) with spacer when the following criteria are met:  a. Alert and cooperative     b. HR < 140 bpm  c. RR < 30 bpm                d. Can demonstrate a 2-3 second inspiratory hold  4. Bronchodilators will be administered via Hand Held Nebulizer [facility]) to patients when ANY of the following criteria are met  a. Incognizant or uncooperative          b. Patients treated with HHN at Home        c. Unable to demonstrate proper use of MDI with spacer     d. RR > 30 bpm   5. Bronchodilators will be delivered via Metered Dose Inhaler (MDI), HHN, Aerogen to intubated patients on mechanical ventilation. 6. Inhalation medication orders will be delivered and/or substituted as outlined below. Aerosolized Medications Ordering and Administration Guidelines:    1. All Medications will be ordered by a physician, and their frequency and/or modality will be adjusted as defined by the patients Respiratory Severity Index (RSI) score. 2. If the patient does not have documented COPD, consider discontinuing anticholinergics when RSI is less than 9.  3. If the bronchospasm worsens (increased RSI), then the bronchodilator frequency can be increased to a maximum of every 4 hours. If greater than every 4 hours is required, the physician will be contacted.   4. If the bronchospasm improves, the frequency of the bronchodilator can be decreased, based on the patient's RSI, but not less than home treatment regimen frequency. 5. Bronchodilator(s) will be discontinued if patient has a RSI less than 9 and has received no scheduled or as needed treatment for 72  Hrs. Patients Ordered on a Mucolytic Agent:    1. Must always be administered with a bronchodilator. 2. Discontinue if patient experiences worsened bronchospasm, or secretions have lessened to the point that the patient is able to clear them with a cough. Anti-inflammatory and Combination Medications:    1. If the patient lacks prior history of lung disease, is not using inhaled anti-inflammatory medication at home, and lacks wheezing by examination or by history for at least 24 hours, contact physician for possible discontinuation.

## 2020-06-22 NOTE — CARE COORDINATION
Case Management Assessment           Initial Evaluation                Date / Time of Evaluation: 6/22/2020 3:57 PM                 Assessment Completed by: Frances Cordero    Patient Name: Rivas Salamanca     YOB: 1926  Diagnosis: Pulmonary embolism on right [facility]) [I26.99]  Pulmonary embolism on right [facility]) [I26.99]  Pulmonary embolism and infarction [facility]) [I26.99]     Date / Time: 6/21/2020 11:11 AM    Patient Admission Status: Inpatient    If patient is discharged prior to next notation, then this note serves as note for discharge by case management. Current PCP: Leslie Alcantara MD  Clinic Patient: No    Chart Reviewed: Yes  Patient/ Family Interviewed: Yes    Initial assessment completed at bedside with: over the phone with daughter Debby Hernandez    Hospitalization in the last 30 days: No    Emergency Contacts:  Extended Emergency Contact Information  Primary Emergency Contact: [address] [place] [address] Phone: [phone]  Relation: Child  Secondary Emergency Contact: [address]  Mobile Phone: [phone]  Relation: Brother/Sister    Advance Directives:   Code Status: Full [address]: Yes  Agent: Merriam Kanner  Contact Number: [phone]    Copy present: No     In paper Chart: No    Scanned into EMR No    Financial  Payor: MEDICARE / Plan: MEDICARE PART A AND B / Product Type: *No Product type* /     Pre-cert required for SNF: No    Pharmacy    [address] [facility], [address] [place] [address] [phone] - F [phone]  [address] [place] [address]  Phone: [phone] Fax: [phone]      Potential assistance Purchasing Medications: Potential Assistance Purchasing Medications: No  Does Patient want to participate in local refill/ meds to beds program?: No    Meds To Beds General Rules:  1. Can ONLY be done Monday- Friday between 8:30am-5pm  2.  Prescription(s) must be in that her cousins Ashlie Resendez and Natty White, are in town and look in on her mother, as she lives in [place]. Jalen's number was not in chart [phone]. One of the cousins will transport her home at discharge. The Plan for Transition of Care is related to the following treatment goals of Pulmonary embolism on right [facility]) [I26.99]  Pulmonary embolism on right [facility]) [I26.99]  Pulmonary embolism and infarction [facility]) [I26.99]    The Patient and/or patient representative Antonette Hazel and her family were provided with a choice of provider and agrees with the discharge plan Yes    Freedom of choice list was provided with basic dialogue that supports the patient's individualized plan of care/goals and shares the quality data associated with the providers.  Yes    Care Transition patient: Yes    Deanna Ingram RN  [facility] [employer]  Case Management Department  Ph: [phone]   Fax: [phone]

## 2020-06-22 NOTE — PROGRESS NOTES
4 Eyes Admission Assessment     I agree as the admission nurse that 2 RN's have performed a thorough Head to Toe Skin Assessment on the patient. ALL assessment sites listed below have been assessed on admission. Areas assessed by both nurses:   [x]   Head, Face, and Ears   [x]   Shoulders, Back, and Chest  [x]   Arms, Elbows, and Hands   [x]   Coccyx, Sacrum, and Ischum  [x]   Legs, Feet, and Heels        Does the Patient have Skin Breakdown?   No         [place] Prevention initiated:  Yes   Wound Care Orders initiated:  No      [facility] nurse consulted for Pressure Injury (Stage 3,4, Unstageable, DTI, NWPT, and Complex wounds):  No      Nurse 1 eSignature: Electronically signed by Hernando Benavides RN on 6/22/20 at 1:39 AM EDT    **SHARE this note so that the co-signing nurse is able to place an eSignature**    Nurse 2 eSignature: Electronically signed by Nickolas Zendejas RN on 6/21/20 at 11:39 PM EDT

## 2020-06-22 NOTE — PROGRESS NOTES
Physical Therapy    Facility/Department: [place] [address] PCU  Initial Assessment and Discharge    NAME: Aurelia Barrera  : 1926  MRN: [id number]    Date of Service: 2020    Discharge Recommendations:  Aurelia Barrera scored a 20/24 on the AM-PAC short mobility form. At this time, no further PT is recommended upon discharge. Recommend patient returns to prior setting with prior services. PT Equipment Recommendations  Equipment Needed: No    Assessment   Assessment: Pt from home with PE.  PT doing well from PT standpoint, demonstrating transfers and gait at/near functional baseline. Gait is steady with rolling walker. Pt demonstrates and verbalizes good safety techniques throughout session. No other acute PT needs identified. Feel pt can return home at d/c. Recommend ongoing activity/ambulation with RN staff for remainder of hospital stay. Decision Making: Medium Complexity  Patient Education: Role of PT. Pt verbalized understanding. REQUIRES PT FOLLOW UP: No     Restrictions  Up with assist     Vision/Hearing  Vision: Impaired  Vision Exceptions: Legally blind(macular degeneration)  Hearing: Within functional limits       Subjective  Chart Reviewed: Yes  Additional Pertinent Hx: Pt to ED  for chest and arm pain and admitted for PE. Chest CT: Single right upper lobe subsegmental acute pulmonary embolism. PMH:  HTN, Grave's disease, DJD, asthma, OA, poor vision, macular degeneration, IBS, GERD, diverticulitis, depression, CTS  Diagnosis: PE    Subjective  Subjective: Pt found supine in bed. Pt very talkative. \"I have gas. Then the gas causes by blood pressure to go up. They say I have a clot in my lung. \"  Pt also reports near functional baseline.    Pain Screening  Patient Currently in Pain: Denies    Orientation  Within Functional Limits    Social/Functional History  Lives With: Alone  Type of Home: Apartment(Senior )  Home Layout: One level  Home Access: Level entry, Elevator  Bathroom

## 2020-06-22 NOTE — CONSULTS
Clinical Pharmacy Progress Note  Medication History     Admit Date: 6/21/2020    Asked by Dr. Salena Lema to clarify home medications. List of current medications patient is taking is complete. Home medication list in EPIC updated to reflect changes noted below. Source of information: patient, outpatient fill records    The following medications were removed from admission medication list:  Calcium    The following medication instructions/doses were adjusted to reflect how patient is taking:  Aspirin changed to enteric coated tablet  Pantoprazole changed from daily to BID  Methimazole changed to taking 1.5 tabs on Sun/Mon/Wed (not MWF)  Felodipine changed to PRN (pt states she very rarely takes this, only if BP high despite other meds). She was not given BP parameters on when to take. Lidocaine patches changed to PRN     Please call with questions.   Talia Mata PharmD, [address] [place] [address] Pharmacy: [phone]  [address] [place] [address] wireless: [phone]  6/22/2020 2:12 PM

## 2020-06-22 NOTE — PROGRESS NOTES
Progress Note    Admit Date: 6/21/2020  Day: 1  Diet: DIET GENERAL; Gluten Free    CC: chest pain    Interval history: Reports improvement in Chest pain. Pain mostly on lt side chest and sharp but manageable. Denies SOB at rest. Denies nausea, vomiting. Denies diarrhea. HPI: 79 yo F PMH Graves' disease, HTN, post-herpetic neuralgia, GERD, asthma, irritable bowel syndrome/SIBO, MGUS, celiac disease, hyperparathyroidism p/w left sided chest pain. Pain is constant without any aggravating or alleviating factors. Says pain is mostly located on the left of the chest with radiation to the arm. Not exacerbated by movement, exertion, or inspiration. Had an episode of right sided chest pain earlier however it was in the area of the previous zoster infection. Pt says this pain feels different from her shingles infection and she has not noticed any rashes.      Medications:     Scheduled Meds:   methIMAzole  7.5 mg Oral Once per day on Sun Mon Fri    [START ON 6/23/2020] methIMAzole  5 mg Oral Once per day on Tue Wed Thu Sat    aspirin  81 mg Oral Daily    budesonide-formoterol  2 puff Inhalation BID    fluticasone  1 spray Each Nostril Daily    atenolol  50 mg Oral Daily    cetirizine  10 mg Oral Daily    montelukast  10 mg Oral Nightly    sodium chloride flush  10 mL Intravenous 2 times per day    apixaban  10 mg Oral BID    amLODIPine  2.5 mg Oral Daily    spironolactone  25 mg Oral Daily    pantoprazole  40 mg Oral BID     Continuous Infusions:  PRN Meds:albuterol sulfate HFA, sodium chloride flush, acetaminophen **OR** acetaminophen, polyethylene glycol, promethazine **OR** ondansetron, simethicone    Objective:   Vitals:   T-max:  Patient Vitals for the past 8 hrs:   BP Temp Temp src Pulse Resp SpO2   06/22/20 0808 -- -- -- -- -- 97 %   06/22/20 0412 (!) 156/68 97.7 °F (36.5 °C) Oral 59 16 97 %       Intake/Output Summary (Last 24 hours) at 6/22/2020 1005  Last data filed at 6/22/2020 0412  Gross per 24 hour   Intake 240 ml   Output --   Net 240 ml       Review of Systems  As Per HPI and Interval History. Physical Exam  Eyes:      Extraocular Movements: Extraocular movements intact. Cardiovascular:      Rate and Rhythm: Normal rate and regular rhythm. Heart sounds: No murmur. Pulmonary:      Effort: Pulmonary effort is normal.      Breath sounds: No wheezing or rales. Abdominal:      Palpations: Abdomen is soft. Tenderness: There is no abdominal tenderness. Musculoskeletal: Normal range of motion. Right lower leg: No edema. Left lower leg: No edema. Skin:     General: Skin is warm. Neurological:      General: No focal deficit present. Mental Status: She is alert and oriented to person, place, and time. Motor: No weakness. LABS:    CBC:   Recent Labs     06/21/20  1152 06/22/20  0430   WBC 7.2 7.8   HGB 14.1 15.1   HCT 42.7 45.3    142   MCV 84.3 83.6     Renal:    Recent Labs     06/21/20  1152 06/22/20  0430    137   K 4.4 4.2    103   CO2 27 25   BUN 16 12   CREATININE 1.0 0.7   GLUCOSE 93 85   CALCIUM 10.5 10.6   MG 1.90  --    PHOS 2.9  --    ANIONGAP 8 9     Hepatic: No results for input(s): AST, ALT, BILITOT, BILIDIR, PROT, LABALBU, ALKPHOS in the last 72 hours. Troponin:   Recent Labs     06/21/20  1152   TROPONINI <0.01     Cultures:  -----------------------------------------------------------------  RAD:   CTA PULMONARY W CONTRAST   Final Result      1. Single right upper lobe subsegmental acute pulmonary embolism. The clinical significance is uncertain. 2. Otherwise, no evidence for acute cardiopulmonary disease      XR CHEST STANDARD (2 VW)   Final Result      No evidence for acute cardiopulmonary disease. Assessment/Plan:     Acute, submassive, subsegmental pulmonary embolus - Unclear source. Pt does have risk of thromboembolic disease with MGUS. Not tachycardic, hypoxic, hypotensive, febrile, or leukocytotic. Given Lovenox in ED.    - Elqiuis 10 BID x7 days then 5 mg BID thereafter. Pt made aware of risks of bleeding. Will let Pt's PCP know regarding future prescriptions. - Telemetry     Grave's disease  - Methimazole 5mg ( Tuesda, wed, Thursday, Sat) / 7.5 mg (Sunday, Monday, Friday)  -TSH: 6.71, T4 3.1.     Hypertension  - Continue home meds: Atenolol 50, Felodipine 2.5 -> norvasc 2.5 mg as substitute, spironolactone 25 mg     Post-herpetic neuralgia  - Lidoderm patches     Celiac disease  - Gluten free diet     GERD  - Protonix 40 mg po qd     MGUS    Code Status:Full code  FEN: General diet.  Gluten free  PPX: Eliquis  DISPO: Ivelisse Solano MD, PGY-1  06/22/20  10:05 AM    This patient has been staffed and discussed with Anthony Bello MD.

## 2020-06-22 NOTE — PROGRESS NOTES
[facility]    Cardiology Consult/H&P  Consulting Cardiologist Naila Pablo M.D., [facility] [place]  Referring Provider:  Donell Cui MD    6/22/2020, 2:18 PM    Chief Complaint   Patient presents with    Chest Pain    Arm Pain      Primary Cardiologist:  Asked by No admitting provider for patient encounter. Donell Cui MD to evaluate and assess this patient's left chest pain    History of Present Illness:   Michael Frias is a 80 y.o. female admitted with left chest pain as a dull ache in her left shoulder and around her left breast area. She also has pain in her right anterior chest which she thinks may be related to her old episodes of shingles. The enzymes are negative so far and EKG does no show any acute changes but she did have a an abnormal CT showing left lung pulmonary emboli. On examination I find all else to be stable. She is very sharp at 80. She has visual difficulty because of previous eye issues. The lungs are clear the extremities show no edema. Is receiving oral anticoagulant along with Lovenox. History of shingles on the right chest with postherpetic neuralgia  Status post cholecystectomy. Pulmonary emboli. 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 Surgical History:   has a past surgical history that includes Cholecystectomy; Colonoscopy (10/29/2009); Hemorrhoid surgery; and Hysterectomy (in 40's). Social History:   reports that she has never smoked. She has never used smokeless tobacco. She reports that she does not drink alcohol or use drugs. Family History:  family history includes Cancer in her father; Heart Disease in her mother; High Blood Pressure in her mother; Stroke in her mother. Home Medications:  Prior to Admission medications    Medication Sig Start Date End Date Taking? Authorizing Provider   aspirin 81 MG EC tablet Take 81 mg by mouth daily   Yes Historical Provider, MD   pantoprazole (PROTONIX) 40 MG tablet Take 40 mg by mouth 2 times daily   Yes Historical Provider, MD   felodipine (PLENDIL) 2.5 MG extended release tablet Take 2.5 mg by mouth daily as needed (high blood pressure not controlled with other medications)   Yes Historical Provider, MD   polyethylene glycol (GLYCOLAX) 17 g packet Take 17 g by mouth daily   Yes Historical Provider, MD   Simethicone (GAS RELIEF) 180 MG CAPS Take by mouth   Yes Historical Provider, MD   spironolactone (ALDACTONE) 25 MG tablet TAKE 1 TABLET DAILY 3/9/20  Yes Cathy Jameson MD   lidocaine (LIDODERM) 5 % Place 2 patches onto the skin daily 12 hours on, 12 hours off. 12/9/19 12/8/20 Yes Cathy Jameson MD   albuterol sulfate (PROAIR RESPICLICK) 305 (90 Base) MCG/ACT aerosol powder inhalation Inhale 2 puffs into the lungs every 6 hours as needed for Wheezing or Shortness of Breath 12/9/19  Yes Cathy Jameson MD   montelukast (SINGULAIR) 10 MG tablet TAKE 1 TABLET NIGHTLY 12/9/19  Yes Cathy Jameson MD   budesonide-formoterol (SYMBICORT) 160-4.5 MCG/ACT AERO Inhale 2 puffs into the lungs 2 times daily 12/9/19  Yes Cathy Jameson MD   atenolol (TENORMIN) 50 MG tablet TAKE 1 TABLET DAILY 9/25/19  Yes Cathy Jameson MD   lidocaine (XYLOCAINE) 5 % ointment Apply topically three times a day, as needed.  9/11/19  Yes Denise De La Torre MD   fluticasone (FLONASE) 50 MCG/ACT nasal spray PLACE 1 SPRAY IN [facility] NOSTRIL DAILY 4/18/18  Yes Fabian Cerda MD   ammonium lactate (LAC-HYDRIN) 12 % lotion Apply topically daily. 8/9/17  Yes Fabian Cerda MD   loratadine (CLARITIN) 10 MG tablet Take 1 tablet by mouth daily 5/7/17  Yes aFbian Cerda MD   hydrocortisone (ANUSOL-HC) 25 MG suppository Place 1 suppository rectally 2 times daily as needed for Hemorrhoids 5/7/17  Yes Fabian Cerda MD   methimazole (TAPAZOLE) 5 MG tablet Take 5 mg by mouth See Admin Instructions 1 tablet by mouth daily except for Sunday, Monday, Wednesday take 1.5 tablets.  5/15/19   Historical Provider, MD        Current Medications:  Current Facility-Administered Medications   Medication Dose Route Frequency Provider Last Rate Last Dose    methIMAzole (TAPAZOLE) tablet 7.5 mg  7.5 mg Oral Once per day on Sun Mon Fri Lindsey Valdez MD   7.5 mg at 06/22/20 0855    [START ON 6/23/2020] methIMAzole (TAPAZOLE) tablet 5 mg  5 mg Oral Once per day on Tue Wed Thu Sat Lindsey Valdez MD        albuterol sulfate  (90 Base) MCG/ACT inhaler 2 puff  2 puff Inhalation Q6H PRN Wilmar Davis MD        aspirin chewable tablet 81 mg  81 mg Oral Daily Wilmar Davis MD        budesonide-formoterol [facility]) 160-4.5 MCG/ACT inhaler 2 puff  2 puff Inhalation BID Wilmar Davis MD   2 puff at 06/22/20 0807    fluticasone (FLONASE) 50 MCG/ACT nasal spray 1 spray  1 spray Each Nostril Daily Wilmar Davis MD   1 spray at 06/22/20 0907    atenolol (TENORMIN) tablet 50 mg  50 mg Oral Daily Wilmar Davis MD   50 mg at 06/22/20 0855    cetirizine (ZYRTEC) tablet 10 mg  10 mg Oral Daily Wilmar Davis MD        montelukast (SINGULAIR) tablet 10 mg  10 mg Oral Nightly Wilmar Davis MD        sodium chloride flush 0.9 % injection 10 mL  10 mL Intravenous 2 times per day Wilmar Davis MD   10 mL at 06/22/20 0857    sodium chloride flush 0.9 % injection 10 mL  10 mL Intravenous PRN Wilmar Davis MD        acetaminophen (TYLENOL) tablet 650 mg abnormal bruising or bleeding, blood clots or swollen lymph nodes. · Allergic/Immunologic: No nasal congestion or hives. · All other ROS are reviewed and are unremarkable. Physical Examination:    Vitals:    06/22/20 0412 06/22/20 0808 06/22/20 0855 06/22/20 1209   BP: (!) 156/68  (!) 165/70 (!) 180/71   Pulse: 59  51    Resp: 16  16    Temp: 97.7 °F (36.5 °C)  97.9 °F (36.6 °C)    TempSrc: Oral  Oral    SpO2: 97% 97%  98%   Weight:       Height:            EXAMl and General Appearance:  Healthy. And alert . HEENT: eyes and ears intact. No nasal masses  THYROID: not enlarged  LUNGS:  · Clear to auscultation and percussion  HEART and VASCULAR:  · The apical impulses not displaced  · Heart tones are crisp and normal  · Cervical veins are not engorged  · The carotid upstroke is normal in amplitude and contour without delay or bruit  · Peripheral pulses are symmetrical and full  · There is no clubbing, cyanosis of the extremities. · No peripheral edema  · Femoral Arteries: 2+ and equal  · Pedal Pulses:2+ and equal   ABDOMEN[de-identified]  · No masses or tenderness  · Liver/Spleen: No Abnormalities Noted  NEUROLOGICAL:  . Moves all extremities to command. Cranial nerves 2-12 are in tact.   PSYCHIATRIC: alert and lucid  and oriented and appropriate  SKIN: No lesions or rashes  LYMPH NODES: none enlarged    ·   ·   ·     CBC with Differential:    Lab Results   Component Value Date    WBC 7.8 06/22/2020    RBC 5.42 06/22/2020    HGB 15.1 06/22/2020    HCT 45.3 06/22/2020     06/22/2020    MCV 83.6 06/22/2020    MCH 27.8 06/22/2020    MCHC 33.3 06/22/2020    RDW 14.3 06/22/2020    SEGSPCT 70.9 05/17/2013    LYMPHOPCT 27.2 06/22/2020    MONOPCT 11.0 06/22/2020    EOSPCT 1 08/25/2011    BASOPCT 0.5 06/22/2020    MONOSABS 0.9 06/22/2020    LYMPHSABS 2.1 06/22/2020    EOSABS 0.1 06/22/2020    BASOSABS 0.0 06/22/2020    DIFFTYPE Auto-K 05/17/2013     BMP:    Lab Results   Component Value Date     06/22/2020    K 4.2 06/22/2020     06/22/2020    CO2 25 06/22/2020    BUN 12 06/22/2020    LABALBU 3.9 12/21/2019    CREATININE 0.7 06/22/2020    CALCIUM 10.6 06/22/2020    GFRAA >60 06/22/2020    GFRAA >60 05/17/2013    LABGLOM >60 06/22/2020     Uric Acid:  No components found for: URIC  PT/INR:    Lab Results   Component Value Date    PROTIME 11.1 10/12/2015    INR 1.03 10/12/2015     Last 3 Troponin:    Lab Results   Component Value Date    TROPONINI <0.01 06/21/2020    TROPONINI <0.01 12/21/2019    TROPONINI <0.01 12/21/2019     FLP:    Lab Results   Component Value Date    TRIG 76 07/15/2016    HDL 52 07/15/2016    HDL 46 01/11/2012    LDLCALC 79 07/15/2016    LABVLDL 15 07/15/2016       EKG: Paced rhythm 45/min with PAC  echocardiogram pending  Assessment/ Plan     Patient Active Problem List    Diagnosis Date Noted    Aortic regurgitation 06/01/2016     Priority: High    Essential hypertension 12/07/2015     Priority: High    Graves' disease 04/02/2012     Priority: High    Pulmonary embolism and infarction ([address] 06/22/2020    Pulmonary embolism on right ([address] 06/21/2020    Postherpetic neuralgia 09/11/2019    Chronic pain syndrome 09/11/2019    Advanced age 09/11/2019    Multiple thyroid nodules 07/02/2019    Adrenal adenoma, left 07/02/2019    Adrenal adenoma, right 07/02/2019    Herpes zoster without complication 17/05/0695    Bilateral carpal tunnel syndrome 08/23/2018    Age-related osteoporosis without current pathological fracture 08/09/2017    Hyperparathyroidism ([address] 07/17/2016    History of Graves' disease 07/17/2016    Primary osteoarthritis of both knees 07/15/2016    Celiac disease 05/11/2016    Multiple food allergies 03/09/2016    Protein malnutrition ([address] 03/09/2016    Arthritis of knee, right 03/30/2015    Erythrocytosis 05/07/2014    Bacterial overgrowth syndrome 05/06/2014    Hearing loss sensory, bilateral 10/08/2013    Alkaline phosphatase elevation 09/02/2013    Osteoporosis 07/31/2012    Left renal mass 07/17/2012    S/P colonoscopy 04/10/2012    Menopause 04/02/2012    GERD (gastroesophageal reflux disease) 05/25/2011    Asthma 05/25/2011    IBS (irritable bowel syndrome) 05/25/2011    MGUS (monoclonal gammopathy of unknown significance) 05/25/2011   Chest pains left chest to the left shoulder and left arm. Rule out coronary disease. I spoke to the patient about a cardiac cath and or a stress test.  She cannot walk a treadmill and does not want to have a pharmacologic stress test because of previous exposure that was miserable for her. At this point we will continue her serial enzymes and EKG. Echocardiogram has been ordered. She is debating whether to have an intervention stress test.  I am not sure that I see enough to want to perform a cardiac cath. The troponin levels were less than 0.01 on 3 determinations. We will see how her pains do over the next couple days and determine if aggressive measures should be taken. The creatinine is 0.7  Thanks for allowing me the opportunity  to participate in the evaluation and care of your patients.  Please call if we can assist further [phone]    This chart was likely completed using voice recognition technology and may contain unintended grammatical , phraseology,and/or punctuation errors  Danette Storm M.D., [facility] [place]  6/22/20202:18 PM

## 2020-06-22 NOTE — ACP (ADVANCE CARE PLANNING)
ADVANCED CARE PLANNING    Kin Valles       :  1926              MRN:  [id number]      Purpose of Encounter: Advanced care planning in light of advanced age, admission for PE. Parties in attendance: :Miguel Angel Freind MD.  Decisional Capacity:Yes    Diagnosis: Active Problems:    Pulmonary embolism on right [facility])  Resolved Problems:    * No resolved hospital problems. *    Patients Medical Story:Admitted for chest pain, and found with a PE. Lives alone, independently. Goals of Care Determinations: Patient wishes to focus on recovery and return to his baseline. Plan: Will notify Tiana Sam MD of change in care plan. Will look at further interventions as needed. Code Status: At this time patient wishes to be Full Code  Time Spent with Patient: 8 minutes      Electronically signed by Miguel Angel Lion MD on 2020 at 12:18 AM  Thank you Tiana Sam MD for the opportunity to be involved in this patient's care.

## 2020-06-22 NOTE — PLAN OF CARE
Problem: Falls - Risk of:  Goal: Will remain free from falls  Description: Will remain free from falls  Outcome: Ongoing     Problem: Pain:  Goal: Pain level will decrease  Description: Pain level will decrease  Outcome: Ongoing       Pt resting in bed, no c/o pain.

## 2020-06-23 VITALS
HEIGHT: 69 IN | RESPIRATION RATE: 16 BRPM | TEMPERATURE: 98.3 F | SYSTOLIC BLOOD PRESSURE: 156 MMHG | HEART RATE: 59 BPM | BODY MASS INDEX: 18.61 KG/M2 | OXYGEN SATURATION: 97 % | DIASTOLIC BLOOD PRESSURE: 75 MMHG | WEIGHT: 125.66 LBS

## 2020-06-23 LAB
LV EF: 55 %
LVEF MODALITY: NORMAL

## 2020-06-23 PROCEDURE — 93306 TTE W/DOPPLER COMPLETE: CPT

## 2020-06-23 PROCEDURE — 99232 SBSQ HOSP IP/OBS MODERATE 35: CPT | Performed by: NURSE PRACTITIONER

## 2020-06-23 PROCEDURE — 6370000000 HC RX 637 (ALT 250 FOR IP)

## 2020-06-23 PROCEDURE — 99226 PR SBSQ OBSERVATION CARE/DAY 35 MINUTES: CPT | Performed by: INTERNAL MEDICINE

## 2020-06-23 PROCEDURE — 6370000000 HC RX 637 (ALT 250 FOR IP): Performed by: STUDENT IN AN ORGANIZED HEALTH CARE EDUCATION/TRAINING PROGRAM

## 2020-06-23 PROCEDURE — 94640 AIRWAY INHALATION TREATMENT: CPT

## 2020-06-23 RX ADMIN — CETIRIZINE HYDROCHLORIDE 10 MG: 10 TABLET, FILM COATED ORAL at 08:53

## 2020-06-23 RX ADMIN — APIXABAN 10 MG: 5 TABLET, FILM COATED ORAL at 08:53

## 2020-06-23 RX ADMIN — FLUTICASONE PROPIONATE 1 SPRAY: 50 SPRAY, METERED NASAL at 08:54

## 2020-06-23 RX ADMIN — PANTOPRAZOLE SODIUM 40 MG: 40 TABLET, DELAYED RELEASE ORAL at 17:11

## 2020-06-23 RX ADMIN — METHIMAZOLE 5 MG: 5 TABLET ORAL at 08:53

## 2020-06-23 RX ADMIN — PANTOPRAZOLE SODIUM 40 MG: 40 TABLET, DELAYED RELEASE ORAL at 06:43

## 2020-06-23 RX ADMIN — ATENOLOL 50 MG: 50 TABLET ORAL at 08:53

## 2020-06-23 RX ADMIN — BUDESONIDE AND FORMOTEROL FUMARATE DIHYDRATE 2 PUFF: 160; 4.5 AEROSOL RESPIRATORY (INHALATION) at 08:29

## 2020-06-23 RX ADMIN — POLYETHYLENE GLYCOL 3350 17 G: 17 POWDER, FOR SOLUTION ORAL at 07:28

## 2020-06-23 RX ADMIN — ANTACID TABLETS 500 MG: 500 TABLET, CHEWABLE ORAL at 00:02

## 2020-06-23 RX ADMIN — SPIRONOLACTONE 25 MG: 25 TABLET, FILM COATED ORAL at 08:53

## 2020-06-23 RX ADMIN — AMLODIPINE BESYLATE 2.5 MG: 2.5 TABLET ORAL at 08:53

## 2020-06-23 ASSESSMENT — PAIN SCALES - GENERAL
PAINLEVEL_OUTOF10: 0

## 2020-06-23 NOTE — CARE COORDINATION
Case Management Assessment           Daily Note                 Date/ Time of Note: 6/23/2020 4:11 PM         Note completed by: Gael Knowles    Patient Name: Blake Kerr  YOB: 1926    Diagnosis:Pulmonary embolism on right [facility]) [I26.99]  Pulmonary embolism on right [facility]) [I26.99]  Pulmonary embolism and infarction [facility]) [I26.99]  Patient Admission Status: Inpatient    Date of Admission:6/21/2020 11:11 AM Length of Stay: 1 GLOS:        ________________________________________________________________________________________  PT AM-PAC: 21 / 24 per last evaluation on: 6.23    OT AM-PAC: 23 / 24 per last evaluation on: 6.23    DME Needs for discharge: n/a  ________________________________________________________________________________________  Discharge Plan: Home with [address]: [facility]    Tentative discharge date: TBD    Current barriers to discharge: medical clearance    Referrals completed: [address]: [facility]    ________________________________________________________________________________________  Case Management Notes:  Patient is from home alone, has COA for home health aides. [facility] following for skilled care needs. Please call [facility] or [facility]ris to transport pt home. Mary Dominguez and her family were provided with choice of provider; she and her family are in agreement with the discharge plan.     Care Transition Patient: Yes    Gael Knowles RN  [facility]  Case Management Department  Ph: [phone]  Fax: [phone]

## 2020-06-23 NOTE — DISCHARGE SUMMARY
INTERNAL MEDICINE DEPARTMENT AT [address] [place] [address]  DISCHARGE SUMMARY    Patient ID: Gilberto Harris                                             Discharge Date: 6/23/2020   Patient's PCP: Jose Cerda MD                                          Discharge Physician: Britany Nixon MD  Admit Date: 6/21/2020   Admitting Physician: No admitting provider for patient encounter. PROBLEMS DURING HOSPITALIZATION:  Hospital Problems           Last Modified POA    Chest pain in adult 6/23/2020 Yes    Pulmonary embolism on right [facility]) 6/21/2020 Yes    Pulmonary embolism and infarction ([address] 6/22/2020 Yes    Single subsegmental pulmonary embolism without acute cor pulmonale 6/23/2020 Yes          DISCHARGE DIAGNOSES:  1-Lt sided chest pain (ACS ruled out)  2-Acute, submassive, subsegmental pulmonary embolus without acute cor pulmonale  3- Grave's disease  4-Hypertension  5-Post-herpetic neuralgia  6-Celiac disease  7-GERD  8-MGUS    Hospital Course:      89 yo [address]  Graves' disease, HTN, post-herpetic neuralgia, GERD, asthma, irritable bowel syndrome/SIBO, MGUS, celiac disease, hyperparathyroidism p/w left sided chest pain. CTPA +ve for Rt sided Pulmonary embolism. Started on lovenox BID dosing then transitioned to Eliquis. However PE finding inconsistent with chest pain location. Cards was consulted. Pt unable to tolerate exercise stress test and refused pharmacologic stress test due to previous unpleasant experience. Echo was ordered and demonstrated EF 05%, normal diastolic dysfunction, no wall motion abnormalities. Pt's chest pain subsided without intervention and did not have any additional episodes of Lt sided chest pain for 24 hrs prior to discharge. Pt's new onset PE and subsequent AC was discussed with PCP via phone call and recommend 3 months of anticoagulation.  Pt advised that only 1 month supply of eliquis was provided on discharge and subsequent prescriptions will come from PCP on followup appointment and therfore she was advised to followup in 1 week post discharge. At time of discharge, Pt denies chest pain, SOB, N/V, diarrhea. Tolerting diet, Able to ambulate with walker. Physical Exam:  BP (!) 156/75   Pulse 59   Temp 98.3 °F (36.8 °C) (Oral)   Resp 16   Ht 5' 9\" (1.753 m)   Wt 125 lb 10.6 oz (57 kg)   LMP  (LMP Unknown)   SpO2 97%   Breastfeeding No   BMI 18.56 kg/m²   Eyes:      Extraocular Movements: Extraocular movements intact. Cardiovascular:      Rate and Rhythm: Normal rate and regular rhythm. Heart sounds: No murmur. Pulmonary:      Effort: Pulmonary effort is normal.      Breath sounds: No wheezing or rales. Abdominal:      Palpations: Abdomen is soft. Tenderness: There is no abdominal tenderness. Musculoskeletal: Normal range of motion. Right lower leg: No edema. Left lower leg: No edema. Skin:     General: Skin is warm. Neurological:      General: No focal deficit present. Mental Status: She is alert and oriented to person, place, and time. Motor: No weakness. Consults: cardiology  Significant Diagnostic Studies:   CTA PULMONARY W CONTRAST   Final Result      1. Single right upper lobe subsegmental acute pulmonary embolism. The clinical significance is uncertain. 2. Otherwise, no evidence for acute cardiopulmonary disease      XR CHEST STANDARD (2 VW)   Final Result      No evidence for acute cardiopulmonary disease. Disposition: home  Discharged Condition: Stable  Follow Up: Primary Care Physician in one week    DISCHARGE MEDICATION:     Medication List      START taking these medications    apixaban 5 MG Tabs tablet  Commonly known as:  Eliquis DVT/PE Starter Pack  Take 10 mg (2 tablets) orally twice daily for 7 days, then take 5 mg (1 tablet) orally twice daily thereafter.         CONTINUE taking these medications    albuterol sulfate 108 (90 Base) MCG/ACT aerosol powder inhalation  Commonly known as: ProAir RespiClick  Inhale 2 puffs into the lungs every 6 hours as needed for Wheezing or Shortness of Breath     ammonium lactate 12 % lotion  Commonly known as:  Lac-Hydrin  Apply topically daily. aspirin 81 MG EC tablet     atenolol 50 MG tablet  Commonly known as:  TENORMIN  TAKE 1 TABLET DAILY     budesonide-formoterol 160-4.5 MCG/ACT Aero  Commonly known as:  Symbicort  Inhale 2 puffs into the lungs 2 times daily     felodipine 2.5 MG extended release tablet  Commonly known as:  PLENDIL     fluticasone 50 MCG/ACT nasal spray  Commonly known as:  FLONASE  PLACE 1 SPRAY IN EACH NOSTRIL DAILY     Gas Relief 180 MG Caps  Generic drug:  Simethicone     hydrocortisone 25 MG suppository  Commonly known as:  ANUSOL-HC  Place 1 suppository rectally 2 times daily as needed for Hemorrhoids     * lidocaine 5 % ointment  Commonly known as:  XYLOCAINE  Apply topically three times a day, as needed. * lidocaine 5 %  Commonly known as:  LIDODERM  Place 2 patches onto the skin daily 12 hours on, 12 hours off.     loratadine 10 MG tablet  Commonly known as:  Claritin  Take 1 tablet by mouth daily     methIMAzole 5 MG tablet  Commonly known as:  TAPAZOLE     montelukast 10 MG tablet  Commonly known as:  SINGULAIR  TAKE 1 TABLET NIGHTLY     pantoprazole 40 MG tablet  Commonly known as:  PROTONIX     polyethylene glycol 17 g packet  Commonly known as:  GLYCOLAX     spironolactone 25 MG tablet  Commonly known as:  ALDACTONE  TAKE 1 TABLET DAILY         * This list has 2 medication(s) that are the same as other medications prescribed for you. Read the directions carefully, and ask your doctor or other care provider to review them with you. Where to Get Your Medications      These medications were sent to [place], [address] Alvina Salinas [phone] - F [phone] [address]    Phone:  [phone]   · apixaban 5 MG Tabs tablet       Activity: activity as tolerated  Diet: Gluten free  Wound Care: as directed    Time Spent on discharge is more than 30 minutes    Signed:  Segundo Huitron MD   6/23/2020

## 2020-06-23 NOTE — DISCHARGE INSTR - COC
Continuity of Care Form    Patient Name: Kade Nunez   :  1926  MRN:  [id number]    Admit date:  2020  Discharge date:  ***    Code Status Order: Full Code   Advance Directives:     Admitting Physician:  No admitting provider for patient encounter.   PCP: Rena White MD    Discharging Nurse: [facility] Unit/Room#: 9790/8762-94  Discharging Unit Phone Number: ***    Emergency Contact:   Extended Emergency Contact Information  Primary Emergency Contact: [address] Phone: [phone]  Relation: Child  Secondary Emergency Contact: De Hsieh   [facility]  Mobile Phone: [phone]  Relation: Brother/Sister    Past Surgical History:  Past Surgical History:   Procedure Laterality Date    CHOLECYSTECTOMY      COLONOSCOPY  10/29/2009    **see scanned report- SUNDEEP&PETER    HEMORRHOID SURGERY      HYSTERECTOMY  in 40's    rudolph/bso       Immunization History:   Immunization History   Administered Date(s) Administered    Influenza Virus Vaccine 10/02/2015    Influenza, High Dose (Fluzone 65 yrs and older) 2012, 10/17/2013, 09/15/2016, 2017, 10/22/2018    Influenza, Triv, inactivated, subunit, adjuvanted, IM (Fluad 65 yrs and older) 2019    Pneumococcal Conjugate 13-valent (Pacbxry62) 12/15/2016    Pneumococcal Polysaccharide (Apikzbjkm68) 2012    Tdap (Boostrix, Adacel) 2017       Active Problems:  Patient Active Problem List   Diagnosis Code    GERD (gastroesophageal reflux disease) K21.9    Asthma J45.909    IBS (irritable bowel syndrome) K58.9    MGUS (monoclonal gammopathy of unknown significance) D47.2    Menopause Z78.0    Graves' disease E05.00    S/P colonoscopy Z98.890    Left renal mass N28.89    Osteoporosis M81.0    Alkaline phosphatase elevation R74.8    Hearing loss sensory, bilateral H90.3    Bacterial overgrowth syndrome K63.89    Erythrocytosis D75.1    Arthritis of knee, right M17.11    Essential hypertension I10    Multiple food allergies Z91.018    Protein malnutrition (HCC) E46    Celiac disease K90.0    Aortic regurgitation I35.1    Primary osteoarthritis of both knees M17.0    Hyperparathyroidism ([address] E21.3    History of Graves' disease Z86.39    Age-related osteoporosis without current pathological fracture M81.0    Bilateral carpal tunnel syndrome G56.03    Herpes zoster without complication X46.8    Multiple thyroid nodules E04.2    Adrenal adenoma, left D35.02    Adrenal adenoma, right D35.01    Postherpetic neuralgia B02.29    Chronic pain syndrome G89.4    Advanced age R48    Chest pain in adult R07.9    Pulmonary embolism on right [facility]) I26.99    Pulmonary embolism and infarction (HCC) I26.99    Single subsegmental pulmonary embolism without acute cor pulmonale I26.93       Isolation/Infection:   Isolation          No Isolation        Patient Infection Status     Infection Onset Added Last Indicated Last Indicated By Review Planned Expiration Resolved Resolved By    None active    Resolved    COVID-19 Rule Out 06/21/20 06/21/20 06/21/20 COVID-19 (Ordered)   06/22/20 Rule-Out Test Resulted          Nurse Assessment:  Last Vital Signs: BP (!) 156/75   Pulse 59   Temp 98.3 °F (36.8 °C) (Oral)   Resp 16   Ht 5' 9\" (1.753 m)   Wt 125 lb 10.6 oz (57 kg)   LMP  (LMP Unknown)   SpO2 97%   Breastfeeding No   BMI 18.56 kg/m²     Last documented pain score (0-10 scale): Pain Level: 0  Last Weight:   Wt Readings from Last 1 Encounters:   06/23/20 125 lb 10.6 oz (57 kg)     Mental Status:  {IP PT MENTAL STATUS:20030}    IV Access:  { JUDSON IV ACCESS:115563338}    Nursing Mobility/ADLs:  Walking   {CHP DME ASTS:809420865}  Transfer  {CHP DME UTAX:147365168}  Bathing  {CHP DME WPYK:624476726}  Dressing  {CHP DME LROB:260848499}  Toileting  {CHP DME DDJS:681722981}  Feeding  {P DME DSEU:685305524}  Med Admin  {[facility] DME QIXC:712231375}  Med Delivery   { JUDSON MED Delivery:747577797}    Wound Care Documentation and Therapy:        Elimination:  Continence:   · Bowel: {YES / LL:93134}  · Bladder: {YES / QO:30318}  Urinary Catheter: {Urinary Catheter:089128150}   Colostomy/Ileostomy/Ileal Conduit: {YES / YX:65883}       Date of Last BM: ***    Intake/Output Summary (Last 24 hours) at 2020 1715  Last data filed at 2020 1250  Gross per 24 hour   Intake 480 ml   Output 0 ml   Net 480 ml     I/O last 3 completed shifts:   In: 18 [P.O.:480]  Out: 0     Safety Concerns:     {[facility] Safety Concerns:475765978}    Impairments/Disabilities:      [facility] Impairments/Disabilities:808515678}    Nutrition Therapy:  Current Nutrition Therapy:   [facility] Diet List:601131392}    Routes of Feeding: {P DME Other Feedings:900941073}  Liquids: {Slp liquid thickness:46823}  Daily Fluid Restriction: {[facility] DME Yes amt example:194026886}  Last Modified Barium Swallow with Video (Video Swallowing Test): {Done Not Done GPMD:836492925}    Treatments at the Time of Hospital Discharge:   Respiratory Treatments: ***  Oxygen Therapy:  {Therapy; copd oxygen:34977}  Ventilator:    {[facility] Vent QOL}    Rehab Therapies: Physical Therapy, Occupational Therapy and SN  Weight Bearing Status/Restrictions: [facility] Weight Bearin}  Other Medical Equipment (for information only, NOT a DME order):  {EQUIPMENT:517747884}  Other Treatments: ***    Patient's personal belongings (please select all that are sent with patient):  {[facility] DME Belongings:052656364}    RN SIGNATURE:  {Esignature:012912196}    CASE MANAGEMENT/SOCIAL WORK SECTION    Inpatient Status Date: ***    Readmission Risk Assessment Score:  Readmission Risk              Risk of Unplanned Readmission:        13           Discharging to Facility/ Agency   · Name:   · Address:  · Phone:  · Fax:    Dialysis Facility (if applicable)   · Name:  · Address:  · Dialysis Schedule:  · Phone:  · Fax:    / signature: {Esignature:991103637}    PHYSICIAN SECTION    Prognosis: Fair    Condition at Discharge: Stable    Rehab Potential (if transferring to Rehab): Good    Recommended Labs or Other Treatments After Discharge: CBC in 1 week. Physician Certification: I certify the above information and transfer of Cathy Tillman  is necessary for the continuing treatment of the diagnosis listed and that she requires {Admit to Appropriate Level of Care:98897} for {GREATER/LESS:182975381} 30 days.      Update Admission H&P: {CHP DME Changes in BTRYP:535974520}    PHYSICIAN SIGNATURE:  {Esignature:795943745}

## 2020-06-23 NOTE — PROGRESS NOTES
[address]       In Patient  Progress note        Genaro Salomon MD,  [address] [facility]   Daily Progress Note      Admit Date:  6/21/2020  Primary Cardiologist : Mary Anne Ray      CC: Chest Pain/Arm Pain    Interval Hx: Ms Lucie Webb admitted with dull aching left CP radiating to left shoulder and breast. Also reported pain in R anterior chest, pt believes this is from past episodes of shingles. EKG (6/21/20) showed no acute changes, pt in NSR with Neg trop. CT showed right upper lobe pulmonary emboli, CXR wnl. Pt unable to walk on treadmill, does not want pharmacologic stress test d/t past negative experience. Experiencing intermittent atypical right sided chest pain from previous herpes zoster infection. Pt reports no left sided chest pain today. Utilizing medical treatment for PE (eliquis). No plans at this time for cardiac cath, will continue to monitor unless acute changes noted. VSS w/ SBP slightly elevated/ SPO2 >92% on RA. Echocardiogram - ordered 06/23/20  PE: on Eliquis 10 mg BID x 7 days, then transition to 5mg BID thereafter. Will contact PCP regarding future prescriptions. PMH:  Graves disease, HTN, post-herpetic neuralgia, GERD, asthma, irritable bowel syndrome/SIBO, MGUS, celiac disease, hyperparathyroidism,       I have examined pt and reviewed notes / any lab work EKGs stress test, angiograms, & images reviewed  I  have spent >20 minutes of face to face time with the patient with more than 50% spent counseling and coordinating care this patient.         Objective:   BP (!) 144/76   Pulse 76   Temp 97.6 °F (36.4 °C) (Oral)   Resp 15   Ht 5' 9\" (1.753 m)   Wt 125 lb 10.6 oz (57 kg)   LMP  (LMP Unknown)   SpO2 98%   Breastfeeding No   BMI 18.56 kg/m²       Intake/Output Summary (Last 24 hours) at 6/23/2020 1250  Last data filed at 6/23/2020 0643  Gross per 24 hour   Intake 0 ml   Output 0 ml   Net 0 ml     Wt Readings from Last 3 Encounters:   06/23/20 125 lb 10.6 oz (57 kg)   02/21/20 127 lb (57.6 kg)   12/30/19 126 lb 9.6 oz (57.4 kg)       Physical Exam:   BP (!) 144/76   Pulse 76   Temp 97.6 °F (36.4 °C) (Oral)   Resp 15   Ht 5' 9\" (1.753 m)   Wt 125 lb 10.6 oz (57 kg)   LMP  (LMP Unknown)   SpO2 98%   Breastfeeding No   BMI 18.56 kg/m²   BP Readings from Last 3 Encounters:   06/23/20 (!) 144/76   02/21/20 128/60   12/30/19 (!) 153/75     Pulse Readings from Last 3 Encounters:   06/23/20 76   02/21/20 55   12/30/19 62       Intake/Output Summary (Last 24 hours) at 6/23/2020 1250  Last data filed at 6/23/2020 3260  Gross per 24 hour   Intake 0 ml   Output 0 ml   Net 0 ml     Wt Readings from Last 2 Encounters:   06/23/20 125 lb 10.6 oz (57 kg)   02/21/20 127 lb (57.6 kg)     Constitutional: She is oriented to person, place, and time. She appears well-developed and well-nourished. In no acute distress. Head: Normocephalic and atraumatic. Eyes: PEERL   Neck: Neck supple. No JVD present. Carotid bruit is not present. No mass and no thyromegaly present. No lymphadenopathy present. Cardiovascular: Normal rate, regular rhythm, normal heart sounds and intact distal pulses. Exam reveals no gallop and no friction rub. No murmur heard. Pulmonary/Chest: Effort normal and breath sounds normal. No respiratory distress. She has no wheezes, rhonchi or rales. Abdominal: Soft, non-tender. Bowel sounds and aorta are normal. She exhibits no organomegaly, mass or bruit. Extremities: No edema, cyanosis, or clubbing. Pulses are 2+ radial/carotid/dorsalis pedis and posterior tibial bilaterally. Neurological: oriented to person place    Skin: Skin is warm and dry. There is no rash or diaphoresis. Psychiatric: She has a normal mood and affect.  Her speech is normal and behavior is normal.     Medications:    methIMAzole  7.5 mg Oral Once per day on Sun Mon Fri    methIMAzole  5 mg Oral Once per day on Tue Wed Thu Sat    aspirin  81 mg Oral Daily    budesonide-formoterol  2 puff Inhalation BID    fluticasone  1 spray Each Nostril Daily    atenolol  50 mg Oral Daily    cetirizine  10 mg Oral Daily    montelukast  10 mg Oral Nightly    sodium chloride flush  10 mL Intravenous 2 times per day    apixaban  10 mg Oral BID    amLODIPine  2.5 mg Oral Daily    spironolactone  25 mg Oral Daily    pantoprazole  40 mg Oral BID     Lab Data:/ EKGs reviewed: 6/21/20 - NSR: HR 65, , QRS 84, QTc 420. Recent Labs     06/21/20  1152 06/22/20  0430   WBC 7.2 7.8   HGB 14.1 15.1    142     BMP:    Recent Labs     06/21/20  1152 06/22/20  0430    137   K 4.4 4.2   CO2 27 25   BUN 16 12   CREATININE 1.0 0.7     CC: Chest Pain/Arm Pain    Interval Hx: Ms Jacob Hutchins admitted with dull aching left CP radiating to left shoulder and breast. Reported intermittent right sided chest pain from previous herpes zoster infection. CKR, and EKG are WNL, Negative troponin. CT showing right upper love pulmonary emboli. Pt unable to walk on treadmill, does not want pharmacologic stress test d/t past negative experience. Experiencing intermittent atypical right sided chest pain from previous herpes zoster infection. Pt reports no left sided chest pain today. Utilizing medical treatment for PE (eliquis). No cardiac cath, will continue to monitor unless acute changes noted. Assessment/Plan:    Acute, submassive, subsegmental pulmonary embolus  -  Unclear source. -  Increased risk of thromboembolic disease with MGUS. -  In NSR, SPO2 > 92% on RA, BP slightly elevated,Afebrile. -  Elqiuis 10 BID x7 days (started 6/22/10) then transition to 5 mg BID thereafter.   -  No signs/symptoms of bleeding, Hgb stable (15.1).       Grave's disease  - Methimazole 5mg ( Tuesda, wed, Thursday, Sat) / 7.5 mg (Sunday, Monday, Friday)  -TSH: 6.71, T4 3.1.     Hypertension  - Continue home meds: Atenolol 50, Felodipine 2.5 (substituted with Norvasc 2.5mg), spironolactone 25 mg  -  Apresoline 5mg IV Push Q 6 hr PRN   -   - 160s, continue to monitor     Post-herpetic neuralgia  - Lidoderm patches     Celiac disease  - Gluten free diet     GERD  - Protonix 40 mg po BID     MGUS  Per IM     Plan:   will follow     STAN Wills  Patient is clinically stable at this time. No chest pains that I can detect. Her enzymes are as noted. EKG and rhythm are stable. Will follow acutely. We did not anticipate any intervention at this time.   Liat Pepper MD, [facility]

## 2020-06-23 NOTE — PROGRESS NOTES
Progress Note    Admit Date: 6/21/2020  Day: 2  Diet: DIET GENERAL; Gluten Free  Dietary Nutrition Supplements: Other Oral Supplement (see comment)    CC: chest pain    Interval history: No episodes of Lt sided chest pain since yesterday morning. Reports she doesn't want a pharmacological stress test as she had very unpleasant experience the last time she had it. Rt sided chest painbut manageable. Denies SOB at rest. Denies nausea, vomiting. Denies diarrhea. HPI: 79 yo F PMH Graves' disease, HTN, post-herpetic neuralgia, GERD, asthma, irritable bowel syndrome/SIBO, MGUS, celiac disease, hyperparathyroidism p/w left sided chest pain. Pain is constant without any aggravating or alleviating factors. Says pain is mostly located on the left of the chest with radiation to the arm. Not exacerbated by movement, exertion, or inspiration. Had an episode of right sided chest pain earlier however it was in the area of the previous zoster infection. Pt says this pain feels different from her shingles infection and she has not noticed any rashes.      Medications:     Scheduled Meds:   methIMAzole  7.5 mg Oral Once per day on Sun Mon Fri    methIMAzole  5 mg Oral Once per day on Tue Wed Thu Sat    aspirin  81 mg Oral Daily    budesonide-formoterol  2 puff Inhalation BID    fluticasone  1 spray Each Nostril Daily    atenolol  50 mg Oral Daily    cetirizine  10 mg Oral Daily    montelukast  10 mg Oral Nightly    sodium chloride flush  10 mL Intravenous 2 times per day    apixaban  10 mg Oral BID    amLODIPine  2.5 mg Oral Daily    spironolactone  25 mg Oral Daily    pantoprazole  40 mg Oral BID     Continuous Infusions:  PRN Meds:calcium carbonate, hydrALAZINE, albuterol sulfate HFA, sodium chloride flush, acetaminophen **OR** acetaminophen, polyethylene glycol, promethazine **OR** ondansetron, simethicone    Objective:   Vitals:   T-max:  Patient Vitals for the past 8 hrs:   BP Temp Temp src Pulse Resp SpO2 Weight   06/23/20 0830 -- -- -- -- 15 98 % --   06/23/20 0725 (!) 144/76 97.6 °F (36.4 °C) Oral 76 18 92 % --   06/23/20 0643 -- -- -- -- -- -- 125 lb 10.6 oz (57 kg)   06/23/20 0418 (!) 161/68 97.9 °F (36.6 °C) Oral 52 17 94 % --       Intake/Output Summary (Last 24 hours) at 6/23/2020 0906  Last data filed at 6/23/2020 4576  Gross per 24 hour   Intake 0 ml   Output 0 ml   Net 0 ml       Review of Systems  As Per HPI and Interval History. Physical Exam  Eyes:      Extraocular Movements: Extraocular movements intact. Cardiovascular:      Rate and Rhythm: Normal rate and regular rhythm. Heart sounds: No murmur. Pulmonary:      Effort: Pulmonary effort is normal.      Breath sounds: No wheezing or rales. Abdominal:      Palpations: Abdomen is soft. Tenderness: There is no abdominal tenderness. Musculoskeletal: Normal range of motion. Right lower leg: No edema. Left lower leg: No edema. Skin:     General: Skin is warm. Neurological:      General: No focal deficit present. Mental Status: She is alert and oriented to person, place, and time. Motor: No weakness. LABS:    CBC:   Recent Labs     06/21/20  1152 06/22/20  0430   WBC 7.2 7.8   HGB 14.1 15.1   HCT 42.7 45.3    142   MCV 84.3 83.6     Renal:    Recent Labs     06/21/20  1152 06/22/20  0430    137   K 4.4 4.2    103   CO2 27 25   BUN 16 12   CREATININE 1.0 0.7   GLUCOSE 93 85   CALCIUM 10.5 10.6   MG 1.90  --    PHOS 2.9  --    ANIONGAP 8 9     Hepatic: No results for input(s): AST, ALT, BILITOT, BILIDIR, PROT, LABALBU, ALKPHOS in the last 72 hours. Troponin:   Recent Labs     06/21/20  1152   TROPONINI <0.01     Cultures:  -----------------------------------------------------------------  RAD:   CTA PULMONARY W CONTRAST   Final Result      1. Single right upper lobe subsegmental acute pulmonary embolism. The clinical significance is uncertain.    2. Otherwise, no evidence for acute cardiopulmonary disease      XR CHEST STANDARD (2 VW)   Final Result      No evidence for acute cardiopulmonary disease. Assessment/Plan:       Lt sided chest pain  -pain quality and location suspicious for ACS, however trops -ve x3, EKG without any changes.  -Cards on board. ECHO ordered. Will f/u results and consider cath as Pt unable to tolerate exercise stress test and doesn't want pharmacological stress test due to prior unpleasant experience. Acute, submassive, subsegmental pulmonary embolus - Unclear source. Pt does have risk of thromboembolic disease with MGUS. Not tachycardic, hypoxic, hypotensive, febrile, or leukocytotic. Given Lovenox in ED.    - Elqiuis 10 BID x7 days then 5 mg BID thereafter. Pt made aware of risks of bleeding. Will let Pt's PCP know regarding future prescriptions. - Telemetry     Grave's disease  - Methimazole 5mg ( Tuesda, wed, Thursday, Sat) / 7.5 mg (Sunday, Monday, Friday)  -TSH: 6.71, T4 3.1.     Hypertension  - Continue home meds: Atenolol 50, Felodipine 2.5 -> norvasc 2.5 mg as substitute, spironolactone 25 mg     Post-herpetic neuralgia  - Lidoderm patches     Celiac disease  - Gluten free diet     GERD  - Protonix 40 mg po qd     MGUS    Code Status:Full code  FEN: General diet.  Gluten free  PPX: Eliquis  DISPO: Elizabeth Hatch MD, PGY-1  06/23/20  9:06 AM    This patient has been staffed and discussed with Mercedes Pérez MD.

## 2020-06-24 NOTE — PROGRESS NOTES
CLINICAL PHARMACY NOTE: MEDS TO [address] Select Patient?: Yes  Total # of Prescriptions Filled: 1   The following medications were delivered to the patient:  · Eliquis  Total # of Interventions Completed: 0  Time Spent (min): 15    Additional Documentation:

## 2020-06-24 NOTE — CARE COORDINATION
[facility]     Patient aware and agreeable to services. Faxed orders to [facility].     Tierra Lemos LPN  Care Transition Nurse  [address]  [phone]

## 2020-06-25 ENCOUNTER — TELEPHONE (OUTPATIENT)
Dept: PRIMARY CARE CLINIC | Age: 85
End: 2020-06-25

## 2020-06-25 LAB
BASOPHILS ABSOLUTE: 0.1 K/UL (ref 0–0.2)
BASOPHILS RELATIVE PERCENT: 0.6 %
EOSINOPHILS ABSOLUTE: 0.1 K/UL (ref 0–0.6)
EOSINOPHILS RELATIVE PERCENT: 1.2 %
HCT VFR BLD CALC: 44.8 % (ref 36–48)
HEMOGLOBIN: 14.6 G/DL (ref 12–16)
LYMPHOCYTES ABSOLUTE: 2.3 K/UL (ref 1–5.1)
LYMPHOCYTES RELATIVE PERCENT: 25 %
MCH RBC QN AUTO: 27.3 PG (ref 26–34)
MCHC RBC AUTO-ENTMCNC: 32.5 G/DL (ref 31–36)
MCV RBC AUTO: 84 FL (ref 80–100)
MONOCYTES ABSOLUTE: 1 K/UL (ref 0–1.3)
MONOCYTES RELATIVE PERCENT: 10.4 %
NEUTROPHILS ABSOLUTE: 5.8 K/UL (ref 1.7–7.7)
NEUTROPHILS RELATIVE PERCENT: 62.8 %
PDW BLD-RTO: 14.8 % (ref 12.4–15.4)
PLATELET # BLD: 175 K/UL (ref 135–450)
PMV BLD AUTO: 9.7 FL (ref 5–10.5)
RBC # BLD: 5.33 M/UL (ref 4–5.2)
WBC # BLD: 9.3 K/UL (ref 4–11)

## 2020-06-25 RX ORDER — CYANOCOBALAMIN 1000 UG/ML
1000 INJECTION INTRAMUSCULAR; SUBCUTANEOUS
Qty: 1 ML | Refills: 5 | Status: SHIPPED | OUTPATIENT
Start: 2020-06-25 | End: 2020-07-30 | Stop reason: SDUPTHER

## 2020-06-26 ENCOUNTER — TELEPHONE (OUTPATIENT)
Dept: PRIMARY CARE CLINIC | Age: 85
End: 2020-06-26

## 2020-06-29 ENCOUNTER — VIRTUAL VISIT (OUTPATIENT)
Dept: PRIMARY CARE CLINIC | Age: 85
End: 2020-06-29
Payer: MEDICARE

## 2020-06-29 VITALS
HEART RATE: 69 BPM | SYSTOLIC BLOOD PRESSURE: 119 MMHG | BODY MASS INDEX: 18.46 KG/M2 | DIASTOLIC BLOOD PRESSURE: 86 MMHG | RESPIRATION RATE: 16 BRPM | WEIGHT: 125 LBS

## 2020-06-29 PROCEDURE — 1111F DSCHRG MED/CURRENT MED MERGE: CPT | Performed by: INTERNAL MEDICINE

## 2020-06-29 PROCEDURE — 99214 OFFICE O/P EST MOD 30 MIN: CPT | Performed by: INTERNAL MEDICINE

## 2020-06-29 PROCEDURE — G8427 DOCREV CUR MEDS BY ELIG CLIN: HCPCS | Performed by: INTERNAL MEDICINE

## 2020-06-29 PROCEDURE — 1090F PRES/ABSN URINE INCON ASSESS: CPT | Performed by: INTERNAL MEDICINE

## 2020-06-29 PROCEDURE — 4040F PNEUMOC VAC/ADMIN/RCVD: CPT | Performed by: INTERNAL MEDICINE

## 2020-06-29 PROCEDURE — 1123F ACP DISCUSS/DSCN MKR DOCD: CPT | Performed by: INTERNAL MEDICINE

## 2020-06-29 ASSESSMENT — ENCOUNTER SYMPTOMS
EYE PAIN: 0
NAUSEA: 1
EYE ITCHING: 0
DIARRHEA: 1
HEMATOCHEZIA: 0
BLOOD IN STOOL: 0
APNEA: 0
CHEST TIGHTNESS: 0
BELCHING: 1
VOMITING: 0
BACK PAIN: 0
CONSTIPATION: 0
FLATUS: 1
FACIAL SWELLING: 0
WATER BRASH: 0
GLOBUS SENSATION: 1
SORE THROAT: 0
RHINORRHEA: 0
SHORTNESS OF BREATH: 0
EYE DISCHARGE: 0
CHOKING: 0
TROUBLE SWALLOWING: 0
COUGH: 0
STRIDOR: 0
ALLERGIC/IMMUNOLOGIC COMMENTS: ALLERGY ON GLUTEN-FREE DIET
EYE REDNESS: 0
WHEEZING: 0
ABDOMINAL PAIN: 1
ANAL BLEEDING: 0

## 2020-06-29 NOTE — PROGRESS NOTES
one the next, then stopped an no more    Creon [Pancrelipase (Lip-Prot-Amyl)]      Swollen gums.  Eggs Or Egg-Derived Products      Hives  this test in the [employer] ([email or web address]). Egg White IgE 3. 59High   <=0.34 kU/L Final 02/10/2016  2:14 PM [facility]                  Gluten Meal      Abdominal pain and diarreha  Wheat IgE 0.53  <=0.34 kU/L Final 02/10/2016  2:14 PM [facility]       Milk-Related Compounds      Milk, Cow's IgE 1. 75High   <=0.34 kU/L Final 02/10/2016  2:14 PM [facility]    Ace Inhibitors      angioedema    Ciprocinonide [Fluocinolone]      Stiff, no nausea or rash    Dye [Iodides] Hives     Only reacts to dye that went into eyes, OK with IV contrast    Fluorescein     Lisinopril Swelling    Macrobid [Nitrofurantoin Monohydrate Macrocrystals]     No Known Allergies     Sulfa Antibiotics Rash         Review of Systems   Constitutional: Positive for fatigue. Negative for activity change, appetite change, chills, diaphoresis, fever, unexpected weight change and weight loss. Decreasing fatigue   HENT: Negative. Negative for congestion, ear discharge, ear pain, facial swelling, hearing loss, nosebleeds, postnasal drip, rhinorrhea, sneezing, sore throat and trouble swallowing. Graves Disease being followed by endocrinology. Eyes: Negative for pain, discharge, redness and itching. 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 Asthma, controlled with prn inhaler. Cardiovascular: Positive for chest pain. Negative for palpitations and leg swelling. Hypertension    Right chest and breast pain in area where she had shingles that remained after shingles resolved. Gastrointestinal: Positive for abdominal pain, diarrhea, flatus and nausea.  Negative for anal bleeding, anorexia, blood in stool,

## 2020-07-20 ENCOUNTER — TELEPHONE (OUTPATIENT)
Dept: PRIMARY CARE CLINIC | Age: 85
End: 2020-07-20

## 2020-07-20 NOTE — TELEPHONE ENCOUNTER
Naomy Patrick at 7/20/2020 12:41 PM     Status: Signed       Caller:wilbert  Hamilton: [phone]  Calling to find out if pt is supposed to stay on Eloquist.  The hospital had given it to her while she was there. If so, can you pls call in a new rx for her?  pls advise. Thank you! Let daughter know , patient should stay on eliquis and I sent a new script to the pharmacy and give patient appointment to see me this week.

## 2020-07-24 ENCOUNTER — TELEPHONE (OUTPATIENT)
Dept: PRIMARY CARE CLINIC | Age: 85
End: 2020-07-24

## 2020-07-27 NOTE — TELEPHONE ENCOUNTER
PA submitted via CMM for Eliquis 5MG tablets. Regulo LEACH Case ID: [id number]    APPROVED today per [facility]. Coverage Start Date:06/27/2020  Coverage End Date:07/27/2021    Please advise patient. Thank you.

## 2020-07-28 ENCOUNTER — OFFICE VISIT (OUTPATIENT)
Dept: PRIMARY CARE CLINIC | Age: 85
End: 2020-07-28
Payer: MEDICARE

## 2020-07-28 VITALS — TEMPERATURE: 98.1 F | SYSTOLIC BLOOD PRESSURE: 100 MMHG | HEART RATE: 60 BPM | DIASTOLIC BLOOD PRESSURE: 70 MMHG

## 2020-07-28 PROCEDURE — G8419 CALC BMI OUT NRM PARAM NOF/U: HCPCS | Performed by: INTERNAL MEDICINE

## 2020-07-28 PROCEDURE — 1036F TOBACCO NON-USER: CPT | Performed by: INTERNAL MEDICINE

## 2020-07-28 PROCEDURE — 1123F ACP DISCUSS/DSCN MKR DOCD: CPT | Performed by: INTERNAL MEDICINE

## 2020-07-28 PROCEDURE — G8427 DOCREV CUR MEDS BY ELIG CLIN: HCPCS | Performed by: INTERNAL MEDICINE

## 2020-07-28 PROCEDURE — 4040F PNEUMOC VAC/ADMIN/RCVD: CPT | Performed by: INTERNAL MEDICINE

## 2020-07-28 PROCEDURE — 1090F PRES/ABSN URINE INCON ASSESS: CPT | Performed by: INTERNAL MEDICINE

## 2020-07-28 PROCEDURE — 99214 OFFICE O/P EST MOD 30 MIN: CPT | Performed by: INTERNAL MEDICINE

## 2020-07-28 RX ORDER — POLYETHYLENE GLYCOL 3350 17 G/17G
17 POWDER, FOR SOLUTION ORAL 2 TIMES DAILY
Qty: 527 G | Refills: 11 | Status: SHIPPED | OUTPATIENT
Start: 2020-07-28 | End: 2020-09-14 | Stop reason: SDUPTHER

## 2020-07-28 RX ORDER — SYRINGE AND NEEDLE,INSULIN,1ML 25GX1"
1 SYRINGE, EMPTY DISPOSABLE MISCELLANEOUS
Qty: 100 EACH | Refills: 0 | Status: SHIPPED | OUTPATIENT
Start: 2020-07-28 | End: 2021-01-29 | Stop reason: SDUPTHER

## 2020-07-28 ASSESSMENT — ENCOUNTER SYMPTOMS
SORE THROAT: 0
SHORTNESS OF BREATH: 0
APNEA: 0
WHEEZING: 0
NAUSEA: 1
VOMITING: 0
COUGH: 0
TROUBLE SWALLOWING: 0
CHEST TIGHTNESS: 0
RHINORRHEA: 0
EYE ITCHING: 0
STRIDOR: 0
CONSTIPATION: 0
ALLERGIC/IMMUNOLOGIC COMMENTS: ALLERGY ON GLUTEN-FREE DIET
FACIAL SWELLING: 0
ANAL BLEEDING: 0
DIARRHEA: 1
ABDOMINAL PAIN: 1
EYE REDNESS: 0
CHOKING: 0
BLOOD IN STOOL: 0
EYE DISCHARGE: 0
EYE PAIN: 0
BACK PAIN: 0

## 2020-07-28 NOTE — PROGRESS NOTES
2020     Karoline Maldonado (:  1926) is a 80 y.o. female, here for evaluation of the following medical concerns:    Hypertension   This is a chronic problem. The current episode started more than 1 year ago. The problem has been waxing and waning since onset. The problem is controlled. Pertinent negatives include no anxiety, blurred vision, chest pain, headaches, malaise/fatigue, neck pain, orthopnea, palpitations, peripheral edema, PND, shortness of breath or sweats. There are no associated agents to hypertension. Risk factors for coronary artery disease include post-menopausal state. Past treatments include calcium channel blockers and beta blockers. The current treatment provides significant improvement. Compliance problems include medication side effects. There is no history of heart failure. Abdominal Pain   This is a chronic problem. The current episode started more than 1 year ago. The onset quality is sudden. The problem occurs daily. The problem has been waxing and waning. The pain is located in the generalized abdominal region. The pain is at a severity of 7/10. The pain is severe. The quality of the pain is aching and colicky. The abdominal pain does not radiate. Associated symptoms include diarrhea and nausea. Pertinent negatives include no arthralgias, constipation, dysuria, fever, frequency, headaches, hematuria, myalgias or vomiting. Nothing aggravates the pain. The pain is relieved by nothing. She has tried nothing for the symptoms. The treatment provided no relief. Prior diagnostic workup includes CT scan (CT in 2018. ). celiac's diseaes on gluten free diet. Pulmonary Embolus in 2020 on Eliquis 5 mg bid without bruising or signs of bleeding. She presents for evaluation and treatment of pulmonary emboli. Symptoms include none. .  Symptoms began in 2020 when presented to ER with chest pain,completely resolved since that time.  She denies constant cough, cough after hematuria, urgency and vaginal discharge. Musculoskeletal: Negative. Negative for arthralgias, back pain, joint swelling, myalgias, neck pain and neck stiffness. Multijoint osteoarthritis. Will receive cartilage injections in both knees with ortho , this month on 10/17/14. Osteoporosis treated with Genistin  ( i cool )not a candidate for oral bisphosphonates due to GERD with frequent exacerbations. Skin: Negative for pallor and rash. Allergic/Immunologic: Negative for environmental allergies and food allergies. Allergy on gluten-free diet   Neurological: Negative. Negative for dizziness, weakness, numbness and headaches. Still have a shingles pain but has not taken 100 mg gabapentin yet. Unfortunately when she had shingles she took 1 dose of valacyclovir and had nausea and discontinue. I gave her a low dose of Famvir but she could not tolerate that as well. The rash is now healed. Hematological: Negative. Psychiatric/Behavioral: Negative. Prior to Visit Medications    Medication Sig Taking?  Authorizing Provider   apixaban (ELIQUIS) 5 MG TABS tablet Take 1 tablet by mouth 2 times daily Yes Waldo Monzon MD   rifaximin (XIFAXAN) 550 MG tablet Take 1 tablet by mouth 2 times daily Yes Waldo Monzon MD   cyanocobalamin 1000 MCG/ML injection Inject 1 mL into the muscle every 30 days Yes Waldo Monzon MD   aspirin 81 MG EC tablet Take 81 mg by mouth daily Yes Historical Provider, MD   pantoprazole (PROTONIX) 40 MG tablet Take 40 mg by mouth 2 times daily Yes Historical Provider, MD   felodipine (PLENDIL) 2.5 MG extended release tablet Take 2.5 mg by mouth daily as needed (high blood pressure not controlled with other medications) Yes Historical Provider, MD   polyethylene glycol (GLYCOLAX) 17 g packet Take 17 g by mouth daily Yes Historical Provider, MD   Simethicone (GAS RELIEF) 180 MG CAPS Take by mouth Yes Historical Provider, MD spironolactone (ALDACTONE) 25 MG tablet TAKE 1 TABLET DAILY Yes Margie Zee MD   lidocaine (LIDODERM) 5 % Place 2 patches onto the skin daily 12 hours on, 12 hours off. Yes Margie Zee MD   albuterol sulfate (PROAIR RESPICLICK) 520 (90 Base) MCG/ACT aerosol powder inhalation Inhale 2 puffs into the lungs every 6 hours as needed for Wheezing or Shortness of Breath Yes Margie Zee MD   montelukast (SINGULAIR) 10 MG tablet TAKE 1 TABLET NIGHTLY Yes Margie Zee MD   budesonide-formoterol (SYMBICORT) 160-4.5 MCG/ACT AERO Inhale 2 puffs into the lungs 2 times daily Yes Margie Zee MD   atenolol (TENORMIN) 50 MG tablet TAKE 1 TABLET DAILY Yes Margie Zee MD   lidocaine (XYLOCAINE) 5 % ointment Apply topically three times a day, as needed. Yes Goyo Montero MD   methimazole (TAPAZOLE) 5 MG tablet Take 5 mg by mouth See Admin Instructions 1 tablet by mouth daily except for Sunday, Monday, Wednesday take 1.5 tablets. Yes Pete Butcher MD   fluticasone (FLONASE) 50 MCG/ACT nasal spray PLACE 1 SPRAY IN EACH NOSTRIL DAILY Yes Margie Zee MD   ammonium lactate (LAC-HYDRIN) 12 % lotion Apply topically daily. Yes Margie Zee MD   loratadine (CLARITIN) 10 MG tablet Take 1 tablet by mouth daily Yes Margie Zee MD   hydrocortisone (ANUSOL-HC) 25 MG suppository Place 1 suppository rectally 2 times daily as needed for Hemorrhoids Yes Margie Zee MD        Social History     Tobacco Use    Smoking status: Never Smoker    Smokeless tobacco: Never Used   Substance Use Topics    Alcohol use: No        Vitals:    07/28/20 1027   BP: 100/70   Pulse: 60   Temp: 98.1 °F (36.7 °C)   TempSrc: Oral     Estimated body mass index is 18.46 kg/m² as calculated from the following:    Height as of 6/21/20: 5' 9\" (1.753 m). Weight as of 6/29/20: 125 lb (56.7 kg).     Physical Exam  Vitals reviewed: underweight frail elderly woman.   Constitutional:       General: She is not in acute distress. Appearance: She is ill-appearing. She is not toxic-appearing or diaphoretic. HENT:      Head: Normocephalic and atraumatic. Right Ear: External ear normal.      Left Ear: External ear normal.      Nose: Nose normal.   Eyes:      General:         Right eye: No discharge. Left eye: No discharge. Extraocular Movements: Extraocular movements intact. Neck:      Musculoskeletal: Normal range of motion. No neck rigidity or muscular tenderness. Vascular: No carotid bruit. Comments: Patient observed to have normal range of motion of the neck and no neck masses per inspection. Cardiovascular:      Heart sounds: Normal heart sounds. Pulmonary:      Effort: Pulmonary effort is normal.      Breath sounds: Normal breath sounds. No stridor. No rhonchi. Chest:      Chest wall: No tenderness. Abdominal:      Comments: Denies pain with palpation of the abdomen. Musculoskeletal:      Right lower leg: No edema. Left lower leg: No edema. Lymphadenopathy:      Cervical: No cervical adenopathy. Skin:     General: Skin is warm and dry. Coloration: Skin is not jaundiced or pale. Findings: No bruising, erythema, lesion or rash. Comments: No skin abnormalities noted. Neurological:      General: No focal deficit present. Mental Status: She is alert and oriented to person, place, and time. Cranial Nerves: No cranial nerve deficit. Psychiatric:         Mood and Affect: Mood normal.         Behavior: Behavior normal.         Thought Content: Thought content normal.         Judgment: Judgment normal.         ASSESSMENT/PLAN:   Diagnosis Orders   1. Coagulopathy ([address] with PE in [place], small with no hemodynamic compromise. Stable on eliquis. Plan to continue until December of 2020. No signs of bleeding.  Patients aide accompanies her to visit and checks her stool, since patient has poor vision and

## 2020-07-29 ENCOUNTER — TELEPHONE (OUTPATIENT)
Dept: PRIMARY CARE CLINIC | Age: 85
End: 2020-07-29

## 2020-07-29 PROBLEM — I26.99 PULMONARY EMBOLISM AND INFARCTION (HCC): Status: RESOLVED | Noted: 2020-06-22 | Resolved: 2020-07-29

## 2020-07-29 PROBLEM — I34.0 MITRAL REGURGITATION: Status: ACTIVE | Noted: 2020-07-29

## 2020-07-29 PROBLEM — M19.90 OSTEOARTHROSIS: Status: ACTIVE | Noted: 2020-07-29

## 2020-07-29 PROBLEM — H35.369 DRUSEN (DEGENERATIVE) OF MACULA, UNSPECIFIED EYE: Status: ACTIVE | Noted: 2020-07-29

## 2020-07-29 PROBLEM — H16.212 EXPOSURE KERATOCONJUNCTIVITIS, LEFT EYE: Status: ACTIVE | Noted: 2020-07-29

## 2020-07-29 PROBLEM — H26.9 CATARACT: Status: ACTIVE | Noted: 2020-07-29

## 2020-07-29 PROBLEM — H35.363 DRUSEN (DEGENERATIVE) OF MACULA, BILATERAL: Status: ACTIVE | Noted: 2020-07-29

## 2020-07-29 PROBLEM — H54.3 BLINDNESS OF BOTH EYES: Status: ACTIVE | Noted: 2020-07-29

## 2020-07-29 PROBLEM — H35.352 CYSTOID MACULAR DEGENERATION, LEFT EYE: Status: ACTIVE | Noted: 2020-07-29

## 2020-07-29 PROBLEM — H43.813 VITREOUS DEGENERATION, BILATERAL: Status: ACTIVE | Noted: 2020-07-29

## 2020-07-29 ASSESSMENT — ENCOUNTER SYMPTOMS
ORTHOPNEA: 0
BLURRED VISION: 0

## 2020-07-29 NOTE — TELEPHONE ENCOUNTER
----- Message from Kimberly Nilsamarco sent at 7/29/2020  2:37 PM EDT -----  Subject: Refill Request    QUESTIONS  Name of Medication? cyanocobalamin 1000 MCG/ML injection  Patient-reported dosage and instructions? 1000 MCG/ML  How many days do you have left? 0  Preferred Pharmacy? [address] [facility] [phone] Shiela Ch[address]vez[address] [phone]  Pharmacy phone number (if available)? [phone]  Additional Information for Provider?   ---------------------------------------------------------------------------  --------------  [address]  What is the best way for the office to contact you? OK to leave message on   voicemail  Preferred Call Back Phone Number?  [phone]

## 2020-07-30 RX ORDER — CYANOCOBALAMIN 1000 UG/ML
1000 INJECTION INTRAMUSCULAR; SUBCUTANEOUS
Qty: 3 ML | Refills: 3 | Status: SHIPPED | OUTPATIENT
Start: 2020-07-30 | End: 2021-07-12

## 2020-08-03 ENCOUNTER — TELEPHONE (OUTPATIENT)
Dept: PRIMARY CARE CLINIC | Age: 85
End: 2020-08-03

## 2020-08-03 RX ORDER — SYRINGE AND NEEDLE,INSULIN,1ML 25GX1"
1 SYRINGE, EMPTY DISPOSABLE MISCELLANEOUS
Qty: 100 EACH | Refills: 0 | Status: CANCELLED | OUTPATIENT
Start: 2020-08-03

## 2020-08-03 RX ORDER — CYANOCOBALAMIN 1000 UG/ML
1000 INJECTION INTRAMUSCULAR; SUBCUTANEOUS
Qty: 3 ML | Refills: 3 | Status: CANCELLED | OUTPATIENT
Start: 2020-08-03

## 2020-08-03 NOTE — TELEPHONE ENCOUNTER
----- Message from Juan Harris sent at 8/3/2020 11:37 AM EDT -----  Subject: Message to Provider    QUESTIONS  Information for Provider? Pt needs to know if [facility]   scripts and B-12 ? callthem back about this.  ---------------------------------------------------------------------------  --------------  CALL BACK INFO  What is the best way for the office to contact you? OK to leave message on   voicemail  Preferred Call Back Phone Number? [phone]  ---------------------------------------------------------------------------  --------------  SCRIPT ANSWERS  Relationship to Patient? Guardian  Representative Name? daughter Marissa Humphrey   Is the Representative on the appropriate HIPAA document in Epic?  Yes

## 2020-08-03 NOTE — TELEPHONE ENCOUNTER
----- Message from Dior Raygoza sent at 8/3/2020 11:37 AM EDT -----  Subject: Message to Provider    QUESTIONS  Information for Provider? Pt needs to know if eliquis was sent to [facility]   scripts and B-12 ? callthem back about this.  ---------------------------------------------------------------------------  --------------  CALL BACK INFO  What is the best way for the office to contact you? OK to leave message on   voicemail  Preferred Call Back Phone Number? [phone]  ---------------------------------------------------------------------------  --------------  SCRIPT ANSWERS  Relationship to Patient? Guardian  Representative Name? daughter Kemal Cheek   Is the Representative on the appropriate HIPAA document in Epic?  Yes

## 2020-08-06 ENCOUNTER — HOSPITAL ENCOUNTER (OUTPATIENT)
Dept: CT IMAGING | Age: 85
Discharge: HOME | End: 2020-08-06
Payer: MEDICARE

## 2020-08-06 PROCEDURE — 74176 CT ABD & PELVIS W/O CONTRAST: CPT

## 2020-08-06 PROCEDURE — 6360000004 HC RX CONTRAST MEDICATION: Performed by: INTERNAL MEDICINE

## 2020-08-06 RX ADMIN — IOHEXOL 50 ML: 240 INJECTION, SOLUTION INTRATHECAL; INTRAVASCULAR; INTRAVENOUS; ORAL at 11:51

## 2020-08-07 ENCOUNTER — TELEPHONE (OUTPATIENT)
Dept: PRIMARY CARE CLINIC | Age: 85
End: 2020-08-07

## 2020-08-07 NOTE — TELEPHONE ENCOUNTER
----- Message from Lorena Martínez sent at 8/7/2020  3:27 PM EDT -----  Subject: Message to Provider    QUESTIONS  Information for Provider? Pt daughter asked can we fax over blood work   orders for pt to get the blood drawn by her home care nursing agency. This   is the fax number [phone] attention to rafy. Its [facility]  ---------------------------------------------------------------------------  --------------  CALL BACK INFO  What is the best way for the office to contact you? OK to leave message on   voicemail  Preferred Call Back Phone Number? [phone]  ---------------------------------------------------------------------------  --------------  SCRIPT ANSWERS  Relationship to Patient?  Self

## 2020-08-26 ENCOUNTER — TELEPHONE (OUTPATIENT)
Dept: PRIMARY CARE CLINIC | Age: 85
End: 2020-08-26

## 2020-08-26 NOTE — TELEPHONE ENCOUNTER
Would like the pending orders faxed or are there different orders? Te Smith MA at 8/26/2020  9:20 AM     Status: Signed       ----- Message from Paige Gomez sent at 8/26/2020  9:12 AM EDT -----  Subject: Message to Provider     QUESTIONS  Information for Provider? Please fax order for home health nurser to drawn   pt's blood at home. Fax? [phone]      Please print lab orders and fax to home care.

## 2020-08-26 NOTE — TELEPHONE ENCOUNTER
----- Message from Fain Carrel sent at 8/26/2020  9:12 AM EDT -----  Subject: Message to Provider    QUESTIONS  Information for Provider? Please fax order for home health nurser to drawn   pt's blood at home. Fax? [phone]  ---------------------------------------------------------------------------  --------------  Kary SALAZAR  What is the best way for the office to contact you? OK to leave message on   voicemail  Preferred Call Back Phone Number? [phone]  ---------------------------------------------------------------------------  --------------  SCRIPT ANSWERS  Relationship to Patient? Other  Representative Name? Enoc Mancia  Is the Representative on the appropriate HIPAA document in Epic?  Yes

## 2020-08-29 ENCOUNTER — HOSPITAL ENCOUNTER (EMERGENCY)
Age: 85
Discharge: HOME | End: 2020-08-29
Attending: EMERGENCY MEDICINE
Payer: MEDICARE

## 2020-08-29 ENCOUNTER — APPOINTMENT (OUTPATIENT)
Dept: GENERAL RADIOLOGY | Age: 85
End: 2020-08-29
Payer: MEDICARE

## 2020-08-29 ENCOUNTER — APPOINTMENT (OUTPATIENT)
Dept: CT IMAGING | Age: 85
End: 2020-08-29
Payer: MEDICARE

## 2020-08-29 VITALS
RESPIRATION RATE: 13 BRPM | BODY MASS INDEX: 18.51 KG/M2 | HEIGHT: 69 IN | OXYGEN SATURATION: 100 % | HEART RATE: 61 BPM | TEMPERATURE: 97.9 F | WEIGHT: 125 LBS | DIASTOLIC BLOOD PRESSURE: 64 MMHG | SYSTOLIC BLOOD PRESSURE: 118 MMHG

## 2020-08-29 LAB
ANION GAP SERPL CALCULATED.3IONS-SCNC: 10 MMOL/L (ref 3–16)
BASOPHILS ABSOLUTE: 0.1 K/UL (ref 0–0.2)
BASOPHILS RELATIVE PERCENT: 0.9 %
BUN BLDV-MCNC: 15 MG/DL (ref 7–20)
CALCIUM SERPL-MCNC: 10.5 MG/DL (ref 8.3–10.6)
CHLORIDE BLD-SCNC: 102 MMOL/L (ref 99–110)
CO2: 24 MMOL/L (ref 21–32)
CREAT SERPL-MCNC: 1.1 MG/DL (ref 0.6–1.2)
EKG ATRIAL RATE: 79 BPM
EKG DIAGNOSIS: NORMAL
EKG P AXIS: 67 DEGREES
EKG P-R INTERVAL: 266 MS
EKG Q-T INTERVAL: 394 MS
EKG QRS DURATION: 82 MS
EKG QTC CALCULATION (BAZETT): 451 MS
EKG R AXIS: -10 DEGREES
EKG T AXIS: 19 DEGREES
EKG VENTRICULAR RATE: 79 BPM
EOSINOPHILS ABSOLUTE: 0.2 K/UL (ref 0–0.6)
EOSINOPHILS RELATIVE PERCENT: 2.3 %
GFR AFRICAN AMERICAN: 56
GFR NON-AFRICAN AMERICAN: 46
GLUCOSE BLD-MCNC: 98 MG/DL (ref 70–99)
HCT VFR BLD CALC: 43.9 % (ref 36–48)
HEMOGLOBIN: 14.3 G/DL (ref 12–16)
LYMPHOCYTES ABSOLUTE: 3 K/UL (ref 1–5.1)
LYMPHOCYTES RELATIVE PERCENT: 32.2 %
MCH RBC QN AUTO: 27.8 PG (ref 26–34)
MCHC RBC AUTO-ENTMCNC: 32.6 G/DL (ref 31–36)
MCV RBC AUTO: 85.3 FL (ref 80–100)
MONOCYTES ABSOLUTE: 1.2 K/UL (ref 0–1.3)
MONOCYTES RELATIVE PERCENT: 12.8 %
NEUTROPHILS ABSOLUTE: 4.9 K/UL (ref 1.7–7.7)
NEUTROPHILS RELATIVE PERCENT: 51.8 %
PDW BLD-RTO: 15.2 % (ref 12.4–15.4)
PLATELET # BLD: 194 K/UL (ref 135–450)
PMV BLD AUTO: 9.1 FL (ref 5–10.5)
POTASSIUM REFLEX MAGNESIUM: 4.7 MMOL/L (ref 3.5–5.1)
PRO-BNP: 85 PG/ML (ref 0–449)
RBC # BLD: 5.14 M/UL (ref 4–5.2)
SODIUM BLD-SCNC: 136 MMOL/L (ref 136–145)
TROPONIN: <0.01 NG/ML
TROPONIN: <0.01 NG/ML
WBC # BLD: 9.4 K/UL (ref 4–11)

## 2020-08-29 PROCEDURE — 99285 EMERGENCY DEPT VISIT HI MDM: CPT

## 2020-08-29 PROCEDURE — 85025 COMPLETE CBC W/AUTO DIFF WBC: CPT

## 2020-08-29 PROCEDURE — 36415 COLL VENOUS BLD VENIPUNCTURE: CPT

## 2020-08-29 PROCEDURE — 6360000004 HC RX CONTRAST MEDICATION: Performed by: EMERGENCY MEDICINE

## 2020-08-29 PROCEDURE — 71275 CT ANGIOGRAPHY CHEST: CPT

## 2020-08-29 PROCEDURE — 93005 ELECTROCARDIOGRAM TRACING: CPT | Performed by: EMERGENCY MEDICINE

## 2020-08-29 PROCEDURE — 83880 ASSAY OF NATRIURETIC PEPTIDE: CPT

## 2020-08-29 PROCEDURE — 71045 X-RAY EXAM CHEST 1 VIEW: CPT

## 2020-08-29 PROCEDURE — 80048 BASIC METABOLIC PNL TOTAL CA: CPT

## 2020-08-29 PROCEDURE — 84484 ASSAY OF TROPONIN QUANT: CPT

## 2020-08-29 RX ADMIN — IOPAMIDOL 80 ML: 755 INJECTION, SOLUTION INTRAVENOUS at 06:57

## 2020-08-29 ASSESSMENT — PAIN DESCRIPTION - LOCATION: LOCATION: CHEST

## 2020-08-29 ASSESSMENT — PAIN DESCRIPTION - PAIN TYPE: TYPE: ACUTE PAIN

## 2020-08-29 ASSESSMENT — PAIN SCALES - GENERAL: PAINLEVEL_OUTOF10: 5

## 2020-08-29 NOTE — ED NOTES
father; Heart Disease in her mother; High Blood Pressure in her mother; Stroke in her mother. She reports that she has never smoked. She has never used smokeless tobacco. She reports that she does not drink alcohol or use drugs. Medications     Previous Medications    ALBUTEROL SULFATE (PROAIR RESPICLICK) 093 (90 BASE) MCG/ACT AEROSOL POWDER INHALATION    Inhale 2 puffs into the lungs every 6 hours as needed for Wheezing or Shortness of Breath    AMMONIUM LACTATE (LAC-HYDRIN) 12 % LOTION    Apply topically daily. APIXABAN (ELIQUIS) 5 MG TABS TABLET    Take 1 tablet by mouth 2 times daily    ASPIRIN 81 MG EC TABLET    Take 81 mg by mouth daily    ATENOLOL (TENORMIN) 50 MG TABLET    TAKE 1 TABLET DAILY    BUDESONIDE-FORMOTEROL (SYMBICORT) 160-4.5 MCG/ACT AERO    Inhale 2 puffs into the lungs 2 times daily    CYANOCOBALAMIN 1000 MCG/ML INJECTION    Inject 1 mL into the muscle every 30 days    FELODIPINE (PLENDIL) 2.5 MG EXTENDED RELEASE TABLET    Take 2.5 mg by mouth daily as needed (high blood pressure not controlled with other medications)    FLUTICASONE (FLONASE) 50 MCG/ACT NASAL SPRAY    PLACE 1 SPRAY IN EACH NOSTRIL DAILY    HYDROCORTISONE (ANUSOL-HC) 25 MG SUPPOSITORY    Place 1 suppository rectally 2 times daily as needed for Hemorrhoids    INSULIN SYRINGE-NEEDLE U-100 (B-D INSULIN SYRINGE 1CC/25G) 25G X 5/8\" 1 ML MISC    1 Syringe by Does not apply route every 30 days    LIDOCAINE (LIDODERM) 5 %    Place 2 patches onto the skin daily 12 hours on, 12 hours off. LIDOCAINE (XYLOCAINE) 5 % OINTMENT    Apply topically three times a day, as needed. LORATADINE (CLARITIN) 10 MG TABLET    Take 1 tablet by mouth daily    METHIMAZOLE (TAPAZOLE) 5 MG TABLET    Take 5 mg by mouth See Admin Instructions 1 tablet by mouth daily except for Sunday, Monday, Wednesday take 1.5 tablets.     MONTELUKAST (SINGULAIR) 10 MG TABLET    TAKE 1 TABLET NIGHTLY    PANTOPRAZOLE (PROTONIX) 40 MG TABLET    Take 40 mg by mouth 2 times daily    POLYETHYLENE GLYCOL (GLYCOLAX) 17 G PACKET    Take 17 g by mouth 2 times daily    RIFAXIMIN (XIFAXAN) 550 MG TABLET    Take 1 tablet by mouth 2 times daily    SIMETHICONE (GAS RELIEF) 180 MG CAPS    Take by mouth    SPIRONOLACTONE (ALDACTONE) 25 MG TABLET    TAKE 1 TABLET DAILY       Allergies     She is allergic to acyclovir; creon [pancrelipase (lip-prot-amyl)]; eggs or egg-derived products; gluten meal; milk-related compounds; ace inhibitors; ciprocinonide [fluocinolone]; dye [iodides]; fluorescein; lisinopril; macrobid [nitrofurantoin monohydrate macrocrystals]; no known allergies; and sulfa antibiotics. Physical Exam     INITIAL VITALS: BP: 131/68, Temp: 97.9 °F (36.6 °C), Pulse: 79, Resp: 15, SpO2: 99 %   Physical Exam  Constitutional:       Appearance: Normal appearance. HENT:      Head: Normocephalic and atraumatic. Eyes:      Conjunctiva/sclera: Conjunctivae normal.   Neck:      Musculoskeletal: Normal range of motion. Cardiovascular:      Rate and Rhythm: Normal rate and regular rhythm. Pulmonary:      Effort: Pulmonary effort is normal. No respiratory distress. Breath sounds: No wheezing or rales. Chest:      Chest wall: No tenderness. Abdominal:      General: Abdomen is flat. There is no distension. Palpations: Abdomen is soft. Tenderness: There is no abdominal tenderness. There is no guarding. Musculoskeletal:         General: No swelling or tenderness. Right lower leg: No edema. Left lower leg: No edema. Skin:     General: Skin is warm and dry. Neurological:      General: No focal deficit present. Mental Status: She is alert and oriented to person, place, and time. Mental status is at baseline. Psychiatric:         Mood and Affect: Mood normal.         Diagnostic Results     EKG   Sinus rhythm, rate 79 bpm, SC interval 266, QRS 82 ms, QTc 451. First-degree AV block. No acute ST or T wave injuries are noted.     RADIOLOGY:  CTA PULMONARY W CONTRAST   Final Result      No evidence of pulmonary embolism or acute intrathoracic abnormality. Large multinodular goiter. XR CHEST PORTABLE   Final Result      1. Mild bibasilar atelectasis otherwise clear lungs. LABS:   Results for orders placed or performed during the hospital encounter of 08/29/20   CBC auto differential   Result Value Ref Range    WBC 9.4 4.0 - 11.0 K/uL    RBC 5.14 4.00 - 5.20 M/uL    Hemoglobin 14.3 12.0 - 16.0 g/dL    Hematocrit 43.9 36.0 - 48.0 %    MCV 85.3 80.0 - 100.0 fL    MCH 27.8 26.0 - 34.0 pg    MCHC 32.6 31.0 - 36.0 g/dL    RDW 15.2 12.4 - 15.4 %    Platelets 614 418 - 680 K/uL    MPV 9.1 5.0 - 10.5 fL    Neutrophils % 51.8 %    Lymphocytes % 32.2 %    Monocytes % 12.8 %    Eosinophils % 2.3 %    Basophils % 0.9 %    Neutrophils Absolute 4.9 1.7 - 7.7 K/uL    Lymphocytes Absolute 3.0 1.0 - 5.1 K/uL    Monocytes Absolute 1.2 0.0 - 1.3 K/uL    Eosinophils Absolute 0.2 0.0 - 0.6 K/uL    Basophils Absolute 0.1 0.0 - 0.2 K/uL   Basic Metabolic Panel w/ Reflex to MG   Result Value Ref Range    Sodium 136 136 - 145 mmol/L    Potassium reflex Magnesium 4.7 3.5 - 5.1 mmol/L    Chloride 102 99 - 110 mmol/L    CO2 24 21 - 32 mmol/L    Anion Gap 10 3 - 16    Glucose 98 70 - 99 mg/dL    BUN 15 7 - 20 mg/dL    CREATININE 1.1 0.6 - 1.2 mg/dL    GFR Non- 46 (A) >60    GFR  56 (A) >60    Calcium 10.5 8.3 - 10.6 mg/dL   Troponin   Result Value Ref Range    Troponin <0.01 <0.01 ng/mL   Brain Natriuretic Peptide   Result Value Ref Range    Pro-BNP 85 0 - 449 pg/mL   EKG 12 Lead   Result Value Ref Range    Ventricular Rate 79 BPM    Atrial Rate 79 BPM    P-R Interval 266 ms    QRS Duration 82 ms    Q-T Interval 394 ms    QTc Calculation (Bazett) 451 ms    P Axis 67 degrees    R Axis -10 degrees    T Axis 19 degrees    Diagnosis       EKG performed in ER and to be interpreted by ER physician. Confirmed by MD, ER ([id number]),  Deonna Hidalgo, Kartik Suh ([id number]) on 8/29/2020 6:41:59 AM       ED BEDSIDE ULTRASOUND:      RECENT VITALS:  BP: 115/61,Temp: 97.9 °F (36.6 °C), Pulse: 61, Resp: 13, SpO2: 97 %     Procedures         ED Course     Nursing Notes, Past Medical Hx, Past Surgical Hx, Social Hx,Allergies, and Family Hx were reviewed. patient was given the following medications:  Orders Placed This Encounter   Medications    iopamidol (ISOVUE-370) 76 % injection 80 mL       CONSULTS:  None    MEDICAL DECISIONMAKING / ASSESSMENT / Chip Bob is a 80 y.o. female Who presents with left-sided chest pain. She reports that the symptoms have largely resolved by this point. She was hemodynamically stable, and in sinus rhythm, not requiring oxygen. Basic lab work-up thus far has demonstrated negative troponin, stable BNP, and CTPA demonstrates no obvious pulmonary edema or pulmonary embolism. Will obtain 90-minute troponin for further re-stratification. Her heart score is low to moderate risk at a 4 on my calculation based primarily on age and risk factors, though her story of nonexertional pleuritic type chest pain which resolved spontaneously with negative troponins thus far might lean against ACS of the likely cause. .  We will discuss options with the patient after a stratification. At shift change I am handing the patient over to the oncoming physician who will follow-up on the results of her further testing. Clinical Impression     No diagnosis found. Disposition     PATIENT REFERRED TO:  No follow-up provider specified.     DISCHARGE MEDICATIONS:  New Prescriptions    No medications on file       DISPOSITION           Branden Bella MD  08/29/20 0721

## 2020-08-29 NOTE — ED NOTES
Patient assisted to bedside commode. Patient unsteady. Patient now back in bed, lowest position. Bed rails up x 2. Call light within reach. Another warm blanket provided.       Sawyer Gilliam RN  08/29/20 1386

## 2020-08-29 NOTE — ED NOTES
Pt up to bedside commode. Pt dressed in her PJs, so pt was taken off monitor per pt request. Pt updated on POC. Awaiting call from niece to take pt home. Will continue to monitor.        Karlos May RN  08/29/20 1243

## 2020-08-29 NOTE — ED PROVIDER NOTES
department immediately if she experiences return of chest pain, shortness of breath, or any other concerning changes in her symptoms. Patient agrees with plan, all questions answered. Clinical Impression     1.  Chest pain, unspecified type        Disposition     PATIENT REFERRED TO:  [facility] Emergency Department  [address]  Go to   If symptoms worsen    Adrian Jones MD  [address]  [phone]    Schedule an appointment as soon as possible for a visit         DISCHARGE MEDICATIONS:  New Prescriptions    No medications on file       DISPOSITION Decision To Discharge 08/29/2020 09:34:14 AM       Faizan Mckeon MD  08/29/20 [id number]

## 2020-08-29 NOTE — ED PROVIDER NOTES
[address]      ATTENDING PHYSICIAN NOTE   Date of evaluation: 8/29/2020   Chief Complaint   Chest Pain (x 2 hours ) and Headache   History of Present Illness   Karli Avila is a 80 y.o. female who presents with complaint of chest pain. She localizes to the left side of her chest, going from approximately the   Midclavicular line out to the mid axillary line. She says it is worse with deep breathing. It came on at rest, and she associates it with a feeling of discomfort in her stomach which she has had previously and attributes to a \"bacterial overgrowth\". Denies any alleviating or aggravating factors otherwise. Its not associated with shortness of breath, nausea, or kyle abdominal pain she says. No leg swelling. Was feeling well today before the onset of chest pain. She does say that it feels similar to when she was recently diagnosed with a PE and discomfort she was having, though says that the PE felt much more sharp. She has been taking her anticoagulation as prescribed reportedly. 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 She has a past surgical history that includes Cholecystectomy; Colonoscopy (10/29/2009); Hemorrhoid surgery; and Hysterectomy (in 40's).    Her family history includes Cancer in her father; Heart Disease in her mother; High Blood Pressure in her mother; Stroke in her mother. She reports that she has never smoked. She has never used smokeless tobacco. She reports that she does not drink alcohol or use drugs. Medications          Previous Medications    ALBUTEROL SULFATE (PROAIR RESPICLICK) 405 (90 BASE) MCG/ACT AEROSOL POWDER INHALATION Inhale 2 puffs into the lungs every 6 hours as needed for Wheezing or Shortness of Breath    AMMONIUM LACTATE (LAC-HYDRIN) 12 % LOTION Apply topically daily. APIXABAN (ELIQUIS) 5 MG TABS TABLET Take 1 tablet by mouth 2 times daily    ASPIRIN 81 MG EC TABLET Take 81 mg by mouth daily    ATENOLOL (TENORMIN) 50 MG TABLET TAKE 1 TABLET DAILY    BUDESONIDE-FORMOTEROL (SYMBICORT) 160-4.5 MCG/ACT AERO Inhale 2 puffs into the lungs 2 times daily    CYANOCOBALAMIN 1000 MCG/ML INJECTION Inject 1 mL into the muscle every 30 days    FELODIPINE (PLENDIL) 2.5 MG EXTENDED RELEASE TABLET Take 2.5 mg by mouth daily as needed (high blood pressure not controlled with other medications)    FLUTICASONE (FLONASE) 50 MCG/ACT NASAL SPRAY PLACE 1 SPRAY IN EACH NOSTRIL DAILY    HYDROCORTISONE (ANUSOL-HC) 25 MG SUPPOSITORY Place 1 suppository rectally 2 times daily as needed for Hemorrhoids    INSULIN SYRINGE-NEEDLE U-100 (B-D INSULIN SYRINGE 1CC/25G) 25G X 5/8\" 1 ML MISC 1 Syringe by Does not apply route every 30 days    LIDOCAINE (LIDODERM) 5 % Place 2 patches onto the skin daily 12 hours on, 12 hours off. LIDOCAINE (XYLOCAINE) 5 % OINTMENT Apply topically three times a day, as needed. LORATADINE (CLARITIN) 10 MG TABLET Take 1 tablet by mouth daily    METHIMAZOLE (TAPAZOLE) 5 MG TABLET Take 5 mg by mouth See Admin Instructions 1 tablet by mouth daily except for Sunday, Monday, Wednesday take 1.5 tablets.     MONTELUKAST (SINGULAIR) 10 MG TABLET TAKE 1 TABLET NIGHTLY    PANTOPRAZOLE (PROTONIX) 40 MG TABLET Take 40 mg by mouth 2 times daily    POLYETHYLENE GLYCOL (GLYCOLAX) 17 G PACKET Take 17 g by mouth 2 times daily    RIFAXIMIN (XIFAXAN) 550 MG TABLET Take 1 tablet by mouth 2 times daily    SIMETHICONE (GAS RELIEF) 180 MG CAPS Take by mouth    SPIRONOLACTONE (ALDACTONE) 25 MG TABLET TAKE 1 TABLET DAILY     Allergies   She is allergic to acyclovir; creon [pancrelipase (lip-prot-amyl)]; eggs or egg-derived products; gluten meal; milk-related compounds; ace inhibitors; ciprocinonide [fluocinolone]; dye [iodides]; fluorescein; lisinopril; macrobid [nitrofurantoin monohydrate macrocrystals]; no known allergies; and sulfa antibiotics. Physical Exam   INITIAL VITALS: BP: 131/68, Temp: 97.9 °F (36.6 °C), Pulse: 79, Resp: 15, SpO2: 99 %   Physical Exam   Constitutional:   Appearance: Normal appearance. HENT:   Head: Normocephalic and atraumatic. Eyes:   Conjunctiva/sclera: Conjunctivae normal.   Neck:   Musculoskeletal: Normal range of motion. Cardiovascular:   Rate and Rhythm: Normal rate and regular rhythm. Pulmonary:   Effort: Pulmonary effort is normal. No respiratory distress. Breath sounds: No wheezing or rales. Chest:   Chest wall: No tenderness. Abdominal:   General: Abdomen is flat. There is no distension. Palpations: Abdomen is soft. Tenderness: There is no abdominal tenderness. There is no guarding. Musculoskeletal:   General: No swelling or tenderness. Right lower leg: No edema. Left lower leg: No edema. Skin:   General: Skin is warm and dry. Neurological:   General: No focal deficit present. Mental Status: She is alert and oriented to person, place, and time. Mental status is at baseline. Psychiatric:   Mood and Affect: Mood normal.     Diagnostic Results   EKG   Sinus rhythm, rate 79 bpm, IL interval 266, QRS 82 ms, QTc 451. First-degree AV block. No acute ST or T wave injuries are noted. RADIOLOGY:   CTA PULMONARY W CONTRAST   Final Result      No evidence of pulmonary embolism or acute intrathoracic abnormality. Large multinodular goiter.          XR CHEST PORTABLE Final Result      1. Mild bibasilar atelectasis otherwise clear lungs. LABS:          Results for orders placed or performed during the hospital encounter of 08/29/20   CBC auto differential   Result Value Ref Range    WBC 9.4 4.0 - 11.0 K/uL    RBC 5.14 4.00 - 5.20 M/uL    Hemoglobin 14.3 12.0 - 16.0 g/dL    Hematocrit 43.9 36.0 - 48.0 %    MCV 85.3 80.0 - 100.0 fL    MCH 27.8 26.0 - 34.0 pg    MCHC 32.6 31.0 - 36.0 g/dL    RDW 15.2 12.4 - 15.4 %    Platelets 834 257 - 904 K/uL    MPV 9.1 5.0 - 10.5 fL    Neutrophils % 51.8 %    Lymphocytes % 32.2 %    Monocytes % 12.8 %    Eosinophils % 2.3 %    Basophils % 0.9 %    Neutrophils Absolute 4.9 1.7 - 7.7 K/uL    Lymphocytes Absolute 3.0 1.0 - 5.1 K/uL    Monocytes Absolute 1.2 0.0 - 1.3 K/uL    Eosinophils Absolute 0.2 0.0 - 0.6 K/uL    Basophils Absolute 0.1 0.0 - 0.2 K/uL   Basic Metabolic Panel w/ Reflex to MG   Result Value Ref Range    Sodium 136 136 - 145 mmol/L    Potassium reflex Magnesium 4.7 3.5 - 5.1 mmol/L    Chloride 102 99 - 110 mmol/L    CO2 24 21 - 32 mmol/L    Anion Gap 10 3 - 16    Glucose 98 70 - 99 mg/dL    BUN 15 7 - 20 mg/dL    CREATININE 1.1 0.6 - 1.2 mg/dL    GFR Non- 46 (A) >60    GFR  56 (A) >60    Calcium 10.5 8.3 - 10.6 mg/dL   Troponin   Result Value Ref Range    Troponin <0.01 <0.01 ng/mL   Brain Natriuretic Peptide   Result Value Ref Range    Pro-BNP 85 0 - 449 pg/mL   EKG 12 Lead   Result Value Ref Range    Ventricular Rate 79 BPM    Atrial Rate 79 BPM    P-R Interval 266 ms    QRS Duration 82 ms    Q-T Interval 394 ms    QTc Calculation (Bazett) 451 ms    P Axis 67 degrees    R Axis -10 degrees    T Axis 19 degrees    Diagnosis       EKG performed in ER and to be interpreted by ER physician. Confirmed by MD, ER ([id number]),  Jessica Jean ([id number]) on 8/29/2020 6:41:59 AM   ED BEDSIDE ULTRASOUND:   RECENT VITALS: BP: 115/61,Temp: 97.9 °F (36.6 °C), Pulse: 61, Resp: 13, SpO2: 97 %   Procedures     ED

## 2020-08-31 ENCOUNTER — CARE COORDINATION (OUTPATIENT)
Dept: CARE COORDINATION | Age: 85
End: 2020-08-31

## 2020-08-31 NOTE — CARE COORDINATION
Unable to reach pt at home or alternate number. No HIPAA to reference for pt-approved alternate contact. Will reattempt home number again at later time.

## 2020-09-10 ENCOUNTER — TELEPHONE (OUTPATIENT)
Dept: PRIMARY CARE CLINIC | Age: 85
End: 2020-09-10

## 2020-09-10 NOTE — TELEPHONE ENCOUNTER
----- Message from [facility] sent at 9/9/2020 12:29 PM EDT -----  Subject: Message to Provider    QUESTIONS  Information for Provider? Pts. daughter is requesting rx for overnight   pads  wipes and panty liners. but mainly overnight pads.  [facility] fax   [phone]   ---------------------------------------------------------------------------  --------------  Jen SALAZAR  What is the best way for the office to contact you? OK to leave message on   voicemail  Preferred Call Back Phone Number? [phone]  ---------------------------------------------------------------------------  --------------  SCRIPT ANSWERS  Relationship to Patient? Other  Representative Name? Valentino Line  Is the Representative on the appropriate HIPAA document in Epic?  Yes

## 2020-09-14 ENCOUNTER — OFFICE VISIT (OUTPATIENT)
Dept: PRIMARY CARE CLINIC | Age: 85
End: 2020-09-14
Payer: COMMERCIAL

## 2020-09-14 VITALS
RESPIRATION RATE: 16 BRPM | DIASTOLIC BLOOD PRESSURE: 93 MMHG | SYSTOLIC BLOOD PRESSURE: 169 MMHG | TEMPERATURE: 97.6 F | HEART RATE: 61 BPM

## 2020-09-14 PROCEDURE — 1090F PRES/ABSN URINE INCON ASSESS: CPT | Performed by: INTERNAL MEDICINE

## 2020-09-14 PROCEDURE — 1123F ACP DISCUSS/DSCN MKR DOCD: CPT | Performed by: INTERNAL MEDICINE

## 2020-09-14 PROCEDURE — 4040F PNEUMOC VAC/ADMIN/RCVD: CPT | Performed by: INTERNAL MEDICINE

## 2020-09-14 PROCEDURE — 99214 OFFICE O/P EST MOD 30 MIN: CPT | Performed by: INTERNAL MEDICINE

## 2020-09-14 PROCEDURE — G8427 DOCREV CUR MEDS BY ELIG CLIN: HCPCS | Performed by: INTERNAL MEDICINE

## 2020-09-14 RX ORDER — POLYETHYLENE GLYCOL 3350 17 G/17G
17 POWDER, FOR SOLUTION ORAL 2 TIMES DAILY
Qty: 100 EACH | Refills: 5 | Status: SHIPPED | OUTPATIENT
Start: 2020-09-14 | End: 2020-10-13 | Stop reason: SDUPTHER

## 2020-09-14 RX ORDER — HYDROCORTISONE ACETATE 25 MG/1
25 SUPPOSITORY RECTAL EVERY 12 HOURS
Qty: 24 SUPPOSITORY | Refills: 0 | Status: SHIPPED | OUTPATIENT
Start: 2020-09-14 | End: 2021-01-29 | Stop reason: SDUPTHER

## 2020-09-14 RX ORDER — PANTOPRAZOLE SODIUM 40 MG/1
40 TABLET, DELAYED RELEASE ORAL 2 TIMES DAILY
Qty: 60 TABLET | Refills: 5 | Status: SHIPPED | OUTPATIENT
Start: 2020-09-14 | End: 2021-01-29 | Stop reason: SDUPTHER

## 2020-09-14 RX ORDER — FELODIPINE 2.5 MG/1
2.5 TABLET, EXTENDED RELEASE ORAL DAILY
Qty: 30 TABLET | Refills: 5 | Status: SHIPPED | OUTPATIENT
Start: 2020-09-14 | End: 2020-10-20 | Stop reason: SDUPTHER

## 2020-09-14 RX ORDER — FELODIPINE 2.5 MG/1
2.5 TABLET, EXTENDED RELEASE ORAL DAILY
Qty: 30 TABLET | Refills: 5 | Status: SHIPPED | OUTPATIENT
Start: 2020-09-14 | End: 2020-09-14 | Stop reason: SDUPTHER

## 2020-09-14 NOTE — PROGRESS NOTES
2020     Aurelia Barrera (:  1926) is a 80 y.o. female, here for evaluation of the following medical concerns:    HPI  Follow up for ER visit. Hypertension, as not been well controlled. Patient was not taking felodipine 2.5 mg daily but is taking Spironolactone 25 mg daily. No leg edema orthopnea or shortness of breath. Intermittent atypical chest pain. No orthopnea. Hypertension present for years. Lab Results   Component Value Date    CREATININE 1.1 2020    BUN 15 2020     2020    K 4.7 2020     2020    CO2 24 2020       Lab Results   Component Value Date    WBC 9.4 2020    HGB 14.3 2020    HCT 43.9 2020    MCV 85.3 2020     2020     Radiation Dose Estimates     No radiation information found for this patient    Narrative    STUDY DATE:  2020 6:56 AM    EXAM: CTA PULMONARY W CONTRAST         INDICATION: left sided chest pain, hx PE    COMPARISON: 2020    TECHNIQUE: Spiral CT of the chest with multiplanar reformatted images and axial maximum intensity projection images provided for review. Axial and coronal MIP images were obtained.  Individualized dose optimization technique was used in order to meet    ALARA standards for radiation dose reduction.  In addition to vendor specific dose reduction algorithms, the dose reduction techniques vary based on the specific scanner utilized but frequently include automated exposure control, adjustment of the mA    and/or kV according to patient size, and use of iterative reconstruction technique. CONTRAST: 80 mL Isovue-370 intravenous         FINDINGS:          images: No additional abnormality.         PULMONARY ARTERIES:    Adequacy of evaluation: Adequate    Pulmonary arteries: Normal. No right heart strain.         OTHER STRUCTURES:    Airways: Mild rightward deviation the trachea secondary to a large multinodular goiter.  Central airways patent.   Lungs: Minimal biapical pleural and parenchymal scarring. Mild emphysema. Minimal scattered atelectasis and/or scarring. No consolidation. Nodules: Stable 4 mm nodular groundglass opacity along the right major fissure in the right middle lobe (series 601, image 87). Pleura: No pleural effusions or significant pleural thickening.         Heart and great vessels: Cardiomegaly.  Mild aortic calcifications. Other mediastinal structures and lower neck: Significantly enlarged multinodular goiter    Adenopathy: None.         Chest wall: No significant abnormality. Osseous structures: No significant abnormality. Upper abdomen: Asymmetric geographic enhancement left hepatic lobe with atrophy, likely perfusion related. There is mild intrahepatic biliary ductal dilatation within the left hepatic lobe as well. Right adrenal nodules are partially visualized but    unchanged. Small hiatal hernia.              Impression         No evidence of pulmonary embolism or acute intrathoracic abnormality.         Large multinodular goiter.             Review of Systems   Constitutional: Positive for fatigue. Negative for activity change, appetite change, chills, diaphoresis, fever and unexpected weight change. Decreasing fatigue   HENT: Negative. Negative for congestion, ear discharge, ear pain, facial swelling, hearing loss, nosebleeds, postnasal drip, rhinorrhea, sneezing, sore throat and trouble swallowing. Graves Disease being followed by endocrinology. Eyes: Negative for pain, discharge, redness and itching. 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 Asthma, controlled with prn inhaler. Cardiovascular: Positive for chest pain. Negative for palpitations and leg swelling.         Hypertension    Right chest and breast pain in area where she had shingles that remained after shingles resolved. Gastrointestinal: Positive for abdominal pain, constipation, diarrhea and nausea. Negative for anal bleeding, blood in stool and vomiting. IBS and GERD with intermittent pain.   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 Endocrine: Negative. Primary hyperparathyroidism, Graves' disease   Genitourinary: Positive for vaginal pain. Negative for dysuria, frequency, hematuria, urgency and vaginal discharge. Musculoskeletal: Negative. Negative for arthralgias, back pain, joint swelling, myalgias, neck pain and neck stiffness. Multijoint osteoarthritis. Will receive cartilage injections in both knees with ortho , this month on 10/17/14. Osteoporosis treated with Genistin  ( i cool )not a candidate for oral bisphosphonates due to GERD with frequent exacerbations. Skin: Negative for pallor and rash. Allergic/Immunologic: Negative for environmental allergies and food allergies. Allergy on gluten-free diet   Neurological: Negative. Negative for dizziness, weakness, numbness and headaches. Still have a shingles pain but has not taken 100 mg gabapentin yet. Unfortunately when she had shingles she took 1 dose of valacyclovir and had nausea and discontinue. I gave her a low dose of Famvir but she could not tolerate that as well. The rash is now healed. Hematological: Negative. Psychiatric/Behavioral: Negative. Prior to Visit Medications    Medication Sig Taking?  Authorizing Provider   apixaban (ELIQUIS) 5 MG TABS tablet Take 1 tablet by mouth 2 times daily  Mesha Palafox MD   cyanocobalamin 1000 MCG/ML injection Inject 1 mL into the muscle every 30 days  Mesha Palafox MD   polyethylene glycol (GLYCOLAX) 17 g packet Take 17 g by mouth 2 times daily  Mesha Palafox MD   rifaximin (XIFAXAN) 550 MG tablet Take 1 tablet by mouth 2 times daily  Mesha Palafox MD   Insulin Syringe-Needle U-100 (B-D INSULIN SYRINGE 1CC/25G) 25G X 5/8\" 1 ML MISC 1 Syringe by Does not apply route every 30 days  Cruz Hatch MD   aspirin 81 MG EC tablet Take 81 mg by mouth daily  Historical Provider, MD   pantoprazole (PROTONIX) 40 MG tablet Take 40 mg by mouth 2 times daily  Historical Provider, MD   felodipine (PLENDIL) 2.5 MG extended release tablet Take 2.5 mg by mouth daily as needed (high blood pressure not controlled with other medications)  Historical Provider, MD   Simethicone (GAS RELIEF) 180 MG CAPS Take by mouth  Historical Provider, MD   spironolactone (ALDACTONE) 25 MG tablet TAKE 1 TABLET DAILY  Cruz Hatch MD   lidocaine (LIDODERM) 5 % Place 2 patches onto the skin daily 12 hours on, 12 hours off. Cruz Hatch MD   albuterol sulfate (PROAIR RESPICLICK) 183 (90 Base) MCG/ACT aerosol powder inhalation Inhale 2 puffs into the lungs every 6 hours as needed for Wheezing or Shortness of Breath  Cruz Hatch MD   montelukast (SINGULAIR) 10 MG tablet TAKE 1 TABLET NIGHTLY  Cruz Hatch MD   budesonide-formoterol (SYMBICORT) 160-4.5 MCG/ACT AERO Inhale 2 puffs into the lungs 2 times daily  Cruz Hatch MD   atenolol (TENORMIN) 50 MG tablet TAKE 1 TABLET DAILY  Cruz Hatch MD   lidocaine (XYLOCAINE) 5 % ointment Apply topically three times a day, as needed. Gabrielle Valdovinos MD   methimazole (TAPAZOLE) 5 MG tablet Take 5 mg by mouth See Admin Instructions 1 tablet by mouth daily except for Sunday, Monday, Wednesday take 1.5 tablets. Historical Provider, MD   fluticasone (FLONASE) 50 MCG/ACT nasal spray PLACE 1 SPRAY IN EACH NOSTRIL DAILY  Cruz Hatch MD   ammonium lactate (LAC-HYDRIN) 12 % lotion Apply topically daily.   Cruz Hatch MD   loratadine (CLARITIN) 10 MG tablet Take 1 tablet by mouth daily  Cruz Hatch MD   hydrocortisone (ANUSOL-HC) 25 MG suppository Place 1 suppository rectally 2 times daily as needed for Hemorrhoids  Aren Pfeiffer MD        Social History     Tobacco Use    Smoking status: Never Smoker    Smokeless tobacco: Never Used   Substance Use Topics    Alcohol use: No        There were no vitals filed for this visit. Estimated body mass index is 18.46 kg/m² as calculated from the following:    Height as of 8/29/20: 5' 9\" (1.753 m). Weight as of 8/29/20: 125 lb (56.7 kg). Physical Exam  Constitutional:       General: She is not in acute distress. Appearance: She is not toxic-appearing. Comments: Patient appears underweight   HENT:      Head: Normocephalic and atraumatic. Nose: Nose normal.   Neck:      Comments: Normal range of motion of the neck was observed and no appearance of neck  masses  Pulmonary:      Effort: Pulmonary effort is normal.   Musculoskeletal:         General: No swelling. Right lower leg: No edema. Left lower leg: No edema. Comments: No swelling of the legs observed. Neurological:      Mental Status: She is alert. ASSESSMENT/PLAN:     Diagnosis Orders   1. Atrophic vaginitis symptoms, will refer to GYN for further evaluation. Naseem Barrios MD, Gynecology, [facility]   2. Essential hypertension not controlled due to noncompliance with felodipine. Prescription refill and patient will take regularly. She will hold it systolic pressure is 823 or below. Monitor blood pressure daily. felodipine (PLENDIL) 2.5 MG extended release tablet    DISCONTINUED: felodipine (PLENDIL) 2.5 MG extended release tablet   3. Rectal pain: G.I. referral given for anoscopic exam in the office. Maria C Vela MD, Gastroenterology, [facility]   4. Chest pain, unspecified type with no shortness of breath. History of PE on Eliquis. No signs of bleeding. Farhat Grijalva MD, Cardiology, [facility]   5. Irritable bowel syndrome with both constipation and diarrhea not taking fiber supplement will start daily. polyethylene glycol (GLYCOLAX) 17 g packet       Chaz Gonzales is a 80 y.o. female being evaluated by a Virtual Visit (video visit) encounter to address concerns as mentioned above. A caregiver was present when appropriate. Due to this being a TeleHealth encounter (During LDYCD-94 public health emergency), evaluation of the following organ systems was limited: Vitals/Constitutional/EENT/Resp/CV/GI//MS/Neuro/Skin/Heme-Lymph-Imm. Pursuant to the emergency declaration under the [address] [place] [address] authority and the [employer] Act, this Virtual Visit was conducted with patient's (and/or legal guardian's) consent, to reduce the patient's risk of exposure to COVID-19 and provide necessary medical care. The patient (and/or legal guardian) has also been advised to contact this office for worsening conditions or problems, and seek emergency medical treatment and/or call 911 if deemed necessary. Patient identification was verified at the start of the visit: Yes    Total time spent for this encounter: 25 minutes. Services were provided through a video synchronous discussion virtually to substitute for in-person clinic visit. Patient and provider were located at their individual homes. --Pola Bazan MD on 9/14/2020 at 12:20 PM    An electronic signature was used to authenticate this note. An electronic signature was used to authenticate this note.     --Pola Bazan MD on 9/14/2020 at 12:00 PM

## 2020-09-17 ENCOUNTER — TELEPHONE (OUTPATIENT)
Dept: GYNECOLOGY | Age: 85
End: 2020-09-17

## 2020-09-17 ASSESSMENT — ENCOUNTER SYMPTOMS
EYE DISCHARGE: 0
CHEST TIGHTNESS: 0
STRIDOR: 0
BLOOD IN STOOL: 0
SORE THROAT: 0
DIARRHEA: 1
TROUBLE SWALLOWING: 0
WHEEZING: 0
EYE PAIN: 0
FACIAL SWELLING: 0
EYE ITCHING: 0
BACK PAIN: 0
ABDOMINAL PAIN: 1
CHOKING: 0
ANAL BLEEDING: 0
EYE REDNESS: 0
COUGH: 0
SHORTNESS OF BREATH: 0
CONSTIPATION: 1
APNEA: 0
RHINORRHEA: 0
NAUSEA: 1
VOMITING: 0
ALLERGIC/IMMUNOLOGIC COMMENTS: ALLERGY ON GLUTEN-FREE DIET

## 2020-09-17 NOTE — TELEPHONE ENCOUNTER
Daughter, Marissa Humphrey, is calling to get an appointment with Dr. Juanjo Flores for a pap. Patient is 80 yrs old and has not had a pap for years and her PCP suggested she get one. Please call, Marissa Humphrey at [phone] to schedule an appointment on a Monday in [place].

## 2020-09-17 NOTE — TELEPHONE ENCOUNTER
See if she can come in on 11/18/20 at noon. If she does not want to wait, we can put her on a wait list to come in early.

## 2020-10-12 ENCOUNTER — TELEPHONE (OUTPATIENT)
Dept: PRIMARY CARE CLINIC | Age: 85
End: 2020-10-12

## 2020-10-13 RX ORDER — POLYETHYLENE GLYCOL 3350 17 G/17G
17 POWDER, FOR SOLUTION ORAL 2 TIMES DAILY
Qty: 100 EACH | Refills: 5 | Status: SHIPPED | OUTPATIENT
Start: 2020-10-13 | End: 2021-01-29 | Stop reason: SDUPTHER

## 2020-10-16 ENCOUNTER — TELEPHONE (OUTPATIENT)
Dept: PRIMARY CARE CLINIC | Age: 85
End: 2020-10-16

## 2020-10-16 NOTE — TELEPHONE ENCOUNTER
Patient's niece calling in today requesting that updated medication list be faxed to [facility]. Fax number is [phone]. Patient also needing a script for generic laxative powder that is inexpensive.   Please assist.

## 2020-10-18 NOTE — TELEPHONE ENCOUNTER
Patient's niece calling in today requesting that updated medication list be faxed to [facility]. Fax number is [phone]. Patient also needing a script for generic laxative powder that is inexpensive. Please assist.      Please fax medication list.  Patient can take generic miralax, will order.

## 2020-10-21 RX ORDER — FELODIPINE 2.5 MG/1
2.5 TABLET, EXTENDED RELEASE ORAL DAILY
Qty: 90 TABLET | Refills: 1 | Status: SHIPPED | OUTPATIENT
Start: 2020-10-21 | End: 2021-01-20 | Stop reason: SDUPTHER

## 2020-10-26 ENCOUNTER — OFFICE VISIT (OUTPATIENT)
Dept: CARDIOLOGY CLINIC | Age: 85
End: 2020-10-26
Payer: COMMERCIAL

## 2020-10-26 VITALS
DIASTOLIC BLOOD PRESSURE: 64 MMHG | BODY MASS INDEX: 20.38 KG/M2 | WEIGHT: 138 LBS | TEMPERATURE: 96.8 F | SYSTOLIC BLOOD PRESSURE: 134 MMHG | HEART RATE: 60 BPM

## 2020-10-26 PROCEDURE — G8427 DOCREV CUR MEDS BY ELIG CLIN: HCPCS | Performed by: INTERNAL MEDICINE

## 2020-10-26 PROCEDURE — 1123F ACP DISCUSS/DSCN MKR DOCD: CPT | Performed by: INTERNAL MEDICINE

## 2020-10-26 PROCEDURE — 1090F PRES/ABSN URINE INCON ASSESS: CPT | Performed by: INTERNAL MEDICINE

## 2020-10-26 PROCEDURE — G8420 CALC BMI NORM PARAMETERS: HCPCS | Performed by: INTERNAL MEDICINE

## 2020-10-26 PROCEDURE — 99214 OFFICE O/P EST MOD 30 MIN: CPT | Performed by: INTERNAL MEDICINE

## 2020-10-26 PROCEDURE — 1036F TOBACCO NON-USER: CPT | Performed by: INTERNAL MEDICINE

## 2020-10-26 PROCEDURE — G8482 FLU IMMUNIZE ORDER/ADMIN: HCPCS | Performed by: INTERNAL MEDICINE

## 2020-10-26 PROCEDURE — 4040F PNEUMOC VAC/ADMIN/RCVD: CPT | Performed by: INTERNAL MEDICINE

## 2020-10-26 NOTE — PROGRESS NOTES
[facility]   Dr Francis Anderson. Kaiser Eubanks MD, [address] [facility]                                                                     Outpatient Follow Up Note         10/26/20  HPI:  Eulogio Ferguson is 80 y.o. female who presents today with MR/AR HTN HLD this patient is stable today. She continues to do well. She is just related that she has a boat already for the 2020 election. She has no chest discomfort or shortness of breath. She feels that the ravages of old age i.e. arthritis is getting to her. Other than that all her hemodynamics are good. The vitals are good the lungs are clear and extremities do show no edema. 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    PCR    Clostridium difficile infection 1/27/15    AG positive    Degenerative joint disease of knee     Depression     Diverticulitis     GERD (gastroesophageal reflux disease)     Grave's disease 4/2/2012    Heart disease     Hypertension     IBS (irritable bowel syndrome)     Macular degeneration     MGUS (monoclonal gammopathy of unknown significance)     Poor vision     d/t macular degeneration    Simple cyst of kidney      PSH  Past Surgical History:   Procedure Laterality Date    CHOLECYSTECTOMY      COLONOSCOPY  10/29/2009    **see scanned report- SILVANA    HEMORRHOID SURGERY      HYSTERECTOMY  in 40's    rudolph/bso     SH:       Social History     Tobacco Use   Smoking Status Never Smoker   Smokeless Tobacco Never Used     Current Medications:  Prior to Visit Medications    Medication Sig Taking?  Authorizing Provider   felodipine (PLENDIL) 2.5 MG extended release tablet Take 1 tablet by mouth daily Yes Liz Connolly MD   polyethylene glycol (GLYCOLAX) 17 g packet Take 17 g by mouth 2 times daily Yes Liz Connolly MD   pantoprazole (PROTONIX) 40 MG tablet Take 1 tablet by mouth 2 times daily Yes Liz Connolly MD   hydrocortisone (ANUSOL-HC) 25 MG suppository Place 1 suppository rectally every 12 hours Yes Liz Connolly MD   apixaban (ELIQUIS) 5 MG TABS tablet Take 1 tablet by mouth 2 times daily Yes Liz Connolly MD   cyanocobalamin 1000 MCG/ML injection Inject 1 mL into the muscle every 30 days Yes Liz Connolly MD   rifaximin (XIFAXAN) 550 MG tablet Take 1 tablet by mouth 2 times daily Yes Liz Connolly MD   Insulin Syringe-Needle U-100 (B-D INSULIN SYRINGE 1CC/25G) 25G X 5/8\" 1 ML MISC 1 Syringe by Does not apply route every 30 days Yes Liz Connolly MD   Simethicone (GAS RELIEF) 180 MG CAPS Take by mouth Yes Pete Provider, MD   spironolactone (ALDACTONE) 25 MG tablet TAKE 1 TABLET DAILY Yes Liz Connolly MD   lidocaine (LIDODERM) 5 % Place 2 patches onto the skin daily 12 hours on, 12 hours off. Yes Liz Connolly MD   albuterol sulfate (PROAIR RESPICLICK) 612 (90 Base) MCG/ACT aerosol powder inhalation Inhale 2 puffs into the lungs every 6 hours as needed for Wheezing or Shortness of Breath Yes Liz Connolly MD   montelukast (SINGULAIR) 10 MG tablet TAKE 1 TABLET NIGHTLY Yes Liz Connolly MD   budesonide-formoterol (SYMBICORT) 160-4.5 MCG/ACT AERO Inhale 2 puffs into the lungs 2 times daily Yes Liz Connolly MD   atenolol (TENORMIN) 50 MG tablet TAKE 1 TABLET DAILY Yes Liz Connolly MD   lidocaine (XYLOCAINE) 5 % ointment Apply topically three times a day, as needed.  Yes Fabi Benavides MD   methimazole (TAPAZOLE) 5 MG tablet Take 5 mg by mouth See Admin Instructions 1 tablet by mouth daily except for Sunday, Monday, Wednesday take 1.5 tablets. Yes Historical Provider, MD   fluticasone (FLONASE) 50 MCG/ACT nasal spray PLACE 1 SPRAY IN EACH NOSTRIL DAILY Yes Jessica Haque MD   ammonium lactate (LAC-HYDRIN) 12 % lotion Apply topically daily. Yes Jessica Haque MD   loratadine (CLARITIN) 10 MG tablet Take 1 tablet by mouth daily Yes Jessica Haque MD     Family History  Family History   Problem Relation Age of Onset    Heart Disease Mother     High Blood Pressure Mother     Stroke Mother     Cancer Father         multiple myeloma     · ROS:   · Cardiovascular: Reviewed in HPI  · Allergic/Immunologic: No nasal congestion or hives. · All other ROS are reviewed and are unremarkable. PHYSICAL EXAM:      Wt Readings from Last 3 Encounters:   10/26/20 138 lb (62.6 kg)   08/29/20 125 lb (56.7 kg)   06/29/20 125 lb (56.7 kg)       BP Readings from Last 3 Encounters:   10/26/20 134/64   09/14/20 (!) 169/93   08/29/20 118/64       Pulse Readings from Last 3 Encounters:   10/26/20 60   09/14/20 61   08/29/20 61       Exam   ENT: Cranial nerves II through XII intact grossly except that she is legally blind. Head eyes ears nose and throat unremarkable. NEUROLOGIC: Awake and alert and oriented x3. She is able to recognize me. Moves all extremities well for 80year-old. Constitutional: This patient is oriented to person, place, and time. Also appears well-developed and well-nourished and in no acute distress. HEENT: Normocephalic and atraumatic. Sclerae anicteric. No xanthelasmas. Conjunctiva white, no subconjunctival hemorrhage   External inspection of ears nose teeth & gums   Eyes:PERRLA EOM's intact. Neck: Neck supple. No JVD present. Carotids without bruits. No mass and no thyromegaly. No lymphadenopathy    Cardiovascular: RRR, normal S1 and S2; no murmur/gallop or rub, PMI nondisplaced  Pulmonary/Chest: Effort normal.  Lungs clear to auscultation.  Chest wall nontender  Abdominal: soft,

## 2020-11-18 ENCOUNTER — OFFICE VISIT (OUTPATIENT)
Dept: GYNECOLOGY | Age: 85
End: 2020-11-18
Payer: COMMERCIAL

## 2020-11-18 VITALS
SYSTOLIC BLOOD PRESSURE: 138 MMHG | HEART RATE: 78 BPM | RESPIRATION RATE: 17 BRPM | WEIGHT: 140 LBS | HEIGHT: 69 IN | TEMPERATURE: 97.5 F | DIASTOLIC BLOOD PRESSURE: 82 MMHG | BODY MASS INDEX: 20.73 KG/M2

## 2020-11-18 PROCEDURE — 99387 INIT PM E/M NEW PAT 65+ YRS: CPT | Performed by: OBSTETRICS & GYNECOLOGY

## 2020-11-18 PROCEDURE — G8482 FLU IMMUNIZE ORDER/ADMIN: HCPCS | Performed by: OBSTETRICS & GYNECOLOGY

## 2020-11-18 ASSESSMENT — ENCOUNTER SYMPTOMS
BACK PAIN: 1
GASTROINTESTINAL NEGATIVE: 1
RESPIRATORY NEGATIVE: 1

## 2020-11-19 NOTE — PROGRESS NOTES
Subjective:      Patient ID: Isabella Pierce is a 80 y.o. female. Patient is here for annual-patient complains of pain when sitting down-in vulvar area and along buttock area. Wants to make sure no rash. Review of Systems   Constitutional: Negative. HENT: Negative. Eyes: Positive for visual disturbance. Respiratory: Negative. Cardiovascular: Negative. Gastrointestinal: Negative. Genitourinary: Negative. Musculoskeletal: Positive for arthralgias, back pain and gait problem. Skin: Negative. Psychiatric/Behavioral: Negative.       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 2017    PCR    Clostridium difficile infection 1/27/15    AG positive    Degenerative joint disease of knee     Depression     Diverticulitis     GERD (gastroesophageal reflux disease)     Grave's disease 2012    Heart disease     Hypertension     IBS (irritable bowel syndrome)     Macular degeneration     MGUS (monoclonal gammopathy of unknown significance)     Osteoporosis     Poor vision     d/t macular degeneration    Simple cyst of kidney      Past Surgical History:   Procedure Laterality Date    CHOLECYSTECTOMY      COLONOSCOPY  10/29/2009    **see scanned report- SILVANA    HEMORRHOID SURGERY      HYSTERECTOMY  in 40's    rudolph/bso    HYSTERECTOMY, TOTAL ABDOMINAL       OB History    Para Term  AB Living   1 1           SAB TAB Ectopic Molar Multiple Live Births             1      # Outcome Date GA Lbr Jose E/2nd Weight Sex Delivery Anes PTL Lv   1 Para    7 lb (3.175 kg) F Vag-Spont        Social History     Socioeconomic History    Marital status: Single     Spouse name: Not on file    Number of children: Not on file    Years of education: Not on file    Highest education level: Not on file Occupational History    Not on file   Social Needs    Financial resource strain: Not on file    Food insecurity     Worry: Not on file     Inability: Not on file    Transportation needs     Medical: Not on file     Non-medical: Not on file   Tobacco Use    Smoking status: Never Smoker    Smokeless tobacco: Never Used   Substance and Sexual Activity    Alcohol use: No    Drug use: No    Sexual activity: Not Currently   Lifestyle    Physical activity     Days per week: Not on file     Minutes per session: Not on file    Stress: Not on file   Relationships    Social connections     Talks on phone: Not on file     Gets together: Not on file     Attends Latter day service: Not on file     Active member of club or organization: Not on file     Attends meetings of clubs or organizations: Not on file     Relationship status: Not on file    Intimate partner violence     Fear of current or ex partner: Not on file     Emotionally abused: Not on file     Physically abused: Not on file     Forced sexual activity: Not on file   Other Topics Concern    Not on file   Social History Narrative    Not on file     Allergies   Allergen Reactions    Ace Inhibitors Other (See Comments)     angioedema    Acyclovir      Severe nausea  And anorexia. It happened after taking each 800 mg tablet and patient took one one day and one the next, then stopped an no more    Creon [Pancrelipase (Lip-Prot-Amyl)]      Swollen gums.  Dye [Iodides] Hives     Only reacts to dye that went into eyes, OK with IV contrast    Eggs Or Egg-Derived Products      Hives  this test in the [employer] ([facility]). Egg White IgE 3. 59High   <=0.34 kU/L Final 02/10/2016  2:14 PM [facility]                  Gluten Meal      Abdominal pain and diarreha  Wheat IgE 0.53  <=0.34 kU/L Final 02/10/2016  2:14 PM [facility]       Milk-Related Compounds      Milk, Cow's IgE 1. 75High   <=0.34 kU/L Final 02/10/2016  2:14 PM [facility] - methimazole (TAPAZOLE) 5 MG tablet Take 5 mg by mouth See Admin Instructions 1 tablet by mouth daily except for Sunday, Monday, Wednesday take 1.5 tablets.  fluticasone (FLONASE) 50 MCG/ACT nasal spray PLACE 1 SPRAY IN EACH NOSTRIL DAILY 3 Bottle 5    ammonium lactate (LAC-HYDRIN) 12 % lotion Apply topically daily. 225 g 5    loratadine (CLARITIN) 10 MG tablet Take 1 tablet by mouth daily 90 tablet 3     Family History   Problem Relation Age of Onset    Heart Disease Mother     High Blood Pressure Mother     Stroke Mother     Cancer Father         multiple myeloma     /82 (Site: Right Upper Arm, Position: Sitting, Cuff Size: Medium Adult)   Pulse 78   Temp 97.5 °F (36.4 °C) (Oral)   Resp 17   Ht 5' 9\" (1.753 m)   Wt 140 lb (63.5 kg)   LMP  (LMP Unknown)   BMI 20.67 kg/m²       Objective:   Physical Exam  Constitutional:       Appearance: Normal appearance. She is well-developed and normal weight. HENT:      Head: Normocephalic. Nose: Nose normal.      Mouth/Throat:      Mouth: Mucous membranes are moist.      Pharynx: Oropharynx is clear. Neck:      Musculoskeletal: Normal range of motion and neck supple. No neck rigidity. Thyroid: No thyromegaly. Cardiovascular:      Rate and Rhythm: Normal rate and regular rhythm. Pulses: Normal pulses. Heart sounds: Normal heart sounds. No murmur. No friction rub. No gallop. Pulmonary:      Effort: Pulmonary effort is normal. No respiratory distress. Breath sounds: Normal breath sounds. No stridor. No wheezing, rhonchi or rales. Chest:      Chest wall: No tenderness. Breasts:         Right: Normal. No swelling, bleeding, inverted nipple, mass, nipple discharge, skin change or tenderness. Left: Normal. No swelling, bleeding, inverted nipple, mass, nipple discharge, skin change or tenderness. Abdominal:      General: Bowel sounds are normal. There is no distension. Palpations: Abdomen is soft.  There is no mass. Tenderness: There is no abdominal tenderness. There is no guarding or rebound. Hernia: No hernia is present. There is no hernia in the left inguinal area. Genitourinary:     General: Normal vulva. Exam position: Lithotomy position. Pubic Area: No rash. Labia:         Right: No rash, tenderness, lesion or injury. Left: No rash, tenderness, lesion or injury. Urethra: No prolapse, urethral pain, urethral swelling or urethral lesion. Vagina: No signs of injury and foreign body. No vaginal discharge, erythema, tenderness, bleeding, lesions or prolapsed vaginal walls. Adnexa:         Right: No mass, tenderness or fullness. Left: No mass, tenderness or fullness. Rectum: No anal fissure or external hemorrhoid. Comments: Normal urethral meatus, normal urethra, nl bladder    Vulvar vaginal atrophy  Dryness along buttock  Tailbone with no fat around  Musculoskeletal: Normal range of motion. General: Deformity present. No tenderness. Comments: Back curvature   Lymphadenopathy:      Cervical: No cervical adenopathy. Lower Body: No right inguinal adenopathy. No left inguinal adenopathy. Skin:     General: Skin is warm and dry. Coloration: Skin is not pale. Findings: No erythema or rash. Neurological:      General: No focal deficit present. Mental Status: She is alert and oriented to person, place, and time. Mental status is at baseline. Deep Tendon Reflexes: Reflexes are normal and symmetric. Psychiatric:         Mood and Affect: Mood normal.         Behavior: Behavior normal.         Thought Content: Thought content normal.         Judgment: Judgment normal.         Assessment:      1. Annual  2. Menopause  3. Vulvar atrophy      Plan:      1. Pap, calcium  2. See below  3. Kenalog cream to see if helps skin.  No specific rash seen but does have some atrophy        Yara Stratton MD

## 2020-12-09 RX ORDER — MONTELUKAST SODIUM 10 MG/1
TABLET ORAL
Qty: 90 TABLET | Refills: 3 | Status: SHIPPED | OUTPATIENT
Start: 2020-12-09 | End: 2020-12-16 | Stop reason: SDUPTHER

## 2020-12-10 ENCOUNTER — TELEPHONE (OUTPATIENT)
Dept: PRIMARY CARE CLINIC | Age: 85
End: 2020-12-10

## 2020-12-10 NOTE — TELEPHONE ENCOUNTER
----- Message from Sujit Montejo sent at 12/8/2020  4:31 PM EST -----  Subject: Refill Request    QUESTIONS  Name of Medication? montelukast (SINGULAIR) 10 MG tablet  Patient-reported dosage and instructions? ONCE A DAY  How many days do you have left? 1  Preferred Pharmacy? [facility] phone number (if available)? [phone]  Additional Information for Provider? Please advise dotty Perales.  ---------------------------------------------------------------------------  --------------  Gabbie SALAZAR  What is the best way for the office to contact you? OK to leave message on   voicemail  Do not leave any message   patient will call back for answer  Preferred Call Back Phone Number?  [phone]

## 2020-12-16 ENCOUNTER — TELEPHONE (OUTPATIENT)
Dept: PRIMARY CARE CLINIC | Age: 85
End: 2020-12-16

## 2020-12-16 RX ORDER — MONTELUKAST SODIUM 10 MG/1
TABLET ORAL
Qty: 90 TABLET | Refills: 3 | Status: SHIPPED | OUTPATIENT
Start: 2020-12-16 | End: 2021-01-29 | Stop reason: SDUPTHER

## 2020-12-16 NOTE — TELEPHONE ENCOUNTER
Patient tried to pickup medication for montelukast (SINGULAIR) 10 MG at [facility] not there, please resend & review need asap

## 2020-12-17 ENCOUNTER — TELEPHONE (OUTPATIENT)
Dept: PRIMARY CARE CLINIC | Age: 85
End: 2020-12-17

## 2020-12-18 RX ORDER — ATENOLOL 50 MG/1
TABLET ORAL
Qty: 90 TABLET | Refills: 3 | Status: SHIPPED | OUTPATIENT
Start: 2020-12-18 | End: 2021-01-29 | Stop reason: SDUPTHER

## 2020-12-18 NOTE — TELEPHONE ENCOUNTER
Fabi Khoury at 12/17/2020  3:51 PM    Status: Signed      [facility] needs verbal OK for recert that Dr. Steve Velasco will follow all home care orders and medication reconciliation.     May leave VM with BRIANDA Nguyen. Give verbal okay. Have patient call [phone] to arrange to be impatient of visiting physicians where the physician will come to the home to treat patient since I will be retiring in January. Have patient call to make an appointment with visiting physicians in January. Patient will continue to need care.

## 2021-01-11 DIAGNOSIS — R21 RASH: ICD-10-CM

## 2021-01-12 NOTE — TELEPHONE ENCOUNTER
Medication:   Requested Prescriptions     Pending Prescriptions Disp Refills    fluocinonide (LIDEX) 0.05 % cream 60 g 1     Sig: Apply topically 2 times daily. Last Filled:      Patient Phone Number: [phone] (home) [phone] (work)    Last appt: 9/14/2020   Next appt: Visit date not found    Last OARRS:   RX Monitoring 9/11/2019   Periodic Controlled Substance Monitoring Assessed functional status.

## 2021-01-19 ENCOUNTER — TELEPHONE (OUTPATIENT)
Dept: PRIMARY CARE CLINIC | Age: 86
End: 2021-01-19

## 2021-01-19 ENCOUNTER — HOSPITAL ENCOUNTER (OUTPATIENT)
Dept: MAMMOGRAPHY | Age: 86
Discharge: HOME | End: 2021-01-19
Payer: MEDICARE

## 2021-01-19 DIAGNOSIS — Z12.31 VISIT FOR SCREENING MAMMOGRAM: ICD-10-CM

## 2021-01-19 PROCEDURE — 77067 SCR MAMMO BI INCL CAD: CPT

## 2021-01-20 DIAGNOSIS — I10 ESSENTIAL HYPERTENSION: ICD-10-CM

## 2021-01-20 RX ORDER — FELODIPINE 2.5 MG/1
2.5 TABLET, EXTENDED RELEASE ORAL DAILY
Qty: 90 TABLET | Refills: 3 | Status: SHIPPED | OUTPATIENT
Start: 2021-01-20 | End: 2021-01-29 | Stop reason: SDUPTHER

## 2021-01-20 NOTE — TELEPHONE ENCOUNTER
Patient needs felodipine 2.5 mgs called to or faxed to / [phone]. Fax [phone] [facility]  90 day supply.
Please advise
sent
Detail Level: Detailed
Quality 130: Documentation Of Current Medications In The Medical Record: Current Medications Documented
Quality 226: Preventive Care And Screening: Tobacco Use: Screening And Cessation Intervention: Patient screened for tobacco use and is an ex/non-smoker

## 2021-01-29 ENCOUNTER — TELEPHONE (OUTPATIENT)
Dept: PRIMARY CARE CLINIC | Age: 86
End: 2021-01-29

## 2021-01-29 ENCOUNTER — OFFICE VISIT (OUTPATIENT)
Dept: PRIMARY CARE CLINIC | Age: 86
End: 2021-01-29
Payer: MEDICARE

## 2021-01-29 VITALS
WEIGHT: 141 LBS | HEART RATE: 69 BPM | SYSTOLIC BLOOD PRESSURE: 163 MMHG | DIASTOLIC BLOOD PRESSURE: 84 MMHG | BODY MASS INDEX: 20.88 KG/M2 | TEMPERATURE: 97 F | OXYGEN SATURATION: 97 % | HEIGHT: 69 IN

## 2021-01-29 DIAGNOSIS — K58.2 IRRITABLE BOWEL SYNDROME WITH BOTH CONSTIPATION AND DIARRHEA: ICD-10-CM

## 2021-01-29 DIAGNOSIS — I10 ESSENTIAL HYPERTENSION: ICD-10-CM

## 2021-01-29 DIAGNOSIS — I27.82 OTHER CHRONIC PULMONARY EMBOLISM WITHOUT ACUTE COR PULMONALE (HCC): ICD-10-CM

## 2021-01-29 DIAGNOSIS — J30.89 OTHER ALLERGIC RHINITIS: ICD-10-CM

## 2021-01-29 DIAGNOSIS — L85.3 DRY SKIN: ICD-10-CM

## 2021-01-29 DIAGNOSIS — R21 RASH: ICD-10-CM

## 2021-01-29 DIAGNOSIS — E53.8 B12 DEFICIENCY: ICD-10-CM

## 2021-01-29 DIAGNOSIS — B02.29 POSTHERPETIC NEURALGIA: ICD-10-CM

## 2021-01-29 DIAGNOSIS — Z23 NEED FOR SHINGLES VACCINE: Primary | ICD-10-CM

## 2021-01-29 PROCEDURE — 1036F TOBACCO NON-USER: CPT | Performed by: INTERNAL MEDICINE

## 2021-01-29 PROCEDURE — 4040F PNEUMOC VAC/ADMIN/RCVD: CPT | Performed by: INTERNAL MEDICINE

## 2021-01-29 PROCEDURE — 1090F PRES/ABSN URINE INCON ASSESS: CPT | Performed by: INTERNAL MEDICINE

## 2021-01-29 PROCEDURE — G8482 FLU IMMUNIZE ORDER/ADMIN: HCPCS | Performed by: INTERNAL MEDICINE

## 2021-01-29 PROCEDURE — 1123F ACP DISCUSS/DSCN MKR DOCD: CPT | Performed by: INTERNAL MEDICINE

## 2021-01-29 PROCEDURE — 99214 OFFICE O/P EST MOD 30 MIN: CPT | Performed by: INTERNAL MEDICINE

## 2021-01-29 PROCEDURE — G8420 CALC BMI NORM PARAMETERS: HCPCS | Performed by: INTERNAL MEDICINE

## 2021-01-29 PROCEDURE — G8428 CUR MEDS NOT DOCUMENT: HCPCS | Performed by: INTERNAL MEDICINE

## 2021-01-29 RX ORDER — ALBUTEROL SULFATE 90 UG/1
2 POWDER, METERED RESPIRATORY (INHALATION) EVERY 6 HOURS PRN
Qty: 1 INHALER | Refills: 5 | Status: SHIPPED | OUTPATIENT
Start: 2021-01-29 | End: 2022-07-14 | Stop reason: SDUPTHER

## 2021-01-29 RX ORDER — FELODIPINE 2.5 MG/1
2.5 TABLET, EXTENDED RELEASE ORAL DAILY
Qty: 90 TABLET | Refills: 3 | Status: SHIPPED | OUTPATIENT
Start: 2021-01-29 | End: 2021-03-19 | Stop reason: SDUPTHER

## 2021-01-29 RX ORDER — METHIMAZOLE 5 MG/1
5 TABLET ORAL SEE ADMIN INSTRUCTIONS
Qty: 120 TABLET | Refills: 1 | Status: SHIPPED | OUTPATIENT
Start: 2021-01-29

## 2021-01-29 RX ORDER — GABAPENTIN 100 MG/1
100 CAPSULE ORAL 3 TIMES DAILY
Qty: 90 CAPSULE | Refills: 5 | Status: SHIPPED | OUTPATIENT
Start: 2021-01-29 | End: 2021-02-01 | Stop reason: SDUPTHER

## 2021-01-29 RX ORDER — SYRINGE AND NEEDLE,INSULIN,1ML 25GX1"
1 SYRINGE, EMPTY DISPOSABLE MISCELLANEOUS
Qty: 100 EACH | Refills: 0 | Status: SHIPPED | OUTPATIENT
Start: 2021-01-29 | End: 2021-07-12

## 2021-01-29 RX ORDER — AMMONIUM LACTATE 12 G/100G
LOTION TOPICAL
Qty: 225 G | Refills: 5 | Status: SHIPPED | OUTPATIENT
Start: 2021-01-29

## 2021-01-29 RX ORDER — SIMETHICONE 180 MG
1 CAPSULE ORAL 4 TIMES DAILY PRN
Qty: 360 CAPSULE | Refills: 3 | Status: SHIPPED | OUTPATIENT
Start: 2021-01-29 | End: 2022-07-14 | Stop reason: SDUPTHER

## 2021-01-29 RX ORDER — LIDOCAINE 50 MG/G
OINTMENT TOPICAL
Qty: 2 TUBE | Refills: 1 | Status: SHIPPED | OUTPATIENT
Start: 2021-01-29

## 2021-01-29 RX ORDER — ATENOLOL 50 MG/1
TABLET ORAL
Qty: 90 TABLET | Refills: 3 | Status: SHIPPED | OUTPATIENT
Start: 2021-01-29 | End: 2022-04-08

## 2021-01-29 RX ORDER — MONTELUKAST SODIUM 10 MG/1
TABLET ORAL
Qty: 90 TABLET | Refills: 3 | Status: SHIPPED | OUTPATIENT
Start: 2021-01-29 | End: 2021-06-11 | Stop reason: SDUPTHER

## 2021-01-29 RX ORDER — SPIRONOLACTONE 25 MG/1
TABLET ORAL
Qty: 90 TABLET | Refills: 3 | Status: SHIPPED | OUTPATIENT
Start: 2021-01-29 | End: 2022-01-17

## 2021-01-29 RX ORDER — FLUTICASONE PROPIONATE 50 MCG
SPRAY, SUSPENSION (ML) NASAL
Qty: 3 BOTTLE | Refills: 5 | Status: SHIPPED | OUTPATIENT
Start: 2021-01-29 | End: 2022-05-13 | Stop reason: SDUPTHER

## 2021-01-29 RX ORDER — HYDROCORTISONE ACETATE 25 MG/1
25 SUPPOSITORY RECTAL EVERY 12 HOURS
Qty: 24 SUPPOSITORY | Refills: 0 | Status: SHIPPED | OUTPATIENT
Start: 2021-01-29 | End: 2021-05-14 | Stop reason: SDUPTHER

## 2021-01-29 RX ORDER — PANTOPRAZOLE SODIUM 40 MG/1
40 TABLET, DELAYED RELEASE ORAL 2 TIMES DAILY
Qty: 60 TABLET | Refills: 5 | Status: SHIPPED | OUTPATIENT
Start: 2021-01-29 | End: 2021-08-24 | Stop reason: SDUPTHER

## 2021-01-29 RX ORDER — BUDESONIDE AND FORMOTEROL FUMARATE DIHYDRATE 160; 4.5 UG/1; UG/1
2 AEROSOL RESPIRATORY (INHALATION) 2 TIMES DAILY
Qty: 1 INHALER | Refills: 11 | Status: SHIPPED | OUTPATIENT
Start: 2021-01-29 | End: 2022-07-14 | Stop reason: SDUPTHER

## 2021-01-29 RX ORDER — LORATADINE 10 MG/1
10 TABLET ORAL DAILY
Qty: 90 TABLET | Refills: 3 | Status: SHIPPED | OUTPATIENT
Start: 2021-01-29 | End: 2021-07-12

## 2021-01-29 RX ORDER — POLYETHYLENE GLYCOL 3350 17 G/17G
17 POWDER, FOR SOLUTION ORAL 2 TIMES DAILY
Qty: 100 EACH | Refills: 5 | Status: SHIPPED | OUTPATIENT
Start: 2021-01-29 | End: 2022-07-14 | Stop reason: SDUPTHER

## 2021-01-29 ASSESSMENT — PATIENT HEALTH QUESTIONNAIRE - PHQ9
SUM OF ALL RESPONSES TO PHQ QUESTIONS 1-9: 0
2. FEELING DOWN, DEPRESSED OR HOPELESS: 0

## 2021-01-29 NOTE — TELEPHONE ENCOUNTER
----- Message from Azalea Neely sent at 1/29/2021 11:10 AM EST -----  Subject: Message to Provider    QUESTIONS  Information for Provider? New pharmacy? [facility]   [phone]. Please advise.   ---------------------------------------------------------------------------  --------------  CALL BACK INFO  What is the best way for the office to contact you? OK to leave message on   voicemail  Preferred Call Back Phone Number? [phone]  ---------------------------------------------------------------------------  --------------  SCRIPT ANSWERS  Relationship to Patient?  Self

## 2021-02-01 RX ORDER — GABAPENTIN 100 MG/1
100 CAPSULE ORAL 3 TIMES DAILY
Qty: 90 CAPSULE | Refills: 5 | Status: SHIPPED | OUTPATIENT
Start: 2021-02-01 | End: 2021-12-06

## 2021-02-01 NOTE — TELEPHONE ENCOUNTER
[facility] received a call from:    Name of Caller: Fabian Ruano    Relationship to patient: Daughter    Best contact number: [phone]    Reason for call: Pts daughter called to advise wrong pharmacy info was given for medications she is to start right away on her last visit. Please use [facility] on [address] [place] [address], # [phone] for meds she needs to start on immediatly . Long term medications should be sent to [facility] mail order pharmacy.

## 2021-03-19 ENCOUNTER — OFFICE VISIT (OUTPATIENT)
Dept: PRIMARY CARE CLINIC | Age: 86
End: 2021-03-19
Payer: MEDICARE

## 2021-03-19 VITALS
HEIGHT: 69 IN | DIASTOLIC BLOOD PRESSURE: 73 MMHG | HEART RATE: 68 BPM | BODY MASS INDEX: 21.33 KG/M2 | WEIGHT: 144 LBS | SYSTOLIC BLOOD PRESSURE: 149 MMHG | TEMPERATURE: 97.2 F | OXYGEN SATURATION: 98 %

## 2021-03-19 DIAGNOSIS — E04.2 MULTIPLE THYROID NODULES: ICD-10-CM

## 2021-03-19 DIAGNOSIS — I10 ESSENTIAL HYPERTENSION: ICD-10-CM

## 2021-03-19 DIAGNOSIS — K90.0 CELIAC DISEASE: ICD-10-CM

## 2021-03-19 DIAGNOSIS — B02.29 POST HERPETIC NEURALGIA: ICD-10-CM

## 2021-03-19 DIAGNOSIS — M54.2 NECK PAIN: Primary | ICD-10-CM

## 2021-03-19 DIAGNOSIS — K58.2 IRRITABLE BOWEL SYNDROME WITH BOTH CONSTIPATION AND DIARRHEA: ICD-10-CM

## 2021-03-19 DIAGNOSIS — E53.8 B12 DEFICIENCY: ICD-10-CM

## 2021-03-19 PROCEDURE — 4040F PNEUMOC VAC/ADMIN/RCVD: CPT | Performed by: INTERNAL MEDICINE

## 2021-03-19 PROCEDURE — 99214 OFFICE O/P EST MOD 30 MIN: CPT | Performed by: INTERNAL MEDICINE

## 2021-03-19 PROCEDURE — 1036F TOBACCO NON-USER: CPT | Performed by: INTERNAL MEDICINE

## 2021-03-19 PROCEDURE — G8420 CALC BMI NORM PARAMETERS: HCPCS | Performed by: INTERNAL MEDICINE

## 2021-03-19 PROCEDURE — G8482 FLU IMMUNIZE ORDER/ADMIN: HCPCS | Performed by: INTERNAL MEDICINE

## 2021-03-19 PROCEDURE — 1123F ACP DISCUSS/DSCN MKR DOCD: CPT | Performed by: INTERNAL MEDICINE

## 2021-03-19 PROCEDURE — G8427 DOCREV CUR MEDS BY ELIG CLIN: HCPCS | Performed by: INTERNAL MEDICINE

## 2021-03-19 PROCEDURE — 1090F PRES/ABSN URINE INCON ASSESS: CPT | Performed by: INTERNAL MEDICINE

## 2021-03-19 RX ORDER — FELODIPINE 5 MG/1
5 TABLET, EXTENDED RELEASE ORAL NIGHTLY
Qty: 90 TABLET | Refills: 3 | Status: SHIPPED | OUTPATIENT
Start: 2021-03-19 | End: 2021-05-27

## 2021-03-19 RX ORDER — FELODIPINE 2.5 MG/1
5 TABLET, EXTENDED RELEASE ORAL NIGHTLY
Qty: 90 TABLET | Refills: 3 | Status: SHIPPED | OUTPATIENT
Start: 2021-03-19 | End: 2021-05-27 | Stop reason: SDUPTHER

## 2021-03-19 NOTE — PATIENT INSTRUCTIONS
1. Move felodipine to bedtime, and increase the dose from 2.5 to 5 mg, to improve blood pressure control. Take two of the 2.5mg tab until used up. Rx for 5mg sent to your pharmacy. 2. Stop eliquis/apixaban after finish your current bottle. 3.Start gabapentin 300mg at bedtime for your nerve pain, after 1 week can increase to 300mg in AM and at bedtime if nerve pain bad during the day. 4. Get shingrix    5. Metamucil    6.  Blood test 2 months

## 2021-03-19 NOTE — PROGRESS NOTES
3/19/2021    Sunita Cooper (:  1926) is a 80 y.o. 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 For her valvular heart disease she sees Dr. Mehreen Duenas in cardiology. Reassuring echo 2020 EF 55% without resting regional's, normal diastolic function, mild TR, PASP 24 mm with normal inspiratory collapse of the IVC. Chronically anticoagulated. Chest CT 2020 single subsegmental right upper lobe pulmonary embolism on Eliquis 5 mg twice daily since, with intent to discontinue after 6 months effective anticoagulation. Follow-up chest CT for left-sided chest pain 2020 - for PE. Carotid ultrasound in  for TIA showed no high-grade stenosis. Negative right lower extremity DVT study in , negative bilateral lower extremity study in . Head CT  - for stroke. For her Patricia Hedge' disease she sees Dr. Angel Mccauley in endocrinology. Thyroid nodules were benign on biopsy in , thyroid ultrasound  without worrisome changes. She has bilateral adrenal adenomas, radiographically stable on serial CT  through , though she may have AML on superior pole of the left kidney.   Cortisol DHEA plasma normetanephrine and metanephrine renin in  did not suggest endocrinologically functioning adenomas. In November endocrinology  noted worsening bone mineral density, -3 spine -4.7 hip. Due to GI and renal limitations/risks, no great treatment options. Right chest wall zoster 4752 complicated by PHN. Review of Systems   Constitutional: Negative for activity change, appetite change, fatigue and unexpected weight change. HENT: Negative for dental problem, sinus pain, sore throat and trouble swallowing. Eyes: Negative for pain and visual disturbance. Respiratory: Negative for apnea, cough, chest tightness, shortness of breath and wheezing. Cardiovascular: Negative for chest pain and palpitations. Gastrointestinal: Negative for abdominal pain, blood in stool, constipation, diarrhea, nausea, rectal pain and vomiting. Endocrine: Negative for cold intolerance, heat intolerance, polydipsia, polyphagia and polyuria. Genitourinary: Negative for difficulty urinating, dysuria, flank pain, frequency, hematuria, pelvic pain, urgency, vaginal bleeding and vaginal discharge. Musculoskeletal: Negative for arthralgias, back pain, gait problem, joint swelling, myalgias, neck pain and neck stiffness. Skin: Negative for color change and rash. Neurological: Negative for dizziness, tremors, syncope, speech difficulty, weakness, light-headedness and headaches. Hematological: Negative for adenopathy. Does not bruise/bleed easily. Psychiatric/Behavioral: Negative for agitation, behavioral problems, decreased concentration, sleep disturbance and suicidal ideas. The patient is not nervous/anxious and is not hyperactive. Prior to Visit Medications    Medication Sig Taking?  Authorizing Provider   felodipine (PLENDIL) 2.5 MG extended release tablet Take 2 tablets by mouth nightly Yes Gina Raygoza MD   felodipine (PLENDIL) 5 MG extended release tablet Take 1 tablet by mouth nightly Yes Gina Raygoza MD   gabapentin (NEURONTIN) 100 MG capsule Take 1 capsule by mouth 3 times daily for 180 days. Intended supply: 30 days Yes Tomeka Fonseca MD   albuterol sulfate (PROAIR RESPICLICK) 326 (90 Base) MCG/ACT aerosol powder inhalation Inhale 2 puffs into the lungs every 6 hours as needed for Wheezing or Shortness of Breath Yes Tomeka Fonseca MD   ammonium lactate (LAC-HYDRIN) 12 % lotion Apply topically daily. Yes Tomeka Fonseca MD   atenolol (TENORMIN) 50 MG tablet TAKE 1 TABLET DAILY Yes Tomeka Fonseca MD   budesonide-formoterol (SYMBICORT) 160-4.5 MCG/ACT AERO Inhale 2 puffs into the lungs 2 times daily Yes Tomeka Fonseca MD   fluocinonide (LIDEX) 0.05 % cream Apply topically 2 times daily. Yes Tomeka Fonseca MD   fluticasone [facility]) 50 MCG/ACT nasal spray PLACE 1 SPRAY IN EACH NOSTRIL DAILY Yes Tomeka Fonseca MD   hydrocortisone (ANUSOL-HC) 25 MG suppository Place 1 suppository rectally every 12 hours Yes Tomeka Fonseca MD   Insulin Syringe-Needle U-100 (B-D INSULIN SYRINGE 1CC/25G) 25G X 5/8\" 1 ML MISC 1 Syringe by Does not apply route every 30 days Yes Tomeka Fonseca MD   lidocaine (XYLOCAINE) 5 % ointment Apply topically three times a day, as needed. Yes Tomeka Fonseca MD   loratadine (CLARITIN) 10 MG tablet Take 1 tablet by mouth daily Yes Tomeka Fonseca MD   montelukast (SINGULAIR) 10 MG tablet TAKE 1 TABLET NIGHTLY Yes Tomeka Fonseca MD   pantoprazole (PROTONIX) 40 MG tablet Take 1 tablet by mouth 2 times daily Yes Tomeka Fonseca MD   polyethylene glycol (GLYCOLAX) 17 g packet Take 17 g by mouth 2 times daily Yes Tomeka Fonseca MD   rifaximin (XIFAXAN) 550 MG tablet Take 1 tablet by mouth 2 times daily Yes Tomeka Fonseca MD   spironolactone (ALDACTONE) 25 MG tablet Daily Yes Tomeka Fonseca MD   methIMAzole (TAPAZOLE) 5 MG tablet Take 1 tablet by mouth See Admin Instructions 1 tablet by mouth daily except for Sunday, Monday, Wednesday take 1.5 tablets.  Yes Tomeka Fonseca MD   Simethicone (GAS RELIEF) 180 MG CAPS Take 180 mg by mouth 4 times daily as needed (bloating) Yes Sasha Saba MD   cyanocobalamin 1000 MCG/ML injection Inject 1 mL into the muscle every 30 days Yes Michele Yang MD        Allergies   Allergen Reactions    Ace Inhibitors Other (See Comments)     angioedema    Acyclovir      Severe nausea  And anorexia. It happened after taking each 800 mg tablet and patient took one one day and one the next, then stopped an no more    Creon [Pancrelipase (Lip-Prot-Amyl)]      Swollen gums.  Dye [Iodides] Hives     Only reacts to dye that went into eyes, OK with IV contrast    Eggs Or Egg-Derived Products      Hives  this test in the [employer] [place] [employer] ([email or web address]). Egg White IgE 3. 59High   <=0.34 kU/L Final 02/10/2016  2:14 PM [facility]                  Gluten Meal      Abdominal pain and diarreha  Wheat IgE 0.53  <=0.34 kU/L Final 02/10/2016  2:14 PM [facility]       Milk-Related Compounds      Milk, Cow's IgE 1. 75High   <=0.34 kU/L Final 02/10/2016  2:14 PM [facility]    Fluorescein Other (See Comments)    Lisinopril Swelling    Macrobid [Nitrofurantoin Monohydrate Macrocrystals] Other (See Comments)    No Known Allergies     Ciprocinonide [Fluocinolone] Rash     Stiff, no nausea or rash    Sulfa Antibiotics Rash       Past Medical History:   Diagnosis Date    Adrenal gland cyst ([address]     Arthritis     Asthma     Bilateral carpal tunnel syndrome 8/23/2018    Cataract     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    PCR    Clostridium difficile infection 1/27/15    AG positive    Degenerative joint disease of knee     Depression     Diverticulitis     GERD (gastroesophageal reflux disease)     Grave's disease 4/2/2012    Heart disease     Hypertension     IBS (irritable bowel syndrome)     Macular degeneration     MGUS (monoclonal gammopathy of unknown significance)     Osteoporosis     Poor vision     d/t macular degeneration    Simple cyst of kidney        Past Surgical History:   Procedure Laterality Date    CHOLECYSTECTOMY      COLONOSCOPY  10/29/2009    **see scanned report- [email or web address]    HEMORRHOID SURGERY      HYSTERECTOMY  in 40's    rudolph/bso    HYSTERECTOMY, TOTAL ABDOMINAL         Social History     Socioeconomic History    Marital status: Single     Spouse name: Not on file    Number of children: Not on file    Years of education: Not on file    Highest education level: Not on file   Occupational History    Not on file   Social Needs    Financial resource strain: Not on file    Food insecurity     Worry: Not on file     Inability: Not on file    Transportation needs     Medical: Not on file     Non-medical: Not on file   Tobacco Use    Smoking status: Never Smoker    Smokeless tobacco: Never Used   Substance and Sexual Activity    Alcohol use: No    Drug use: No    Sexual activity: Not Currently   Lifestyle    Physical activity     Days per week: Not on file     Minutes per session: Not on file    Stress: Not on file   Relationships    Social connections     Talks on phone: Not on file     Gets together: Not on file     Attends [employer] service: Not on file     Active member of club or organization: Not on file     Attends meetings of clubs or organizations: Not on file     Relationship status: Not on file    Intimate partner violence     Fear of current or ex partner: Not on file     Emotionally abused: Not on file     Physically abused: Not on file     Forced sexual activity: Not on file   Other Topics Concern    Not on file   Social History Narrative    Not on file        Family History   Problem Relation Age of Onset    Heart Disease Mother     High Blood Pressure Mother     Stroke Mother     Cancer Father         multiple myeloma       Vitals:    03/19/21 1019 03/19/21 1025   BP: (!) 149/70 (!) 149/73   Pulse: 68    Temp: 97.2 °F (36.2 °C)    TempSrc: Oral    SpO2: 98%    Weight: 144 lb (65.3 kg)    Height: 5' 9\" (1.753 m)      Estimated body mass index is 21.27 kg/m² as calculated from the following:    Height as of this encounter: 5' 9\" (1.753 m). Weight as of this encounter: 144 lb (65.3 kg). PHYSICAL EXAM  GENERAL:  Pleasant  female who looks her stated age, awake alert and oriented x3, no acute distress. NECK:  Supple nontender. No carotid bruits. Brisk carotid upstrokes, mild JVD with V wave. Marked thyromegaly. Area of tenderness seems to lie superior to the thyroid, and adjacent to the right lateral aspect of the trachea, though no palpable mass fluctuance or tenderness. LYMPH:  No supraclavicular cervical axillarylymphadenopathy. LUNGS:  Clear to auscultation bilaterally. Very good air entry. No inspiratory crackles or expiratory wheezes. HEART:  Regular rate and rhythm without pathologic murmur rub gallop S3 or S4. ABDOMEN:  Soft, mildly tender. Normal bowel sounds. No guarding. No masses. Not appear to be distended. UROGENITAL:  Deferred  EXTREMITIES:  Warm and well perfused without clubbing cyanosis or edema. 2+ pulses in all 4 extremities. Capillary refill less than 2 seconds. NEURO:  Cranial nerves 2-12 grossly intact. Normal muscle bulk and tone. No resting tremor, cogwheeling, normal rapid alternating movements in the hands and feet. No stocking paresthesia. Normal gait and station. MUSCULOSKELETAL:  Mild osteoarthritic changes. SKIN:  No worrisome lesions, skin a little dry. PSYCH:  No psychomotor retardation or agitation. Good eye contact. Unrestricted affect range. Mood congruent with affect. Linear thought.     LABS  Lab Review   Orders Only on 02/08/2021   Component Date Value    TSH 02/08/2021 6.63*   Orders Only on 02/08/2021   Component Date Value    T4 Free 02/08/2021 0.5*   Orders Only on 02/08/2021   Component Date Value    Cortisol 02/08/2021 8.2    Orders Only on 10/15/2020   Component Date Value    Renin Activity 10/15/2020 0.3    Orders Only on 10/15/2020   Component for a month or 2, claims that she feels better when she is on it. I am not so sure. We will check her B12 with next blood draw to determine if she truly has a deficiency. 7. Postherpetic neuralgia  At time of last visit, we prescribed uptitrating gabapentin 300 mg nightly, increasing to 300 mg twice daily. She continues to use topical lidocaine. She tells me not start the gabapentin due to stomach concerns. Yet she reports significant pain slightly in the distribution of her prior shingles. Explained it certainly would not hurt to try the gabapentin once, and see how it went because any stomach distress might be minor compared to the neuralgia. 8. Irritable bowel syndrome with both constipation and diarrhea  Apparent history celiac disease and bacterial overgrowth syndrome. GI consultation discusses GERD abdominal pain constipation October 2020 seemingly in exacerbation. At time of last visit, we retreated with rifaximin to seemingly good effect. Unsure how much fiber she is taking, I reinforced the benefit of Metamucil today. I am unable to verify EGD pathology biopsy diagnosis of celiac disease, duodenal aspirate culture, no TTG in the system that I can find. Update with next blood draw. 9. Need for shingles vaccine  COVID shots completed. She will obtain Shingrix vaccinations from her pharmacy, evidently her insurance is not covering it unfortunately. 10.  Right anterior neck pain. History of multiple thyroid nodules. .  Patient has what she describes as a \"raw\" irritated area on examination superior to the thyroid isthmus line adjacent to the trachea. I could not appreciate a mass there. She believes this has been ongoing for months. She denies improvement or worsening with eating drinking flexing or twisting neck. She has not noticed any discrete mass. Discomfort is seemingly mild.     Last thyroid ultrasound 2018 showed multiple bilateral thyroid nodules, some of which required follow-up in virtue of ultrasound appearance and size so she is due anyway. After some discussion, she is agreeable to obtaining a soft tissue ultrasound the tender area, and again I cannot appreciate any mass there. Other option would be soft tissue neck CT, bronchoscopy, EGD depending on which anatomical structure of the discomfort is associated with. My impression is that this lies above the thyroid but below the vocal cords if adjacent to or lying within the esophagus. Return in about 3 months (around 6/19/2021). It was a pleasure to visit with Ms. Rochelle Cuellar today. Answered all questions as best I could.   Fabián Nava MD   Time 30 minutes

## 2021-04-01 ENCOUNTER — TELEPHONE (OUTPATIENT)
Dept: PRIMARY CARE CLINIC | Age: 86
End: 2021-04-01

## 2021-04-01 DIAGNOSIS — R29.898 SEVERE MUSCLE DECONDITIONING: ICD-10-CM

## 2021-04-01 DIAGNOSIS — B02.29 POSTHERPETIC NEURALGIA: Primary | ICD-10-CM

## 2021-04-01 DIAGNOSIS — H54.8 LEGAL BLINDNESS: ICD-10-CM

## 2021-04-06 ENCOUNTER — HOSPITAL ENCOUNTER (OUTPATIENT)
Age: 86
Discharge: HOME | End: 2021-04-06
Payer: MEDICARE

## 2021-04-06 ENCOUNTER — HOSPITAL ENCOUNTER (OUTPATIENT)
Dept: GENERAL RADIOLOGY | Age: 86
Discharge: HOME | End: 2021-04-06
Payer: MEDICARE

## 2021-04-06 ENCOUNTER — OFFICE VISIT (OUTPATIENT)
Dept: CARDIOLOGY CLINIC | Age: 86
End: 2021-04-06
Payer: MEDICARE

## 2021-04-06 VITALS
DIASTOLIC BLOOD PRESSURE: 60 MMHG | SYSTOLIC BLOOD PRESSURE: 122 MMHG | TEMPERATURE: 97.3 F | BODY MASS INDEX: 21.33 KG/M2 | WEIGHT: 144 LBS | HEART RATE: 64 BPM | HEIGHT: 69 IN

## 2021-04-06 DIAGNOSIS — K90.0 CELIAC DISEASE: ICD-10-CM

## 2021-04-06 DIAGNOSIS — E78.2 MIXED HYPERLIPIDEMIA: ICD-10-CM

## 2021-04-06 DIAGNOSIS — I25.5 ISCHEMIC CARDIOMYOPATHY: ICD-10-CM

## 2021-04-06 DIAGNOSIS — M54.2 NECK PAIN: ICD-10-CM

## 2021-04-06 DIAGNOSIS — I10 ESSENTIAL HYPERTENSION: Primary | ICD-10-CM

## 2021-04-06 DIAGNOSIS — I25.10 CORONARY ARTERY DISEASE INVOLVING NATIVE CORONARY ARTERY OF NATIVE HEART WITHOUT ANGINA PECTORIS: ICD-10-CM

## 2021-04-06 DIAGNOSIS — E53.8 B12 DEFICIENCY: ICD-10-CM

## 2021-04-06 DIAGNOSIS — I10 ESSENTIAL HYPERTENSION: ICD-10-CM

## 2021-04-06 PROCEDURE — 1090F PRES/ABSN URINE INCON ASSESS: CPT | Performed by: INTERNAL MEDICINE

## 2021-04-06 PROCEDURE — G8420 CALC BMI NORM PARAMETERS: HCPCS | Performed by: INTERNAL MEDICINE

## 2021-04-06 PROCEDURE — 99214 OFFICE O/P EST MOD 30 MIN: CPT | Performed by: INTERNAL MEDICINE

## 2021-04-06 PROCEDURE — 70360 X-RAY EXAM OF NECK: CPT

## 2021-04-06 PROCEDURE — 1036F TOBACCO NON-USER: CPT | Performed by: INTERNAL MEDICINE

## 2021-04-06 PROCEDURE — G8427 DOCREV CUR MEDS BY ELIG CLIN: HCPCS | Performed by: INTERNAL MEDICINE

## 2021-04-06 PROCEDURE — 4040F PNEUMOC VAC/ADMIN/RCVD: CPT | Performed by: INTERNAL MEDICINE

## 2021-04-06 PROCEDURE — 1123F ACP DISCUSS/DSCN MKR DOCD: CPT | Performed by: INTERNAL MEDICINE

## 2021-04-06 NOTE — PROGRESS NOTES
Taking? Authorizing Provider   felodipine (PLENDIL) 2.5 MG extended release tablet Take 2 tablets by mouth nightly Yes Kalpesh Brown MD   felodipine (PLENDIL) 5 MG extended release tablet Take 1 tablet by mouth nightly Yes Kalpesh Brown MD   gabapentin (NEURONTIN) 100 MG capsule Take 1 capsule by mouth 3 times daily for 180 days. Intended supply: 30 days Yes Kalpesh Brown MD   albuterol sulfate (PROAIR RESPICLICK) 340 (90 Base) MCG/ACT aerosol powder inhalation Inhale 2 puffs into the lungs every 6 hours as needed for Wheezing or Shortness of Breath Yes Kalpesh Brown MD   ammonium lactate (LAC-HYDRIN) 12 % lotion Apply topically daily. Yes Kalpesh Brown MD   atenolol (TENORMIN) 50 MG tablet TAKE 1 TABLET DAILY Yes Kalpesh Brown MD   budesonide-formoterol (SYMBICORT) 160-4.5 MCG/ACT AERO Inhale 2 puffs into the lungs 2 times daily Yes Kalpesh Brown MD   fluocinonide (LIDEX) 0.05 % cream Apply topically 2 times daily. Yes Kalpesh Brown MD   fluticasone [facility]) 50 MCG/ACT nasal spray PLACE 1 SPRAY IN EACH NOSTRIL DAILY Yes Kalpesh Brown MD   hydrocortisone (ANUSOL-HC) 25 MG suppository Place 1 suppository rectally every 12 hours Yes Kalpesh Brown MD   Insulin Syringe-Needle U-100 (B-D INSULIN SYRINGE 1CC/25G) 25G X 5/8\" 1 ML MISC 1 Syringe by Does not apply route every 30 days Yes Kalpesh Brown MD   lidocaine (XYLOCAINE) 5 % ointment Apply topically three times a day, as needed.  Yes Kalpesh Brown MD   loratadine (CLARITIN) 10 MG tablet Take 1 tablet by mouth daily Yes Kalpesh Brown MD   montelukast (SINGULAIR) 10 MG tablet TAKE 1 TABLET NIGHTLY Yes Kalpesh Brown MD   pantoprazole (PROTONIX) 40 MG tablet Take 1 tablet by mouth 2 times daily Yes Kalpesh Brown MD   polyethylene glycol (GLYCOLAX) 17 g packet Take 17 g by mouth 2 times daily Yes Kalpesh Brown MD   rifaximin (XIFAXAN) 550 MG tablet Take 1 tablet by mouth 2 times daily Yes Kalpesh Brown MD   spironolactone (ALDACTONE) 25 MG tablet Daily Yes Moises Patricio MD   methIMAzole (TAPAZOLE) 5 MG tablet Take 1 tablet by mouth See Admin Instructions 1 tablet by mouth daily except for Sunday, Monday, Wednesday take 1.5 tablets. Yes Moises Patricio MD   Simethicone (GAS RELIEF) 180 MG CAPS Take 180 mg by mouth 4 times daily as needed (bloating) Yes Moises Patricio MD   cyanocobalamin 1000 MCG/ML injection Inject 1 mL into the muscle every 30 days Yes Braden Pinto MD     Family History  Family History   Problem Relation Age of Onset    Heart Disease Mother     High Blood Pressure Mother     Stroke Mother     Cancer Father         multiple myeloma     · ROS:   · Cardiovascular: Reviewed in HPI  · Allergic/Immunologic: No nasal congestion or hives. · All other ROS are reviewed and are unremarkable. PHYSICAL EXAM:      Wt Readings from Last 3 Encounters:   04/06/21 144 lb (65.3 kg)   03/19/21 144 lb (65.3 kg)   01/29/21 141 lb (64 kg)       BP Readings from Last 3 Encounters:   04/06/21 122/60   03/19/21 (!) 149/73   01/29/21 (!) 163/84       Pulse Readings from Last 3 Encounters:   04/06/21 64   03/19/21 68   01/29/21 69       Exam   ENT: Cranial nerves II through XII intact grossly except that she is legally blind. Head eyes ears nose and throat unremarkable. NEUROLOGIC: Awake and alert and oriented x3. She is able to recognize me. Moves all extremities well for 80year-old. Constitutional: This patient is oriented to person, place, and time. Also appears well-developed and well-nourished and in no acute distress. HEENT: Normocephalic and atraumatic. Sclerae anicteric. No xanthelasmas. Conjunctiva white, no subconjunctival hemorrhage   External inspection of ears nose teeth & gums   Eyes:PERRLA EOM's intact. Neck: Neck supple. No JVD present. Carotids without bruits. No mass and no thyromegaly.  No lymphadenopathy    Cardiovascular: RRR, normal S1 and S2; no murmur/gallop or rub, PMI nondisplaced  Pulmonary/Chest: Effort normal.  Lungs clear to auscultation. Chest wall nontender  Abdominal: soft, nontender, nondistended. + bowel sounds; no organomegaly or bruits. Aorta normal  Extremities: No edema  Pulses are 2+ radial/carotid/dorsalis pedis bilaterally. Cap refill brisk. Neurological: No cranial nerve deficit. Psychiatric: This patient has a normal mood and affect and the speech is normal as is behavior    Assessment:    MR / AR   Echo 2017 EF 55% m/m MR moderate insuff /  similar to prior echo on    LV Diastolic Dimension: 3.9 cm LV Systolic Dimension: 2.7 cm   The aortic valve appears normal in structure and function.   The aortic valve appears tricuspid.   Moderate aortic regurgitation is present.   No evidence of aortic stenosis. On BB   Summary 20   Left ventricular cavity size is normal with normal left ventricular wall   thickness. Overall left ventricular systolic function appears normal with an ejection   fraction of 55%. No regional wall motion abnormalities   Normal diastolic function. Mild tricuspid regurgitation. Estimated pulmonary artery systolic pressure is at 24 mmHg assuming a right   atrial pressure of 3 mmHg. [id number]  55 BPM 18   SD interval 218 ms  QRS duration 88 ms  QT/QTc 434/415 ms  P-R-T axes 67 8 33    HTN  Optimal  EKG on January 3, 2018 sinus rhythm 61/m. Nonspecific lateral ST and T wave changes. Stable pattern. PLAN:   Hemodynamics and and blood pressure and vitals are stable. Will recommend that she get her shingles vaccine soon. No medication refills needed at this time. Return to see me in 6 months. .. Gissel Goodman M.D.  [facility] [place]

## 2021-04-07 LAB
TSH REFLEX: 2.77 UIU/ML (ref 0.27–4.2)
VITAMIN B-12: 1168 PG/ML (ref 211–911)

## 2021-04-08 ENCOUNTER — TELEPHONE (OUTPATIENT)
Dept: PRIMARY CARE CLINIC | Age: 86
End: 2021-04-08

## 2021-04-08 NOTE — TELEPHONE ENCOUNTER
Received: Yesterday  Message Contents   MD Chantel Arredondo MA             Thyroid hormone levels have normalized, and you can continue on your current dose of thyroid medicine.  B12 levels are quite high, I suspect you just took a shot or a pill prior to the blood test.

## 2021-04-09 LAB — TISSUE TRANSGLUTAMINASE IGA: <0.5 U/ML (ref 0–14)

## 2021-04-12 ENCOUNTER — OFFICE VISIT (OUTPATIENT)
Dept: PRIMARY CARE CLINIC | Age: 86
End: 2021-04-12
Payer: MEDICARE

## 2021-04-12 VITALS — SYSTOLIC BLOOD PRESSURE: 139 MMHG | DIASTOLIC BLOOD PRESSURE: 69 MMHG | HEART RATE: 70 BPM | OXYGEN SATURATION: 97 %

## 2021-04-12 DIAGNOSIS — M17.12 PRIMARY OSTEOARTHRITIS OF LEFT KNEE: Primary | ICD-10-CM

## 2021-04-12 DIAGNOSIS — D68.9 COAGULOPATHY (HCC): ICD-10-CM

## 2021-04-12 DIAGNOSIS — Z78.0 ASYMPTOMATIC MENOPAUSAL STATE: ICD-10-CM

## 2021-04-12 DIAGNOSIS — Z00.00 ROUTINE GENERAL MEDICAL EXAMINATION AT A HEALTH CARE FACILITY: ICD-10-CM

## 2021-04-12 DIAGNOSIS — E21.3 HYPERPARATHYROIDISM (HCC): ICD-10-CM

## 2021-04-12 DIAGNOSIS — H35.3290 EXUDATIVE AGE-RELATED MACULAR DEGENERATION, UNSPECIFIED LATERALITY, UNSPECIFIED STAGE (HCC): ICD-10-CM

## 2021-04-12 DIAGNOSIS — I50.22 CHRONIC SYSTOLIC CONGESTIVE HEART FAILURE (HCC): ICD-10-CM

## 2021-04-12 DIAGNOSIS — Z23 NEED FOR PROPHYLACTIC VACCINATION AND INOCULATION AGAINST VARICELLA: ICD-10-CM

## 2021-04-12 DIAGNOSIS — G89.4 CHRONIC PAIN SYNDROME: ICD-10-CM

## 2021-04-12 DIAGNOSIS — H35.30 MACULAR DEGENERATION OF BOTH EYES, UNSPECIFIED TYPE: ICD-10-CM

## 2021-04-12 DIAGNOSIS — E46 PROTEIN MALNUTRITION (HCC): ICD-10-CM

## 2021-04-12 PROCEDURE — 4040F PNEUMOC VAC/ADMIN/RCVD: CPT | Performed by: INTERNAL MEDICINE

## 2021-04-12 PROCEDURE — 1090F PRES/ABSN URINE INCON ASSESS: CPT | Performed by: INTERNAL MEDICINE

## 2021-04-12 PROCEDURE — 1036F TOBACCO NON-USER: CPT | Performed by: INTERNAL MEDICINE

## 2021-04-12 PROCEDURE — G0438 PPPS, INITIAL VISIT: HCPCS | Performed by: INTERNAL MEDICINE

## 2021-04-12 PROCEDURE — 99213 OFFICE O/P EST LOW 20 MIN: CPT | Performed by: INTERNAL MEDICINE

## 2021-04-12 PROCEDURE — G8427 DOCREV CUR MEDS BY ELIG CLIN: HCPCS | Performed by: INTERNAL MEDICINE

## 2021-04-12 PROCEDURE — G8420 CALC BMI NORM PARAMETERS: HCPCS | Performed by: INTERNAL MEDICINE

## 2021-04-12 PROCEDURE — 1123F ACP DISCUSS/DSCN MKR DOCD: CPT | Performed by: INTERNAL MEDICINE

## 2021-04-12 ASSESSMENT — LIFESTYLE VARIABLES: HOW OFTEN DO YOU HAVE A DRINK CONTAINING ALCOHOL: 0

## 2021-04-12 ASSESSMENT — PATIENT HEALTH QUESTIONNAIRE - PHQ9
SUM OF ALL RESPONSES TO PHQ QUESTIONS 1-9: 0
SUM OF ALL RESPONSES TO PHQ9 QUESTIONS 1 & 2: 0
2. FEELING DOWN, DEPRESSED OR HOPELESS: 0
SUM OF ALL RESPONSES TO PHQ QUESTIONS 1-9: 0
SUM OF ALL RESPONSES TO PHQ QUESTIONS 1-9: 0

## 2021-04-12 NOTE — PROGRESS NOTES
2021   Medicare Annual Wellness Visit  Name: Bob Bojorquez Date: 2021   MRN: <[id number]> Sex: Female   Age: 80 y.o. Ethnicity: Non-/Non    : 1926 Race: Black       Alanna Bennett is here for Medicare AWV     Screenings for behavioral, psychosocial and functional/safety risks, and cognitive dysfunction are all negative except as indicated below. These results, as well as other patient data from the Health Risk Assessment form, are documented in Flowsheets linked to this Encounter.           Allergies   Allergen Reactions    Ace Inhibitors Other (See Comments)       angioedema    Acyclovir         Severe nausea  And anorexia. It happened after taking each 800 mg tablet and patient took one one day and one the next, then stopped an no more    Creon [Pancrelipase (Lip-Prot-Amyl)]         Swollen gums.  Dye [Iodides] Hives       Only reacts to dye that went into eyes, OK with IV contrast    Eggs Or Egg-Derived Products         Hives  this test in the [facility] Directory ([facility]). Egg White IgE       3. 59High            <=0.34 kU/L    Final            02/10/2016  2:14 PM       Stephanie Bennett (:  1926) is a 80 y.o. 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 For her valvular heart disease she sees Dr. Norman Hightower in cardiology. Reassuring echo June 2020 EF 55% without resting regional's, normal diastolic function, mild TR, PASP 24 mm with normal inspiratory collapse of the IVC. Chest CT June 2020 single subsegmental right upper lobe pulmonary embolism on Eliquis 5 mg twice daily for approximately 7 months, with intent to discontinue after 6 months effective anticoagulation. Follow-up chest CT for left-sided chest pain August 2020 - for PE. Carotid ultrasound in 2013 for TIA showed no high-grade stenosis. Negative right lower extremity DVT study in 2015, negative bilateral lower extremity study in 2014. Head CT 2013 - for stroke. For her Avery Rife' disease she sees Dr. Carlos Enrique May in endocrinology. Thyroid nodules were benign on biopsy in 2013, thyroid ultrasound 2015 without worrisome changes. She has bilateral adrenal adenomas, radiographically stable on serial CT 2012 through 2018, though she may have AML on superior pole of the left kidney. Cortisol DHEA plasma normetanephrine and metanephrine renin in 2020 did not suggest endocrinologically functioning adenomas. In November endocrinology  noted worsening bone mineral density, -3 spine -4.7 hip. Due to GI and renal limitations/risks, no great treatment options. Right chest wall zoster [id number] complicated by PHN. Has not had Shingrix shots yet. Endorses right greater than left knee pain walker dependent and interested in seeing orthopedics again for consideration of synovial fluid exchange versus cortisone shot. Endorses left greater than right lower extremity neuropathy however too sedated to tolerate higher dose of gabapentin. Review of Systems   Constitutional: Negative for activity change, appetite change, fatigue and unexpected weight change. HENT: Negative for dental problem, sinus pain, sore throat and trouble swallowing.     Eyes: Negative for pain and visual disturbance. Respiratory: Negative for apnea, cough, chest tightness, shortness of breath and wheezing. Cardiovascular: Negative for chest pain and palpitations. Gastrointestinal: Negative for abdominal pain, blood in stool, constipation, diarrhea, nausea, rectal pain and vomiting. Endocrine: Negative for cold intolerance, heat intolerance, polydipsia, polyphagia and polyuria. Genitourinary: Negative for difficulty urinating, dysuria, flank pain, frequency, hematuria, pelvic pain, urgency, vaginal bleeding and vaginal discharge. Musculoskeletal: Negative for arthralgias, back pain, gait problem, joint swelling, myalgias, neck pain and neck stiffness. Skin: Negative for color change and rash. Neurological: Negative for dizziness, tremors, syncope, speech difficulty, weakness, light-headedness and headaches. Hematological: Negative for adenopathy. Does not bruise/bleed easily. Psychiatric/Behavioral: Negative for agitation, behavioral problems, decreased concentration, sleep disturbance and suicidal ideas. The patient is not nervous/anxious and is not hyperactive. Prior to Visit Medications    Medication Sig Taking? Authorizing Provider   zoster recombinant adjuvanted vaccine [facility]) 50 MCG/0.5ML SUSR injection Inject 0.5 mLs into the muscle once for 1 dose Yes Starla Bella MD   felodipine (PLENDIL) 2.5 MG extended release tablet Take 2 tablets by mouth nightly Yes Starla Bella MD   felodipine (PLENDIL) 5 MG extended release tablet Take 1 tablet by mouth nightly Yes Starla Bella MD   gabapentin (NEURONTIN) 100 MG capsule Take 1 capsule by mouth 3 times daily for 180 days.  Intended supply: 30 days Yes Starla Bella MD   albuterol sulfate (PROAIR RESPICLICK) 557 (90 Base) MCG/ACT aerosol powder inhalation Inhale 2 puffs into the lungs every 6 hours as needed for Wheezing or Shortness of Breath Yes Starla Bella MD   ammonium lactate (LAC-HYDRIN) 12 % lotion Apply topically daily. Yes Luis F Beasley MD   atenolol (TENORMIN) 50 MG tablet TAKE 1 TABLET DAILY Yes Luis F Beasley MD   budesonide-formoterol (SYMBICORT) 160-4.5 MCG/ACT AERO Inhale 2 puffs into the lungs 2 times daily Yes Luis F Beasley MD   fluocinonide (LIDEX) 0.05 % cream Apply topically 2 times daily. Yes Luis F Beasley MD   fluticasone [facility]) 50 MCG/ACT nasal spray PLACE 1 SPRAY IN EACH NOSTRIL DAILY Yes Luis F Beasley MD   hydrocortisone (ANUSOL-HC) 25 MG suppository Place 1 suppository rectally every 12 hours Yes Luis F Beasley MD   Insulin Syringe-Needle U-100 (B-D INSULIN SYRINGE 1CC/25G) 25G X 5/8\" 1 ML MISC 1 Syringe by Does not apply route every 30 days Yes Luis F Beasley MD   lidocaine (XYLOCAINE) 5 % ointment Apply topically three times a day, as needed. Yes Luis F Beasley MD   loratadine (CLARITIN) 10 MG tablet Take 1 tablet by mouth daily Yes Luis F Beasley MD   montelukast (SINGULAIR) 10 MG tablet TAKE 1 TABLET NIGHTLY Yes Luis F Beasley MD   pantoprazole (PROTONIX) 40 MG tablet Take 1 tablet by mouth 2 times daily Yes Luis F Beasley MD   polyethylene glycol (GLYCOLAX) 17 g packet Take 17 g by mouth 2 times daily Yes Luis F Beasley MD   spironolactone (ALDACTONE) 25 MG tablet Daily Yes Luis F Beasley MD   methIMAzole (TAPAZOLE) 5 MG tablet Take 1 tablet by mouth See Admin Instructions 1 tablet by mouth daily except for Sunday, Monday, Wednesday take 1.5 tablets. Yes Luis F Beasley MD   Simethicone (GAS RELIEF) 180 MG CAPS Take 180 mg by mouth 4 times daily as needed (bloating) Yes Luis F Beasley MD   cyanocobalamin 1000 MCG/ML injection Inject 1 mL into the muscle every 30 days Yes Jayne Montelongo MD        Allergies   Allergen Reactions    Ace Inhibitors Other (See Comments)     angioedema    Acyclovir      Severe nausea  And anorexia.  It happened after taking each 800 mg tablet and patient took one one day and one the next, then stopped an no more    Creon [Pancrelipase (Lip-Prot-Amyl)]      Swollen gums.  Dye [Iodides] Hives     Only reacts to dye that went into eyes, OK with IV contrast    Eggs Or Egg-Derived Products      Hives  this test in the [facility] Directory ([facility]com). Egg White IgE 3. 59High   <=0.34 kU/L Final 02/10/2016  2:14 PM [facility]                  Gluten Meal      Abdominal pain and diarreha  Wheat IgE 0.53  <=0.34 kU/L Final 02/10/2016  2:14 PM [facility]       Milk-Related Compounds      Milk, Cow's IgE 1. 75High   <=0.34 kU/L Final 02/10/2016  2:14 PM [facility]    Fluorescein Other (See Comments)    Lisinopril Swelling    Macrobid [Nitrofurantoin Monohydrate Macrocrystals] Other (See Comments)    No Known Allergies     Ciprocinonide [Fluocinolone] Rash     Stiff, no nausea or rash    Sulfa Antibiotics Rash       Past Medical History:   Diagnosis Date    [facility] gland [facility] [address]     Arthritis     Asthma     Bilateral carpal tunnel syndrome 8/23/2018    Cataract     Chronic systolic congestive heart failure ([facility] [address] 4/12/2021    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    PCR    Clostridium difficile infection 1/27/15    AG positive    Degenerative joint disease of knee     Depression     Diverticulitis     GERD (gastroesophageal reflux disease)     Grave's disease 4/2/2012    Heart disease     Hypertension     IBS (irritable bowel syndrome)     Macular degeneration     MGUS (monoclonal gammopathy of unknown significance)     Osteoporosis     Poor vision     d/t macular degeneration    Simple cyst of kidney        Past Surgical History:   Procedure Laterality Date    CHOLECYSTECTOMY      COLONOSCOPY  10/29/2009    **see scanned report- SILVANA    HEMORRHOID SURGERY      HYSTERECTOMY  in 40's    rudolph/bso    HYSTERECTOMY, TOTAL ABDOMINAL         Social History     Socioeconomic History    Marital status: Single     Spouse name: Not on file    Number of children: Not on file    Years of education: Not on file    Highest education level: Not on file   Occupational History    Not on file   Social Needs    Financial resource strain: Not on file    Food insecurity     Worry: Not on file     Inability: Not on file    Transportation needs     Medical: Not on file     Non-medical: Not on file   Tobacco Use    Smoking status: Never Smoker    Smokeless tobacco: Never Used   Substance and Sexual Activity    Alcohol use: No    Drug use: No    Sexual activity: Not Currently   Lifestyle    Physical activity     Days per week: Not on file     Minutes per session: Not on file    Stress: Not on file   Relationships    Social connections     Talks on phone: Not on file     Gets together: Not on file     Attends Hinduism service: Not on file     Active member of club or organization: Not on file     Attends meetings of clubs or organizations: Not on file     Relationship status: Not on file    Intimate partner violence     Fear of current or ex partner: Not on file     Emotionally abused: Not on file     Physically abused: Not on file     Forced sexual activity: Not on file   Other Topics Concern    Not on file   Social History Narrative    Not on file        Family History   Problem Relation Age of Onset    Heart Disease Mother     High Blood Pressure Mother     Stroke Mother     Cancer Father         multiple myeloma       Vitals:    04/12/21 1028   BP: 139/69   Pulse: 70   SpO2: 97%     Estimated body mass index is 21.27 kg/m² as calculated from the following:    Height as of 4/6/21: 5' 9\" (1.753 m). Weight as of 4/6/21: 144 lb (65.3 kg). PHYSICAL EXAM  GENERAL:  Pleasant  female who looks her stated age, awake alert and oriented x3, no acute distress. HEENT: Lateral gaze preference given macular degeneration. Pupils sluggishly reactive symmetrically. NECK:  Supple nontender. No carotid bruits. Brisk carotid upstrokes, mild JVD with V wave. Marked thyromegaly. Value    Normetanephrine, Free 10/15/2020 0.98*    Metanephrine, Free 10/15/2020 0.17     Metaneph/Plasma Interp 10/15/2020 See Note    Orders Only on 10/15/2020   Component Date Value    DHEAS (DHEA Sulfate) 10/15/2020 <15.0    Orders Only on 10/15/2020   Component Date Value    ACTH 10/15/2020 7    Orders Only on 10/15/2020   Component Date Value    TSH 10/15/2020 13.66*   Orders Only on 10/15/2020   Component Date Value    T4 Free 10/15/2020 0.4*   There may be more visits with results that are not included. ASSESSMENT/PLAN  1. Dry skin  At time of last visit we prescribed Lac-Hydrin, which the patient reports has helped. She has failed to use it consistently in her lower extremities but will start to do so now, may need to transition to just moisturizing cream at some point. This will help with some of her neuropathic pain. 2. Essential hypertension  In the outpatient setting historically blood pressures run 130-170/80, most commonly 150/80. With recent felodipine dose increase, blood pressure in clinic today looks better controlled. .    She will continue atenolol spironolactone in the morning. 3. Other chronic pulmonary embolism without acute cor pulmonale ([facility] [address]  Patient is completed anticoagulation, and resume baby aspirin. 4. Rash  - fluocinonide (LIDEX) 0.05 % cream; Apply topically 2 times daily. Dispense: 60 g; Refill: 1    5. Other allergic rhinitis  - fluticasone (FLONASE) 50 MCG/ACT nasal spray; PLACE 1 SPRAY IN EACH NOSTRIL DAILY  Dispense: 3 Bottle; Refill: 5  - loratadine (CLARITIN) 10 MG tablet; Take 1 tablet by mouth daily  Dispense: 90 tablet; Refill: 3  - montelukast (SINGULAIR) 10 MG tablet; TAKE 1 TABLET NIGHTLY  Dispense: 90 tablet; Refill: 3    6. B12 deficiency  Update B12 timely fashion. Has been out of her B12 shot for a month or 2, claims that she feels better when she is on it. I am not so sure.   We will check her B12 with next blood draw to determine if she truly has a deficiency. 7. Postherpetic neuralgia  At time of last visit, we prescribed uptitrating gabapentin 300 mg nightly, increasing to 300 mg twice daily. She continues to use topical lidocaine. She tells me not start the gabapentin due to stomach concerns. Yet she reports significant pain slightly in the distribution of her prior shingles. Explained at that visit it certainly would not hurt to try the gabapentin once, and see how it went because any stomach distress might be minor compared to the neuralgia. I am unsure how much gabapentin she is actually taking, perhaps home health can help. 8. Irritable bowel syndrome with both constipation and diarrhea  Apparent history celiac disease and bacterial overgrowth syndrome. GI consultation discusses GERD abdominal pain constipation October 2020 seemingly in exacerbation. At time of last visit, we retreated with rifaximin to seemingly good effect. Unsure how much fiber she is taking, I reinforced the benefit of Metamucil today. I am unable to verify EGD pathology biopsy diagnosis of celiac disease, duodenal aspirate culture, interestingly TTG IgA negative. 9. Need for shingles vaccine  COVID shots completed. She will obtain Shingrix vaccinations from her pharmacy, evidently her insurance is not covering it unfortunately. 10.  Right anterior neck pain. History of multiple thyroid nodules. .  Patient has what she describes as a \"raw\" irritated area on examination superior to the thyroid isthmus line adjacent to the trachea. I could not appreciate a mass there. She believes this has been ongoing for months. She denies improvement or worsening with eating drinking flexing or twisting neck. She has not noticed any discrete mass. Discomfort is seemingly mild. Soft tissue ultrasound of the area did not disclose any discrete pathology other than her more posterior lying arthritis.     Last thyroid ultrasound 2018 showed multiple bilateral thyroid nodules, some of which required follow-up in virtue of ultrasound appearance and size so she is due anyway. Unfortunately no comment on thyroid nodules. 11.  Macular degeneration. Her eyesight causes her to occasionally drop her medications on the floor where she cannot find them. She has to rely on pill shape, which is unreliable in case of formulary or  changes from her insurance and/or pharmacy. We agreed it would be safer if she can get her pills set up, and referral to [address] [place] [address] ([phone] general number, fax [phone]) 4 weekly skilled nursing for 6 weeks, transitioning to every 2 weeks based on compliance and ease of adoption of pillbox. Return in 3 months (on 7/12/2021) for Medicare Annual Wellness Visit in 1 year. It was a pleasure to visit with Ms. Ragini Escoto today. Answered all questions as best I could. Katia Kaplan MD   Total time 30 minutes    Cardiovascular Disease Risk Counseling: Assessed the patient's risk to develop cardiovascular disease and reviewed main risk factors. Reviewed steps to reduce disease risk including:   · Quitting tobacco use, reducing amount smoked, or not starting the habit  · Making healthy food choices  · Being physically active and gradualy increasing activity levels   · Reduce weight and determine a healthy BMI goal  · Monitor blood pressure and treat if higher than 140/90 mmHg  · Maintain blood total cholesterol levels under 5 mmol/l or 190 mg/dl  · Maintain LDL cholesterol levels under 3.0 mmol/l or 115 mg/dl   · Control blood glucose levels  · Consider taking aspirin (75 mg daily), once blood pressure is controlled   Provided a follow up plan. Time spent (minutes): 5 minutes    Advance Care Planning   Advanced Care Planning: Discussed the patients choices for care and treatment in case of a health event that adversely affects decision-making abilities.  Also discussed the patients long-term treatment options. Reviewed with the patient the [facility] of [facility] Declaration forms  Reviewed the process of designating a competent adult as an Agent (or -in-fact) that could take make health care decisions for the patient if incompetent. Patient was asked to complete the declaration forms, either acknowledge the forms by a public notary or an eligible witness and provide a signed copy to the practice office. Her daughter is her POA. She indicates that she would not wish heroic measures such as shocks CPR or intubation under any circumstances. She would not wish a feeding tube. She would accept IV fluids and antibiotics for potentially reversible conditions  DNR/DNI  Time spent (minutes): 5 minutes      Medicare Annual Wellness Visit  Name: Cindy Brownlee Date: 2021   MRN: <[id number]> Sex: Female   Age: 80 y.o. Ethnicity: Non-/Non    : 1926 Race: Black      Wil Cortes is here for Medicare AWV    Screenings for behavioral, psychosocial and functional/safety risks, and cognitive dysfunction are all negative except as indicated below. These results, as well as other patient data from the [facility] form, are documented in Flowsheets linked to this Encounter. Allergies   Allergen Reactions    Ace Inhibitors Other (See Comments)     angioedema    Acyclovir      Severe nausea  And anorexia. It happened after taking each 800 mg tablet and patient took one one day and one the next, then stopped an no more    Creon [Pancrelipase (Lip-Prot-Amyl)]      Swollen gums.  Dye [Iodides] Hives     Only reacts to dye that went into eyes, OK with IV contrast    Eggs Or Egg-Derived Products      Hives  this test in the [employer] Directory ([facility]plab.com). Egg White IgE 3. 59High   <=0.34 kU/L Final 02/10/2016  2:14 PM  - [facility]                  Gluten Meal      Abdominal pain and diarreha  Wheat IgE 0.53  <=0.34 kU/L Final 02/10/2016  2:14 PM [facility]       Milk-Related Compounds      Milk, Cow's IgE 1. 75High   <=0.34 kU/L Final 02/10/2016  2:14 PM  - [facility]    Fluorescein Other (See Comments)    Lisinopril Swelling    Macrobid [Nitrofurantoin Monohydrate Macrocrystals] Other (See Comments)    No Known Allergies     Ciprocinonide [Fluocinolone] Rash     Stiff, no nausea or rash    Sulfa Antibiotics Rash       Prior to Visit Medications    Medication Sig Taking? Authorizing Provider   zoster recombinant adjuvanted vaccine [facility]) 50 MCG/0.5ML SUSR injection Inject 0.5 mLs into the muscle once for 1 dose Yes Kaia Perez MD   felodipine (PLENDIL) 2.5 MG extended release tablet Take 2 tablets by mouth nightly Yes Kaia Perez MD   felodipine (PLENDIL) 5 MG extended release tablet Take 1 tablet by mouth nightly Yes Kaia Perez MD   gabapentin (NEURONTIN) 100 MG capsule Take 1 capsule by mouth 3 times daily for 180 days. Intended supply: 30 days Yes Kaia Perez MD   albuterol sulfate (PROAIR RESPICLICK) 953 (90 Base) MCG/ACT aerosol powder inhalation Inhale 2 puffs into the lungs every 6 hours as needed for Wheezing or Shortness of Breath Yes Kaia Perez MD   ammonium lactate (LAC-HYDRIN) 12 % lotion Apply topically daily. Yes Kaia Perez MD   atenolol (TENORMIN) 50 MG tablet TAKE 1 TABLET DAILY Yes Kaia Perez MD   budesonide-formoterol (SYMBICORT) 160-4.5 MCG/ACT AERO Inhale 2 puffs into the lungs 2 times daily Yes Kaia Perez MD   fluocinonide (LIDEX) 0.05 % cream Apply topically 2 times daily.  Yes Kaia Perez MD   fluticasone [facility]) 50 MCG/ACT nasal spray PLACE 1 SPRAY IN EACH NOSTRIL DAILY Yes Kaia Perez MD   hydrocortisone (ANUSOL-HC) 25 MG suppository Place 1 suppository rectally every 12 hours Yes Kaia Perez MD   Insulin Syringe-Needle U-100 (B-D INSULIN SYRINGE 1CC/25G) 25G X 5/8\" 1 ML MISC 1 Syringe by Does not apply route every 30 days Yes Kaia Perez MD lidocaine (XYLOCAINE) 5 % ointment Apply topically three times a day, as needed. Yes Jacquelin Dancer, MD   loratadine (CLARITIN) 10 MG tablet Take 1 tablet by mouth daily Yes Jacquelin Dancer, MD   montelukast (SINGULAIR) 10 MG tablet TAKE 1 TABLET NIGHTLY Yes Jacquelin Dancer, MD   pantoprazole (PROTONIX) 40 MG tablet Take 1 tablet by mouth 2 times daily Yes Jacquelin Dancer, MD   polyethylene glycol (GLYCOLAX) 17 g packet Take 17 g by mouth 2 times daily Yes Jacquelin Dancer, MD   spironolactone (ALDACTONE) 25 MG tablet Daily Yes Jacquelin Dancer, MD   methIMAzole (TAPAZOLE) 5 MG tablet Take 1 tablet by mouth See Admin Instructions 1 tablet by mouth daily except for Sunday, Monday, Wednesday take 1.5 tablets.  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    PCR    Clostridium difficile infection 1/27/15    AG positive    Degenerative joint disease of knee     Depression     Diverticulitis     GERD (gastroesophageal reflux disease)     Grave's disease 4/2/2012    Heart disease     Hypertension     IBS (irritable bowel syndrome)     Macular degeneration     MGUS (monoclonal gammopathy of unknown significance)     Osteoporosis     Poor vision     d/t macular degeneration    Simple cyst of kidney        Past Surgical History:   Procedure Laterality Date    CHOLECYSTECTOMY      COLONOSCOPY  10/29/2009    **see scanned report- [facility]    HEMORRHOID SURGERY      HYSTERECTOMY  in 40's    rudolph/bso    HYSTERECTOMY, TOTAL ABDOMINAL         Family Filed Not on File      General Health Risk Interventions:  · Patient unable to tolerate higher doses of gabapentin due to sedation. Has been on other medications such as Lyrica Cymbalta without benefit. Chronic strongly contraindicated. Health Habits/Nutrition:  Health Habits/Nutrition  Do you exercise for at least 20 minutes 2-3 times per week?: Yes  Have you lost any weight without trying in the past 3 months?: No  Do you eat only one meal per day?: No  Have you seen the dentist within the past year?: (!) No     Health Habits/Nutrition Interventions:  · Nutritional issues:  patient advised to follow-up in this office for further evaluation and treatment within 1 month(s)    Hearing/Vision:  No exam data present  Hearing/Vision  Do you or your family notice any trouble with your hearing that hasn't been managed with hearing aids?: No  Do you have difficulty driving, watching TV, or doing any of your daily activities because of your eyesight?: (!) Yes  Have you had an eye exam within the past year?: (!) No  Hearing/Vision Interventions:  · Vision concerns:  patient encouraged to make appointment with his/her eye specialist     ADL:  ADLs  In the past 7 days, did you need help from others to perform any of the following everyday activities? Eating, dressing, grooming, bathing, toileting, or walking/balance?: None  In the past 7 days, did you need help from others to take care of any of the following? Laundry, housekeeping, banking/finances, shopping, telephone use, food preparation, transportation, or taking medications?: [employer], Laundry, Shopping, Telephone Use, Food Preparation, Transportation  ADL Interventions:  · We are asking skilled nursing to start to set up her medications. She currently gets unskilled home health aide via [facility] 2 to 3 hours 4 days a week. Daughter healthcare power of  resides in [place] which she has a niece that pays her bills monthly.     Personalized Preventive Plan   Current Health Maintenance Status  Immunization History   Administered Date(s) Administered    COVID-19, Carpenter Peter, PF, 30mcg/0.3mL 01/22/2021, 02/12/2021    Influenza Virus Vaccine 11/22/2003, 09/18/2012, 10/17/2013, 10/02/2015, 09/15/2016    Influenza, High Dose (Fluzone 65 yrs and older) 09/18/2012, 10/17/2013, 10/01/2014, 10/02/2015, 09/15/2016, 09/07/2017, 10/22/2018, 09/09/2020    Influenza, High-dose, [employer], 65 yrs +, IM (Fluzone) 09/09/2020, 09/09/2020    Influenza, Triv, inactivated, subunit, adjuvanted, IM (Fluad 65 yrs and older) 11/08/2019    Pneumococcal Conjugate 13-valent ([id number]) 12/15/2016    Pneumococcal Polysaccharide ([id number]) 04/02/2012    Tdap (Boostrix, Adacel) 07/03/2017        Health Maintenance   Topic Date Due    Shingles Vaccine (1 of 2) Never done   [employer] Visit (AWV)  Never done    Potassium monitoring  10/15/2021    Creatinine monitoring  10/15/2021    TSH testing  04/06/2022    DTaP/Tdap/Td vaccine (2 - Td) 07/03/2027    Flu vaccine  Completed    Pneumococcal 65+ years Vaccine  Completed    COVID-19 Vaccine  Completed    Hepatitis A vaccine  Aged Out    Hepatitis B vaccine  Aged Out    Hib vaccine  Aged Out    Meningococcal (ACWY) vaccine  Aged Out     Recommendations for [employer] Due: see orders and patient instructions/AVS.  . Recommended screening schedule for the next 5-10 years is provided to the patient in written form: see Patient Layla Ambriz was seen today for medicare awv. Diagnoses and all orders for this visit:    Primary osteoarthritis of left knee  -     Loraine Prabhakar MD, Orthopedic Surgery, [facility] SPECIAL[facility]    Need for prophylactic vaccination and inoculation against varicella  -     zoster recombinant adjuvanted vaccine [facility]) 50 MCG/0.5ML SUSR injection;  Inject 0.5 mLs into the muscle once for 1 dose    Routine general medical examination at a health care facility  - zoster recombinant adjuvanted vaccine [facility]) 50 MCG/0.5ML SUSR injection; Inject 0.5 mLs into the muscle once for 1 dose    Asymptomatic menopausal state    Exudative age-related macular degeneration, unspecified laterality, unspecified stage (HCC)    Protein malnutrition (HCC)    Chronic systolic congestive heart failure (HCC)    Hyperparathyroidism (HCC)    Coagulopathy (HCC)    Chronic pain syndrome    Macular degeneration of both eyes, unspecified type  -     DME Order for (Specify) as OP           See other note dressing macular degeneration, need for Ensure boost, though fortunately compensated heart failure and only secondary hyperparathyroidism followed in nephrology. Her coagulopathy has subsided and she is no longer a candidate for chronic anticoagulation.

## 2021-04-12 NOTE — PATIENT INSTRUCTIONS
1. Adan to help set up meds  2. Ask if hearing aid service can coordinate with you to get aid batteries every month. 3. Shingrix shots at your pharmacy. 1 shot 2 months apart. 4. Ortho for your left knee          Advance Directives: Care Instructions  Overview  An advance directive is a legal way to state your wishes at the end of your life. It tells your family and your doctor what to do if you can't say what you want. There are two main types of advance directives. You can change them any time your wishes change. Living will. This form tells your family and your doctor your wishes about life support and other treatment. The form is also called a declaration. Medical power of . This form lets you name a person to make treatment decisions for you when you can't speak for yourself. This person is called a health care agent (health care proxy, health care surrogate). The form is also called a durable power of  for health care. If you do not have an advance directive, decisions about your medical care may be made by a family member, or by a doctor or a  who doesn't know you. It may help to think of an advance directive as a gift to the people who care for you. If you have one, they won't have to make tough decisions by themselves. Follow-up care is a key part of your treatment and safety. Be sure to make and go to all appointments, and call your doctor if you are having problems. It's also a good idea to know your test results and keep a list of the medicines you take. What should you include in an advance directive? Many states have a unique advance directive form. (It may ask you to address specific issues.) Or you might use a universal form that's approved by many states. If your form doesn't tell you what to address, it may be hard to know what to include in your advance directive. Use the questions below to help you get started.   · Who do you want to make decisions about included within your After Visit Summary for your review. Other Preventive Recommendations:    · A preventive eye exam performed by an eye specialist is recommended every 1-2 years to screen for glaucoma; cataracts, macular degeneration, and other eye disorders. · A preventive dental visit is recommended every 6 months. · Try to get at least 150 minutes of exercise per week or 10,000 steps per day on a pedometer . · Order or download the FREE \"Exercise & Physical Activity: Your Everyday Guide\" from [employer]. Call 9-466.663.3509 or search [employer] online. · You need 6603-6762 mg of calcium and 1587-8843 IU of vitamin D per day. It is possible to meet your calcium requirement with diet alone, but a vitamin D supplement is usually necessary to meet this goal.  · When exposed to the sun, use a sunscreen that protects against both UVA and UVB radiation with an SPF of 30 or greater. Reapply every 2 to 3 hours or after sweating, drying off with a towel, or swimming. · Always wear a seat belt when traveling in a car. Always wear a helmet when riding a bicycle or motorcycle. Personalized Preventive Plan for Jose De Jesus Foote - 4/12/2021  Medicare offers a range of preventive health benefits. Some of the tests and screenings are paid in full while other may be subject to a deductible, co-insurance, and/or copay. Some of these benefits include a comprehensive review of your medical history including lifestyle, illnesses that may run in your family, and various assessments and screenings as appropriate. After reviewing your medical record and screening and assessments performed today your provider may have ordered immunizations, labs, imaging, and/or referrals for you. A list of these orders (if applicable) as well as your Preventive Care list are included within your After Visit Summary for your review.     Other Preventive Recommendations:    A preventive eye exam

## 2021-04-13 ENCOUNTER — TELEPHONE (OUTPATIENT)
Dept: PRIMARY CARE CLINIC | Age: 86
End: 2021-04-13

## 2021-04-13 NOTE — TELEPHONE ENCOUNTER
[facility] called concerning skilled nursing care for Ms. Hsieh. They were. contacted by Dr. Riddhi Fisher yesterday. Please call and she will let someone know what it is that they need.

## 2021-04-21 ENCOUNTER — TELEPHONE (OUTPATIENT)
Dept: PRIMARY CARE CLINIC | Age: 86
End: 2021-04-21

## 2021-04-23 ENCOUNTER — OFFICE VISIT (OUTPATIENT)
Dept: ORTHOPEDIC SURGERY | Age: 86
End: 2021-04-23
Payer: MEDICARE

## 2021-04-23 VITALS — HEIGHT: 69 IN | BODY MASS INDEX: 20.73 KG/M2 | WEIGHT: 140 LBS | RESPIRATION RATE: 16 BRPM

## 2021-04-23 DIAGNOSIS — M17.0 PRIMARY OSTEOARTHRITIS OF BOTH KNEES: ICD-10-CM

## 2021-04-23 DIAGNOSIS — M25.561 RIGHT KNEE PAIN, UNSPECIFIED CHRONICITY: ICD-10-CM

## 2021-04-23 DIAGNOSIS — M25.562 LEFT KNEE PAIN, UNSPECIFIED CHRONICITY: Primary | ICD-10-CM

## 2021-04-23 PROCEDURE — 20610 DRAIN/INJ JOINT/BURSA W/O US: CPT | Performed by: ORTHOPAEDIC SURGERY

## 2021-04-23 PROCEDURE — 4040F PNEUMOC VAC/ADMIN/RCVD: CPT | Performed by: ORTHOPAEDIC SURGERY

## 2021-04-23 PROCEDURE — 1036F TOBACCO NON-USER: CPT | Performed by: ORTHOPAEDIC SURGERY

## 2021-04-23 PROCEDURE — G8420 CALC BMI NORM PARAMETERS: HCPCS | Performed by: ORTHOPAEDIC SURGERY

## 2021-04-23 PROCEDURE — 1090F PRES/ABSN URINE INCON ASSESS: CPT | Performed by: ORTHOPAEDIC SURGERY

## 2021-04-23 PROCEDURE — 1123F ACP DISCUSS/DSCN MKR DOCD: CPT | Performed by: ORTHOPAEDIC SURGERY

## 2021-04-23 PROCEDURE — 99203 OFFICE O/P NEW LOW 30 MIN: CPT | Performed by: ORTHOPAEDIC SURGERY

## 2021-04-23 PROCEDURE — G8427 DOCREV CUR MEDS BY ELIG CLIN: HCPCS | Performed by: ORTHOPAEDIC SURGERY

## 2021-04-23 NOTE — PROGRESS NOTES
[facility]  [facility] Visit    Chief Complaint: Bilateral knee pain    HPI:  Margaret Harris is a 80 y. o. who is here for evaluation of bilateral knee pain. She has had bilateral knee pain for a number of years. There is no history of injury or surgery. She reports pain is worse on the right leg. She has pain on a daily basis. All activities worsen the knee pain. She lives independently and walks with a walker. She is hard of hearing and has minimal eyesight due to cataracts. She is not taking any medication to help with the knee pain. She denies any other areas of pain in her back or hips.       Patient Active Problem List   Diagnosis    GERD (gastroesophageal reflux disease)    Asthma    IBS (irritable bowel syndrome)    MGUS (monoclonal gammopathy of unknown significance)    Menopause    Graves' disease    S/P colonoscopy    Left renal mass    Osteoporosis    Alkaline phosphatase elevation    Hearing loss sensory, bilateral    Bacterial overgrowth syndrome    Erythrocytosis    Arthritis of knee, right    Essential hypertension    Multiple food allergies    Protein malnutrition (HCC)    Celiac disease    Aortic regurgitation    Primary osteoarthritis of both knees    Hyperparathyroidism ([address]    History of Graves' disease    Age-related osteoporosis without current pathological fracture    Bilateral carpal tunnel syndrome    Herpes zoster without complication    Multiple thyroid nodules    Adrenal adenoma, left    Adrenal adenoma, right    Postherpetic neuralgia    Chronic pain syndrome    Advanced age   Mary Vinny Chest pain in adult    Pulmonary embolism on right ([address]    Single subsegmental pulmonary embolism without acute cor pulmonale (HCC)    Blindness of both eyes    Cataract    Corns and callosities    Cystoid macular degeneration, left eye    Dermatophytosis of nail    DJD (degenerative joint disease) of knee    Drusen (degenerative) of macula, bilateral    Drusen (degenerative) of macula, unspecified eye    Exposure keratoconjunctivitis, left eye    Osteoarthrosis    Exudative age-related macular degeneration (HCC)    Vitreous degeneration, bilateral    Mitral regurgitation    Chronic systolic congestive heart failure (HCC)    Coagulopathy (HCC)       ROS:  Constitutional: denies fever, chills, weight loss  MSK: denies pain in other joints, muscle aches  Neurological: denies numbness, tingling, weakness    Exam:  Resp. rate 16, height 5' 9\" (1.753 m), weight 140 lb (63.5 kg), not currently breastfeeding. Appearance: sitting in exam room chair, appears to be in no acute distress, awake and alert  Resp: unlabored breathing on room air  Skin: warm, dry and intact with out erythema or significant increased temperature  Neuro: grossly intact both lower extremities. Intact sensation to light touch. Motor exam 4+ to 5/5 in all major motor groups. Right knee: Examination reveals that knee range of motion is 0 to 125 degrees. There is valgus deformity, positive crepitus, positive joint line tenderness, positive antalgic gait. Neurologically, plantar flexion and dorsiflexion is intact. Pulses palpable distally. 5/5 strength. Left knee: Examination reveals that knee range of motion is 0 to 130 degrees. There is varus deformity, positive crepitus, positive joint line tenderness, positive antalgic gait. Neurologically, plantar flexion and dorsiflexion is intact. Pulses palpable distally. 5/5 strength. Imaging:  3 views of bilateral knees were performed and interpreted today. The right knee is significant for severe tricompartmental osteoarthritis with bone-on-bone osteoarthritis of the lateral compartment. The left knee is significant for tricompartmental degenerative changes with bone-on-bone osteoarthritis of the patellofemoral compartment.     Assessment:  Bilateral knee osteoarthritis    Plan:  We discussed the diagnosis and treatment options. She is not interested in any sort of oral medication to take. We did discuss steroid injections in the knees today and these were performed as described below. She is not interested in any type of knee replacement surgery. She will follow-up in 3 months for reevaluation and possible repeat injection in the knees. PROCEDURE NOTE:  After verbal consent was obtained, bilateral knees were prepped with Betadine and anesthetized with ethyl chloride. Each knee joint was then injected under sterile technique with 2 mL of 0.25% marcaine and 1 mL of 40 mg/mL Kenalog. Bandages were applied. The patient tolerated the procedure well and there were no complications. 6    Total time spent on today's encounter was at least 32 minutes. This time included reviewing prior notes, radiographs, and lab results when available, reviewing history obtained by medical assistant, performing history and physical exam, reviewing tests/radiographs with the patient, counseling the patient, ordering medications or tests, documentation in the electronic health record, and coordination of care. This dictation was done with Dragon dictation and may contain mechanical errors related to translation.

## 2021-04-24 NOTE — PROGRESS NOTES
2021    Vidya Claros (:  1926) is a 80 y.o. 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 Marisa Dangelo in cardiology. Are all quite reassuring echo 2020 EF 55% without resting regional's, normal diastolic function, mild TR, PASP 24 mm with normal inspiratory collapse of the IVC. Chronically anticoagulated. Chest CT 2020 single subsegmental right upper lobe pulmonary embolism on Eliquis 5 mg twice daily since, with intent to discontinue after 6 months effective anticoagulation. Follow-up chest CT for left-sided chest pain 2020 - for PE. Carotid ultrasound in  for TIA showed no high-grade stenosis. Negative right lower extremity DVT study in , negative bilateral lower extremity study in . Head CT  - for stroke. For her Ulanda Ruffing' disease she sees Dr. Michele Reyna in endocrinology. Thyroid nodules were benign on biopsy in , thyroid ultrasound  without worrisome changes. She has bilateral adrenal adenomas, radiographically stable on serial CT  through , though she may have AML on superior pole of the left kidney. Cortisol DHEA plasma normetanephrine and metanephrine renin in  did not suggest endocrinologically functioning adenomas.     November endocrinologist noted worsening bone mineral density, -3 spine -4.7 hip. Due to GI and renal limitations/risks, no great treatment options. Right chest wall zoster 2019 gated by PHN. Review of Systems   Constitutional: Negative for activity change, appetite change, fatigue and unexpected weight change. HENT: Negative for dental problem, sinus pain, sore throat and trouble swallowing. Eyes: Negative for pain and visual disturbance. Respiratory: Negative for apnea, cough, chest tightness, shortness of breath and wheezing. Cardiovascular: Negative for chest pain and palpitations. Gastrointestinal: Negative for abdominal pain, blood in stool, constipation, diarrhea, nausea, rectal pain and vomiting. Endocrine: Negative for cold intolerance, heat intolerance, polydipsia, polyphagia and polyuria. Genitourinary: Negative for difficulty urinating, dysuria, flank pain, frequency, hematuria, pelvic pain, urgency, vaginal bleeding and vaginal discharge. Musculoskeletal: Negative for arthralgias, back pain, gait problem, joint swelling, myalgias, neck pain and neck stiffness. Skin: Negative for color change and rash. Neurological: Negative for dizziness, tremors, syncope, speech difficulty, weakness, light-headedness and headaches. Hematological: Negative for adenopathy. Does not bruise/bleed easily. Psychiatric/Behavioral: Negative for agitation, behavioral problems, decreased concentration, sleep disturbance and suicidal ideas. The patient is not nervous/anxious and is not hyperactive. Prior to Visit Medications    Medication Sig Taking? Authorizing Provider   albuterol sulfate (PROAIR RESPICLICK) 940 (90 Base) MCG/ACT aerosol powder inhalation Inhale 2 puffs into the lungs every 6 hours as needed for Wheezing or Shortness of Breath Yes Madhavi Fischer MD   ammonium lactate (LAC-HYDRIN) 12 % lotion Apply topically daily.  Yes Madhavi Fischer MD   atenolol (TENORMIN) 50 MG tablet TAKE 1 TABLET DAILY Yes Madhavi Fischer MD   budesonide-formoterol (SYMBICORT) 160-4.5 MCG/ACT AERO Inhale 2 puffs into the lungs 2 times daily Yes Benedict Wren MD   apixaban (ELIQUIS) 5 MG TABS tablet TAKE 1 TABLET TWICE A DAY Yes Benedict Wren MD   felodipine (PLENDIL) 2.5 MG extended release tablet Take 1 tablet by mouth daily Yes Benedict Wren MD   fluocinonide (LIDEX) 0.05 % cream Apply topically 2 times daily. Yes Benedict Wren MD   fluticasone Nu Catena) 50 MCG/ACT nasal spray PLACE 1 SPRAY IN EACH NOSTRIL DAILY Yes Benedict Wren MD   hydrocortisone (ANUSOL-HC) 25 MG suppository Place 1 suppository rectally every 12 hours Yes Benedict Wren MD   Insulin Syringe-Needle U-100 (B-D INSULIN SYRINGE 1CC/25G) 25G X 5/8\" 1 ML MISC 1 Syringe by Does not apply route every 30 days Yes Benedict Wren MD   lidocaine (XYLOCAINE) 5 % ointment Apply topically three times a day, as needed. Yes Benedict Wren MD   loratadine (CLARITIN) 10 MG tablet Take 1 tablet by mouth daily Yes Benedict Wren MD   montelukast (SINGULAIR) 10 MG tablet TAKE 1 TABLET NIGHTLY Yes Benedict Wren MD   pantoprazole (PROTONIX) 40 MG tablet Take 1 tablet by mouth 2 times daily Yes Benedict Wren MD   polyethylene glycol (GLYCOLAX) 17 g packet Take 17 g by mouth 2 times daily Yes Benedict Wren MD   rifaximin (XIFAXAN) 550 MG tablet Take 1 tablet by mouth 2 times daily Yes Benedict Wren MD   spironolactone (ALDACTONE) 25 MG tablet Daily Yes Benedict Wren MD   methIMAzole (TAPAZOLE) 5 MG tablet Take 1 tablet by mouth See Admin Instructions 1 tablet by mouth daily except for Sunday, Monday, Wednesday take 1.5 tablets. Yes Benedict Wren MD   Simethicone (GAS RELIEF) 180 MG CAPS Take 180 mg by mouth 4 times daily as needed (bloating) Yes Benedict Wren MD   gabapentin (NEURONTIN) 100 MG capsule Take 1 capsule by mouth 3 times daily for 180 days.  Intended supply: 30 days Yes Benedict Wren MD   cyanocobalamin 1000 MCG/ML injection Inject 1 mL into the muscle every 30 days  Estiven Moyer, MD        Allergies   Allergen Reactions    Ace Inhibitors Other (See Comments)     angioedema    Acyclovir      Severe nausea  And anorexia. It happened after taking each 800 mg tablet and patient took one one day and one the next, then stopped an no more    Creon [Pancrelipase (Lip-Prot-Amyl)]      Swollen gums.  Dye [Iodides] Hives     Only reacts to dye that went into eyes, OK with IV contrast    Eggs Or Egg-Derived Products      Hives  this test in the [facility] ([facility]). Egg White IgE 3. 59High   <=0.34 kU/L Final 02/10/2016  2:14 PM [facility]                  Gluten Meal      Abdominal pain and diarreha  Wheat IgE 0.53  <=0.34 kU/L Final 02/10/2016  2:14 PM [facility]       Milk-Related Compounds      Milk, Cow's IgE 1. 75High   <=0.34 kU/L Final 02/10/2016  2:14 PM [facility]    Fluorescein Other (See Comments)    Lisinopril Swelling    Macrobid [Nitrofurantoin Monohydrate Macrocrystals] Other (See Comments)    No Known Allergies     Ciprocinonide [Fluocinolone] Rash     Stiff, no nausea or rash    Sulfa Antibiotics Rash       Past Medical History:   Diagnosis Date    Adrenal gland cyst ([address]     Arthritis     Asthma     Bilateral carpal tunnel syndrome 8/23/2018    Cataract     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    PCR    Clostridium difficile infection 1/27/15    AG positive    Degenerative joint disease of knee     Depression     Diverticulitis     GERD (gastroesophageal reflux disease)     Grave's disease 4/2/2012    Heart disease     Hypertension     IBS (irritable bowel syndrome)     Macular degeneration     MGUS (monoclonal gammopathy of unknown significance)     Osteoporosis     Poor vision     d/t macular degeneration    Simple cyst of kidney        Past Surgical History:   Procedure Laterality Date    CHOLECYSTECTOMY      COLONOSCOPY  10/29/2009    **see scanned report- SILVANA    HEMORRHOID SURGERY      HYSTERECTOMY in 42's    rudolph/bso    HYSTERECTOMY, TOTAL ABDOMINAL         Social History     Socioeconomic History    Marital status: Single     Spouse name: Not on file    Number of children: Not on file    Years of education: Not on file    Highest education level: Not on file   Occupational History    Not on file   Social Needs    Financial resource strain: Not on file    Food insecurity     Worry: Not on file     Inability: Not on file    Transportation needs     Medical: Not on file     Non-medical: Not on file   Tobacco Use    Smoking status: Never Smoker    Smokeless tobacco: Never Used   Substance and Sexual Activity    Alcohol use: No    Drug use: No    Sexual activity: Not Currently   Lifestyle    Physical activity     Days per week: Not on file     Minutes per session: Not on file    Stress: Not on file   Relationships    Social connections     Talks on phone: Not on file     Gets together: Not on file     Attends Latter-day service: Not on file     Active member of club or organization: Not on file     Attends meetings of clubs or organizations: Not on file     Relationship status: Not on file    Intimate partner violence     Fear of current or ex partner: Not on file     Emotionally abused: Not on file     Physically abused: Not on file     Forced sexual activity: Not on file   Other Topics Concern    Not on file   Social History Narrative    Not on file        Family History   Problem Relation Age of Onset    Heart Disease Mother     High Blood Pressure Mother     Stroke Mother     Cancer Father         multiple myeloma       Vitals:    01/29/21 1255 01/29/21 1305   BP: (!) 163/84 (!) 163/84   Pulse: 69    Temp: 97 °F (36.1 °C)    TempSrc: Oral    SpO2: 97%    Weight: 141 lb (64 kg)    Height: 5' 9\" (1.753 m)      Estimated body mass index is 20.82 kg/m² as calculated from the following:    Height as of this encounter: 5' 9\" (1.753 m).     Weight as of this encounter: 141 lb (64 kg).    PHYSICAL EXAM  GENERAL:  Pleasant  female who looks her stated age, awake alert and oriented x3, no acute distress. NECK:  Supple nontender. No carotid bruits. Brisk carotid upstrokes, mild JVD with V wave. Marked thyromegaly. LYMPH:  No supraclavicular cervical axillary or inguinal lymphadenopathy. LUNGS:  Clear to auscultation bilaterally. Very good air entry. No inspiratory crackles or expiratory wheezes. HEART:  Regular rate and rhythm without pathologic murmur rub gallop S3 or S4. ABDOMEN:  Soft, mildly tender. Normal bowel sounds. No guarding. No masses. Not appear to be distended. UROGENITAL:  Deferred  EXTREMITIES:  Warm and well perfused without clubbing cyanosis or edema. 2+ pulses in all 4 extremities. Capillary refill less than 2 seconds. NEURO:  Cranial nerves 2-12 grossly intact. Normal muscle bulk and tone. No resting tremor, cogwheeling, normal rapid alternating movements in the hands and feet. No stocking paresthesia. Normal gait and station. MUSCULOSKELETAL:  Mild osteoarthritic changes. SKIN:  No worrisome lesions, skin a little dry. PSYCH:  No psychomotor retardation or agitation. Good eye contact. Unrestricted affect range. Mood congruent with affect. Linear thought.     LABS  Lab Review   Orders Only on 10/15/2020   Component Date Value    Renin Activity 10/15/2020 0.3    Orders Only on 10/15/2020   Component Date Value    Normetanephrine, Free 10/15/2020 0.98*    Metanephrine, Free 10/15/2020 0.17     Metaneph/Plasma Interp 10/15/2020 See Note    Orders Only on 10/15/2020   Component Date Value    DHEAS (DHEA Sulfate) 10/15/2020 <15.0    Orders Only on 10/15/2020   Component Date Value    ACTH 10/15/2020 7    Orders Only on 10/15/2020   Component Date Value    TSH 10/15/2020 13.66*   Orders Only on 10/15/2020   Component Date Value    T4 Free 10/15/2020 0.4*   Orders Only on 10/15/2020   Component Date Value    Cortisol 10/15/2020 6.8 Orders Only on 10/15/2020   Component Date Value    Sodium 10/15/2020 139     Potassium 10/15/2020 4.7     Chloride 10/15/2020 101     CO2 10/15/2020 25     Anion Gap 10/15/2020 13     Glucose 10/15/2020 82     BUN 10/15/2020 11     CREATININE 10/15/2020 1.0     GFR Non- 10/15/2020 52*    GFR  10/15/2020 >60     Calcium 10/15/2020 10.2     Total Protein 10/15/2020 6.9     Albumin 10/15/2020 4.1     Albumin/Globulin Ratio 10/15/2020 1.5     Total Bilirubin 10/15/2020 0.8     Alkaline Phosphatase 10/15/2020 133*    ALT 10/15/2020 8*    AST 10/15/2020 17     Globulin 10/15/2020 2.8    Orders Only on 10/15/2020   Component Date Value    Vit D, 25-Hydroxy 10/15/2020 20.7*   Admission on 08/29/2020, Discharged on 08/29/2020   Component Date Value    WBC 08/29/2020 9.4     RBC 08/29/2020 5.14     Hemoglobin 08/29/2020 14.3     Hematocrit 08/29/2020 43.9     MCV 08/29/2020 85.3     MCH 08/29/2020 27.8     MCHC 08/29/2020 32.6     RDW 08/29/2020 15.2     Platelets 14/44/4494 194     MPV 08/29/2020 9.1     Neutrophils % 08/29/2020 51.8     Lymphocytes % 08/29/2020 32.2     Monocytes % 08/29/2020 12.8     Eosinophils % 08/29/2020 2.3     Basophils % 08/29/2020 0.9     Neutrophils Absolute 08/29/2020 4.9     Lymphocytes Absolute 08/29/2020 3.0     Monocytes Absolute 08/29/2020 1.2     Eosinophils Absolute 08/29/2020 0.2     Basophils Absolute 08/29/2020 0.1     Sodium 08/29/2020 136     Potassium reflex Magnesi* 08/29/2020 4.7     Chloride 08/29/2020 102     CO2 08/29/2020 24     Anion Gap 08/29/2020 10     Glucose 08/29/2020 98     BUN 08/29/2020 15     CREATININE 08/29/2020 1.1     GFR Non- 08/29/2020 46*    GFR  08/29/2020 56*    Calcium 08/29/2020 10.5     Troponin 08/29/2020 <0.01     Ventricular Rate 08/29/2020 79     Atrial Rate 08/29/2020 79     P-R Interval 08/29/2020 266     QRS Duration 08/29/2020 82     Q-T Interval 08/29/2020 394     QTc Calculation (Bazett) 08/29/2020 451     P Axis 08/29/2020 67     R Axis 08/29/2020 -10     T Axis 08/29/2020 19     Diagnosis 08/29/2020 EKG performed in ER and to be interpreted by ER physician. Confirmed by MD, ER ([id number]),  Donna Tatum ([id number]) on 8/29/2020 6:41:59 AM     Pro-BNP 08/29/2020 85     Troponin 08/29/2020 <0.01    There may be more visits with results that are not included. ASSESSMENT/PLAN  1. Dry skin    - ammonium lactate (LAC-HYDRIN) 12 % lotion; Apply topically daily. Dispense: 225 g; Refill: 5    2. Essential hypertension  She attributes her elevated blood pressure to increased bloating based on past experience. Wonder about moving felodipine to bedtime, note role in constipation however allergic to ACE inhibitor, diuretic might worsen constipation due to dehydration. Discussed timing of blood pressure medication follow-up. - atenolol (TENORMIN) 50 MG tablet; TAKE 1 TABLET DAILY  Dispense: 90 tablet; Refill: 3  - felodipine (PLENDIL) 2.5 MG extended release tablet; Take 1 tablet by mouth daily  Dispense: 90 tablet; Refill: 3  - spironolactone (ALDACTONE) 25 MG tablet; Daily  Dispense: 90 tablet; Refill: 3    3. Other chronic pulmonary embolism without acute cor pulmonale (HCC)  Patient will finish 6 to 7 months of effective anticoagulation with her current prescription.  - apixaban (ELIQUIS) 5 MG TABS tablet; TAKE 1 TABLET TWICE A DAY  Dispense: 180 tablet; Refill: 0    4. Rash    - fluocinonide (LIDEX) 0.05 % cream; Apply topically 2 times daily. Dispense: 60 g; Refill: 1    5. Other allergic rhinitis    - fluticasone (FLONASE) 50 MCG/ACT nasal spray; PLACE 1 SPRAY IN EACH NOSTRIL DAILY  Dispense: 3 Bottle; Refill: 5  - loratadine (CLARITIN) 10 MG tablet; Take 1 tablet by mouth daily  Dispense: 90 tablet; Refill: 3  - montelukast (SINGULAIR) 10 MG tablet; TAKE 1 TABLET NIGHTLY  Dispense: 90 tablet; Refill: 3    6.  B12 deficiency  Update B12 timely fashion.  - Insulin Syringe-Needle U-100 (B-D INSULIN SYRINGE 1CC/25G) 25G X 5/8\" 1 ML MISC; 1 Syringe by Does not apply route every 30 days  Dispense: 100 each; Refill: 0    7. Postherpetic neuralgia  [place] uptitrating gabapentin 300 mg nightly, increasing to 300 mg twice daily. Patient instructed to let us know if it worsens her stomach. - lidocaine (XYLOCAINE) 5 % ointment; Apply topically three times a day, as needed. Dispense: 2 Tube; Refill: 1    8. Irritable bowel syndrome with both constipation and diarrhea  Patient states diagnosed with celiac disease and bacterial overgrowth syndrome. Yet GI consultation discusses GERD abdominal pain constipation October 2020 seemingly in exacerbation. [place] with rifaximin, assess response at follow-up. I am unable to verify EGD pathology biopsy diagnosis of celiac disease, duodenal aspirate culture, no TTG in the system that I can find. We will proceed with follow-up. - polyethylene glycol (GLYCOLAX) 17 g packet; Take 17 g by mouth 2 times daily  Dispense: 100 each; Refill: 5  - rifaximin (XIFAXAN) 550 MG tablet; Take 1 tablet by mouth 2 times daily  Dispense: 42 tablet; Refill: 0    9. Need for shingles vaccine  After Covid vaccination side effects subside, few weeks recommend she pursue Shingrix vaccination series at her pharmacy. Insurance did not cover before but now patient desirous of inducing recurrence risk. Return in about 4 weeks (around 2/26/2021). It was a pleasure to visit with Ms. Nakul Villalobos today. Answered all questions as best I could.   Lizeth Kathleen MD   Time 30 minutes normal...

## 2021-05-11 ENCOUNTER — TELEPHONE (OUTPATIENT)
Dept: PRIMARY CARE CLINIC | Age: 86
End: 2021-05-11

## 2021-05-11 NOTE — TELEPHONE ENCOUNTER
Caller:[facility] (homecare)   Calling regarding the mediset medication. Needing to speak to the physician or MA regarding pt staying compliant with taking her meds.    pls call the nurse at [phone]

## 2021-05-14 ENCOUNTER — TELEPHONE (OUTPATIENT)
Dept: PRIMARY CARE CLINIC | Age: 86
End: 2021-05-14

## 2021-05-14 RX ORDER — HYDROCORTISONE ACETATE 25 MG/1
25 SUPPOSITORY RECTAL EVERY 12 HOURS
Qty: 24 SUPPOSITORY | Refills: 0 | Status: SHIPPED | OUTPATIENT
Start: 2021-05-14

## 2021-05-14 NOTE — TELEPHONE ENCOUNTER
Medication:   Requested Prescriptions      No prescriptions requested or ordered in this encounter        Last Filled:      Patient Phone Number: [phone] (home)     Last appt: 4/12/2021   Next appt: 7/12/2021    Last OARRS:   RX Monitoring 9/11/2019   Periodic Controlled Substance Monitoring Assessed functional status.

## 2021-05-14 NOTE — TELEPHONE ENCOUNTER
----- Message from Gary Abel sent at 5/13/2021  3:01 PM EDT -----  Subject: Refill Request    QUESTIONS  Name of Medication? hydrocortisone (ANUSOL-HC) 25 MG suppository  Patient-reported dosage and instructions? 25 mg as needed  How many days do you have left? 0  Preferred Pharmacy? [facility]Y #[id number]  Pharmacy phone number (if available)? [phone]  Additional Information for Provider? Please call refill in. Patient is   currently out. Call patient's daughter Farrah Baez [phone]  ---------------------------------------------------------------------------  --------------  Yamileth SALAZAR  What is the best way for the office to contact you? OK to leave message on   voicemail  Preferred Call Back Phone Number?  [phone]

## 2021-05-25 ENCOUNTER — TELEPHONE (OUTPATIENT)
Dept: PRIMARY CARE CLINIC | Age: 86
End: 2021-05-25

## 2021-05-26 ENCOUNTER — TELEPHONE (OUTPATIENT)
Dept: PRIMARY CARE CLINIC | Age: 86
End: 2021-05-26

## 2021-05-26 NOTE — TELEPHONE ENCOUNTER
I sent a prescription for felodipine 5 mg at bedtime with refills good for at least 6 months in March 2021 to [facility] mail order. I do not understand the question did some other provider want to change it?

## 2021-05-26 NOTE — TELEPHONE ENCOUNTER
Caller: milana @ [facility]   Ref# [id number]   Med clarification; Needing to clarify whether or not they are supposed to fill the following:  ~felodipine (PLENDIL) 2.5 MG extended release tablet   Thank you!   [place]: [phone]

## 2021-05-27 ENCOUNTER — TELEPHONE (OUTPATIENT)
Dept: PRIMARY CARE CLINIC | Age: 86
End: 2021-05-27

## 2021-05-27 DIAGNOSIS — I10 ESSENTIAL HYPERTENSION: ICD-10-CM

## 2021-05-27 RX ORDER — FELODIPINE 5 MG/1
5 TABLET, EXTENDED RELEASE ORAL NIGHTLY
Qty: 90 TABLET | Refills: 1 | Status: SHIPPED | OUTPATIENT
Start: 2021-05-27 | End: 2022-01-17

## 2021-05-27 NOTE — TELEPHONE ENCOUNTER
Pharmacy called for clarification of patient's felodipine prescriptions. One is for 2.5 mfs and the other is for 5 mgs with discontinuation of 2.5. Please call and clarify.  [phone] with ref# [id number].

## 2021-06-11 DIAGNOSIS — J30.89 OTHER ALLERGIC RHINITIS: ICD-10-CM

## 2021-06-11 RX ORDER — MONTELUKAST SODIUM 10 MG/1
TABLET ORAL
Qty: 90 TABLET | Refills: 3 | Status: SHIPPED | OUTPATIENT
Start: 2021-06-11 | End: 2021-09-23 | Stop reason: SDUPTHER

## 2021-06-11 NOTE — TELEPHONE ENCOUNTER
Medication:   Requested Prescriptions     Pending Prescriptions Disp Refills    montelukast (SINGULAIR) 10 MG tablet 90 tablet 3     Sig: TAKE 1 TABLET NIGHTLY     Last Filled:  1/29/21    Last appt: 4/12/2021   Next appt: 7/12/2021    Last Lipid:   Lab Results   Component Value Date    CHOL 146 07/15/2016    TRIG 76 07/15/2016    HDL 52 07/15/2016    HDL 46 01/11/2012    LDLCALC 79 07/15/2016

## 2021-06-17 NOTE — TELEPHONE ENCOUNTER
Tell this pharmacy that she was switched from taking 2 of the 2.5 mg felodipine tablets at night to a 5 mg tablet felodipine tablet at night instead. The overall dose has not changed.

## 2021-07-12 ENCOUNTER — OFFICE VISIT (OUTPATIENT)
Dept: PRIMARY CARE CLINIC | Age: 86
End: 2021-07-12
Payer: MEDICARE

## 2021-07-12 VITALS
BODY MASS INDEX: 20.97 KG/M2 | DIASTOLIC BLOOD PRESSURE: 76 MMHG | HEART RATE: 66 BPM | WEIGHT: 142 LBS | SYSTOLIC BLOOD PRESSURE: 138 MMHG

## 2021-07-12 DIAGNOSIS — L85.3 XEROSIS OF SKIN: ICD-10-CM

## 2021-07-12 DIAGNOSIS — K58.2 IRRITABLE BOWEL SYNDROME WITH BOTH CONSTIPATION AND DIARRHEA: Primary | ICD-10-CM

## 2021-07-12 DIAGNOSIS — B02.29 POST HERPETIC NEURALGIA: ICD-10-CM

## 2021-07-12 PROCEDURE — 1123F ACP DISCUSS/DSCN MKR DOCD: CPT | Performed by: INTERNAL MEDICINE

## 2021-07-12 PROCEDURE — 1036F TOBACCO NON-USER: CPT | Performed by: INTERNAL MEDICINE

## 2021-07-12 PROCEDURE — G8420 CALC BMI NORM PARAMETERS: HCPCS | Performed by: INTERNAL MEDICINE

## 2021-07-12 PROCEDURE — G8427 DOCREV CUR MEDS BY ELIG CLIN: HCPCS | Performed by: INTERNAL MEDICINE

## 2021-07-12 PROCEDURE — 1090F PRES/ABSN URINE INCON ASSESS: CPT | Performed by: INTERNAL MEDICINE

## 2021-07-12 PROCEDURE — 4040F PNEUMOC VAC/ADMIN/RCVD: CPT | Performed by: INTERNAL MEDICINE

## 2021-07-12 PROCEDURE — 99213 OFFICE O/P EST LOW 20 MIN: CPT | Performed by: INTERNAL MEDICINE

## 2021-07-12 RX ORDER — IPRATROPIUM BROMIDE 21 UG/1
SPRAY, METERED NASAL
Qty: 1 BOTTLE | Refills: 3 | Status: SHIPPED | OUTPATIENT
Start: 2021-07-12 | End: 2021-11-17

## 2021-07-12 RX ORDER — ERGOCALCIFEROL 1.25 MG/1
50000 CAPSULE ORAL WEEKLY
Qty: 12 CAPSULE | Refills: 3 | Status: SHIPPED | OUTPATIENT
Start: 2021-07-12 | End: 2021-12-14

## 2021-07-12 SDOH — ECONOMIC STABILITY: FOOD INSECURITY: WITHIN THE PAST 12 MONTHS, THE FOOD YOU BOUGHT JUST DIDN'T LAST AND YOU DIDN'T HAVE MONEY TO GET MORE.: NEVER TRUE

## 2021-07-12 SDOH — ECONOMIC STABILITY: FOOD INSECURITY: WITHIN THE PAST 12 MONTHS, YOU WORRIED THAT YOUR FOOD WOULD RUN OUT BEFORE YOU GOT MONEY TO BUY MORE.: NEVER TRUE

## 2021-07-12 ASSESSMENT — SOCIAL DETERMINANTS OF HEALTH (SDOH): HOW HARD IS IT FOR YOU TO PAY FOR THE VERY BASICS LIKE FOOD, HOUSING, MEDICAL CARE, AND HEATING?: NOT HARD AT ALL

## 2021-07-12 NOTE — PROGRESS NOTES
2021     Ceasar Judd (:  1926) is a 80 y.o. 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 For her valvular heart disease she sees Dr. Jennifer Fernando in cardiology. Reassuring echo 2020 EF 55% without resting regional's, normal diastolic function, mild TR, PASP 24 mm with normal inspiratory collapse of the IVC. Chest CT 2020 single subsegmental right upper lobe pulmonary embolism on Eliquis 5 mg twice daily for approximately 7 months, with intent to discontinue after 6 months effective anticoagulation. Follow-up chest CT for left-sided chest pain 2020 - for PE. Carotid ultrasound in  for TIA showed no high-grade stenosis. Negative right lower extremity DVT study in , negative bilateral lower extremity study in . Head CT  - for stroke. For her Dulce Jose' disease she sees Dr. Leodan Woodard in endocrinology. Thyroid nodules were benign on biopsy in , thyroid ultrasound  without worrisome changes. She has bilateral adrenal adenomas, radiographically stable on serial CT  through , though she may have AML on superior pole of the left kidney.   Cortisol DHEA plasma normetanephrine and metanephrine renin in 2020 did not suggest endocrinologically functioning adenomas. In November endocrinology  noted worsening bone mineral density, -3 spine -4.7 hip. Due to GI and renal limitations/risks, no great treatment options. Right chest wall zoster [id number] complicated by PHN. Has not had Shingrix shots yet. Endorses right greater than left knee pain walker dependent and interested in seeing orthopedics again for consideration of synovial fluid exchange versus cortisone shot. Endorses left greater than right lower extremity neuropathy however too sedated to tolerate higher dose of gabapentin. Review of Systems   Constitutional: Negative for activity change, appetite change, fatigue and unexpected weight change. HENT: Negative for dental problem, sinus pain, sore throat and trouble swallowing. Eyes: Negative for pain and visual disturbance. Respiratory: Negative for apnea, cough, chest tightness, shortness of breath and wheezing. Cardiovascular: Negative for chest pain and palpitations. Gastrointestinal: Negative for abdominal pain, blood in stool, constipation, diarrhea, nausea, rectal pain and vomiting. Endocrine: Negative for cold intolerance, heat intolerance, polydipsia, polyphagia and polyuria. Genitourinary: Negative for difficulty urinating, dysuria, flank pain, frequency, hematuria, pelvic pain, urgency, vaginal bleeding and vaginal discharge. Musculoskeletal: Negative for arthralgias, back pain, gait problem, joint swelling, myalgias, neck pain and neck stiffness. Skin: Negative for color change and rash. Neurological: Negative for dizziness, tremors, syncope, speech difficulty, weakness, light-headedness and headaches. Hematological: Negative for adenopathy. Does not bruise/bleed easily. Psychiatric/Behavioral: Negative for agitation, behavioral problems, decreased concentration, sleep disturbance and suicidal ideas.  The patient is not nervous/anxious and is not hyperactive. Prior to Visit Medications    Medication Sig Taking? Authorizing Provider   vitamin D (ERGOCALCIFEROL) 1.25 MG (35059 UT) CAPS capsule Take 1 capsule by mouth once a week Yes Joe Glez MD   ipratropium (ATROVENT) 0.03 % nasal spray 1 spray each nostril at bedtime Yes Joe Glez MD   montelukast (SINGULAIR) 10 MG tablet TAKE 1 TABLET NIGHTLY Yes Joe Glez MD   felodipine (PLENDIL) 5 MG extended release tablet Take 1 tablet by mouth nightly Yes Joe Glez MD   hydrocortisone (ANUSOL-HC) 25 MG suppository Place 1 suppository rectally every 12 hours Yes Joe Glez MD   gabapentin (NEURONTIN) 100 MG capsule Take 1 capsule by mouth 3 times daily for 180 days. Intended supply: 30 days Yes Joe Glez MD   albuterol sulfate (PROAIR RESPICLICK) 466 (90 Base) MCG/ACT aerosol powder inhalation Inhale 2 puffs into the lungs every 6 hours as needed for Wheezing or Shortness of Breath Yes Joe Glez MD   ammonium lactate (LAC-HYDRIN) 12 % lotion Apply topically daily. Yes Joe Glez MD   atenolol (TENORMIN) 50 MG tablet TAKE 1 TABLET DAILY Yes Joe Glez MD   budesonide-formoterol (SYMBICORT) 160-4.5 MCG/ACT AERO Inhale 2 puffs into the lungs 2 times daily Yes Joe Glez MD   fluocinonide (LIDEX) 0.05 % cream Apply topically 2 times daily. Yes Jeo Glez MD   fluticasone [facility]) 50 MCG/ACT nasal spray PLACE 1 SPRAY IN EACH NOSTRIL DAILY Yes Joe Glez MD   pantoprazole (PROTONIX) 40 MG tablet Take 1 tablet by mouth 2 times daily Yes Joe Glez MD   polyethylene glycol (GLYCOLAX) 17 g packet Take 17 g by mouth 2 times daily Yes Joe Glez MD   spironolactone (ALDACTONE) 25 MG tablet Daily Yes Joe Glez MD   methIMAzole (TAPAZOLE) 5 MG tablet Take 1 tablet by mouth See Admin Instructions 1 tablet by mouth daily except for Sunday, Monday, Wednesday take 1.5 tablets.  Yes Joe Glez MD   Simethicone (GAS RELIEF) 180 MG CAPS Take 180 mg by mouth 4 times daily as needed (bloating) Yes Mc Easley MD   lidocaine (XYLOCAINE) 5 % ointment Apply topically three times a day, as needed. Mc Easley MD        Allergies   Allergen Reactions    Ace Inhibitors Other (See Comments)     angioedema    Acyclovir      Severe nausea  And anorexia. It happened after taking each 800 mg tablet and patient took one one day and one the next, then stopped an no more    Creon [Pancrelipase (Lip-Prot-Amyl)]      Swollen gums.  Dye [Iodides] Hives     Only reacts to dye that went into eyes, OK with IV contrast    Eggs Or Egg-Derived Products      Hives  this test in the [employer] ([facility]). Egg White IgE 3. 59High   <=0.34 kU/L Final 02/10/2016  2:14 PM [facility]                  Gluten Meal      Abdominal pain and diarreha  Wheat IgE 0.53  <=0.34 kU/L Final 02/10/2016  2:14 PM [facility]       Milk-Related Compounds      Milk, Cow's IgE 1. 75High   <=0.34 kU/L Final 02/10/2016  2:14 PM [facility]    Fluorescein Other (See Comments)    Lisinopril Swelling    Macrobid [Nitrofurantoin Monohydrate Macrocrystals] Other (See Comments)    No Known Allergies     Ciprocinonide [Fluocinolone] Rash     Stiff, no nausea or rash    Sulfa Antibiotics Rash       Past Medical History:   Diagnosis Date    [facility]renal gland cyst ([facility] [address]     Arthritis     Asthma     Bilateral carpal tunnel syndrome 8/23/2018    Cataract     Chronic systolic congestive heart failure ([address] 4/12/2021    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    PCR    Clostridium difficile infection 1/27/15    AG positive    Degenerative joint disease of knee     Depression     Diverticulitis     GERD (gastroesophageal reflux disease)     Grave's disease 4/2/2012    Heart disease     Hypertension     IBS (irritable bowel syndrome)     Macular degeneration     MGUS (monoclonal gammopathy of unknown significance)     Osteoporosis     Poor vision     d/t macular degeneration    Simple cyst of kidney        Past Surgical History:   Procedure Laterality Date    CHOLECYSTECTOMY      COLONOSCOPY  10/29/2009    **see scanned report- [email or web address]    HEMORRHOID SURGERY      HYSTERECTOMY  in 40's    rudolph/bso    HYSTERECTOMY, TOTAL ABDOMINAL         Social History     Socioeconomic History    Marital status: Single     Spouse name: Not on file    Number of children: Not on file    Years of education: Not on file    Highest education level: Not on file   Occupational History    Not on file   Tobacco Use    Smoking status: Never Smoker    Smokeless tobacco: Never Used   Vaping Use    Vaping Use: Never used   Substance and Sexual Activity    Alcohol use: No    Drug use: No    Sexual activity: Not Currently   Other Topics Concern    Not on file   Social History Narrative    Not on file     Social Determinants of Health     Financial Resource Strain: Low Risk     Difficulty of Paying Living Expenses: Not hard at all   Food Insecurity: No Food Insecurity    Worried About Running Out of Food in the Last Year: Never true    Sherie of Food in the Last Year: Never true   Transportation Needs:     Lack of Transportation (Medical):      Lack of Transportation (Non-Medical):    Physical Activity:     Days of Exercise per Week:     Minutes of Exercise per Session:    Stress:     Feeling of Stress :    Social Connections:     Frequency of Communication with Friends and Family:     Frequency of Social Gatherings with Friends and Family:     Attends Jehovah's witness Services:     Active Member of Clubs or Organizations:     Attends Club or Organization Meetings:     Marital Status:    Intimate Partner Violence:     Fear of Current or Ex-Partner:     Emotionally Abused:     Physically Abused:     Sexually Abused:         Family History   Problem Relation Age of Onset    Heart Disease Mother     High Blood Pressure Mother     Stroke Mother     Cancer Father         multiple myeloma       Vitals:    07/12/21 1104   BP: 138/76   Pulse: 66   Weight: 142 lb (64.4 kg)     Estimated body mass index is 20.97 kg/m² as calculated from the following:    Height as of 4/23/21: 5' 9\" (1.753 m). Weight as of this encounter: 142 lb (64.4 kg). PHYSICAL EXAM  GENERAL:  Pleasant  female who looks her stated age, awake alert and oriented x3, no acute distress. HEENT: Lateral gaze preference given macular degeneration. Pupils sluggishly reactive symmetrically. Posterior oropharynx mildly reddened. Turbinates mildly reddened. NECK:  Supple nontender. No carotid bruits. Brisk carotid upstrokes, mild JVD with V wave. Marked thyromegaly. Area of tenderness seems to lie superior to the thyroid, and adjacent to the right lateral aspect of the trachea, though no palpable mass fluctuance or tenderness. LYMPH:  No supraclavicular cervical axillarylymphadenopathy. LUNGS:  Clear to auscultation bilaterally. Very good air entry. No inspiratory crackles or expiratory wheezes. HEART:  Regular rate and rhythm without pathologic murmur rub gallop S3 or S4. ABDOMEN:  Soft, mildly tender. Normal bowel sounds. No guarding. No masses. Not appear to be distended. UROGENITAL:  Deferred  EXTREMITIES:  Warm and well perfused without clubbing cyanosis or edema. 2+ pulses in all 4 extremities. Capillary refill 2-3 seconds with dependent rubor but no foot ulceration. NEURO:  Cranial nerves 2-12 grossly intact. Normal muscle bulk and tone. No resting tremor, cogwheeling, normal rapid alternating movements in the hands and feet. Vibration and monofilament sensation nearly absent below the midcalf. Significant skin changes stocking paresthesia. Normal gait and station. MUSCULOSKELETAL: Pronounced osteoarthritic changes, right knee grossly deformed. No active synovitis joint warmth or erythema.   SKIN:  No worrisome lesions, skin woody, with scaling on the distal lower extremities. PSYCH:  No psychomotor retardation or agitation. Good eye contact. Unrestricted affect range. Mood congruent with affect. Linear thought. LABS  Lab Review   Orders Only on 04/06/2021   Component Date Value    Vitamin B-12 04/06/2021 1168*    TSH 04/06/2021 2.77     TISSUE TRANSGLUTAMINASE * 04/06/2021 <0.5    Orders Only on 02/08/2021   Component Date Value    TSH 02/08/2021 6.63*   Orders Only on 02/08/2021   Component Date Value    T4 Free 02/08/2021 0.5*   Orders Only on 02/08/2021   Component Date Value    Cortisol 02/08/2021 8.2    Orders Only on 10/15/2020   Component Date Value    Renin Activity 10/15/2020 0.3    Orders Only on 10/15/2020   Component Date Value    Normetanephrine, Free 10/15/2020 0.98*    Metanephrine, Free 10/15/2020 0.17     Metaneph/Plasma Interp 10/15/2020 See Note    Orders Only on 10/15/2020   Component Date Value    DHEAS (DHEA Sulfate) 10/15/2020 <15.0    Orders Only on 10/15/2020   Component Date Value    ACTH 10/15/2020 7    Orders Only on 10/15/2020   Component Date Value    TSH 10/15/2020 13.66*   Orders Only on 10/15/2020   Component Date Value    T4 Free 10/15/2020 0.4*   There may be more visits with results that are not included. ASSESSMENT/PLAN  1. Dry skin  At time of last visit we prescribed Lac-Hydrin, which the patient reports has helped. See #4 below    2. Essential hypertension  In the outpatient setting historically blood pressures run 130-170/80, most commonly 150/80. With recent felodipine dose increase, blood pressure in clinic today looks better controlled. .    She will continue atenolol spironolactone in the morning. 3. Other chronic pulmonary embolism without acute cor pulmonale ([address]  Patient has completed anticoagulation, and resumed baby aspirin. 4. Rash  Distal bilateral lower extremities foot and ankle involvement primarily.   Daily moisturization with Lidex cream, Lac-Hydrin, progressing to Lidex and CeraVe. Examined on return. - fluocinonide (LIDEX) 0.05 % cream; Apply topically 2 times daily. Dispense: 60 g; Refill: 1    5. Other allergic rhinitis  For postnasal drip symptoms we will add ipratropium nasal spray. - fluticasone (FLONASE) 50 MCG/ACT nasal spray; PLACE 1 SPRAY IN EACH NOSTRIL DAILY  Dispense: 3 Bottle; Refill: 5  - loratadine (CLARITIN) 10 MG tablet; Take 1 tablet by mouth daily  Dispense: 90 tablet; Refill: 3  - montelukast (SINGULAIR) 10 MG tablet; TAKE 1 TABLET NIGHTLY  Dispense: 90 tablet; Refill: 3    6. B12 \"deficiency\"  B12 levels are supratherapeutic. She can stop the shots. Doubt she needs supplement beyond multivitamin daily. 7. Postherpetic neuralgia  At time of last visit, we prescribed uptitrating gabapentin 300 mg nightly, increasing to 300 mg twice daily we are now changing 200 AM 200PM 300Bedtime to provide better neuropathy coverage while reducing sedation. She continues to use topical lidocaine. 8. Irritable bowel syndrome with both constipation and diarrhea  Apparent history celiac disease and bacterial overgrowth syndrome. GI consultation discusses GERD abdominal pain constipation October 2020 seemingly in exacerbation. Patient has been treated a few times with rifaximin to seemingly good effect. Unsure how much fiber she is taking, I reinforced the benefit of Metamucil today. I am unable to verify EGD pathology biopsy diagnosis of celiac disease, duodenal aspirate culture, interestingly TTG IgA negative. She will continue on simethicone. 9. Need for shingles and Covid vaccine  COVID and Shingrix shots completed. 10.  History of multiple thyroid nodules. Patient has what she describes as a \"raw\" irritated area on examination superior to the thyroid isthmus line adjacent to the trachea. I could not appreciate a mass there. She believes this has been ongoing for months.   She denies improvement or worsening with eating drinking flexing or twisting neck. She has not noticed any discrete mass. Discomfort is seemingly mild. Soft tissue ultrasound of the area did not disclose any discrete pathology other than her more posterior lying arthritis. Last thyroid ultrasound 2018 showed multiple bilateral thyroid nodules, some of which required follow-up in virtue of ultrasound appearance and size so she is due anyway. Unfortunately no comment on thyroid nodules. 11.  Macular degeneration. Her eyesight causes her to occasionally drop her medications on the floor where she cannot find them. She has to rely on pill shape, which is unreliable in case of formulary or  changes from her insurance and/or pharmacy. Inquire regarding involvement of [facility] ([phone] general number, fax [phone]) 4 weekly skilled nursing for 6 weeks, transitioning to every 2 weeks based on compliance and ease of adoption of pillbox. 12. multiple skin seborrheic keratoses. Patient somewhat troubled by the right lateral chest wall SK, reporting that it itches or sometimes. She does not need it removed at this time she will let us know if she changes her mind. Return in about 3 months (around 10/12/2021). It was a pleasure to visit with Ms. Jose Sewell today. Answered all questions as best I could.   Romario Calderon MD   Total time 28 minutes

## 2021-07-12 NOTE — PATIENT INSTRUCTIONS
Change gabapentin from 300mg 2x/day, to 200mg in AM 200mg in mid afternoon, 300mg bedtime. Metamucil 1 scoop with 10 oz water  Throw away the eliquis  Seborrheic keratosis right chest can be removed  Ipratropium nose spray at bedtime 1 spray each nostril to dry up the post nasal spray.

## 2021-08-10 ENCOUNTER — OFFICE VISIT (OUTPATIENT)
Dept: CARDIOLOGY CLINIC | Age: 86
End: 2021-08-10
Payer: MEDICARE

## 2021-08-10 VITALS
SYSTOLIC BLOOD PRESSURE: 116 MMHG | HEART RATE: 78 BPM | HEIGHT: 69 IN | BODY MASS INDEX: 20.88 KG/M2 | WEIGHT: 141 LBS | DIASTOLIC BLOOD PRESSURE: 68 MMHG

## 2021-08-10 DIAGNOSIS — R07.9 CHEST PAIN IN ADULT: Primary | ICD-10-CM

## 2021-08-10 PROCEDURE — G8420 CALC BMI NORM PARAMETERS: HCPCS | Performed by: INTERNAL MEDICINE

## 2021-08-10 PROCEDURE — 1090F PRES/ABSN URINE INCON ASSESS: CPT | Performed by: INTERNAL MEDICINE

## 2021-08-10 PROCEDURE — 1123F ACP DISCUSS/DSCN MKR DOCD: CPT | Performed by: INTERNAL MEDICINE

## 2021-08-10 PROCEDURE — 99214 OFFICE O/P EST MOD 30 MIN: CPT | Performed by: INTERNAL MEDICINE

## 2021-08-10 PROCEDURE — 1036F TOBACCO NON-USER: CPT | Performed by: INTERNAL MEDICINE

## 2021-08-10 PROCEDURE — 4040F PNEUMOC VAC/ADMIN/RCVD: CPT | Performed by: INTERNAL MEDICINE

## 2021-08-10 PROCEDURE — 93000 ELECTROCARDIOGRAM COMPLETE: CPT | Performed by: INTERNAL MEDICINE

## 2021-08-10 PROCEDURE — G8427 DOCREV CUR MEDS BY ELIG CLIN: HCPCS | Performed by: INTERNAL MEDICINE

## 2021-08-10 NOTE — PROGRESS NOTES
[address]   Dr Jackelin Dueñas. Rohit Rodriguez MD, [address] [facility]                                                                                Outpatient Follow Up Note         08/10/21  HPI:  Daniel Laws is 80 y.o. female who presents today with MR/AR HTN HLD now has aged to 80 and surprised that she has made this far. Having significant pain in the right chest from a previous recent 3 years ago episode of shingles. Has received a more recent dose of Shingrix vaccine. Has received coronavirus vaccine as well. Cardiac wise she is stable and not having any current issues and no edema. Blood pressure stable.       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    PCR    Clostridium difficile infection 1/27/15    AG positive    Degenerative joint disease of knee     Depression     Diverticulitis     GERD (gastroesophageal reflux disease)     Grave's disease 4/2/2012    Heart disease     Hypertension     IBS (irritable bowel syndrome)     Macular degeneration     MGUS (monoclonal gammopathy of unknown significance)     Osteoporosis     Poor vision     d/t macular degeneration    Simple cyst of kidney      PSH  Past Surgical History:   Procedure Laterality Date    CHOLECYSTECTOMY      COLONOSCOPY  10/29/2009    **see scanned report- SILVANA    HEMORRHOID SURGERY      HYSTERECTOMY  in 40's    rudolph/bso    HYSTERECTOMY, TOTAL ABDOMINAL       SH:       Social History     Tobacco Use   Smoking Status Never Smoker Smokeless Tobacco Never Used     Current Medications:  Prior to Visit Medications    Medication Sig Taking? Authorizing Provider   vitamin D (ERGOCALCIFEROL) 1.25 MG (38696 UT) CAPS capsule Take 1 capsule by mouth once a week Yes Camden Pfeiffer MD   ipratropium (ATROVENT) 0.03 % nasal spray 1 spray each nostril at bedtime Yes Camden Pfeiffer MD   montelukast (SINGULAIR) 10 MG tablet TAKE 1 TABLET NIGHTLY Yes Camden Pfeiffer MD   felodipine (PLENDIL) 5 MG extended release tablet Take 1 tablet by mouth nightly Yes Camden Pfeiffer MD   hydrocortisone (ANUSOL-HC) 25 MG suppository Place 1 suppository rectally every 12 hours Yes Camden Pfeiffer MD   albuterol sulfate (PROAIR RESPICLICK) 565 (90 Base) MCG/ACT aerosol powder inhalation Inhale 2 puffs into the lungs every 6 hours as needed for Wheezing or Shortness of Breath Yes Camden Pfeiffer MD   ammonium lactate (LAC-HYDRIN) 12 % lotion Apply topically daily. Yes Camden Pfeiffer MD   atenolol (TENORMIN) 50 MG tablet TAKE 1 TABLET DAILY Yes Camden Pfeiffer MD   budesonide-formoterol (SYMBICORT) 160-4.5 MCG/ACT AERO Inhale 2 puffs into the lungs 2 times daily Yes Camden Pfeiffer MD   fluocinonide (LIDEX) 0.05 % cream Apply topically 2 times daily. Yes Camden Pfeiffer MD   fluticasone [facility]) 50 MCG/ACT nasal spray PLACE 1 SPRAY IN EACH NOSTRIL DAILY Yes Camden Pfeiffer MD   lidocaine (XYLOCAINE) 5 % ointment Apply topically three times a day, as needed. Yes Camden Pfeiffer MD   pantoprazole (PROTONIX) 40 MG tablet Take 1 tablet by mouth 2 times daily Yes Camden Pfeiffer MD   polyethylene glycol (GLYCOLAX) 17 g packet Take 17 g by mouth 2 times daily Yes Camden Pfeiffer MD   spironolactone (ALDACTONE) 25 MG tablet Daily Yes Camden Pfeiffer MD   methIMAzole (TAPAZOLE) 5 MG tablet Take 1 tablet by mouth See Admin Instructions 1 tablet by mouth daily except for Sunday, Monday, Wednesday take 1.5 tablets.  Yes Camden Pfeiffer MD   Simethicone (GAS RELIEF) 180 MG CAPS Take 180 mg by mouth 4 times daily as needed (bloating) Yes Cleda Gowers, MD   gabapentin (NEURONTIN) 100 MG capsule Take 1 capsule by mouth 3 times daily for 180 days. Intended supply: 27 days  Cleda Gowers, MD     Family History  Family History   Problem Relation Age of Onset    Heart Disease Mother     High Blood Pressure Mother     Stroke Mother     Cancer Father         multiple myeloma     · ROS:   · Cardiovascular: Reviewed in HPI  · Allergic/Immunologic: No nasal congestion or hives. · All other ROS are reviewed and are unremarkable. PHYSICAL EXAM:      Wt Readings from Last 3 Encounters:   08/10/21 141 lb (64 kg)   07/12/21 142 lb (64.4 kg)   04/23/21 140 lb (63.5 kg)       BP Readings from Last 3 Encounters:   08/10/21 116/68   07/12/21 138/76   04/12/21 139/69       Pulse Readings from Last 3 Encounters:   08/10/21 78   07/12/21 66   04/12/21 70       Exam   ENT: Cranial nerves II through XII intact grossly except that she is legally blind. Head eyes ears nose and throat unremarkable. NEUROLOGIC: Awake and alert and oriented x3. She is able to recognize me. Moves all extremities well for 80year-old. Constitutional: This patient is oriented to person, place, and time. Also appears well-developed and well-nourished and in no acute distress. HEENT: Normocephalic and atraumatic. Sclerae anicteric. No xanthelasmas. Conjunctiva white, no subconjunctival hemorrhage   External inspection of ears nose teeth & gums   Eyes:PERRLA EOM's intact. Neck: Neck supple. No JVD present. Carotids without bruits. No mass and no thyromegaly. No lymphadenopathy    Cardiovascular: RRR, normal S1 and S2; no murmur/gallop or rub, PMI nondisplaced  Pulmonary/Chest: Effort normal.  Lungs clear to auscultation. Chest wall nontender  Abdominal: soft, nontender, nondistended. + bowel sounds; no organomegaly or bruits. Aorta normal  Extremities: No edema  Pulses are 2+ radial/carotid/dorsalis pedis bilaterally.  Cap refill brisk. Neurological: No cranial nerve deficit. Psychiatric: This patient has a normal mood and affect and the speech is normal as is behavior    Assessment:    MR / AR   Echo 2017 EF 55% m/m MR moderate insuff /  similar to prior echo on    LV Diastolic Dimension: 3.9 cm LV Systolic Dimension: 2.7 cm   The aortic valve appears normal in structure and function.   The aortic valve appears tricuspid.   Moderate aortic regurgitation is present.   No evidence of aortic stenosis. On BB   Summary 20   Left ventricular cavity size is normal with normal left ventricular wall   thickness. Overall left ventricular systolic function appears normal with an ejection   fraction of 55%. No regional wall motion abnormalities   Normal diastolic function. Mild tricuspid regurgitation. Estimated pulmonary artery systolic pressure is at 24 mmHg assuming a right   atrial pressure of 3 mmHg. EK18  55 BPM 18   CT interval 218 ms  QRS duration 88 ms  QT/QTc 434/415 ms  P-R-T axes 67 8 33    EKG on 8/10/2021 sinus rhythm 75. Ventricular hypertrophy by voltages. Late transition. HTN  Optimal  EKG on January 3, 2018 sinus rhythm 61/m. Nonspecific lateral ST and T wave changes. Stable pattern. PLAN:   New current therapy. Did advise that she take some Tylenol perhaps along with the Neurontin and that might improve her pain discomfort. .. Anusha Pablo M.D.  [facility] [place]

## 2021-08-11 ENCOUNTER — TELEPHONE (OUTPATIENT)
Dept: PRIMARY CARE CLINIC | Age: 86
End: 2021-08-11

## 2021-08-11 NOTE — TELEPHONE ENCOUNTER
----- Message from Migdalia Little sent at 8/9/2021 12:20 PM EDT -----  Subject: Message to Provider    QUESTIONS  Information for Provider? Pts mira needs a new medication that was   previously given to pt, name of medication Is Nystatin for Thrush. was   previously given that med with a previous MD and would like to go back on   that. Pharmacy would be [address] [place], [place]. ,   ---------------------------------------------------------------------------  --------------  CALL BACK INFO  What is the best way for the office to contact you? OK to leave message on   voicemail  Preferred Call Back Phone Number? [phone]  ---------------------------------------------------------------------------  --------------  SCRIPT ANSWERS  Relationship to Patient? Guardian  Representative Name? wilbert john (daughter)  Is the Representative on the appropriate HIPAA document in Epic?  Yes

## 2021-08-17 RX ORDER — PANTOPRAZOLE SODIUM 40 MG/1
40 TABLET, DELAYED RELEASE ORAL 2 TIMES DAILY
Qty: 60 TABLET | Refills: 5 | Status: CANCELLED | OUTPATIENT
Start: 2021-08-17

## 2021-08-17 NOTE — TELEPHONE ENCOUNTER
Medication:   Requested Prescriptions     Pending Prescriptions Disp Refills    pantoprazole (PROTONIX) 40 MG tablet 60 tablet 5     Sig: Take 1 tablet by mouth 2 times daily        Last Filled:  1/29/2021    Patient Phone Number: [phone] (home)     Last appt: Visit date not found   Next appt: Visit date not found    Last OARRS:   RX Monitoring 9/11/2019   Periodic Controlled Substance Monitoring Assessed functional status.

## 2021-08-25 ENCOUNTER — TELEPHONE (OUTPATIENT)
Dept: PRIMARY CARE CLINIC | Age: 86
End: 2021-08-25

## 2021-08-25 RX ORDER — PANTOPRAZOLE SODIUM 40 MG/1
40 TABLET, DELAYED RELEASE ORAL 2 TIMES DAILY
Qty: 60 TABLET | Refills: 5 | Status: SHIPPED | OUTPATIENT
Start: 2021-08-25 | End: 2021-09-07 | Stop reason: SDUPTHER

## 2021-08-25 NOTE — TELEPHONE ENCOUNTER
Please have prescription pantoprazole (PROTONIX) 40 MG sent to [facility] pharmacy on [address] [facility] and also MA patient's daughter Jabari Borjas @ [phone] would like a call please

## 2021-09-07 RX ORDER — PANTOPRAZOLE SODIUM 40 MG/1
40 TABLET, DELAYED RELEASE ORAL 2 TIMES DAILY
Qty: 60 TABLET | Refills: 5 | Status: SHIPPED | OUTPATIENT
Start: 2021-09-07 | End: 2021-10-01

## 2021-09-07 NOTE — TELEPHONE ENCOUNTER
Medication:   Requested Prescriptions     Pending Prescriptions Disp Refills    pantoprazole (PROTONIX) 40 MG tablet 60 tablet 5     Sig: Take 1 tablet by mouth 2 times daily        Last Filled:      Patient Phone Number: [phone] (home)     Last appt: 7/12/2021   Next appt: 10/14/2021    Last OARRS:   RX Monitoring 9/11/2019   Periodic Controlled Substance Monitoring Assessed functional status.

## 2021-10-01 RX ORDER — PANTOPRAZOLE SODIUM 40 MG/1
TABLET, DELAYED RELEASE ORAL
Qty: 60 TABLET | Refills: 5 | Status: SHIPPED | OUTPATIENT
Start: 2021-10-01 | End: 2022-03-23

## 2021-10-01 NOTE — TELEPHONE ENCOUNTER
Medication:   Requested Prescriptions     Pending Prescriptions Disp Refills    pantoprazole (PROTONIX) 40 MG tablet [Pharmacy Med Name: PANTOPRAZOLE SOD DR 40 MG TAB] 60 tablet 5     Sig: TAKE 1 TABLET BY MOUTH TWICE A DAY        Last Filled:      Patient Phone Number: [phone] (home)     Last appt: 7/12/2021   Next appt: 10/14/2021    Last OARRS:   RX Monitoring 9/11/2019   Periodic Controlled Substance Monitoring Assessed functional status.

## 2021-10-14 ENCOUNTER — OFFICE VISIT (OUTPATIENT)
Dept: PRIMARY CARE CLINIC | Age: 86
End: 2021-10-14
Payer: MEDICARE

## 2021-10-14 VITALS
SYSTOLIC BLOOD PRESSURE: 134 MMHG | WEIGHT: 142.2 LBS | DIASTOLIC BLOOD PRESSURE: 74 MMHG | BODY MASS INDEX: 21 KG/M2 | HEART RATE: 97 BPM | OXYGEN SATURATION: 99 %

## 2021-10-14 DIAGNOSIS — G89.4 CHRONIC PAIN SYNDROME: ICD-10-CM

## 2021-10-14 DIAGNOSIS — E04.2 MULTIPLE THYROID NODULES: ICD-10-CM

## 2021-10-14 DIAGNOSIS — E53.8 B12 DEFICIENCY: ICD-10-CM

## 2021-10-14 DIAGNOSIS — I10 ESSENTIAL HYPERTENSION: ICD-10-CM

## 2021-10-14 DIAGNOSIS — E21.3 HYPERPARATHYROIDISM (HCC): ICD-10-CM

## 2021-10-14 DIAGNOSIS — L82.1 SEBORRHEIC KERATOSES: Primary | ICD-10-CM

## 2021-10-14 PROCEDURE — 1036F TOBACCO NON-USER: CPT | Performed by: INTERNAL MEDICINE

## 2021-10-14 PROCEDURE — G8484 FLU IMMUNIZE NO ADMIN: HCPCS | Performed by: INTERNAL MEDICINE

## 2021-10-14 PROCEDURE — 4040F PNEUMOC VAC/ADMIN/RCVD: CPT | Performed by: INTERNAL MEDICINE

## 2021-10-14 PROCEDURE — G8420 CALC BMI NORM PARAMETERS: HCPCS | Performed by: INTERNAL MEDICINE

## 2021-10-14 PROCEDURE — 99214 OFFICE O/P EST MOD 30 MIN: CPT | Performed by: INTERNAL MEDICINE

## 2021-10-14 PROCEDURE — 1123F ACP DISCUSS/DSCN MKR DOCD: CPT | Performed by: INTERNAL MEDICINE

## 2021-10-14 PROCEDURE — 1090F PRES/ABSN URINE INCON ASSESS: CPT | Performed by: INTERNAL MEDICINE

## 2021-10-14 PROCEDURE — G8427 DOCREV CUR MEDS BY ELIG CLIN: HCPCS | Performed by: INTERNAL MEDICINE

## 2021-10-14 NOTE — PROGRESS NOTES
10/14/2021     Constantino Bridges (:  1926) is a 80 y.o. 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 difficile carrier, irritable bowel syndrome occasionally treated with rifaximin GERD, depression, celiac disease, likely hyperparathyroidism with hypercalcemia complicated by osteoporosis, status post remote cholecystectomy hysterectomy with salpingo-oophorectomy hemorrhoid surgery last colonoscopy in , who established care with us in January, following her providers assisted. For her valvular heart disease she sees Dr. Ashley Sarabia in cardiology. Reassuring echo 2020 EF 55% without resting regional's, normal diastolic function, mild TR, PASP 24 mm with normal inspiratory collapse of the IVC. Chest CT 2020 single subsegmental right upper lobe pulmonary embolism on Eliquis 5 mg twice daily for approximately 7 months, with intent to discontinue after 6 months effective anticoagulation. Follow-up chest CT for left-sided chest pain 2020 - for PE. Carotid ultrasound in  for TIA showed no high-grade stenosis. Negative right lower extremity DVT study in , negative bilateral lower extremity study in . Head CT  - for stroke. For her Nelia Brooking' disease she sees Dr. Arlyn Marcano in endocrinology. Thyroid nodules were benign on biopsy in , thyroid ultrasound  without worrisome changes. She has bilateral adrenal adenomas, radiographically stable on serial CT  through , though she may have AML on superior pole of the left kidney.   Cortisol DHEA plasma normetanephrine and metanephrine renin in 2020 did not suggest endocrinologically functioning adenomas. In November endocrinology  noted worsening bone mineral density, -3 spine -4.7 hip. Due to GI and renal limitations/risks, no great treatment options. Right chest wall zoster 9807 complicated by PHN. Has not had Shingrix shots yet. Endorses right greater than left knee pain walker dependent and interested in seeing orthopedics again for consideration of synovial fluid exchange versus cortisone shot. Endorses left greater than right lower extremity neuropathy however too sedated to tolerate higher dose of gabapentin. Review of Systems   Constitutional: Negative for activity change, appetite change, fatigue and unexpected weight change. HENT: Negative for dental problem, sinus pain, sore throat and trouble swallowing. Eyes: Negative for pain and visual disturbance. Respiratory: Negative for apnea, cough, chest tightness, shortness of breath and wheezing. Cardiovascular: Negative for chest pain and palpitations. Gastrointestinal: Negative for abdominal pain, blood in stool, constipation, diarrhea, nausea, rectal pain and vomiting. Endocrine: Negative for cold intolerance, heat intolerance, polydipsia, polyphagia and polyuria. Genitourinary: Negative for difficulty urinating, dysuria, flank pain, frequency, hematuria, pelvic pain, urgency, vaginal bleeding and vaginal discharge. Musculoskeletal: Negative for arthralgias, back pain, gait problem, joint swelling, myalgias, neck pain and neck stiffness. Skin: Negative for color change and rash. Neurological: Negative for dizziness, tremors, syncope, speech difficulty, weakness, light-headedness and headaches. Hematological: Negative for adenopathy. Does not bruise/bleed easily. Psychiatric/Behavioral: Negative for agitation, behavioral problems, decreased concentration, sleep disturbance and suicidal ideas. The patient is not nervous/anxious and is not hyperactive. Prior to Visit Medications    Medication Sig Taking? Authorizing Provider   pantoprazole (PROTONIX) 40 MG tablet TAKE 1 TABLET BY MOUTH TWICE A DAY Yes Radha Robledo MD   montelukast (SINGULAIR) 10 MG tablet TAKE 1 TABLET NIGHTLY Yes Radha Robledo MD   nystatin (MYCOSTATIN) 219546 UNIT/ML suspension Take 5 mLs by mouth 4 times daily Yes Radha Robledo MD   vitamin D (ERGOCALCIFEROL) 1.25 MG (60728 UT) CAPS capsule Take 1 capsule by mouth once a week Yes Radha Robledo MD   ipratropium (ATROVENT) 0.03 % nasal spray 1 spray each nostril at bedtime Yes Radha Robledo MD   felodipine (PLENDIL) 5 MG extended release tablet Take 1 tablet by mouth nightly Yes Radha Robledo MD   hydrocortisone (ANUSOL-HC) 25 MG suppository Place 1 suppository rectally every 12 hours Yes Radha Robledo MD   albuterol sulfate (PROAIR RESPICLICK) 319 (90 Base) MCG/ACT aerosol powder inhalation Inhale 2 puffs into the lungs every 6 hours as needed for Wheezing or Shortness of Breath Yes Radha Robledo MD   ammonium lactate (LAC-HYDRIN) 12 % lotion Apply topically daily. Yes Radha Robledo MD   atenolol (TENORMIN) 50 MG tablet TAKE 1 TABLET DAILY Yes Radha Robledo MD   budesonide-formoterol (SYMBICORT) 160-4.5 MCG/ACT AERO Inhale 2 puffs into the lungs 2 times daily Yes Radha Robledo MD   fluocinonide (LIDEX) 0.05 % cream Apply topically 2 times daily. Yes Radha Robledo MD   fluticasone [facility]) 50 MCG/ACT nasal spray PLACE 1 SPRAY IN EACH NOSTRIL DAILY Yes Radha Robledo MD   lidocaine (XYLOCAINE) 5 % ointment Apply topically three times a day, as needed. Yes Radha Robledo MD   polyethylene glycol (GLYCOLAX) 17 g packet Take 17 g by mouth 2 times daily Yes Radha Robledo MD   spironolactone (ALDACTONE) 25 MG tablet Daily Yes Radha Robledo MD   methIMAzole (TAPAZOLE) 5 MG tablet Take 1 tablet by mouth See Admin Instructions 1 tablet by mouth daily except for Sunday, Monday, Wednesday take 1.5 tablets.  Yes Vadim Gonzalez Jamil Mcgowan MD   Simethicone (GAS RELIEF) 180 MG CAPS Take 180 mg by mouth 4 times daily as needed (bloating) Yes Celeste Salinas MD   gabapentin (NEURONTIN) 100 MG capsule Take 1 capsule by mouth 3 times daily for 180 days. Intended supply: 30 days  Celeste Salinas MD        Allergies   Allergen Reactions    Ace Inhibitors Other (See Comments)     angioedema    Acyclovir      Severe nausea  And anorexia. It happened after taking each 800 mg tablet and patient took one one day and one the next, then stopped an no more    Creon [Pancrelipase (Lip-Prot-Amyl)]      Swollen gums.  Dye [Iodides] Hives     Only reacts to dye that went into eyes, OK with IV contrast    Eggs Or Egg-Derived Products      Hives  this test in the [employer] ([facility]). Egg White IgE 3. 59High   <=0.34 kU/L Final 02/10/2016  2:14 PM [facility]                  Gluten Meal      Abdominal pain and diarreha  Wheat IgE 0.53  <=0.34 kU/L Final 02/10/2016  2:14 PM [facility]       Milk-Related Compounds      Milk, Cow's IgE 1. 75High   <=0.34 kU/L Final 02/10/2016  2:14 PM [facility]    Fluorescein Other (See Comments)    Lisinopril Swelling    Macrobid [Nitrofurantoin Monohydrate Macrocrystals] Other (See Comments)    No Known Allergies     Ciprocinonide [Fluocinolone] Rash     Stiff, no nausea or rash    Sulfa Antibiotics Rash       Past Medical History:   Diagnosis Date    [address] [address]     Arthritis     Asthma     Bilateral carpal tunnel syndrome 8/23/2018    Cataract     Chronic systolic congestive heart failure (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    PCR    Clostridium difficile infection 1/27/15    AG positive    Degenerative joint disease of knee     Depression     Diverticulitis     GERD (gastroesophageal reflux disease)     Grave's disease 4/2/2012    Heart disease     Hypertension     IBS (irritable bowel syndrome)     Macular degeneration     MGUS (monoclonal gammopathy of unknown significance)     Osteoporosis     Poor vision     d/t macular degeneration    Simple cyst of kidney        Past Surgical History:   Procedure Laterality Date    CHOLECYSTECTOMY      COLONOSCOPY  10/29/2009    **see scanned report- SILVANA    HEMORRHOID SURGERY      HYSTERECTOMY  in 40's    rudolph/bso    HYSTERECTOMY, TOTAL ABDOMINAL         Social History     Socioeconomic History    Marital status: Single     Spouse name: Not on file    Number of children: Not on file    Years of education: Not on file    Highest education level: Not on file   Occupational History    Not on file   Tobacco Use    Smoking status: Never Smoker    Smokeless tobacco: Never Used   Vaping Use    Vaping Use: Never used   Substance and Sexual Activity    Alcohol use: No    Drug use: No    Sexual activity: Not Currently   Other Topics Concern    Not on file   Social History Narrative    Not on file     Social Determinants of Health     Financial Resource Strain: Low Risk     Difficulty of Paying Living Expenses: Not hard at all   Food Insecurity: No Food Insecurity    Worried About Running Out of Food in the Last Year: Never true    Sherie of Food in the Last Year: Never true   Transportation Needs:     Lack of Transportation (Medical):      Lack of Transportation (Non-Medical):    Physical Activity:     Days of Exercise per Week:     Minutes of Exercise per Session:    Stress:     Feeling of Stress :    Social Connections:     Frequency of Communication with Friends and Family:     Frequency of Social Gatherings with Friends and Family:     Attends Evangelical Services:     Active Member of Clubs or Organizations:     Attends Club or Organization Meetings:     Marital Status:    Intimate Partner Violence:     Fear of Current or Ex-Partner:     Emotionally Abused:     Physically Abused:     Sexually Abused:         Family History   Problem Relation Age of Onset    Heart Disease Mother     High Blood Pressure Mother     Stroke Mother     Cancer Father         multiple myeloma       Vitals:    10/14/21 0955   BP: 134/74   Pulse: 97   SpO2: 99%   Weight: 142 lb 3.2 oz (64.5 kg)     Estimated body mass index is 21 kg/m² as calculated from the following:    Height as of 8/10/21: 5' 9\" (1.753 m). Weight as of this encounter: 142 lb 3.2 oz (64.5 kg). PHYSICAL EXAM  GENERAL:  Pleasant  female who looks her stated age, awake alert and oriented x3, no acute distress. HEENT: Lateral gaze preference given macular degeneration. Pupils sluggishly reactive symmetrically. Posterior oropharynx mildly reddened. Turbinates mildly reddened. NECK:  Supple nontender. No carotid bruits. Brisk carotid upstrokes, mild JVD with V wave. Marked thyromegaly. Area of tenderness seems to lie superior to the thyroid, and adjacent to the right lateral aspect of the trachea, though no palpable mass fluctuance or tenderness. LYMPH:  No supraclavicular cervical axillarylymphadenopathy. LUNGS:  Clear to auscultation bilaterally. Very good air entry. No inspiratory crackles or expiratory wheezes. HEART:  Regular rate and rhythm without pathologic murmur rub gallop S3 or S4. ABDOMEN:  Soft, mildly tender. Normal bowel sounds. No guarding. No masses. Not appear to be distended. UROGENITAL:  Deferred  EXTREMITIES:  Warm and well perfused without clubbing cyanosis or edema. 2+ pulses in all 4 extremities. Capillary refill 2-3 seconds with dependent rubor but no foot ulceration. NEURO:  Cranial nerves 2-12 grossly intact. Normal muscle bulk and tone. No resting tremor, cogwheeling, normal rapid alternating movements in the hands and feet. Vibration and monofilament sensation nearly absent below the midcalf. Significant skin changes stocking paresthesia. Normal gait and station. MUSCULOSKELETAL: Pronounced osteoarthritic changes, right knee grossly deformed.   No active synovitis joint warmth or erythema. SKIN:  No worrisome lesions, skin woody, with scaling on the distal lower extremities. PSYCH:  No psychomotor retardation or agitation. Good eye contact. Unrestricted affect range. Mood congruent with affect. Linear thought. LABS  Lab Review   Orders Only on 08/10/2021   Component Date Value    TSH 08/10/2021 <0.01*   Orders Only on 08/10/2021   Component Date Value    T4 Free 08/10/2021 2.9*   Orders Only on 08/10/2021   Component Date Value    Vit D, 25-Hydroxy 08/10/2021 21.4*   Orders Only on 04/06/2021   Component Date Value    Vitamin B-12 04/06/2021 1168*    TSH 04/06/2021 2.77     TISSUE TRANSGLUTAMINASE * 04/06/2021 <0.5    Orders Only on 02/08/2021   Component Date Value    TSH 02/08/2021 6.63*   Orders Only on 02/08/2021   Component Date Value    T4 Free 02/08/2021 0.5*   Orders Only on 02/08/2021   Component Date Value    Cortisol 02/08/2021 8.2    Orders Only on 10/15/2020   Component Date Value    Renin Activity 10/15/2020 0.3    Orders Only on 10/15/2020   Component Date Value    Normetanephrine, Free 10/15/2020 0.98*    Metanephrine, Free 10/15/2020 0.17     Metaneph/Plasma Interp 10/15/2020 See Note    Orders Only on 10/15/2020   Component Date Value    DHEAS (DHEA Sulfate) 10/15/2020 <15.0    There may be more visits with results that are not included. ASSESSMENT/PLAN  1. Dry skin  At time of last visit we prescribed Lac-Hydrin, which the patient reports has helped. See #4 below    2. Essential hypertension  In the outpatient setting historically blood pressures run 130-170/80, most commonly 150/80. With recent felodipine dose increase, blood pressure in clinic today looks better controlled. .    She will continue atenolol spironolactone in the morning. Update BMP. 3. Other chronic pulmonary embolism without acute cor pulmonale ([facility] [address]  Patient has completed anticoagulation, and resumed baby aspirin.     4. Rash  Distal

## 2021-10-14 NOTE — PATIENT INSTRUCTIONS
1. Derm referral for SKs   2. Use nystatin when rash in mouth bad 4x/day, otherwise  3. Gargle after symbicort to reduce thrush   4. Trial atrovent AM and PM instead of AM flonase PM atrovent, and let me know if that works better. 5. CBD oil daily to shingles area on R breast  6. Metamucil EVERY DAY for IBS  7. Encourage [facility] Vit D oil, 1000 IU daily  8.  Fasting blood work sometime in next few times

## 2021-10-16 ENCOUNTER — APPOINTMENT (OUTPATIENT)
Dept: GENERAL RADIOLOGY | Age: 86
End: 2021-10-16
Payer: MEDICARE

## 2021-10-16 ENCOUNTER — HOSPITAL ENCOUNTER (EMERGENCY)
Age: 86
Discharge: HOME | End: 2021-10-16
Attending: STUDENT IN AN ORGANIZED HEALTH CARE EDUCATION/TRAINING PROGRAM
Payer: MEDICARE

## 2021-10-16 VITALS
OXYGEN SATURATION: 100 % | RESPIRATION RATE: 18 BRPM | SYSTOLIC BLOOD PRESSURE: 146 MMHG | DIASTOLIC BLOOD PRESSURE: 73 MMHG | TEMPERATURE: 99 F | HEART RATE: 83 BPM

## 2021-10-16 DIAGNOSIS — R42 LIGHTHEADEDNESS: Primary | ICD-10-CM

## 2021-10-16 LAB
ALBUMIN SERPL-MCNC: 3.8 G/DL (ref 3.4–5)
ALP BLD-CCNC: 127 U/L (ref 40–129)
ALT SERPL-CCNC: <5 U/L (ref 10–40)
ANION GAP SERPL CALCULATED.3IONS-SCNC: 12 MMOL/L (ref 3–16)
AST SERPL-CCNC: 15 U/L (ref 15–37)
BASOPHILS ABSOLUTE: 0 K/UL (ref 0–0.2)
BASOPHILS RELATIVE PERCENT: 0.5 %
BILIRUB SERPL-MCNC: 0.6 MG/DL (ref 0–1)
BILIRUBIN DIRECT: <0.2 MG/DL (ref 0–0.3)
BILIRUBIN URINE: NEGATIVE
BILIRUBIN, INDIRECT: ABNORMAL MG/DL (ref 0–1)
BLOOD, URINE: ABNORMAL
BUN BLDV-MCNC: 11 MG/DL (ref 7–20)
CALCIUM SERPL-MCNC: 9.9 MG/DL (ref 8.3–10.6)
CHLORIDE BLD-SCNC: 106 MMOL/L (ref 99–110)
CLARITY: CLEAR
CO2: 22 MMOL/L (ref 21–32)
COLOR: YELLOW
CREAT SERPL-MCNC: 0.8 MG/DL (ref 0.6–1.2)
EOSINOPHILS ABSOLUTE: 0.1 K/UL (ref 0–0.6)
EOSINOPHILS RELATIVE PERCENT: 1.6 %
GFR AFRICAN AMERICAN: >60
GFR NON-AFRICAN AMERICAN: >60
GLUCOSE BLD-MCNC: 113 MG/DL (ref 70–99)
GLUCOSE URINE: NEGATIVE MG/DL
HCT VFR BLD CALC: 45.6 % (ref 36–48)
HEMOGLOBIN: 14.6 G/DL (ref 12–16)
KETONES, URINE: NEGATIVE MG/DL
LEUKOCYTE ESTERASE, URINE: NEGATIVE
LIPASE: 52 U/L (ref 13–60)
LYMPHOCYTES ABSOLUTE: 1.7 K/UL (ref 1–5.1)
LYMPHOCYTES RELATIVE PERCENT: 24.2 %
MCH RBC QN AUTO: 26.9 PG (ref 26–34)
MCHC RBC AUTO-ENTMCNC: 32.1 G/DL (ref 31–36)
MCV RBC AUTO: 83.8 FL (ref 80–100)
MICROSCOPIC EXAMINATION: YES
MONOCYTES ABSOLUTE: 0.7 K/UL (ref 0–1.3)
MONOCYTES RELATIVE PERCENT: 10.5 %
NEUTROPHILS ABSOLUTE: 4.4 K/UL (ref 1.7–7.7)
NEUTROPHILS RELATIVE PERCENT: 63.2 %
NITRITE, URINE: NEGATIVE
PDW BLD-RTO: 15.4 % (ref 12.4–15.4)
PH UA: 7 (ref 5–8)
PLATELET # BLD: 190 K/UL (ref 135–450)
PMV BLD AUTO: 8.9 FL (ref 5–10.5)
POTASSIUM REFLEX MAGNESIUM: 4 MMOL/L (ref 3.5–5.1)
PRO-BNP: 147 PG/ML (ref 0–449)
PROTEIN UA: NEGATIVE MG/DL
RBC # BLD: 5.44 M/UL (ref 4–5.2)
RBC UA: NORMAL /HPF (ref 0–4)
SODIUM BLD-SCNC: 140 MMOL/L (ref 136–145)
SPECIFIC GRAVITY UA: 1.01 (ref 1–1.03)
TOTAL PROTEIN: 6.8 G/DL (ref 6.4–8.2)
TROPONIN: <0.01 NG/ML
URINE TYPE: ABNORMAL
UROBILINOGEN, URINE: 0.2 E.U./DL
WBC # BLD: 7 K/UL (ref 4–11)
WBC UA: NORMAL /HPF (ref 0–5)

## 2021-10-16 PROCEDURE — 83880 ASSAY OF NATRIURETIC PEPTIDE: CPT

## 2021-10-16 PROCEDURE — 80076 HEPATIC FUNCTION PANEL: CPT

## 2021-10-16 PROCEDURE — 80048 BASIC METABOLIC PNL TOTAL CA: CPT

## 2021-10-16 PROCEDURE — 85025 COMPLETE CBC W/AUTO DIFF WBC: CPT

## 2021-10-16 PROCEDURE — 93005 ELECTROCARDIOGRAM TRACING: CPT | Performed by: PHYSICIAN ASSISTANT

## 2021-10-16 PROCEDURE — 71046 X-RAY EXAM CHEST 2 VIEWS: CPT

## 2021-10-16 PROCEDURE — 83690 ASSAY OF LIPASE: CPT

## 2021-10-16 PROCEDURE — 36415 COLL VENOUS BLD VENIPUNCTURE: CPT

## 2021-10-16 PROCEDURE — 99283 EMERGENCY DEPT VISIT LOW MDM: CPT

## 2021-10-16 PROCEDURE — 81001 URINALYSIS AUTO W/SCOPE: CPT

## 2021-10-16 PROCEDURE — 84484 ASSAY OF TROPONIN QUANT: CPT

## 2021-10-16 ASSESSMENT — ENCOUNTER SYMPTOMS
SHORTNESS OF BREATH: 0
COUGH: 0
ABDOMINAL PAIN: 0
DIARRHEA: 0
VOMITING: 0
NAUSEA: 0

## 2021-10-16 ASSESSMENT — PAIN SCALES - GENERAL: PAINLEVEL_OUTOF10: 8

## 2021-10-16 ASSESSMENT — PAIN DESCRIPTION - LOCATION: LOCATION: BREAST;BACK

## 2021-10-16 ASSESSMENT — PAIN DESCRIPTION - PAIN TYPE: TYPE: CHRONIC PAIN

## 2021-10-16 ASSESSMENT — PAIN DESCRIPTION - ORIENTATION: ORIENTATION: RIGHT

## 2021-10-16 NOTE — ED PROVIDER NOTES
[address] [facility] [address] ENCOUNTER          PHYSICIAN ASSISTANT NOTE       Date of evaluation: 10/16/2021    Chief Complaint     Dizziness (Patient states that she started to get dizzy and lightheaded and felt like she was going to pass out. )      History of Present Illness     Татьяна Hills is a 80 y.o. female with a history of Graves' disease, hypertension, GERD, irritable bowel syndrome, asthma, celiac disease, adrenal adenoma, CHF, diverticulitis, depression who comes to the emergency department by squad. She lives at home and reports that while she was sitting on the toilet having a bowel movement she began to feel dizzy and felt like she might pass out. She reports that she did not fall or lose consciousness. She did call a squad due to this episode however. On arrival in the emergency department the squad reports that the patient was stable and mentating throughout transport. The patient reports that she feels better now but that she does have \"gas\" which she attributes to her irritable bowel syndrome. She does also endorse some mild pain with urination recently. She also endorses chronic pain in her right breast and over her back from herpes zoster which she has had for 2 years, but reinforces that this pain is not new and that she is on gabapentin at home. She denies any other new symptoms today including headaches, fevers, cough, chest pain, shortness of breath, abdominal pain, vomiting, bowel changes. Review of Systems     Review of Systems   Constitutional: Negative for chills and fever. Respiratory: Negative for cough and shortness of breath. Cardiovascular: Negative for chest pain. Gastrointestinal: Negative for abdominal pain, diarrhea, nausea and vomiting. Genitourinary: Positive for dysuria. Neurological: Positive for light-headedness. Negative for syncope and headaches. Psychiatric/Behavioral: Negative for confusion.        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 She has a past surgical history that includes Cholecystectomy; Colonoscopy (10/29/2009); Hemorrhoid surgery; Hysterectomy (in 40's); and Hysterectomy, total abdominal.  Her family history includes Cancer in her father; Heart Disease in her mother; High Blood Pressure in her mother; Stroke in her mother. She reports that she has never smoked. She has never used smokeless tobacco. She reports that she does not drink alcohol and does not use drugs. Medications     Previous Medications    ALBUTEROL SULFATE (PROAIR RESPICLICK) 036 (90 BASE) MCG/ACT AEROSOL POWDER INHALATION    Inhale 2 puffs into the lungs every 6 hours as needed for Wheezing or Shortness of Breath    AMMONIUM LACTATE (LAC-HYDRIN) 12 % LOTION    Apply topically daily. ATENOLOL (TENORMIN) 50 MG TABLET    TAKE 1 TABLET DAILY    BUDESONIDE-FORMOTEROL (SYMBICORT) 160-4.5 MCG/ACT AERO    Inhale 2 puffs into the lungs 2 times daily    FELODIPINE (PLENDIL) 5 MG EXTENDED RELEASE TABLET    Take 1 tablet by mouth nightly    FLUOCINONIDE (LIDEX) 0.05 % CREAM    Apply topically 2 times daily. FLUTICASONE (FLONASE) 50 MCG/ACT NASAL SPRAY    PLACE 1 SPRAY IN EACH NOSTRIL DAILY    GABAPENTIN (NEURONTIN) 100 MG CAPSULE    Take 1 capsule by mouth 3 times daily for 180 days.  Intended supply: 30 days    HYDROCORTISONE (ANUSOL-HC) 25 MG SUPPOSITORY    Place 1 suppository rectally every 12 hours IPRATROPIUM (ATROVENT) 0.03 % NASAL SPRAY    1 spray each nostril at bedtime    LIDOCAINE (XYLOCAINE) 5 % OINTMENT    Apply topically three times a day, as needed. METHIMAZOLE (TAPAZOLE) 5 MG TABLET    Take 1 tablet by mouth See Admin Instructions 1 tablet by mouth daily except for Sunday, Monday, Wednesday take 1.5 tablets. MONTELUKAST (SINGULAIR) 10 MG TABLET    TAKE 1 TABLET NIGHTLY    NYSTATIN (MYCOSTATIN) 744651 UNIT/ML SUSPENSION    Take 5 mLs by mouth 4 times daily    PANTOPRAZOLE (PROTONIX) 40 MG TABLET    TAKE 1 TABLET BY MOUTH TWICE A DAY    POLYETHYLENE GLYCOL (GLYCOLAX) 17 G PACKET    Take 17 g by mouth 2 times daily    SIMETHICONE (GAS RELIEF) 180 MG CAPS    Take 180 mg by mouth 4 times daily as needed (bloating)    SPIRONOLACTONE (ALDACTONE) 25 MG TABLET    Daily    VITAMIN D (ERGOCALCIFEROL) 1.25 MG (96279 UT) CAPS CAPSULE    Take 1 capsule by mouth once a week       Allergies     She is allergic to ace inhibitors, acyclovir, creon [pancrelipase (lip-prot-amyl)], dye [iodides], eggs or egg-derived products, gluten meal, milk-related compounds, fluorescein, lisinopril, macrobid [nitrofurantoin monohydrate macrocrystals], no known allergies, ciprocinonide [fluocinolone], and sulfa antibiotics. Physical Exam     INITIAL VITALS: BP: (!) 146/73, Temp: 99 °F (37.2 °C), Pulse: 83, Resp: 18, SpO2: 98 %  Physical Exam  Constitutional:       Appearance: Normal appearance. HENT:      Head: Normocephalic and atraumatic. Cardiovascular:      Rate and Rhythm: Normal rate and regular rhythm. Pulses: Normal pulses. Heart sounds: Normal heart sounds. No murmur heard. No friction rub. Pulmonary:      Effort: Pulmonary effort is normal.   Musculoskeletal:         General: Normal range of motion. Cervical back: Normal range of motion. Skin:     General: Skin is warm and dry. Neurological:      Mental Status: She is alert and oriented to person, place, and time.       GCS: GCS eye subscore is 4. GCS verbal subscore is 5. GCS motor subscore is 6. Cranial Nerves: Cranial nerves are intact. No cranial nerve deficit or facial asymmetry. Sensory: Sensation is intact. No sensory deficit. Motor: No abnormal muscle tone or pronator drift. Coordination: Finger-Nose-Finger Test normal.   Psychiatric:         Mood and Affect: Mood normal.         Behavior: Behavior normal.         Diagnostic Results     EKG   EKG shows sinus rhythm with first-degree AV block without ST elevation or depression. There are no other signs of ischemia present. ND interval is 250. QT is 396. RADIOLOGY:  XR CHEST (2 VW)   Final Result      1. Mild bilateral basilar scarring or atelectasis.                 LABS:   Results for orders placed or performed during the hospital encounter of 10/16/21   CBC Auto Differential   Result Value Ref Range    WBC 7.0 4.0 - 11.0 K/uL    RBC 5.44 (H) 4.00 - 5.20 M/uL    Hemoglobin 14.6 12.0 - 16.0 g/dL    Hematocrit 45.6 36.0 - 48.0 %    MCV 83.8 80.0 - 100.0 fL    MCH 26.9 26.0 - 34.0 pg    MCHC 32.1 31.0 - 36.0 g/dL    RDW 15.4 12.4 - 15.4 %    Platelets 687 058 - 866 K/uL    MPV 8.9 5.0 - 10.5 fL    Neutrophils % 63.2 %    Lymphocytes % 24.2 %    Monocytes % 10.5 %    Eosinophils % 1.6 %    Basophils % 0.5 %    Neutrophils Absolute 4.4 1.7 - 7.7 K/uL    Lymphocytes Absolute 1.7 1.0 - 5.1 K/uL    Monocytes Absolute 0.7 0.0 - 1.3 K/uL    Eosinophils Absolute 0.1 0.0 - 0.6 K/uL    Basophils Absolute 0.0 0.0 - 0.2 K/uL   Basic Metabolic Panel w/ Reflex to MG   Result Value Ref Range    Sodium 140 136 - 145 mmol/L    Potassium reflex Magnesium 4.0 3.5 - 5.1 mmol/L    Chloride 106 99 - 110 mmol/L    CO2 22 21 - 32 mmol/L    Anion Gap 12 3 - 16    Glucose 113 (H) 70 - 99 mg/dL    BUN 11 7 - 20 mg/dL    CREATININE 0.8 0.6 - 1.2 mg/dL    GFR Non-African American >60 >60    GFR African American >60 >60    Calcium 9.9 8.3 - 10.6 mg/dL   Lipase   Result Value Ref Range Lipase 52.0 13.0 - 60.0 U/L   Hepatic Function Panel   Result Value Ref Range    Total Protein 6.8 6.4 - 8.2 g/dL    Albumin 3.8 3.4 - 5.0 g/dL    Alkaline Phosphatase 127 40 - 129 U/L    ALT <5 (L) 10 - 40 U/L    AST 15 15 - 37 U/L    Total Bilirubin 0.6 0.0 - 1.0 mg/dL    Bilirubin, Direct <0.2 0.0 - 0.3 mg/dL    Bilirubin, Indirect see below 0.0 - 1.0 mg/dL   Urinalysis, reflex to microscopic   Result Value Ref Range    Color, UA Yellow Straw/Yellow    Clarity, UA Clear Clear    Glucose, Ur Negative Negative mg/dL    Bilirubin Urine Negative Negative    Ketones, Urine Negative Negative mg/dL    Specific Gravity, UA 1.010 1.005 - 1.030    Blood, Urine SMALL (A) Negative    pH, UA 7.0 5.0 - 8.0    Protein, UA Negative Negative mg/dL    Urobilinogen, Urine 0.2 <2.0 E.U./dL    Nitrite, Urine Negative Negative    Leukocyte Esterase, Urine Negative Negative    Microscopic Examination YES     Urine Type NotGiven    Troponin   Result Value Ref Range    Troponin <0.01 <0.01 ng/mL   Brain Natriuretic Peptide   Result Value Ref Range    Pro- 0 - 449 pg/mL   Microscopic Urinalysis   Result Value Ref Range    WBC, UA None seen 0 - 5 /HPF    RBC, UA None seen 0 - 4 /HPF       ED BEDSIDE ULTRASOUND:      RECENT VITALS:  BP: (!) 146/73, Temp: 99 °F (37.2 °C), Pulse: 83, Resp: 18, SpO2: 100 %     Procedures         ED Course     Nursing Notes, Past Medical Hx,Past Surgical Hx, Social Hx, Allergies, and Family Hx were reviewed. The patient was given the following medications:  No orders of the defined types were placed in this encounter. CONSULTS:  None    MEDICAL DECISION MAKING / ASSESSMENT / PLAN     Mel Haines is a 80 y.o. female with a history of Graves' disease, hypertension, GERD, irritable bowel syndrome, asthma, celiac disease, adrenal adenoma, CHF, diverticulitis, depression who comes in after a near syncopal episode while having a bowel movement on her toilet. Patient lives alone.   She endorses medications on file       [facility], PA-C  10/16/21 3368

## 2021-10-16 NOTE — ED NOTES
Discharge instructions reviewed with patient. Pt verbalized understanding. IV d/c. Pt tolerated well. Pt discharged home with nephew, Darcy Headley.        Ginger Kohli RN  10/16/21 0547

## 2021-10-16 NOTE — ED PROVIDER NOTES
ED Attending Attestation Note     Date of evaluation: 10/16/2021    This patient was seen by the advance practice provider. I have seen and examined the patient, agree with the workup, evaluation, management and diagnosis. The care plan has been discussed. I have reviewed the ECG and concur with the PAT's interpretation. My assessment reveals very pleasant female moving all extremities equally with clear speech. Her abdomen is soft and nontender. She reports that when she was on the bathroom/toilet this morning she felt lightheaded but did not fully pass out. She also felt her vision got blurry at that time which has also returned to normal.  She states she has macular degeneration, however there are no new changes to her vision currently. She denies any chest pain or shortness of breath with this. She normally ambulates with a walker and she has an aide that check on her multiple times a week. She states that she has felt much better ever since being here. We will evaluate with lab work and reassess.      Michelle Reddy MD  10/16/21 6785

## 2021-10-16 NOTE — ED TRIAGE NOTES
Patient arrives via EMS stating that she was in the bathroom at home when she felt like she was going to pass out. Patient states that she felt dizzy and lightheaded and that her vision was \"going in and out. \" Patient states that she also has chronic nerve pain from shingles two years ago.

## 2021-10-16 NOTE — ED NOTES
Patient able to ambulate with walker independently to the bathroom and back. Patient tolerated well and PA was notified of patient's status.      Alexandr Cooper RN  10/16/21 9806

## 2021-10-17 LAB
EKG ATRIAL RATE: 64 BPM
EKG DIAGNOSIS: NORMAL
EKG P AXIS: 61 DEGREES
EKG P-R INTERVAL: 250 MS
EKG Q-T INTERVAL: 396 MS
EKG QRS DURATION: 82 MS
EKG QTC CALCULATION (BAZETT): 408 MS
EKG R AXIS: -10 DEGREES
EKG T AXIS: 24 DEGREES
EKG VENTRICULAR RATE: 64 BPM

## 2021-10-18 ENCOUNTER — CARE COORDINATION (OUTPATIENT)
Dept: CARE COORDINATION | Age: 86
End: 2021-10-18

## 2021-10-19 NOTE — CARE COORDINATION
Ambulatory Care Coordination Note  10/19/2021  CM Risk Score: 10  Charlson 10 Year Mortality Risk Score: 100%     ACC: Bela Nunez RN    Summary Note:   Spoke with pt yesterday , introduced self, reason for call and care coordination future calls, pt agreeable to future ACM calls. Pt reports feeling better since returning home from ED, no further episodes of dizziness. Reviewed when to seek care, or use after hours physicians line with vu  Plan  Continue ACM support for Care coordination, Disease Management Education and resource assistance as needed  Complete initial assessment at next call  Start eduction on zone tools    Ambulatory Care Coordination Assessment    Care Coordination Protocol  Program Enrollment: Complex Care  Referral from Primary Care Provider: No  Week 1 - Initial Assessment     Do you have all of your prescriptions and are they filled?: Yes  Barriers to medication adherence: Complexity of regimen, Other, Does not understand need for medication  Other barriers to medication adherence: stomach pain  Are you able to afford your medications?: Yes  How often do you have trouble taking your medications the way you have been told to take them?: Sometimes I take them as prescribed. Do you have Home O2 Therapy?: No      Ability to seek help/take action for Emergent Urgent situations i.e. fire, crime, inclement weather or health crisis.: Needs Assistance  Ability to ambulate to restroom: Independent  Ability handle personal hygeine needs (bathing/dressing/grooming): Needs Assistance  Ability to manage Medications: Independent  Ability to prepare Food Preparation: Independent  Ability to maintain home (clean home, laundry): Needs Assistance  Ability to drive and/or has transportation: Dependent  Ability to do shopping: Needs Assistance  Ability to manage finances:  Independent  Is patient able to live independently?: Yes     [facility]: [facility] Frequent urination at night?: No  Do you use rails/bars?: Yes  Do you have a non-slip tub mat?: Yes     Are you experiencing loss of meaning?: No  Are you experiencing loss of hope and peace?: No     Suggested Interventions and Community Resources  Fall Risk Prevention: Not Started Zone Management Tools: Not Started                  Prior to Admission medications    Medication Sig Start Date End Date Taking? Authorizing Provider   pantoprazole (PROTONIX) 40 MG tablet TAKE 1 TABLET BY MOUTH TWICE A DAY 10/1/21   Augusto Coon MD   montelukast (SINGULAIR) 10 MG tablet TAKE 1 TABLET NIGHTLY 9/24/21   Augusto Coon MD   nystatin (MYCOSTATIN) 931821 UNIT/ML suspension Take 5 mLs by mouth 4 times daily 8/11/21   Augusto Coon MD   vitamin D (ERGOCALCIFEROL) 1.25 MG (47828 UT) CAPS capsule Take 1 capsule by mouth once a week 7/12/21   Augusto Coon MD   ipratropium (ATROVENT) 0.03 % nasal spray 1 spray each nostril at bedtime 7/12/21   Augusto Coon MD   felodipine (PLENDIL) 5 MG extended release tablet Take 1 tablet by mouth nightly 5/27/21   Augusto Coon MD   hydrocortisone (ANUSOL-HC) 25 MG suppository Place 1 suppository rectally every 12 hours 5/14/21   Augusto Coon MD   gabapentin (NEURONTIN) 100 MG capsule Take 1 capsule by mouth 3 times daily for 180 days. Intended supply: 30 days 2/1/21 7/31/21  Augusto Coon MD   albuterol sulfate (PROAIR RESPICLICK) 330 (90 Base) MCG/ACT aerosol powder inhalation Inhale 2 puffs into the lungs every 6 hours as needed for Wheezing or Shortness of Breath 1/29/21   Augusto Coon MD   ammonium lactate (LAC-HYDRIN) 12 % lotion Apply topically daily. 1/29/21   Augusto Coon MD   atenolol (TENORMIN) 50 MG tablet TAKE 1 TABLET DAILY 1/29/21   Augusto Coon MD   budesonide-formoterol [facility]) 160-4.5 MCG/ACT AERO Inhale 2 puffs into the lungs 2 times daily 1/29/21   Augusto Coon MD   fluocinonide (LIDEX) 0.05 % cream Apply topically 2 times daily. 1/29/21   Betzy Smart MD   fluticasone [facility]) 50 MCG/ACT nasal spray PLACE 1 SPRAY IN [facility] NOSTRIL DAILY 1/29/21   Betzy Smart MD   lidocaine (XYLOCAINE) 5 % ointment Apply topically three times a day, as needed. 1/29/21   Betzy Smart MD   polyethylene glycol [facility]) 17 g packet Take 17 g by mouth 2 times daily 1/29/21   Betzy Smart MD   spironolactone (ALDACTONE) 25 MG tablet Daily 1/29/21   Betzy Smart MD   methIMAzole (TAPAZOLE) 5 MG tablet Take 1 tablet by mouth See Admin Instructions 1 tablet by mouth daily except for Sunday, Monday, Wednesday take 1.5 tablets.  1/29/21   Betzy Smart MD   Simethicone (GAS RELIEF) 180 MG CAPS Take 180 mg by mouth 4 times daily as needed (bloating) 1/29/21   Betzy Smart MD       Future Appointments   Date Time Provider Chas Antoine   12/14/2021  1:45 PM Vanesa Ren MD [facility]      and   Congestive Heart Failure Assessment           Symptoms:

## 2021-11-05 ENCOUNTER — CARE COORDINATION (OUTPATIENT)
Dept: CARE COORDINATION | Age: 86
End: 2021-11-05

## 2021-11-05 ENCOUNTER — TELEPHONE (OUTPATIENT)
Dept: PRIMARY CARE CLINIC | Age: 86
End: 2021-11-05

## 2021-11-17 RX ORDER — IPRATROPIUM BROMIDE 21 UG/1
SPRAY, METERED NASAL
Qty: 30 ML | Refills: 1 | Status: SHIPPED | OUTPATIENT
Start: 2021-11-17 | End: 2021-12-13

## 2021-11-17 NOTE — TELEPHONE ENCOUNTER
Medication:   Requested Prescriptions     Pending Prescriptions Disp Refills    ipratropium (ATROVENT) 0.03 % nasal spray [Pharmacy Med Name: IPRATROPIUM 0.03% SPRAY] 30 mL 1     Sig: USE 1 SPRAY IN EACH NOSTRIL AT BEDTIME        Last Filled:      Patient Phone Number: [phone] (home)     Last appt: 10/14/2021   Next appt: Visit date not found    Last OARRS:   RX Monitoring 9/11/2019   Periodic Controlled Substance Monitoring Assessed functional status.

## 2021-11-23 ENCOUNTER — CARE COORDINATION (OUTPATIENT)
Dept: CARE COORDINATION | Age: 86
End: 2021-11-23

## 2021-12-06 RX ORDER — GABAPENTIN 100 MG/1
100 CAPSULE ORAL 3 TIMES DAILY
Qty: 90 CAPSULE | Refills: 5 | Status: SHIPPED | OUTPATIENT
Start: 2021-12-06 | End: 2022-07-14

## 2021-12-06 NOTE — TELEPHONE ENCOUNTER
Medication:   Requested Prescriptions     Pending Prescriptions Disp Refills    gabapentin (NEURONTIN) 100 MG capsule [Pharmacy Med Name: GABAPENTIN 100 MG CAPSULE] 90 capsule 5     Sig: TAKE 1 CAPSULE BY MOUTH 3 TIMES DAILY  DAYS. INTENDED SUPPLY: 30 DAYS        Last Filled:      Patient Phone Number: [phone] (home)     Last appt: 10/14/2021   Next appt: Visit date not found    Last OARRS:   RX Monitoring 9/11/2019   Periodic Controlled Substance Monitoring Assessed functional status.

## 2021-12-13 RX ORDER — IPRATROPIUM BROMIDE 21 UG/1
SPRAY, METERED NASAL
Qty: 30 ML | Refills: 1 | Status: SHIPPED | OUTPATIENT
Start: 2021-12-13 | End: 2022-01-08

## 2021-12-13 NOTE — TELEPHONE ENCOUNTER
Medication:   Requested Prescriptions     Pending Prescriptions Disp Refills    ipratropium (ATROVENT) 0.03 % nasal spray [Pharmacy Med Name: IPRATROPIUM 0.03% SPRAY] 30 mL 1     Sig: USE 1 SPRAY IN EACH NOSTRIL AT BEDTIME        Last Filled:      Patient Phone Number: [phone] (home)     Last appt: 10/14/2021   Next appt: Visit date not found    Last OARRS:   RX Monitoring 9/11/2019   Periodic Controlled Substance Monitoring Assessed functional status.

## 2021-12-14 ENCOUNTER — OFFICE VISIT (OUTPATIENT)
Dept: CARDIOLOGY CLINIC | Age: 86
End: 2021-12-14
Payer: MEDICARE

## 2021-12-14 VITALS
HEIGHT: 69 IN | HEART RATE: 60 BPM | SYSTOLIC BLOOD PRESSURE: 130 MMHG | WEIGHT: 148 LBS | DIASTOLIC BLOOD PRESSURE: 68 MMHG | BODY MASS INDEX: 21.92 KG/M2

## 2021-12-14 DIAGNOSIS — E78.2 MIXED HYPERLIPIDEMIA: ICD-10-CM

## 2021-12-14 DIAGNOSIS — I25.10 CORONARY ARTERY DISEASE INVOLVING NATIVE CORONARY ARTERY OF NATIVE HEART WITHOUT ANGINA PECTORIS: Primary | ICD-10-CM

## 2021-12-14 DIAGNOSIS — I10 ESSENTIAL HYPERTENSION: ICD-10-CM

## 2021-12-14 DIAGNOSIS — I25.5 ISCHEMIC CARDIOMYOPATHY: ICD-10-CM

## 2021-12-14 PROCEDURE — 4040F PNEUMOC VAC/ADMIN/RCVD: CPT | Performed by: INTERNAL MEDICINE

## 2021-12-14 PROCEDURE — G8420 CALC BMI NORM PARAMETERS: HCPCS | Performed by: INTERNAL MEDICINE

## 2021-12-14 PROCEDURE — G8484 FLU IMMUNIZE NO ADMIN: HCPCS | Performed by: INTERNAL MEDICINE

## 2021-12-14 PROCEDURE — 1090F PRES/ABSN URINE INCON ASSESS: CPT | Performed by: INTERNAL MEDICINE

## 2021-12-14 PROCEDURE — 1123F ACP DISCUSS/DSCN MKR DOCD: CPT | Performed by: INTERNAL MEDICINE

## 2021-12-14 PROCEDURE — 99214 OFFICE O/P EST MOD 30 MIN: CPT | Performed by: INTERNAL MEDICINE

## 2021-12-14 PROCEDURE — G8427 DOCREV CUR MEDS BY ELIG CLIN: HCPCS | Performed by: INTERNAL MEDICINE

## 2021-12-14 PROCEDURE — 1036F TOBACCO NON-USER: CPT | Performed by: INTERNAL MEDICINE

## 2021-12-14 NOTE — PROGRESS NOTES
[facility]   Dr Flores Herron. Lisette Vera MD, [address] [facility]                                                                Outpatient Follow Up Note         12/14/21  HPI:  Paul Martinez is 80 y.o. female who presents today with MR/AR HTN HLD here today and doing quite well. Nearing 89 in a couple of weeks. She is doing well and although she cannot see it is actually getting around fairly well. She entertains himself with audiobooks and listen to radio. Otherwise she is doing well and eating well and having no issues.     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    PCR    Clostridium difficile infection 1/27/15    AG positive    Degenerative joint disease of knee     Depression     Diverticulitis     GERD (gastroesophageal reflux disease)     Grave's disease 4/2/2012    Heart disease     Hypertension     IBS (irritable bowel syndrome)     Macular degeneration     MGUS (monoclonal gammopathy of unknown significance)     Osteoporosis     Poor vision     d/t macular degeneration    Simple cyst of kidney      PSH  Past Surgical History:   Procedure Laterality Date    CHOLECYSTECTOMY      COLONOSCOPY  10/29/2009    **see scanned report- SILVANA    HEMORRHOID SURGERY      HYSTERECTOMY  in 40's    rudolph/bso    HYSTERECTOMY, TOTAL ABDOMINAL       SH:       Social History     Tobacco Use   Smoking Status Never Smoker   Smokeless Tobacco Never Used     Current Medications:  Prior to Visit Medications    Medication Sig Taking? Authorizing Provider   ipratropium (ATROVENT) 0.03 % nasal spray USE 1 SPRAY IN EACH NOSTRIL AT BEDTIME Yes Lucia Maldonado MD   gabapentin (NEURONTIN) 100 MG capsule TAKE 1 CAPSULE BY MOUTH 3 TIMES DAILY  DAYS. INTENDED SUPPLY: 30 DAYS Yes Lucia Maldonado MD   pantoprazole (PROTONIX) 40 MG tablet TAKE 1 TABLET BY MOUTH TWICE A DAY Yes Lucia Maldonado MD   montelukast (SINGULAIR) 10 MG tablet TAKE 1 TABLET NIGHTLY Yes Lucia Maldonado MD   nystatin (MYCOSTATIN) 796306 UNIT/ML suspension Take 5 mLs by mouth 4 times daily Yes Lucia Maldonado MD   felodipine (PLENDIL) 5 MG extended release tablet Take 1 tablet by mouth nightly Yes Lucia Maldonado MD   hydrocortisone (ANUSOL-HC) 25 MG suppository Place 1 suppository rectally every 12 hours Yes Lucia Maldonado MD   albuterol sulfate (PROAIR RESPICLICK) 006 (90 Base) MCG/ACT aerosol powder inhalation Inhale 2 puffs into the lungs every 6 hours as needed for Wheezing or Shortness of Breath Yes Lucia Maldonado MD   ammonium lactate (LAC-HYDRIN) 12 % lotion Apply topically daily. Yes Lucia Maldonado MD   atenolol (TENORMIN) 50 MG tablet TAKE 1 TABLET DAILY Yes Lucia Maldonado MD   budesonide-formoterol (SYMBICORT) 160-4.5 MCG/ACT AERO Inhale 2 puffs into the lungs 2 times daily Yes Lucia Maldonado MD   fluocinonide (LIDEX) 0.05 % cream Apply topically 2 times daily. Yes Lucia Maldonado MD   fluticasone [facility]) 50 MCG/ACT nasal spray PLACE 1 SPRAY IN EACH NOSTRIL DAILY Yes Lucia Maldonado MD   lidocaine (XYLOCAINE) 5 % ointment Apply topically three times a day, as needed. Yes Lucia Maldonado MD   polyethylene glycol (GLYCOLAX) 17 g packet Take 17 g by mouth 2 times daily Yes Lucia Maldonado MD   spironolactone (ALDACTONE) 25 MG tablet Daily Yes Lucia Maldonado MD   methIMAzole (TAPAZOLE) 5 MG tablet Take 1 tablet by mouth See Admin Instructions 1 tablet by mouth daily except for Sunday, Monday, Wednesday take 1.5 tablets.  Yes Lucia Maldonado MD Simethicone (GAS RELIEF) 180 MG CAPS Take 180 mg by mouth 4 times daily as needed (bloating) Yes Angelina Sotomayor MD     Family History  Family History   Problem Relation Age of Onset    Heart Disease Mother     High Blood Pressure Mother     Stroke Mother     Cancer Father         multiple myeloma     · ROS:   · Cardiovascular: Reviewed in HPI  · Allergic/Immunologic: No nasal congestion or hives. · All other ROS are reviewed and are unremarkable. PHYSICAL EXAM:      Wt Readings from Last 3 Encounters:   12/14/21 148 lb (67.1 kg)   10/14/21 142 lb 3.2 oz (64.5 kg)   08/10/21 141 lb (64 kg)       BP Readings from Last 3 Encounters:   12/14/21 130/68   10/16/21 (!) 146/73   10/14/21 134/74       Pulse Readings from Last 3 Encounters:   12/14/21 60   10/16/21 83   10/14/21 97       Exam   ENT: Cranial nerves II through XII intact grossly except that she is legally blind. Head eyes ears nose and throat unremarkable. NEUROLOGIC: Awake and alert and oriented x3. She is able to recognize me. Moves all extremities well for 80year-old. Constitutional: This patient is oriented to person, place, and time. Also appears well-developed and well-nourished and in no acute distress. HEENT: Normocephalic and atraumatic. Sclerae anicteric. No xanthelasmas. Conjunctiva white, no subconjunctival hemorrhage   External inspection of ears nose teeth & gums   Eyes:PERRLA EOM's intact. Neck: Neck supple. No JVD present. Carotids without bruits. No mass and no thyromegaly. No lymphadenopathy    Cardiovascular: RRR, normal S1 and S2; no murmur/gallop or rub, PMI nondisplaced  Pulmonary/Chest: Effort normal.  Lungs clear to auscultation. Chest wall nontender  Abdominal: soft, nontender, nondistended. + bowel sounds; no organomegaly or bruits. Aorta normal  Extremities: No edema  Pulses are 2+ radial/carotid/dorsalis pedis bilaterally. Cap refill brisk. Neurological: No cranial nerve deficit.    Psychiatric: This patient has a normal mood and affect and the speech is normal as is behavior    Assessment:    MR / AR   Echo 2017 EF 55% m/m MR moderate insuff /  similar to prior echo on    LV Diastolic Dimension: 3.9 cm LV Systolic Dimension: 2.7 cm   The aortic valve appears normal in structure and function.   The aortic valve appears tricuspid.   Moderate aortic regurgitation is present.   No evidence of aortic stenosis. On BB   Summary 20   Left ventricular cavity size is normal with normal left ventricular wall   thickness. Overall left ventricular systolic function appears normal with an ejection   fraction of 55%. No regional wall motion abnormalities   Normal diastolic function. Mild tricuspid regurgitation. Estimated pulmonary artery systolic pressure is at 24 mmHg assuming a right   atrial pressure of 3 mmHg. EK18  55 BPM 18   WV interval 218 ms  QRS duration 88 ms  QT/QTc 434/415 ms  P-R-T axes 67 8 33    EKG on 8/10/2021 sinus rhythm 75. Ventricular hypertrophy by voltages. Late transition. HTN  Optimal  EKG on January 3, 2018 sinus rhythm 61/m. Nonspecific lateral ST and T wave changes. Stable pattern. PLAN: All looks stable to her at this time. Continue her current therapy. Encouraged her to continue listening to the audiobooks and to return to see us in about 6 months. Fidencio Acuña M.D.  [facility]

## 2021-12-16 DIAGNOSIS — E21.3 HYPERPARATHYROIDISM (HCC): ICD-10-CM

## 2021-12-16 DIAGNOSIS — E53.8 B12 DEFICIENCY: ICD-10-CM

## 2021-12-16 DIAGNOSIS — I10 ESSENTIAL HYPERTENSION: ICD-10-CM

## 2021-12-16 DIAGNOSIS — E04.2 MULTIPLE THYROID NODULES: ICD-10-CM

## 2021-12-16 LAB
ANION GAP SERPL CALCULATED.3IONS-SCNC: 11 MMOL/L (ref 3–16)
BUN BLDV-MCNC: 13 MG/DL (ref 7–20)
CALCIUM SERPL-MCNC: 10.4 MG/DL (ref 8.3–10.6)
CHLORIDE BLD-SCNC: 107 MMOL/L (ref 99–110)
CO2: 26 MMOL/L (ref 21–32)
CREAT SERPL-MCNC: 1.1 MG/DL (ref 0.6–1.2)
GFR AFRICAN AMERICAN: 56
GFR NON-AFRICAN AMERICAN: 46
GLUCOSE BLD-MCNC: 96 MG/DL (ref 70–99)
POTASSIUM SERPL-SCNC: 4.5 MMOL/L (ref 3.5–5.1)
SODIUM BLD-SCNC: 144 MMOL/L (ref 136–145)
TSH REFLEX: 3.28 UIU/ML (ref 0.27–4.2)
VITAMIN B-12: 460 PG/ML (ref 211–911)
VITAMIN D 25-HYDROXY: 23.6 NG/ML

## 2021-12-17 ENCOUNTER — CARE COORDINATION (OUTPATIENT)
Dept: CARE COORDINATION | Age: 86
End: 2021-12-17

## 2021-12-17 NOTE — CARE COORDINATION
Ambulatory Care Coordination Note  12/17/2021  CM Risk Score: 10  Charlson 10 Year Mortality Risk Score: 100%     ACC: Bela Nunez RN    Summary Note:   ACM out reach call placed to pt . States she I\" doing ok she thinks\" nothing specific to report denies sny current cc needs. Reviewed tips for upcoming holiday in regards to dietary options and when she may need to conract provider. Pt vu  Plan:  Continue ACM out reaches for care coordination        Care Coordination Interventions    Program Enrollment: Complex Care  Referral from Primary Care Provider: No  Suggested Interventions and Community Resources  Fall Risk Prevention: In Process  Zone Management Tools: Not Started         Goals Addressed                 This Visit's Progress     Wellness Goal        Patient Self-Management Goal for Health Maintenance  Goal: I will follow a healthy diet as discussed by my provider. I will schedule a yearly preventative care visit. Barriers: overwhelmed by complexity of regimen and lack of education  Plan for overcoming my barriers: care coordination  Confidence: 6/10  Anticipated Goal Completion Date: FEB 2022            Prior to Admission medications    Medication Sig Start Date End Date Taking? Authorizing Provider   ipratropium (ATROVENT) 0.03 % nasal spray USE 1 SPRAY IN EACH NOSTRIL AT BEDTIME 12/13/21   Kary Galeazzi, MD   gabapentin (NEURONTIN) 100 MG capsule TAKE 1 CAPSULE BY MOUTH 3 TIMES DAILY  DAYS.  INTENDED SUPPLY: 30 DAYS 12/6/21 6/4/22  Kary Galeazzi, MD   pantoprazole (PROTONIX) 40 MG tablet TAKE 1 TABLET BY MOUTH TWICE A DAY 10/1/21   Kary Galeazzi, MD   montelukast (SINGULAIR) 10 MG tablet TAKE 1 TABLET NIGHTLY 9/24/21   Kary Galeazzi, MD   nystatin (MYCOSTATIN) 173899 UNIT/ML suspension Take 5 mLs by mouth 4 times daily 8/11/21   Kary Galeazzi, MD   felodipine (PLENDIL) 5 MG extended release tablet Take 1 tablet by mouth nightly 5/27/21   Kary Galeazzi, MD   hydrocortisone (ANUSOL-HC) 25 MG suppository Place 1 suppository rectally every 12 hours 5/14/21   Violette Galvan MD   albuterol sulfate (PROAIR RESPICLICK) 587 (90 Base) MCG/ACT aerosol powder inhalation Inhale 2 puffs into the lungs every 6 hours as needed for Wheezing or Shortness of Breath 1/29/21   Violette Galvan MD   ammonium lactate (LAC-HYDRIN) 12 % lotion Apply topically daily. 1/29/21   Violette Galvan MD   atenolol (TENORMIN) 50 MG tablet TAKE 1 TABLET DAILY 1/29/21   Violette Galvan MD   budesonide-formoterol [facility]) 160-4.5 MCG/ACT AERO Inhale 2 puffs into the lungs 2 times daily 1/29/21   Violette Galvan MD   fluocinonide (LIDEX) 0.05 % cream Apply topically 2 times daily. 1/29/21   Violette Galvan MD   fluticasone [facility]) 50 MCG/ACT nasal spray PLACE 1 SPRAY IN [facility] NOSTRIL DAILY 1/29/21   Violette Galvan MD   lidocaine (XYLOCAINE) 5 % ointment Apply topically three times a day, as needed. 1/29/21   Violette Galvan MD   polyethylene glycol [facility]) 17 g packet Take 17 g by mouth 2 times daily 1/29/21   Violette Galvan MD   spironolactone (ALDACTONE) 25 MG tablet Daily 1/29/21   Violette Galvan MD   methIMAzole (TAPAZOLE) 5 MG tablet Take 1 tablet by mouth See Admin Instructions 1 tablet by mouth daily except for Sunday, Monday, Wednesday take 1.5 tablets.  1/29/21   Violette Galvan MD   Simethicone (GAS RELIEF) 180 MG CAPS Take 180 mg by mouth 4 times daily as needed (bloating) 1/29/21   Violtete Galvan MD       Future Appointments   Date Time Provider Chas Antoine   6/14/2022  1:00 PM Jennifer Solis MD [facility]      and   Congestive Heart Failure Assessment    Are you currently restricting fluids?: No Restriction  Do you understand a low sodium diet?: No  Do you understand how to read food labels?: No  How many restaurant meals do you eat per week?: 0         Symptoms:  None: Yes      Symptom course: stable  Weight trend: stable

## 2021-12-20 LAB — METHYLMALONIC ACID: 0.34 UMOL/L (ref 0–0.4)

## 2021-12-23 RX ORDER — ERGOCALCIFEROL 1.25 MG/1
50000 CAPSULE ORAL WEEKLY
Qty: 12 CAPSULE | Refills: 3 | Status: SHIPPED | OUTPATIENT
Start: 2021-12-23

## 2021-12-29 ENCOUNTER — OFFICE VISIT (OUTPATIENT)
Dept: PRIMARY CARE CLINIC | Age: 86
End: 2021-12-29
Payer: MEDICARE

## 2021-12-29 VITALS
WEIGHT: 150 LBS | HEIGHT: 69 IN | TEMPERATURE: 97.7 F | BODY MASS INDEX: 22.22 KG/M2 | SYSTOLIC BLOOD PRESSURE: 177 MMHG | HEART RATE: 74 BPM | DIASTOLIC BLOOD PRESSURE: 88 MMHG

## 2021-12-29 DIAGNOSIS — R21 RASH AND NONSPECIFIC SKIN ERUPTION: Primary | ICD-10-CM

## 2021-12-29 PROCEDURE — 4040F PNEUMOC VAC/ADMIN/RCVD: CPT | Performed by: INTERNAL MEDICINE

## 2021-12-29 PROCEDURE — 1123F ACP DISCUSS/DSCN MKR DOCD: CPT | Performed by: INTERNAL MEDICINE

## 2021-12-29 PROCEDURE — 1090F PRES/ABSN URINE INCON ASSESS: CPT | Performed by: INTERNAL MEDICINE

## 2021-12-29 PROCEDURE — G8427 DOCREV CUR MEDS BY ELIG CLIN: HCPCS | Performed by: INTERNAL MEDICINE

## 2021-12-29 PROCEDURE — G8420 CALC BMI NORM PARAMETERS: HCPCS | Performed by: INTERNAL MEDICINE

## 2021-12-29 PROCEDURE — 99215 OFFICE O/P EST HI 40 MIN: CPT | Performed by: INTERNAL MEDICINE

## 2021-12-29 PROCEDURE — 1036F TOBACCO NON-USER: CPT | Performed by: INTERNAL MEDICINE

## 2021-12-29 PROCEDURE — G8484 FLU IMMUNIZE NO ADMIN: HCPCS | Performed by: INTERNAL MEDICINE

## 2021-12-29 RX ORDER — CLINDAMYCIN HYDROCHLORIDE 300 MG/1
300 CAPSULE ORAL 3 TIMES DAILY
Qty: 30 CAPSULE | Refills: 0 | Status: SHIPPED | OUTPATIENT
Start: 2021-12-29 | End: 2021-12-29 | Stop reason: SDUPTHER

## 2021-12-29 RX ORDER — CLINDAMYCIN HYDROCHLORIDE 300 MG/1
300 CAPSULE ORAL 3 TIMES DAILY
Qty: 30 CAPSULE | Refills: 0 | Status: SHIPPED | OUTPATIENT
Start: 2021-12-29 | End: 2022-01-03

## 2021-12-29 RX ORDER — TERBINAFINE HYDROCHLORIDE 250 MG/1
250 TABLET ORAL DAILY
Qty: 14 TABLET | Refills: 0 | Status: SHIPPED | OUTPATIENT
Start: 2021-12-29 | End: 2021-12-29 | Stop reason: SDUPTHER

## 2021-12-29 RX ORDER — TERBINAFINE HYDROCHLORIDE 250 MG/1
250 TABLET ORAL DAILY
Qty: 14 TABLET | Refills: 0 | Status: SHIPPED | OUTPATIENT
Start: 2021-12-29 | End: 2022-01-03

## 2021-12-29 NOTE — PROGRESS NOTES
alfa Sharma (:  1926) is a 80 y.o. female,Established patient, here for evaluation of the following chief complaint(s):  Ingrown Toenail (right foot) and Skin Problem (breakdown on left under her belly)    The patient has developed a rash in a right lower abdominal skin fold and has a painful left great toe after seeing the podiatrist recently. ASSESSMENT/PLAN:  Fugal rash and skin breakdown right lower abdominal skin fold. See orders. Left great toe infection. See orders. No follow-ups on file. Subjective   SUBJECTIVE/OBJECTIVE:  HPI    Review of Systems       Objective   Physical Exam       No problem-specific Assessment & Plan notes found for this encounter. On this date 2021 I have spent 40 minutes reviewing previous notes, test results and face to face with the patient discussing the diagnosis and importance of compliance with the treatment plan as well as documenting on the day of the visit. An electronic signature was used to authenticate this note.     --Meryle Senate, MD

## 2021-12-29 NOTE — PATIENT INSTRUCTIONS
I am starting you on terbinafine 250 mg. Please take the prescription once a day for 2 weeks. I believe you have a fungal infection in the skin fold on the lower left side of your abdomen. Please keep this area dry at there a bath or shower to reduce the risk of another fungal infection. Clindamycin is also been prescribed for what is probably a bacterial infection as well in the skin fold. The clindamycin will also help the left great toe infection. Follow-up with Dr. Kimberly Edmonds in 2 weeks to monitor the progress in the healing of the area on your left lower abdomen and your toe.     Continue your other medications as directed

## 2022-01-03 ENCOUNTER — TELEPHONE (OUTPATIENT)
Dept: PRIMARY CARE CLINIC | Age: 87
End: 2022-01-03

## 2022-01-03 RX ORDER — PRENATAL VIT 91/IRON/FOLIC/DHA 28-975-200
COMBINATION PACKAGE (EA) ORAL
Qty: 42 G | Refills: 1 | Status: SHIPPED | OUTPATIENT
Start: 2022-01-03 | End: 2022-07-14 | Stop reason: SDUPTHER

## 2022-01-03 RX ORDER — CEPHALEXIN 500 MG/1
500 CAPSULE ORAL 3 TIMES DAILY
Qty: 21 CAPSULE | Refills: 0 | Status: SHIPPED | OUTPATIENT
Start: 2022-01-03 | End: 2022-01-13

## 2022-01-03 NOTE — TELEPHONE ENCOUNTER
I printed prescription for Lamisil cream and Keflex antibiotic. The [facility] provided is a mail order please find out which local pharmacy she wants the prescription to be faxed to.

## 2022-01-03 NOTE — TELEPHONE ENCOUNTER
clindamycin (CLEOCIN) 300 MG capsule [[id number]]  terbinafine (LAMISIL) 250 MG tablet [[id number]  Patient Stated would like some different medication this is causing her to have diarrhea & she also stated she would like this Medication in a cream its the Lamisil 250 mg Patient Stated please be advise [facility] [address] [phone] please be advise

## 2022-01-04 NOTE — TELEPHONE ENCOUNTER
Pt stated that another provider on her care team prescribed her an antibiotic. Rx faxed for Lamisil cream. To local [facility] pharmacy on pt file

## 2022-01-06 ENCOUNTER — CARE COORDINATION (OUTPATIENT)
Dept: CARE COORDINATION | Age: 87
End: 2022-01-06

## 2022-01-06 NOTE — TELEPHONE ENCOUNTER
Medication:   Requested Prescriptions     Pending Prescriptions Disp Refills    ipratropium (ATROVENT) 0.03 % nasal spray [Pharmacy Med Name: IPRATROPIUM 0.03% SPRAY]  1     Sig: USE 1 SPRAY IN EACH NOSTRIL AT BEDTIME        Last Filled:      Patient Phone Number: [phone] (home)     Last appt: 12/29/2021   Next appt: 1/13/2022    Last OARRS:   RX Monitoring 9/11/2019   Periodic Controlled Substance Monitoring Assessed functional status.

## 2022-01-08 RX ORDER — IPRATROPIUM BROMIDE 21 UG/1
SPRAY, METERED NASAL
Qty: 30 ML | Refills: 1 | Status: SHIPPED | OUTPATIENT
Start: 2022-01-08

## 2022-01-13 ENCOUNTER — OFFICE VISIT (OUTPATIENT)
Dept: PRIMARY CARE CLINIC | Age: 87
End: 2022-01-13
Payer: MEDICARE

## 2022-01-13 VITALS
DIASTOLIC BLOOD PRESSURE: 77 MMHG | HEIGHT: 69 IN | SYSTOLIC BLOOD PRESSURE: 144 MMHG | HEART RATE: 65 BPM | BODY MASS INDEX: 22.22 KG/M2 | WEIGHT: 150 LBS | TEMPERATURE: 97.2 F | OXYGEN SATURATION: 93 %

## 2022-01-13 DIAGNOSIS — L03.031 ABSCESS OR CELLULITIS OF TOE, RIGHT: ICD-10-CM

## 2022-01-13 DIAGNOSIS — B02.29 POSTHERPETIC NEURALGIA: Primary | ICD-10-CM

## 2022-01-13 DIAGNOSIS — L02.611 ABSCESS OR CELLULITIS OF TOE, RIGHT: ICD-10-CM

## 2022-01-13 PROCEDURE — G8427 DOCREV CUR MEDS BY ELIG CLIN: HCPCS | Performed by: INTERNAL MEDICINE

## 2022-01-13 PROCEDURE — 1090F PRES/ABSN URINE INCON ASSESS: CPT | Performed by: INTERNAL MEDICINE

## 2022-01-13 PROCEDURE — G8484 FLU IMMUNIZE NO ADMIN: HCPCS | Performed by: INTERNAL MEDICINE

## 2022-01-13 PROCEDURE — 1036F TOBACCO NON-USER: CPT | Performed by: INTERNAL MEDICINE

## 2022-01-13 PROCEDURE — 1123F ACP DISCUSS/DSCN MKR DOCD: CPT | Performed by: INTERNAL MEDICINE

## 2022-01-13 PROCEDURE — 4040F PNEUMOC VAC/ADMIN/RCVD: CPT | Performed by: INTERNAL MEDICINE

## 2022-01-13 PROCEDURE — G8420 CALC BMI NORM PARAMETERS: HCPCS | Performed by: INTERNAL MEDICINE

## 2022-01-13 PROCEDURE — 99213 OFFICE O/P EST LOW 20 MIN: CPT | Performed by: INTERNAL MEDICINE

## 2022-01-13 NOTE — PATIENT INSTRUCTIONS
1. Please see Podiatrist to take care of the \"tongue\" on the Right great toe nail. 2. CBD oil to your chest wall 2x/day, make sure the    3. Please use metamucil regularly, once daily, to achieve 2-3 soft well formed bms a day, increase to 2x/day if once a daily fails to achieve that goal.  4. Let me know how regular metamucil is working for you. Diarrhea? Constipation?

## 2022-01-13 NOTE — PROGRESS NOTES
2022     Maulik Reyna (:  1926) is a 80 y.o. 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 For her valvular heart disease she sees Dr. Alexandra Loving in cardiology. Reassuring echo 2020 EF 55% without resting regional's, normal diastolic function, mild TR, PASP 24 mm with normal inspiratory collapse of the IVC. Chest CT 2020 single subsegmental right upper lobe pulmonary embolism on Eliquis 5 mg twice daily for approximately 7 months, with intent to discontinue after 6 months effective anticoagulation. Follow-up chest CT for left-sided chest pain 2020 - for PE. Carotid ultrasound in  for TIA showed no high-grade stenosis. Negative right lower extremity DVT study in , negative bilateral lower extremity study in . Head CT  - for stroke. For her Katya Vargas' disease she sees Dr. Eitan Jerez in endocrinology. Thyroid nodules were benign on biopsy in , thyroid ultrasound  without worrisome changes. She has bilateral adrenal adenomas, radiographically stable on serial CT  through , though she may have AML on superior pole of the left kidney.   Cortisol DHEA plasma normetanephrine and metanephrine renin in  did not suggest endocrinologically functioning adenomas. In 2020 endocrinology  noted worsening bone mineral density, -3 spine -4.7 hip. Due to GI and renal limitations/risks, no great treatment options. Right chest wall zoster 4176 complicated by PHN and has finally gotten her Shingrix shots. She continues to experience the zoster pain again unable to tolerate higher doses of gabapentin due to sedation. She is having some nonpleuritic chest wall pain on the left side now, unable to say how long, without breathlessness or tachycardia, and is worried that she might have another PE. No asymmetric lower extremity swelling, CT December 2020 negative for recurrent PE. She continues to struggle with alternating constipation with diarrhea, but cannot endorse diarrhea awakening her from sleep more than 3 loose bowel movements a day, which are typically crampy and relieved by bowel movement. She is inconsistent with Metamucil use, we have been laboring for months in this regard. Was recently seen for toe tip infection in the setting of onycholysis, she was antifungal and antimicrobial therapy with resolution by my exam today. She describes minimizes her right chest wall left greater than right lower extremity neuropathy however too sedated to tolerate higher dose of gabapentin. Review of Systems   Constitutional: Negative for activity change, appetite change, fatigue and unexpected weight change. HENT: Negative for dental problem, sinus pain, sore throat and trouble swallowing. Eyes: Negative for pain and visual disturbance. Respiratory: Negative for apnea, cough, chest tightness, shortness of breath and wheezing. Cardiovascular: Negative for chest pain and palpitations. Gastrointestinal: Negative for abdominal pain, blood in stool, constipation, diarrhea, nausea, rectal pain and vomiting. Endocrine: Negative for cold intolerance, heat intolerance, polydipsia, polyphagia and polyuria.    Genitourinary: Negative for difficulty urinating, dysuria, flank pain, frequency, hematuria, pelvic pain, urgency, vaginal bleeding and vaginal discharge. Musculoskeletal: Negative for arthralgias, back pain, gait problem, joint swelling, myalgias, neck pain and neck stiffness. Skin: Negative for color change and rash. Neurological: Negative for dizziness, tremors, syncope, speech difficulty, weakness, light-headedness and headaches. Hematological: Negative for adenopathy. Does not bruise/bleed easily. Psychiatric/Behavioral: Negative for agitation, behavioral problems, decreased concentration, sleep disturbance and suicidal ideas. The patient is not nervous/anxious and is not hyperactive. Prior to Visit Medications    Medication Sig Taking? Authorizing Provider   ipratropium (ATROVENT) 0.03 % nasal spray USE 1 SPRAY IN EACH NOSTRIL AT BEDTIME Yes Nickie Cornell MD   terbinafine (LAMISIL) 1 % cream Apply topically 2 times daily. Yes Nickie Cornell MD   cephALEXin (KEFLEX) 500 MG capsule Take 1 capsule by mouth 3 times daily for 10 days Yes Nickie Cornell MD   vitamin D (ERGOCALCIFEROL) 1.25 MG (62879 UT) CAPS capsule Take 1 capsule by mouth once a week Yes Nickie Cornell MD   gabapentin (NEURONTIN) 100 MG capsule TAKE 1 CAPSULE BY MOUTH 3 TIMES DAILY  DAYS.  INTENDED SUPPLY: 30 DAYS Yes Nickie Cornell MD   pantoprazole (PROTONIX) 40 MG tablet TAKE 1 TABLET BY MOUTH TWICE A DAY Yes Nickie Cornell MD   montelukast (SINGULAIR) 10 MG tablet TAKE 1 TABLET NIGHTLY Yes Nickie Cornell MD   felodipine (PLENDIL) 5 MG extended release tablet Take 1 tablet by mouth nightly Yes Nickie Cornell MD   hydrocortisone (ANUSOL-HC) 25 MG suppository Place 1 suppository rectally every 12 hours Yes Nickie Cornell MD   albuterol sulfate (PROAIR RESPICLICK) 581 (90 Base) MCG/ACT aerosol powder inhalation Inhale 2 puffs into the lungs every 6 hours as needed for Wheezing or Shortness of Breath Yes Nickie Cornell MD   ammonium lactate (LAC-HYDRIN) 12 % lotion Apply topically daily. Yes Megha Prabhakar MD   atenolol (TENORMIN) 50 MG tablet TAKE 1 TABLET DAILY Yes Megha Prabhakar MD   budesonide-formoterol (SYMBICORT) 160-4.5 MCG/ACT AERO Inhale 2 puffs into the lungs 2 times daily Yes Megha Prabhakar MD   fluocinonide (LIDEX) 0.05 % cream Apply topically 2 times daily. Yes Megha Prabhakar MD   fluticasone [place]) 50 MCG/ACT nasal spray PLACE 1 SPRAY IN EACH NOSTRIL DAILY Yes Megha Prabhakar MD   lidocaine (XYLOCAINE) 5 % ointment Apply topically three times a day, as needed. Yes Megha Prabhakar MD   spironolactone (ALDACTONE) 25 MG tablet Daily Yes Megha Prabhakar MD   methIMAzole (TAPAZOLE) 5 MG tablet Take 1 tablet by mouth See Admin Instructions 1 tablet by mouth daily except for Sunday, Monday, Wednesday take 1.5 tablets. Yes Megha Prabhakar MD   Simethicone (GAS RELIEF) 180 MG CAPS Take 180 mg by mouth 4 times daily as needed (bloating) Yes Megha Prabhakar MD   nystatin (MYCOSTATIN) 387912 UNIT/ML suspension Take 5 mLs by mouth 4 times daily  Patient not taking: Reported on 1/13/2022  Megha Prabhakar MD   polyethylene glycol (GLYCOLAX) 17 g packet Take 17 g by mouth 2 times daily  Patient not taking: Reported on 1/13/2022  Megha Prabhakar MD        Allergies   Allergen Reactions    Ace Inhibitors Other (See Comments)     angioedema    Acyclovir      Severe nausea  And anorexia. It happened after taking each 800 mg tablet and patient took one one day and one the next, then stopped an no more    Creon [Pancrelipase (Lip-Prot-Amyl)]      Swollen gums.  Dye [Iodides] Hives     Only reacts to dye that went into eyes, OK with IV contrast    Eggs Or Egg-Derived Products      Hives  this test in the [employer] ([email or web address]). Egg White IgE 3. 59High   <=0.34 kU/L Final 02/10/2016  2:14 PM  - [facility]                  Gluten Meal      Abdominal pain and diarreha  Wheat IgE 0.53  <=0.34 kU/L Final 02/10/2016  2:14 PM  - [facility]  Milk-Related Compounds      Milk, Cow's IgE 1. 75High   <=0.34 kU/L Final 02/10/2016  2:14 PM  - [facility]    Fluorescein Other (See Comments)    Lisinopril Swelling    Macrobid [Nitrofurantoin Monohydrate Macrocrystals] Other (See Comments)    No Known Allergies     Ciprocinonide [Fluocinolone] Rash     Stiff, no nausea or rash    Sulfa Antibiotics Rash       Past Medical History:   Diagnosis Date    Adrenal gland cyst ([address]     Arthritis     Asthma     Bilateral carpal tunnel syndrome 8/23/2018    Cataract     Chronic systolic congestive heart failure ([address] 4/12/2021    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    PCR    Clostridium difficile infection 1/27/15    AG positive    Degenerative joint disease of knee     Depression     Diverticulitis     GERD (gastroesophageal reflux disease)     Grave's disease 4/2/2012    Heart disease     Hypertension     IBS (irritable bowel syndrome)     Macular degeneration     MGUS (monoclonal gammopathy of unknown significance)     Osteoporosis     Poor vision     d/t macular degeneration    Simple cyst of kidney        Past Surgical History:   Procedure Laterality Date    CHOLECYSTECTOMY      COLONOSCOPY  10/29/2009    **see scanned report- SILVANA    HEMORRHOID SURGERY      HYSTERECTOMY  in 40's    rudolph/bso    HYSTERECTOMY, TOTAL ABDOMINAL         Social History     Socioeconomic History    Marital status: Single     Spouse name: Not on file    Number of children: Not on file    Years of education: Not on file    Highest education level: Not on file   Occupational History    Not on file   Tobacco Use    Smoking status: Never Smoker    Smokeless tobacco: Never Used   Vaping Use    Vaping Use: Never used   Substance and Sexual Activity    Alcohol use: No    Drug use: No    Sexual activity: Not Currently   Other Topics Concern    Not on file   Social History Narrative    Not on file     Social Determinants of Health     Financial Resource Strain: Low Risk     Difficulty of Paying Living Expenses: Not hard at all   Food Insecurity: No Food Insecurity    Worried About Running Out of Food in the Last Year: Never true    Sherie of Food in the Last Year: Never true   Transportation Needs:     Lack of Transportation (Medical): Not on file    Lack of Transportation (Non-Medical): Not on file   Physical Activity:     Days of Exercise per Week: Not on file    Minutes of Exercise per Session: Not on file   Stress:     Feeling of Stress : Not on file   Social Connections:     Frequency of Communication with Friends and Family: Not on file    Frequency of Social Gatherings with Friends and Family: Not on file    Attends Restorationist Services: Not on file    Active Member of [address] [place] [address] [employer] or Organizations: Not on file    Attends Club or Organization Meetings: Not on file    Marital Status: Not on file   Intimate Partner Violence:     Fear of Current or Ex-Partner: Not on file    Emotionally Abused: Not on file    Physically Abused: Not on file    Sexually Abused: Not on file   Housing Stability:     Unable to Pay for Housing in the Last Year: Not on file    Number of [place] in the Last Year: Not on file    Unstable Housing in the Last Year: Not on file        Family History   Problem Relation Age of Onset    Heart Disease Mother     High Blood Pressure Mother     Stroke Mother     Cancer Father         multiple myeloma       Vitals:    01/13/22 1542 01/13/22 1600   BP: (!) 165/73 (!) 144/77   Pulse: 65    Temp: 97.2 °F (36.2 °C)    TempSrc: Temporal    SpO2: 93%    Weight: 150 lb (68 kg)    Height: 5' 9\" (1.753 m)      Estimated body mass index is 22.15 kg/m² as calculated from the following:    Height as of this encounter: 5' 9\" (1.753 m). Weight as of this encounter: 150 lb (68 kg). PHYSICAL EXAM  GENERAL:  Pleasant  female who looks her stated age, awake alert and oriented x3, no acute distress.   HEENT: Lateral gaze preference given macular degeneration. Pupils sluggishly reactive symmetrically. Posterior oropharynx mildly reddened. Turbinates mildly reddened. NECK:  Supple nontender. No carotid bruits. Brisk carotid upstrokes, mild JVD with V wave. Marked thyromegaly. LYMPH:  No supraclavicular cervical axillarylymphadenopathy. LUNGS:  Clear to auscultation bilaterally. Very good air entry. No inspiratory crackles or expiratory wheezes. No pleural friction rub. HEART:  Regular rate and rhythm without pathologic murmur rub gallop S3 or S4. ABDOMEN:  Soft, mildly tender. Normal bowel sounds. No guarding. No masses. Not appear to be distended. UROGENITAL:  Deferred  EXTREMITIES: Her left great toe tip callus without evidence of underlying ulcer shows no erythema or tenderness. Her right great toe cellulitis has resolved. She does have a tongue of toenail on the lateral edge of the great toenail which needs to be removed. Toe warm and well perfused without clubbing cyanosis or edema. 2+ pulses in all 4 extremities. Capillary refill 2-3 seconds with dependent rubor but no foot ulceration. NEURO:  Cranial nerves 2-12 grossly intact. Normal muscle bulk and tone. No resting tremor, cogwheeling, normal rapid alternating movements in the hands and feet. Vibration and monofilament sensation nearly absent below the midcalf. Significant skin changes stocking paresthesia. Normal gait and station. MUSCULOSKELETAL: Pronounced osteoarthritic changes, right knee grossly deformed. No active synovitis joint warmth or erythema. SKIN:  No worrisome lesions, skin woody, with scaling on the distal lower extremities. PSYCH:  No psychomotor retardation or agitation. Good eye contact. Unrestricted affect range. Mood congruent with affect. Linear thought.     LABS  Lab Review   Orders Only on 12/16/2021   Component Date Value    Sodium 12/16/2021 144     Potassium 12/16/2021 4.5     Chloride 12/16/2021 Protein 10/16/2021 6.8     Albumin 10/16/2021 3.8     Alkaline Phosphatase 10/16/2021 127     ALT 10/16/2021 <5*    AST 10/16/2021 15     Total Bilirubin 10/16/2021 0.6     Bilirubin, Direct 10/16/2021 <0.2     Bilirubin, Indirect 10/16/2021 see below     Color, UA 10/16/2021 Yellow     Clarity, UA 10/16/2021 Clear     Glucose, Ur 10/16/2021 Negative     Bilirubin Urine 10/16/2021 Negative     Ketones, Urine 10/16/2021 Negative     Specific Gravity, UA 10/16/2021 1.010     Blood, Urine 10/16/2021 SMALL*    pH, UA 10/16/2021 7.0     Protein, UA 10/16/2021 Negative     Urobilinogen, Urine 10/16/2021 0.2     Nitrite, Urine 10/16/2021 Negative     Leukocyte Esterase, Urine 10/16/2021 Negative     Microscopic Examination 10/16/2021 YES     Urine Type 10/16/2021 NotGiven     Troponin 10/16/2021 <0.01     Pro-BNP 10/16/2021 147     WBC, UA 10/16/2021 None seen     RBC, UA 10/16/2021 None seen    Orders Only on 08/10/2021   Component Date Value    TSH 08/10/2021 <0.01*   Orders Only on 08/10/2021   Component Date Value    T4 Free 08/10/2021 2.9*   Orders Only on 08/10/2021   Component Date Value    Vit D, 25-Hydroxy 08/10/2021 21.4*   Orders Only on 04/06/2021   Component Date Value    Vitamin B-12 04/06/2021 1168*    TSH 04/06/2021 2.77     TISSUE TRANSGLUTAMINASE * 04/06/2021 <0.5    Orders Only on 02/08/2021   Component Date Value    TSH 02/08/2021 6.63*   Orders Only on 02/08/2021   Component Date Value    T4 Free 02/08/2021 0.5*   Orders Only on 02/08/2021   Component Date Value    Cortisol 02/08/2021 8.2          ASSESSMENT/PLAN  1. Dry skin  At time of last visit we prescribed Lac-Hydrin, which the patient reports has helped. See #4 below    2. Essential hypertension  December 2021 BMP reassuring. She will continue amlodipine atenolol spironolactone in the morning.      3. Other chronic pulmonary embolism without acute cor pulmonale (HCC)  Patient has a single right upper lobe subsegmental pulmonary embolism of uncertain clinical significance June 2020, resolved on follow-up CT August 2020 patient has completed anticoagulation, and resumed baby aspirin. 4. Rash  Distal bilateral lower extremities foot and ankle involvement primarily. Daily moisturization with Lidex cream, Lac-Hydrin, progressing to Lidex and CeraVe. Examine on return. Unclear if patient followed up with Derm. - fluocinonide (LIDEX) 0.05 % cream; Apply topically 2 times daily. Dispense: 60 g; Refill: 1    5. Other allergic rhinitis  For postnasal drip symptoms we added ipratropium nasal spray, due to thrush we will hold the Flonase and use the Atrovent twice daily. Consider discontinuing Singulair. 6. B12 \"deficiency\"  B12 levels historically supratherapeutic. She stopped the shots and now B12 levels in the middle of the normal range. 7. Postherpetic neuralgia  At time of last visit, 200 AM 200PM 300Bedtime to provide better neuropathy coverage while reducing sedation. She continues to use topical lidocaine. Recommended trial topical CBD oil, again. 8. Irritable bowel syndrome with both constipation and diarrhea. History of C. difficile. Chart diagnosis of celiac disease. [place]kin exam and history do not suggest recurrent C. difficile. Would not treat with antibiotic. Unsure how much fiber she is taking, I reinforced the benefit of Metamucil today. I am unable to verify EGD pathology biopsy diagnosis of celiac disease, duodenal aspirate culture, interestingly TTG IgA negative. 9.  Routine adult health maintenance  Tdap 2017 Pneumovax 23 2012 [address] plus booster 2021 Shingrix 2021 influenza 2021. Beyond age of Pap, mammogram, colonoscopy. 10.  History of multiple thyroid nodules. Patient has what she describes as a \"raw\" irritated area on examination superior to the thyroid isthmus line adjacent to the trachea. I could not appreciate a mass there.   She believes this has been ongoing for months. She denies improvement or worsening with eating drinking flexing or twisting neck. She has not noticed any discrete mass. Discomfort is seemingly mild. Soft tissue ultrasound of the area did not disclose any discrete pathology other than her more posterior lying arthritis. Last thyroid ultrasound 2018 showed multiple bilateral thyroid nodules, some of which required follow-up in virtue of ultrasound appearance and size so she is due anyway. Unfortunately no comment on thyroid nodules. 11.  Macular degeneration. Her eyesight causes her to occasionally drop her medications on the floor where she cannot find them. She has to rely on pill shape, which is unreliable in case of formulary or  changes from her insurance and/or pharmacy. He takes another pill from the bottle if she drops 1. She is not letting the nurse set up her medicines at this time. Inquire regarding involvement of [facility] ([phone] general number, fax [phone]) 4 weekly skilled nursing for 6 weeks, transitioning to every 2 weeks based on compliance and ease of adoption of pillbox. 12. multiple skin seborrheic keratoses. Patient somewhat troubled by the right lateral chest wall SK, reporting that it itches or sometimes. We referred her to dermatology, inquire at follow-up as to status. 14.  Hyperthyroidism. On methimazole. December 2021 TSH consistent with adequate suppression, follows in endocrinology. 15. MGUS. Chart diagnosis since at least 2011, yet 2018 SPEP UPEP showed no monoclonal protein. SPEP 2013 negative for M spike. Seemingly no immunofixation on record. 16. cellulitis, resolved  17. Onycholysis, persistent. Asked her podiatrist to address the remaining tongue of nail on her right great toe      Return in about 6 months (around 7/13/2022). It was a pleasure to visit with Ms. Ethan Ohara today. Answered all questions as best I could.   Anamaria Carrillo MD   Total time 28 minutes

## 2022-01-17 DIAGNOSIS — I10 ESSENTIAL HYPERTENSION: ICD-10-CM

## 2022-01-17 RX ORDER — SPIRONOLACTONE 25 MG/1
TABLET ORAL
Qty: 90 TABLET | Refills: 3 | Status: SHIPPED | OUTPATIENT
Start: 2022-01-17

## 2022-01-17 RX ORDER — FELODIPINE 5 MG/1
TABLET, EXTENDED RELEASE ORAL
Qty: 90 TABLET | Refills: 3 | Status: SHIPPED | OUTPATIENT
Start: 2022-01-17 | End: 2022-07-14

## 2022-03-01 ENCOUNTER — CARE COORDINATION (OUTPATIENT)
Dept: CARE COORDINATION | Age: 87
End: 2022-03-01

## 2022-03-14 NOTE — Clinical Note
Dear Dr. Loreto Alvarado,  I had the pleasure of seeing your patient, Adela Elizabeth, in my office recently. Thanks so much for involving me in her care. Please see my recommendations under the decubitus ulcer, stage I assessment and plan. Please call me if you have any questions.   Best regards, Herve Velarde, DO
Ambulatory

## 2022-03-16 ENCOUNTER — CARE COORDINATION (OUTPATIENT)
Dept: CARE COORDINATION | Age: 87
End: 2022-03-16

## 2022-03-23 RX ORDER — PANTOPRAZOLE SODIUM 40 MG/1
TABLET, DELAYED RELEASE ORAL
Qty: 180 TABLET | Refills: 1 | Status: SHIPPED | OUTPATIENT
Start: 2022-03-23 | End: 2022-10-10 | Stop reason: SDUPTHER

## 2022-03-23 NOTE — TELEPHONE ENCOUNTER
Medication:   Requested Prescriptions     Pending Prescriptions Disp Refills    pantoprazole (PROTONIX) 40 MG tablet [Pharmacy Med Name: PANTOPRAZOLE SOD DR 40 MG TAB] 180 tablet 1     Sig: TAKE 1 TABLET BY MOUTH TWICE A DAY        Last Filled:      Patient Phone Number: [phone] (home)     Last appt: 1/13/2022   Next appt: 7/14/2022    Last OARRS:   RX Monitoring 9/11/2019   Periodic Controlled Substance Monitoring Assessed functional status.

## 2022-04-07 DIAGNOSIS — I10 ESSENTIAL HYPERTENSION: ICD-10-CM

## 2022-04-08 ENCOUNTER — CARE COORDINATION (OUTPATIENT)
Dept: CARE COORDINATION | Age: 87
End: 2022-04-08

## 2022-04-08 RX ORDER — ATENOLOL 50 MG/1
TABLET ORAL
Qty: 90 TABLET | Refills: 3 | Status: SHIPPED | OUTPATIENT
Start: 2022-04-08

## 2022-05-02 ENCOUNTER — NURSE TRIAGE (OUTPATIENT)
Dept: OTHER | Facility: CLINIC | Age: 87
End: 2022-05-02

## 2022-05-06 ENCOUNTER — HOSPITAL ENCOUNTER (OUTPATIENT)
Dept: VASCULAR LAB | Age: 87
Discharge: HOME | End: 2022-05-06
Payer: MEDICARE

## 2022-05-06 ENCOUNTER — OFFICE VISIT (OUTPATIENT)
Dept: PRIMARY CARE CLINIC | Age: 87
End: 2022-05-06
Payer: MEDICARE

## 2022-05-06 ENCOUNTER — TELEPHONE (OUTPATIENT)
Dept: PRIMARY CARE CLINIC | Age: 87
End: 2022-05-06

## 2022-05-06 VITALS
DIASTOLIC BLOOD PRESSURE: 74 MMHG | SYSTOLIC BLOOD PRESSURE: 141 MMHG | BODY MASS INDEX: 22.59 KG/M2 | HEART RATE: 58 BPM | WEIGHT: 153 LBS | TEMPERATURE: 97.2 F | OXYGEN SATURATION: 99 %

## 2022-05-06 DIAGNOSIS — I87.2 VENOUS STASIS DERMATITIS OF RIGHT LOWER EXTREMITY: ICD-10-CM

## 2022-05-06 DIAGNOSIS — R60.0 EDEMA OF RIGHT LOWER LEG: ICD-10-CM

## 2022-05-06 DIAGNOSIS — L53.9 DEPENDENT RUBOR: Primary | ICD-10-CM

## 2022-05-06 DIAGNOSIS — L53.9 DEPENDENT RUBOR: ICD-10-CM

## 2022-05-06 DIAGNOSIS — R09.89 OTHER SPECIFIED SYMPTOMS AND SIGNS INVOLVING THE CIRCULATORY AND RESPIRATORY SYSTEMS: ICD-10-CM

## 2022-05-06 LAB
ANION GAP SERPL CALCULATED.3IONS-SCNC: 12 MMOL/L (ref 3–16)
BASOPHILS ABSOLUTE: 0.1 K/UL (ref 0–0.2)
BASOPHILS RELATIVE PERCENT: 0.7 %
BUN BLDV-MCNC: 10 MG/DL (ref 7–20)
C-REACTIVE PROTEIN: <3 MG/L (ref 0–5.1)
CALCIUM SERPL-MCNC: 10.1 MG/DL (ref 8.3–10.6)
CHLORIDE BLD-SCNC: 105 MMOL/L (ref 99–110)
CO2: 24 MMOL/L (ref 21–32)
CREAT SERPL-MCNC: 0.9 MG/DL (ref 0.6–1.2)
D DIMER: 594 NG/ML DDU (ref 0–229)
EOSINOPHILS ABSOLUTE: 0.1 K/UL (ref 0–0.6)
EOSINOPHILS RELATIVE PERCENT: 0.8 %
GFR AFRICAN AMERICAN: >60
GFR NON-AFRICAN AMERICAN: 58
GLUCOSE BLD-MCNC: 102 MG/DL (ref 70–99)
HCT VFR BLD CALC: 44.3 % (ref 36–48)
HEMOGLOBIN: 14.4 G/DL (ref 12–16)
LYMPHOCYTES ABSOLUTE: 2.8 K/UL (ref 1–5.1)
LYMPHOCYTES RELATIVE PERCENT: 32.3 %
MCH RBC QN AUTO: 26.5 PG (ref 26–34)
MCHC RBC AUTO-ENTMCNC: 32.5 G/DL (ref 31–36)
MCV RBC AUTO: 81.6 FL (ref 80–100)
MONOCYTES ABSOLUTE: 0.8 K/UL (ref 0–1.3)
MONOCYTES RELATIVE PERCENT: 9 %
NEUTROPHILS ABSOLUTE: 5 K/UL (ref 1.7–7.7)
NEUTROPHILS RELATIVE PERCENT: 57.2 %
PDW BLD-RTO: 15.9 % (ref 12.4–15.4)
PLATELET # BLD: 219 K/UL (ref 135–450)
PMV BLD AUTO: 8.7 FL (ref 5–10.5)
POTASSIUM SERPL-SCNC: 4.4 MMOL/L (ref 3.5–5.1)
RBC # BLD: 5.43 M/UL (ref 4–5.2)
SODIUM BLD-SCNC: 141 MMOL/L (ref 136–145)
URIC ACID, SERUM: 7.2 MG/DL (ref 2.6–6)
WBC # BLD: 8.7 K/UL (ref 4–11)

## 2022-05-06 PROCEDURE — 1123F ACP DISCUSS/DSCN MKR DOCD: CPT | Performed by: INTERNAL MEDICINE

## 2022-05-06 PROCEDURE — 1090F PRES/ABSN URINE INCON ASSESS: CPT | Performed by: INTERNAL MEDICINE

## 2022-05-06 PROCEDURE — 93971 EXTREMITY STUDY: CPT

## 2022-05-06 PROCEDURE — 1036F TOBACCO NON-USER: CPT | Performed by: INTERNAL MEDICINE

## 2022-05-06 PROCEDURE — 4040F PNEUMOC VAC/ADMIN/RCVD: CPT | Performed by: INTERNAL MEDICINE

## 2022-05-06 PROCEDURE — G8420 CALC BMI NORM PARAMETERS: HCPCS | Performed by: INTERNAL MEDICINE

## 2022-05-06 PROCEDURE — G8427 DOCREV CUR MEDS BY ELIG CLIN: HCPCS | Performed by: INTERNAL MEDICINE

## 2022-05-06 PROCEDURE — 99212 OFFICE O/P EST SF 10 MIN: CPT | Performed by: INTERNAL MEDICINE

## 2022-05-06 RX ORDER — TRIAMCINOLONE ACETONIDE 1 MG/G
CREAM TOPICAL
Qty: 80 G | Refills: 0 | Status: SHIPPED | OUTPATIENT
Start: 2022-05-06

## 2022-05-06 NOTE — PATIENT INSTRUCTIONS
1. Dont cross r leg over left  2. Aspercream w 4% lidocaine to shingles area and knee/s twice daily  3.  Blood test and ultrasound of Right leg TODAY

## 2022-05-06 NOTE — TELEPHONE ENCOUNTER
Patient was prescribed Eliquis for a blood clot in her leg. Brand name is cost prohibitive, They would like change to generic apixaban. She has used generic before.  Her daughter would like to pick it up Please call and advise ASAP

## 2022-05-06 NOTE — PROGRESS NOTES
2022     Isabella Pierce (:  1926) is a 80 y.o. female, here for evaluation of the following medical concerns:    No chief complaint on file. 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 Denies PND orthopnea pleuritic chest pain shortness of breath hot red joint pain. Chest CT 2020 single subsegmental right upper lobe pulmonary embolism on Eliquis 5 mg twice daily for approximately 7 months, with intent to discontinue after 6 months effective anticoagulation. Follow-up chest CT for left-sided chest pain 2020 - for PE. CT 2020 negative for recurrent PE. Carotid ultrasound in  for TIA showed no high-grade stenosis. Negative right lower extremity DVT study in , , negative bilateral lower extremity study in . Head CT  - for stroke. Review of Systems   Constitutional: Negative for activity change, appetite change, fatigue and unexpected weight change. HENT: Negative for dental problem, sinus pain, sore throat and trouble swallowing. Eyes: Negative for pain and visual disturbance. Respiratory: Negative for apnea, cough, chest tightness, shortness of breath and wheezing. Cardiovascular: Negative for chest pain and palpitations.    Gastrointestinal: Negative for abdominal pain, blood in stool, constipation, diarrhea, nausea, rectal pain and vomiting. Endocrine: Negative for cold intolerance, heat intolerance, polydipsia, polyphagia and polyuria. Genitourinary: Negative for difficulty urinating, dysuria, flank pain, frequency, hematuria, pelvic pain, urgency, vaginal bleeding and vaginal discharge. Musculoskeletal: Negative for arthralgias, back pain, gait problem, joint swelling, myalgias, neck pain and neck stiffness. Skin: Negative for color change and rash. Neurological: Negative for dizziness, tremors, syncope, speech difficulty, weakness, light-headedness and headaches. Hematological: Negative for adenopathy. Does not bruise/bleed easily. Psychiatric/Behavioral: Negative for agitation, behavioral problems, decreased concentration, sleep disturbance and suicidal ideas. The patient is not nervous/anxious and is not hyperactive. Prior to Visit Medications    Medication Sig Taking? Authorizing Provider   atenolol (TENORMIN) 50 MG tablet TAKE 1 TABLET DAILY  Yousuf Rivas MD   pantoprazole (PROTONIX) 40 MG tablet TAKE 1 TABLET BY MOUTH TWICE A DAY  Yousuf Rivas MD   spironolactone (ALDACTONE) 25 MG tablet TAKE 1 TABLET DAILY  Yousuf Rivas MD   felodipine (PLENDIL) 5 MG extended release tablet TAKE 1 TABLET NIGHTLY. DISCONTINUE 2.5 MG         FELODIPINE PRESCRIPTION  Yousuf Rivas MD   ipratropium (ATROVENT) 0.03 % nasal spray USE 1 SPRAY IN EACH NOSTRIL AT BEDTIME  Yousuf Rivas MD   terbinafine (LAMISIL) 1 % cream Apply topically 2 times daily. Yousuf Rivas MD   vitamin D (ERGOCALCIFEROL) 1.25 MG (20964 UT) CAPS capsule Take 1 capsule by mouth once a week  Yousuf Rivas MD   gabapentin (NEURONTIN) 100 MG capsule TAKE 1 CAPSULE BY MOUTH 3 TIMES DAILY  DAYS.  INTENDED SUPPLY: Emmanuel Lemos MD   montelukast (SINGULAIR) 10 MG tablet TAKE 1 TABLET NIGHTLY  Yousuf Rivas MD   nystatin (MYCOSTATIN) 010914 UNIT/ML suspension Take 5 mLs by mouth 4 times daily  Patient not taking: Reported on 1/13/2022  Jeffery Lujan MD   hydrocortisone (ANUSOL-HC) 25 MG suppository Place 1 suppository rectally every 12 hours  Jeffery Lujan MD   albuterol sulfate (PROAIR RESPICLICK) 425 (90 Base) MCG/ACT aerosol powder inhalation Inhale 2 puffs into the lungs every 6 hours as needed for Wheezing or Shortness of Breath  Jeffery Lujan MD   ammonium lactate (LAC-HYDRIN) 12 % lotion Apply topically daily. Jeffery Lujan MD   budesonide-formoterol [facility]) 160-4.5 MCG/ACT AERO Inhale 2 puffs into the lungs 2 times daily  Jeffery Lujan MD   fluocinonide (LIDEX) 0.05 % cream Apply topically 2 times daily. Jeffery Lujan MD   fluticasone Elva Silvia) 50 MCG/ACT nasal spray PLACE 1 SPRAY IN EACH NOSTRIL DAILY  Jeffery Lujan MD   lidocaine (XYLOCAINE) 5 % ointment Apply topically three times a day, as needed. Jeffery Lujan MD   polyethylene glycol [facility]) 17 g packet Take 17 g by mouth 2 times daily  Patient not taking: Reported on 1/13/2022  Jeffery Lujan MD   methIMAzole (TAPAZOLE) 5 MG tablet Take 1 tablet by mouth See Admin Instructions 1 tablet by mouth daily except for Sunday, Monday, Wednesday take 1.5 tablets. Jeffery Lujan MD   Simethicone (GAS RELIEF) 180 MG CAPS Take 180 mg by mouth 4 times daily as needed (bloating)  Jeffery Lujan MD        Allergies   Allergen Reactions    Ace Inhibitors Other (See Comments)     angioedema    Acyclovir      Severe nausea  And anorexia. It happened after taking each 800 mg tablet and patient took one one day and one the next, then stopped an no more    Creon [Pancrelipase (Lip-Prot-Amyl)]      Swollen gums.  Dye [Iodides] Hives     Only reacts to dye that went into eyes, OK with IV contrast    Eggs Or Egg-Derived Products      Hives  this test in the [facility] ([facility]). Egg White IgE 3. 59High   <=0.34 kU/L Final 02/10/2016  2:14 PM  - [facility]                  Gluten Meal Abdominal pain and diarreha  Wheat IgE 0.53  <=0.34 kU/L Final 02/10/2016  2:14 PM [facility] - [facility]       Milk-Related Compounds      Milk, Cow's IgE 1. 75High   <=0.34 kU/L Final 02/10/2016  2:14 PM [facility] - [facility]    Fluorescein Other (See Comments)    Lisinopril Swelling    Macrobid [Nitrofurantoin Monohydrate Macrocrystals] Other (See Comments)    No Known Allergies     Ciprocinonide [Fluocinolone] Rash     Stiff, no nausea or rash    Sulfa Antibiotics Rash       Past Medical History:   Diagnosis Date    Adrenal gland cyst ([facility] [address]     Arthritis     Asthma     Bilateral carpal tunnel syndrome 8/23/2018    Cataract     Chronic systolic congestive heart failure ([facility] [address] 4/12/2021    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    PCR    Clostridium difficile infection 1/27/15    AG positive    Degenerative joint disease of knee     Depression     Diverticulitis     GERD (gastroesophageal reflux disease)     Grave's disease 4/2/2012    Heart disease     Hypertension     IBS (irritable bowel syndrome)     Macular degeneration     MGUS (monoclonal gammopathy of unknown significance)     Osteoporosis     Poor vision     d/t macular degeneration    Simple cyst of kidney        Past Surgical History:   Procedure Laterality Date    CHOLECYSTECTOMY      COLONOSCOPY  10/29/2009    **see scanned report- SILVANA    HEMORRHOID SURGERY      HYSTERECTOMY  in 40's    rudolph/bso    HYSTERECTOMY, TOTAL ABDOMINAL         Social History     Socioeconomic History    Marital status: Single     Spouse name: Not on file    Number of children: Not on file    Years of education: Not on file    Highest education level: Not on file   Occupational History    Not on file   Tobacco Use    Smoking status: Never Smoker    Smokeless tobacco: Never Used   Vaping Use    Vaping Use: Never used   Substance and Sexual Activity    Alcohol use: No    Drug use: No    Sexual activity: Not Currently   Other Topics Concern    Not on file   Social History Narrative    Not on file     Social Determinants of Health     Financial Resource Strain: Low Risk     Difficulty of Paying Living Expenses: Not hard at all   Food Insecurity: No Food Insecurity    Worried About Running Out of Food in the Last Year: Never true    [address] in the Last Year: Never true   Transportation Needs:     Lack of Transportation (Medical): Not on file    Lack of Transportation (Non-Medical): Not on file   Physical Activity:     Days of Exercise per Week: Not on file    Minutes of Exercise per Session: Not on file   Stress:     Feeling of Stress : Not on file   Social Connections:     Frequency of Communication with Friends and Family: Not on file    Frequency of Social Gatherings with Friends and Family: Not on file    Attends [employer] Services: Not on file    Active Member of [address] [place] [address] or Organizations: Not on file    Attends Club or Organization Meetings: Not on file    Marital Status: Not on file   Intimate Partner Violence:     Fear of Current or Ex-Partner: Not on file    Emotionally Abused: Not on file    Physically Abused: Not on file    Sexually Abused: Not on file   Housing Stability:     Unable to Pay for Housing in the Last Year: Not on file    Number of [place] in the Last Year: Not on file    Unstable Housing in the Last Year: Not on file        Family History   Problem Relation Age of Onset    Heart Disease Mother     High Blood Pressure Mother     Stroke Mother     Cancer Father         multiple myeloma       There were no vitals filed for this visit. Estimated body mass index is 22.15 kg/m² as calculated from the following:    Height as of 1/13/22: 5' 9\" (1.753 m). Weight as of 1/13/22: 150 lb (68 kg). PHYSICAL EXAM  GENERAL:  Pleasant  female who looks her stated age, awake alert and oriented x3, no acute distress. HEENT: Lateral gaze preference given macular degeneration.   Pupils sluggishly reactive symmetrically. Posterior oropharynx mildly reddened. Turbinates mildly reddened. NECK:  Supple nontender. No carotid bruits. Brisk carotid upstrokes, mild JVD with V wave. Marked thyromegaly. LYMPH:  No supraclavicular cervical axillarylymphadenopathy. LUNGS:  Clear to auscultation bilaterally. Very good air entry. No inspiratory crackles or expiratory wheezes. No pleural friction rub. HEART:  Regular rate and rhythm without pathologic murmur rub gallop S3 or S4. ABDOMEN:  Soft, mildly tender. Normal bowel sounds. No guarding. No masses. Not appear to be distended. UROGENITAL:  Deferred  EXTREMITIES: Her left great toe tip callus without evidence of underlying ulcer shows no erythema or tenderness. Her right great toe cellulitis has resolved. She does have a tongue of toenail on the lateral edge of the great toenail which needs to be removed. Toe warm and well perfused without clubbing cyanosis or edema. 2+ pulses in all 4 extremities. Capillary refill 2-3 seconds with dependent rubor but no foot ulceration. NEURO:  Cranial nerves 2-12 grossly intact. Normal muscle bulk and tone. No resting tremor, cogwheeling, normal rapid alternating movements in the hands and feet. Vibration and monofilament sensation nearly absent below the midcalf. Significant skin changes stocking paresthesia. Normal gait and station. MUSCULOSKELETAL: Pronounced osteoarthritic changes, right knee grossly deformed. No active synovitis joint warmth or erythema. SKIN:  No worrisome lesions, skin woody, with scaling on the distal lower extremities. PSYCH:  No psychomotor retardation or agitation. Good eye contact. Unrestricted affect range. Mood congruent with affect. Linear thought.     LABS  Lab Review   Orders Only on 12/16/2021   Component Date Value    Sodium 12/16/2021 144     Potassium 12/16/2021 4.5     Chloride 12/16/2021 107     CO2 12/16/2021 26     Anion Gap 12/16/2021 11     Glucose 12/16/2021 96     BUN 12/16/2021 13     CREATININE 12/16/2021 1.1     GFR Non- 12/16/2021 46*    GFR  12/16/2021 56*    Calcium 12/16/2021 10.4     Vitamin B-12 12/16/2021 460     Methylmalonic Acid 12/16/2021 0.34     Vit D, 25-Hydroxy 12/16/2021 23.6*    TSH 12/16/2021 3.28    Admission on 10/16/2021, Discharged on 10/16/2021   Component Date Value    WBC 10/16/2021 7.0     RBC 10/16/2021 5.44*    Hemoglobin 10/16/2021 14.6     Hematocrit 10/16/2021 45.6     MCV 10/16/2021 83.8     MCH 10/16/2021 26.9     MCHC 10/16/2021 32.1     RDW 10/16/2021 15.4     Platelets 00/32/8772 190     MPV 10/16/2021 8.9     Neutrophils % 10/16/2021 63.2     Lymphocytes % 10/16/2021 24.2     Monocytes % 10/16/2021 10.5     Eosinophils % 10/16/2021 1.6     Basophils % 10/16/2021 0.5     Neutrophils Absolute 10/16/2021 4.4     Lymphocytes Absolute 10/16/2021 1.7     Monocytes Absolute 10/16/2021 0.7     Eosinophils Absolute 10/16/2021 0.1     Basophils Absolute 10/16/2021 0.0     Sodium 10/16/2021 140     Potassium reflex Magnesi* 10/16/2021 4.0     Chloride 10/16/2021 106     CO2 10/16/2021 22     Anion Gap 10/16/2021 12     Glucose 10/16/2021 113*    BUN 10/16/2021 11     CREATININE 10/16/2021 0.8     GFR Non- 10/16/2021 >60     GFR  10/16/2021 >60     Calcium 10/16/2021 9.9     Ventricular Rate 10/16/2021 64     Atrial Rate 10/16/2021 64     P-R Interval 10/16/2021 250     QRS Duration 10/16/2021 82     Q-T Interval 10/16/2021 396     QTc Calculation (Bazett) 10/16/2021 408     P Axis 10/16/2021 61     R Axis 10/16/2021 -10     T Axis 10/16/2021 24     Diagnosis 10/16/2021 EKG performed in ER and to be interpreted by ER physician. Confirmed by MD, ER ([id number]),  Saurabh zuleika, [address] [place] [address] ([facility]) on 10/17/2021 8:33:07 AM     Lipase 10/16/2021 52.0     Total Protein 10/16/2021 6.8     Albumin 10/16/2021 3.8     Alkaline Phosphatase 10/16/2021 127     ALT 10/16/2021 <5*    AST 10/16/2021 15     Total Bilirubin 10/16/2021 0.6     Bilirubin, Direct 10/16/2021 <0.2     Bilirubin, Indirect 10/16/2021 see below     Color, UA 10/16/2021 Yellow     Clarity, UA 10/16/2021 Clear     Glucose, Ur 10/16/2021 Negative     Bilirubin Urine 10/16/2021 Negative     Ketones, Urine 10/16/2021 Negative     Specific Gravity, UA 10/16/2021 1.010     Blood, Urine 10/16/2021 SMALL*    pH, UA 10/16/2021 7.0     Protein, UA 10/16/2021 Negative     Urobilinogen, Urine 10/16/2021 0.2     Nitrite, Urine 10/16/2021 Negative     Leukocyte Esterase, Urine 10/16/2021 Negative     Microscopic Examination 10/16/2021 YES     Urine Type 10/16/2021 NotGiven     Troponin 10/16/2021 <0.01     Pro-BNP 10/16/2021 147     WBC, UA 10/16/2021 None seen     RBC, UA 10/16/2021 None seen    Orders Only on 08/10/2021   Component Date Value    TSH 08/10/2021 <0.01*   Orders Only on 08/10/2021   Component Date Value    T4 Free 08/10/2021 2.9*   Orders Only on 08/10/2021   Component Date Value    Vit D, 25-Hydroxy 08/10/2021 21.4*   Orders Only on 04/06/2021   Component Date Value    Vitamin B-12 04/06/2021 1168*    TSH 04/06/2021 2.77     TISSUE TRANSGLUTAMINASE * 04/06/2021 <0.5          ASSESSMENT/PLAN  1. Dry skin  Lac-Hydrin appears to be helping    2. Essential hypertension  December 2021 BMP reassuring. She will continue amlodipine atenolol spironolactone in the morning. 3. History of pulmonary embolism without acute cor pulmonale ([address]  Patient has a single right upper lobe subsegmental pulmonary embolism of uncertain clinical significance June 2020, resolved on follow-up CT August 2020 patient has completed anticoagulation, and resumed baby aspirin. Lung exam absence of pleuritic chest pain absence of new onset dyspnea would not suggest recurrent PE at this time    4.   Distal stasis dermatitis  Distal bilateral lower extremities foot and ankle involvement primarily. Previously asked to do Lidex therapy, appears not to be doing currently based on dates of last prescription. Doing some type of compression. We are in the process of ruling out significant arterial disease. Moisturization, triamcinolone cream described  for the foot and ankle of the right lower extremity. 5. Other allergic rhinitis  For postnasal drip symptoms we added ipratropium nasal spray, due to thrush we will hold the Flonase and use the Atrovent twice daily. Consider discontinuing Singulair. 6. B12 \"deficiency\"  B12 levels historically supratherapeutic. She stopped the shots and now B12 levels in the middle of the normal range. 7. Postherpetic neuralgia  At time of last visit, 200 AM 200PM 300Bedtime to provide better neuropathy coverage while reducing sedation. She continues to use topical lidocaine. Recommended trial topical CBD oil, again. 8. Irritable bowel syndrome with both constipation and diarrhea. History of C. difficile. Chart diagnosis of celiac disease. [place]kin exam and history do not suggest recurrent C. difficile. Would not treat with antibiotic. Unsure how much fiber she is taking, I reinforced the benefit of Metamucil today. I am unable to verify EGD pathology biopsy diagnosis of celiac disease, duodenal aspirate culture, interestingly TTG IgA negative. 9.  Routine adult health maintenance  Tdap 2017 Pneumovax 23 2012 [address] plus booster 2021 Shingrix 2021 influenza 2021. Beyond age of Pap, mammogram, colonoscopy. 10.  History of multiple thyroid nodules. Patient has what she describes as a \"raw\" irritated area on examination superior to the thyroid isthmus line adjacent to the trachea. I could not appreciate a mass there. She believes this has been ongoing for months. She denies improvement or worsening with eating drinking flexing or twisting neck. She has not noticed any discrete mass. Discomfort is seemingly mild. Soft tissue ultrasound of the area did not disclose any discrete pathology other than her more posterior lying arthritis. Last thyroid ultrasound 2018 showed multiple bilateral thyroid nodules, some of which required follow-up in virtue of ultrasound appearance and size so she is due anyway. Unfortunately no comment on thyroid nodules. 11.  Macular degeneration. Her eyesight causes her to occasionally drop her medications on the floor where she cannot find them. She has to rely on pill shape, which is unreliable in case of formulary or  changes from her insurance and/or pharmacy. She takes another pill from the bottle if she drops 1. She is not letting the nurse set up her medicines at this time. [facility] ([phone] general number, fax [phone])    12. multiple skin seborrheic keratoses. Patient somewhat troubled by the right lateral chest wall SK, reporting that it itches or sometimes. Referred to dermatology previously. 13.  Hyperthyroidism. On methimazole. December 2021 TSH consistent with adequate suppression, follows in endocrinology. 14. MGUS. Chart diagnosis since at least 2011, yet 2018 SPEP UPEP showed no monoclonal protein. SPEP 2013 negative for M spike. Seemingly no immunofixation on record. 15.  Dependent rubor. Barely palpable pulses though capillary refill is brisk and feet are warm without any ischemic eschar. I would like to exclude significant peripheral arterial disease. 16.  Distal right calf edema  Exam does not suggest inflammatory arthropathy or cellulitis today. Checking uric acid, CRP. Rule out distal DVT by venous ultrasound today. Previously received Eliquis for PE. Addendum: Acute appearing common femoral vein clot on the contralateral left leg without extension into the iliac vein for which Xarelto starter pack provided. Reassuring creatinine and hemoglobin.   For this recurrent event as long as it is safe, will keep her on long-term anticoagulation. No evidence of cellulitis on the unilateral right sided calf edema therefore no indication for antibiotics. If false negative ultrasound will be treated with Eliquis anyway. No evidence of decompensated heart failure. After we rule out PAD, can explore intensifying compression. No follow-ups on file. It was a pleasure to visit with Ms. Clovis Halsted today. Answered all questions as best I could.   Lyndsey Magallanes MD   Total time 28 minutes

## 2022-05-09 ENCOUNTER — TELEPHONE (OUTPATIENT)
Dept: ADMINISTRATIVE | Age: 87
End: 2022-05-09

## 2022-05-09 NOTE — TELEPHONE ENCOUNTER
Patient already started on Xarelto does not need to be on Eliquis t spoke with pharmacy Friday about this

## 2022-05-09 NOTE — TELEPHONE ENCOUNTER
I received PA for Eliquis 5MG tablets. I need a current Rx and Dx to submit PA. If you want one completed, please submit a new Rx. Thank you.

## 2022-05-10 ENCOUNTER — HOSPITAL ENCOUNTER (OUTPATIENT)
Dept: VASCULAR LAB | Age: 87
Discharge: HOME | End: 2022-05-10
Payer: MEDICARE

## 2022-05-10 DIAGNOSIS — R60.0 EDEMA OF RIGHT LOWER LEG: ICD-10-CM

## 2022-05-10 PROCEDURE — 93926 LOWER EXTREMITY STUDY: CPT

## 2022-05-13 ENCOUNTER — PATIENT MESSAGE (OUTPATIENT)
Dept: PRIMARY CARE CLINIC | Age: 87
End: 2022-05-13

## 2022-05-13 DIAGNOSIS — J30.89 OTHER ALLERGIC RHINITIS: ICD-10-CM

## 2022-05-13 RX ORDER — FLUTICASONE PROPIONATE 50 MCG
SPRAY, SUSPENSION (ML) NASAL
Qty: 3 EACH | Refills: 3 | Status: SHIPPED | OUTPATIENT
Start: 2022-05-13

## 2022-05-13 NOTE — TELEPHONE ENCOUNTER
Please advise    Please request coupon for the Xarelto and co ordinate this with  The patient so she can get her Xarelto.

## 2022-05-25 ENCOUNTER — TELEPHONE (OUTPATIENT)
Dept: PRIMARY CARE CLINIC | Age: 87
End: 2022-05-25

## 2022-05-25 NOTE — TELEPHONE ENCOUNTER
[facility]  and ask him/her  To fax over coupon for the Xarelto.

## 2022-05-31 ENCOUNTER — APPOINTMENT (OUTPATIENT)
Dept: GENERAL RADIOLOGY | Age: 87
DRG: 378 | End: 2022-05-31
Payer: MEDICARE

## 2022-05-31 ENCOUNTER — HOSPITAL ENCOUNTER (INPATIENT)
Age: 87
LOS: 7 days | Discharge: NURSING, REHAB OR LONG-TERM CARE | DRG: 378 | End: 2022-06-07
Attending: EMERGENCY MEDICINE | Admitting: INTERNAL MEDICINE
Payer: MEDICARE

## 2022-05-31 DIAGNOSIS — D64.9 ANEMIA, UNSPECIFIED TYPE: Primary | ICD-10-CM

## 2022-05-31 DIAGNOSIS — D50.0 IRON DEFICIENCY ANEMIA DUE TO CHRONIC BLOOD LOSS: ICD-10-CM

## 2022-05-31 PROBLEM — K92.2 ACUTE GASTROINTESTINAL BLEEDING: Status: ACTIVE | Noted: 2022-05-31

## 2022-05-31 LAB
ABO/RH: NORMAL
ALBUMIN SERPL-MCNC: 3.4 G/DL (ref 3.4–5)
ALP BLD-CCNC: 113 U/L (ref 40–129)
ALT SERPL-CCNC: 6 U/L (ref 10–40)
AMORPHOUS: NORMAL /HPF
ANION GAP SERPL CALCULATED.3IONS-SCNC: 9 MMOL/L (ref 3–16)
ANTIBODY SCREEN: NORMAL
AST SERPL-CCNC: 10 U/L (ref 15–37)
BASOPHILS ABSOLUTE: 0 K/UL (ref 0–0.2)
BASOPHILS RELATIVE PERCENT: 0.3 %
BILIRUB SERPL-MCNC: 0.6 MG/DL (ref 0–1)
BILIRUBIN DIRECT: <0.2 MG/DL (ref 0–0.3)
BILIRUBIN URINE: NEGATIVE
BILIRUBIN, INDIRECT: ABNORMAL MG/DL (ref 0–1)
BLOOD BANK DISPENSE STATUS: NORMAL
BLOOD BANK DISPENSE STATUS: NORMAL
BLOOD BANK PRODUCT CODE: NORMAL
BLOOD BANK PRODUCT CODE: NORMAL
BLOOD, URINE: ABNORMAL
BPU ID: NORMAL
BPU ID: NORMAL
BUN BLDV-MCNC: 20 MG/DL (ref 7–20)
CALCIUM SERPL-MCNC: 9.2 MG/DL (ref 8.3–10.6)
CHLORIDE BLD-SCNC: 108 MMOL/L (ref 99–110)
CLARITY: CLEAR
CO2: 22 MMOL/L (ref 21–32)
COLOR: YELLOW
CREAT SERPL-MCNC: 1.1 MG/DL (ref 0.6–1.2)
DESCRIPTION BLOOD BANK: NORMAL
DESCRIPTION BLOOD BANK: NORMAL
EKG ATRIAL RATE: 60 BPM
EKG DIAGNOSIS: NORMAL
EKG P AXIS: 66 DEGREES
EKG P-R INTERVAL: 256 MS
EKG Q-T INTERVAL: 428 MS
EKG QRS DURATION: 84 MS
EKG QTC CALCULATION (BAZETT): 428 MS
EKG R AXIS: -6 DEGREES
EKG T AXIS: 11 DEGREES
EKG VENTRICULAR RATE: 60 BPM
EOSINOPHILS ABSOLUTE: 0 K/UL (ref 0–0.6)
EOSINOPHILS RELATIVE PERCENT: 0.3 %
GFR AFRICAN AMERICAN: 56
GFR NON-AFRICAN AMERICAN: 46
GLUCOSE BLD-MCNC: 115 MG/DL (ref 70–99)
GLUCOSE URINE: NEGATIVE MG/DL
HCT VFR BLD CALC: 17.5 % (ref 36–48)
HCT VFR BLD CALC: 18.4 % (ref 36–48)
HEMOGLOBIN: 5.8 G/DL (ref 12–16)
HEMOGLOBIN: 5.9 G/DL (ref 12–16)
INR BLD: 1.38 (ref 0.87–1.14)
KETONES, URINE: NEGATIVE MG/DL
LACTIC ACID, SEPSIS: 1.2 MMOL/L (ref 0.4–1.9)
LACTIC ACID, SEPSIS: 2 MMOL/L (ref 0.4–1.9)
LEUKOCYTE ESTERASE, URINE: NEGATIVE
LYMPHOCYTES ABSOLUTE: 2.1 K/UL (ref 1–5.1)
LYMPHOCYTES RELATIVE PERCENT: 18.5 %
MCH RBC QN AUTO: 25.5 PG (ref 26–34)
MCHC RBC AUTO-ENTMCNC: 32 G/DL (ref 31–36)
MCV RBC AUTO: 79.8 FL (ref 80–100)
MICROSCOPIC EXAMINATION: YES
MONOCYTES ABSOLUTE: 0.9 K/UL (ref 0–1.3)
MONOCYTES RELATIVE PERCENT: 7.9 %
NEUTROPHILS ABSOLUTE: 8.3 K/UL (ref 1.7–7.7)
NEUTROPHILS RELATIVE PERCENT: 73 %
NITRITE, URINE: NEGATIVE
PDW BLD-RTO: 15.9 % (ref 12.4–15.4)
PH UA: 6.5 (ref 5–8)
PLATELET # BLD: 281 K/UL (ref 135–450)
PMV BLD AUTO: 7.7 FL (ref 5–10.5)
POTASSIUM REFLEX MAGNESIUM: 4.6 MMOL/L (ref 3.5–5.1)
PRO-BNP: 527 PG/ML (ref 0–449)
PROTEIN UA: NEGATIVE MG/DL
PROTHROMBIN TIME: 16.9 SEC (ref 11.7–14.5)
RBC # BLD: 2.3 M/UL (ref 4–5.2)
RBC UA: NORMAL /HPF (ref 0–4)
SODIUM BLD-SCNC: 139 MMOL/L (ref 136–145)
SPECIFIC GRAVITY UA: <=1.005 (ref 1–1.03)
TOTAL PROTEIN: 5.5 G/DL (ref 6.4–8.2)
TROPONIN: <0.01 NG/ML
URINE TYPE: ABNORMAL
UROBILINOGEN, URINE: 0.2 E.U./DL
WBC # BLD: 11.3 K/UL (ref 4–11)
WBC UA: NORMAL /HPF (ref 0–5)

## 2022-05-31 PROCEDURE — 85014 HEMATOCRIT: CPT

## 2022-05-31 PROCEDURE — 85025 COMPLETE CBC W/AUTO DIFF WBC: CPT

## 2022-05-31 PROCEDURE — 84484 ASSAY OF TROPONIN QUANT: CPT

## 2022-05-31 PROCEDURE — 80076 HEPATIC FUNCTION PANEL: CPT

## 2022-05-31 PROCEDURE — 6370000000 HC RX 637 (ALT 250 FOR IP): Performed by: INTERNAL MEDICINE

## 2022-05-31 PROCEDURE — 71045 X-RAY EXAM CHEST 1 VIEW: CPT

## 2022-05-31 PROCEDURE — 94640 AIRWAY INHALATION TREATMENT: CPT

## 2022-05-31 PROCEDURE — 93005 ELECTROCARDIOGRAM TRACING: CPT | Performed by: EMERGENCY MEDICINE

## 2022-05-31 PROCEDURE — 2580000003 HC RX 258: Performed by: EMERGENCY MEDICINE

## 2022-05-31 PROCEDURE — 2580000003 HC RX 258: Performed by: STUDENT IN AN ORGANIZED HEALTH CARE EDUCATION/TRAINING PROGRAM

## 2022-05-31 PROCEDURE — 86901 BLOOD TYPING SEROLOGIC RH(D): CPT

## 2022-05-31 PROCEDURE — 86900 BLOOD TYPING SEROLOGIC ABO: CPT

## 2022-05-31 PROCEDURE — 36415 COLL VENOUS BLD VENIPUNCTURE: CPT

## 2022-05-31 PROCEDURE — 36430 TRANSFUSION BLD/BLD COMPNT: CPT

## 2022-05-31 PROCEDURE — 1200000000 HC SEMI PRIVATE

## 2022-05-31 PROCEDURE — C9113 INJ PANTOPRAZOLE SODIUM, VIA: HCPCS | Performed by: INTERNAL MEDICINE

## 2022-05-31 PROCEDURE — 81001 URINALYSIS AUTO W/SCOPE: CPT

## 2022-05-31 PROCEDURE — 2580000003 HC RX 258: Performed by: INTERNAL MEDICINE

## 2022-05-31 PROCEDURE — 83010 ASSAY OF HAPTOGLOBIN QUANT: CPT

## 2022-05-31 PROCEDURE — 6360000002 HC RX W HCPCS: Performed by: INTERNAL MEDICINE

## 2022-05-31 PROCEDURE — P9016 RBC LEUKOCYTES REDUCED: HCPCS

## 2022-05-31 PROCEDURE — 99285 EMERGENCY DEPT VISIT HI MDM: CPT

## 2022-05-31 PROCEDURE — 86923 COMPATIBILITY TEST ELECTRIC: CPT

## 2022-05-31 PROCEDURE — 83880 ASSAY OF NATRIURETIC PEPTIDE: CPT

## 2022-05-31 PROCEDURE — 85018 HEMOGLOBIN: CPT

## 2022-05-31 PROCEDURE — 80048 BASIC METABOLIC PNL TOTAL CA: CPT

## 2022-05-31 PROCEDURE — 85610 PROTHROMBIN TIME: CPT

## 2022-05-31 PROCEDURE — 86850 RBC ANTIBODY SCREEN: CPT

## 2022-05-31 PROCEDURE — 83605 ASSAY OF LACTIC ACID: CPT

## 2022-05-31 RX ORDER — MONTELUKAST SODIUM 10 MG/1
10 TABLET ORAL NIGHTLY
Status: DISCONTINUED | OUTPATIENT
Start: 2022-05-31 | End: 2022-06-07 | Stop reason: HOSPADM

## 2022-05-31 RX ORDER — GABAPENTIN 100 MG/1
100 CAPSULE ORAL 3 TIMES DAILY
Status: DISCONTINUED | OUTPATIENT
Start: 2022-05-31 | End: 2022-06-07 | Stop reason: HOSPADM

## 2022-05-31 RX ORDER — METHIMAZOLE 5 MG/1
5 TABLET ORAL SEE ADMIN INSTRUCTIONS
Status: DISCONTINUED | OUTPATIENT
Start: 2022-05-31 | End: 2022-05-31 | Stop reason: DRUGHIGH

## 2022-05-31 RX ORDER — METHIMAZOLE 5 MG/1
5 TABLET ORAL
Status: DISCONTINUED | OUTPATIENT
Start: 2022-05-31 | End: 2022-06-07 | Stop reason: HOSPADM

## 2022-05-31 RX ORDER — SODIUM CHLORIDE 9 MG/ML
INJECTION, SOLUTION INTRAVENOUS PRN
Status: DISCONTINUED | OUTPATIENT
Start: 2022-05-31 | End: 2022-06-07 | Stop reason: HOSPADM

## 2022-05-31 RX ORDER — PANTOPRAZOLE SODIUM 40 MG/10ML
40 INJECTION, POWDER, LYOPHILIZED, FOR SOLUTION INTRAVENOUS 2 TIMES DAILY
Status: DISCONTINUED | OUTPATIENT
Start: 2022-05-31 | End: 2022-06-07 | Stop reason: HOSPADM

## 2022-05-31 RX ORDER — POLYETHYLENE GLYCOL 3350 17 G/17G
17 POWDER, FOR SOLUTION ORAL DAILY PRN
Status: DISCONTINUED | OUTPATIENT
Start: 2022-05-31 | End: 2022-06-07 | Stop reason: HOSPADM

## 2022-05-31 RX ORDER — SODIUM CHLORIDE, SODIUM LACTATE, POTASSIUM CHLORIDE, CALCIUM CHLORIDE 600; 310; 30; 20 MG/100ML; MG/100ML; MG/100ML; MG/100ML
INJECTION, SOLUTION INTRAVENOUS CONTINUOUS
Status: ACTIVE | OUTPATIENT
Start: 2022-05-31 | End: 2022-06-01

## 2022-05-31 RX ORDER — FLUTICASONE PROPIONATE 50 MCG
2 SPRAY, SUSPENSION (ML) NASAL DAILY
Status: DISCONTINUED | OUTPATIENT
Start: 2022-05-31 | End: 2022-06-07 | Stop reason: HOSPADM

## 2022-05-31 RX ORDER — ACETAMINOPHEN 650 MG/1
650 SUPPOSITORY RECTAL EVERY 6 HOURS PRN
Status: DISCONTINUED | OUTPATIENT
Start: 2022-05-31 | End: 2022-06-01

## 2022-05-31 RX ORDER — SODIUM CHLORIDE 9 MG/ML
INJECTION, SOLUTION INTRAVENOUS PRN
Status: COMPLETED | OUTPATIENT
Start: 2022-05-31 | End: 2022-06-01

## 2022-05-31 RX ORDER — METHIMAZOLE 5 MG/1
7.5 TABLET ORAL
Status: DISCONTINUED | OUTPATIENT
Start: 2022-06-01 | End: 2022-06-07 | Stop reason: HOSPADM

## 2022-05-31 RX ORDER — BUDESONIDE 0.5 MG/2ML
0.5 INHALANT ORAL 2 TIMES DAILY
Status: DISCONTINUED | OUTPATIENT
Start: 2022-05-31 | End: 2022-06-07 | Stop reason: HOSPADM

## 2022-05-31 RX ORDER — SODIUM CHLORIDE 0.9 % (FLUSH) 0.9 %
5-40 SYRINGE (ML) INJECTION EVERY 12 HOURS SCHEDULED
Status: DISCONTINUED | OUTPATIENT
Start: 2022-05-31 | End: 2022-06-07 | Stop reason: HOSPADM

## 2022-05-31 RX ORDER — ONDANSETRON 4 MG/1
4 TABLET, ORALLY DISINTEGRATING ORAL EVERY 8 HOURS PRN
Status: DISCONTINUED | OUTPATIENT
Start: 2022-05-31 | End: 2022-06-07 | Stop reason: HOSPADM

## 2022-05-31 RX ORDER — 0.9 % SODIUM CHLORIDE 0.9 %
1000 INTRAVENOUS SOLUTION INTRAVENOUS ONCE
Status: COMPLETED | OUTPATIENT
Start: 2022-05-31 | End: 2022-05-31

## 2022-05-31 RX ORDER — SODIUM CHLORIDE 0.9 % (FLUSH) 0.9 %
5-40 SYRINGE (ML) INJECTION PRN
Status: DISCONTINUED | OUTPATIENT
Start: 2022-05-31 | End: 2022-06-07 | Stop reason: HOSPADM

## 2022-05-31 RX ORDER — ARFORMOTEROL TARTRATE 15 UG/2ML
15 SOLUTION RESPIRATORY (INHALATION) 2 TIMES DAILY
Status: DISCONTINUED | OUTPATIENT
Start: 2022-05-31 | End: 2022-06-07 | Stop reason: HOSPADM

## 2022-05-31 RX ORDER — ALBUTEROL SULFATE 2.5 MG/3ML
2.5 SOLUTION RESPIRATORY (INHALATION) EVERY 4 HOURS PRN
Status: DISCONTINUED | OUTPATIENT
Start: 2022-05-31 | End: 2022-06-07 | Stop reason: HOSPADM

## 2022-05-31 RX ORDER — ONDANSETRON 2 MG/ML
4 INJECTION INTRAMUSCULAR; INTRAVENOUS EVERY 6 HOURS PRN
Status: DISCONTINUED | OUTPATIENT
Start: 2022-05-31 | End: 2022-06-07 | Stop reason: HOSPADM

## 2022-05-31 RX ADMIN — GABAPENTIN 100 MG: 100 CAPSULE ORAL at 17:14

## 2022-05-31 RX ADMIN — MONTELUKAST 10 MG: 10 TABLET, FILM COATED ORAL at 21:26

## 2022-05-31 RX ADMIN — SODIUM CHLORIDE, POTASSIUM CHLORIDE, SODIUM LACTATE AND CALCIUM CHLORIDE: 600; 310; 30; 20 INJECTION, SOLUTION INTRAVENOUS at 16:21

## 2022-05-31 RX ADMIN — SODIUM CHLORIDE, PRESERVATIVE FREE 10 ML: 5 INJECTION INTRAVENOUS at 21:27

## 2022-05-31 RX ADMIN — PANTOPRAZOLE SODIUM 40 MG: 40 INJECTION, POWDER, FOR SOLUTION INTRAVENOUS at 17:17

## 2022-05-31 RX ADMIN — GABAPENTIN 100 MG: 100 CAPSULE ORAL at 21:27

## 2022-05-31 RX ADMIN — METHIMAZOLE 5 MG: 5 TABLET ORAL at 17:14

## 2022-05-31 RX ADMIN — BUDESONIDE 500 MCG: 0.5 SUSPENSION RESPIRATORY (INHALATION) at 20:12

## 2022-05-31 RX ADMIN — ARFORMOTEROL TARTRATE 15 MCG: 15 SOLUTION RESPIRATORY (INHALATION) at 20:12

## 2022-05-31 RX ADMIN — SODIUM CHLORIDE 1000 ML: 0.9 INJECTION, SOLUTION INTRAVENOUS at 11:39

## 2022-05-31 ASSESSMENT — PAIN - FUNCTIONAL ASSESSMENT: PAIN_FUNCTIONAL_ASSESSMENT: 0-10

## 2022-05-31 ASSESSMENT — PAIN SCALES - GENERAL
PAINLEVEL_OUTOF10: 5
PAINLEVEL_OUTOF10: 6

## 2022-05-31 ASSESSMENT — PAIN DESCRIPTION - PAIN TYPE: TYPE: ACUTE PAIN;CHRONIC PAIN

## 2022-05-31 ASSESSMENT — PAIN DESCRIPTION - FREQUENCY: FREQUENCY: CONTINUOUS

## 2022-05-31 ASSESSMENT — PAIN DESCRIPTION - LOCATION: LOCATION: GENERALIZED

## 2022-05-31 ASSESSMENT — PAIN DESCRIPTION - DESCRIPTORS: DESCRIPTORS: ACHING

## 2022-05-31 NOTE — CONSENT
Informed Consent for Blood Component Transfusion Note    I have discussed with the patient the rationale for blood component transfusion; its benefits in treating or preventing fatigue, organ damage, or death; and its risk which includes mild transfusion reactions, rare risk of blood borne infection, or more serious but rare reactions. I have discussed the alternatives to transfusion, including the risk and consequences of not receiving transfusion. The patient had an opportunity to ask questions and had agreed to proceed with transfusion of blood components.     Electronically signed by Melinda Carmichael MD on 5/31/22 at 12:39 PM EDT

## 2022-05-31 NOTE — ED TRIAGE NOTES
Pt comes to the ED with complaints of generalized fatigues and weakness. Pt states her blood pressure has been low over the last few days. BP currently 119/52 (72) with HR 69.

## 2022-05-31 NOTE — PROGRESS NOTES
4 Eyes Admission Assessment     I agree as the admission nurse that 2 RN's have performed a thorough Head to Toe Skin Assessment on the patient. ALL assessment sites listed below have been assessed on admission.        Areas assessed by both nurses: Cesar Jeff and lizabeth    [x]   Head, Face, and Ears   [x]   Shoulders, Back, and Chest  [x]   Arms, Elbows, and Hands   [x]   Coccyx, Sacrum, and Ischum  [x]   Legs, Feet, and Heels    Soft heels bilaterally  Stage II coccyx  Dry skin generalized          Does the Patient have Skin Breakdown?  yes         [place] Prevention initiated:  Yes   Wound Care Orders initiated:  No      [facility] nurse consulted for Pressure Injury (Stage 3,4, Unstageable, DTI, NWPT, and Complex wounds):  No      Nurse 1 eSignature: Electronically signed by Susan Muniz RN on 5/31/22 at 5:34 PM EDT    **SHARE this note so that the co-signing nurse is able to place an eSignature**    Nurse 2 eSignature: Electronically signed by Cheryl Vega RN on 5/31/22 at 5:37 PM EDT

## 2022-05-31 NOTE — CONSULTS
[address] [place] [address]  GI Consultation                                                                 Patient: Bayron Patterson  : 1926       Date:  2022    Subjective:       History of Present Illness  Patient is a 80 y.o.  female admitted with Acute gastrointestinal bleeding [K92.2]  Anemia, unspecified type [D64.9] who is seen in consult for anemia. Hg drop from 14 to 6 in last 3 weeks, patient unable to see commode but caretakers have not mentioned GI bleeding. No abdominal pain N/V. Frequent heartburn, ? Dysphagia. 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    PCR    Clostridium difficile infection 1/27/15    AG positive    Degenerative joint disease of knee     Depression     Diverticulitis     GERD (gastroesophageal reflux disease)     Grave's disease 2012    Heart disease     Hypertension     IBS (irritable bowel syndrome)     Macular degeneration     MGUS (monoclonal gammopathy of unknown significance)     Osteoporosis     Poor vision     d/t macular degeneration    Simple cyst of kidney       Past Surgical History:   Procedure Laterality Date    CHOLECYSTECTOMY      COLONOSCOPY  10/29/2009    **see scanned report- [facility]    HEMORRHOID SURGERY      HYSTERECTOMY  in 40's    rudolph/bso    HYSTERECTOMY, TOTAL ABDOMINAL        Past Endoscopic History EGD/colon  1 colonic adenoma    Admission Meds  No current facility-administered medications on file prior to encounter.      Current Outpatient Medications on File Prior to Encounter   Medication Sig Dispense Refill    fluticasone (FLONASE) 50 MCG/ACT nasal spray PLACE 1 SPRAY IN EACH NOSTRIL DAILY 3 each 3    triamcinolone (KENALOG) 0.1 % cream Apply topically 2 times daily to reddened area right calf. 80 g 0    rivaroxaban 15 & 20 MG Starter Pack Take as directed on package. 1 each 0    rivaroxaban (XARELTO) 20 MG TABS tablet Take 1 tablet by mouth daily (with breakfast) To start after finishing Xarelto starter pack 90 tablet 1    atenolol (TENORMIN) 50 MG tablet TAKE 1 TABLET DAILY 90 tablet 3    pantoprazole (PROTONIX) 40 MG tablet TAKE 1 TABLET BY MOUTH TWICE A  tablet 1    spironolactone (ALDACTONE) 25 MG tablet TAKE 1 TABLET DAILY 90 tablet 3    felodipine (PLENDIL) 5 MG extended release tablet TAKE 1 TABLET NIGHTLY. DISCONTINUE 2.5 MG         FELODIPINE PRESCRIPTION 90 tablet 3    ipratropium (ATROVENT) 0.03 % nasal spray USE 1 SPRAY IN EACH NOSTRIL AT BEDTIME 30 mL 1    terbinafine (LAMISIL) 1 % cream Apply topically 2 times daily. 42 g 1    vitamin D (ERGOCALCIFEROL) 1.25 MG (64695 UT) CAPS capsule Take 1 capsule by mouth once a week 12 capsule 3    gabapentin (NEURONTIN) 100 MG capsule TAKE 1 CAPSULE BY MOUTH 3 TIMES DAILY  DAYS. INTENDED SUPPLY: 30 DAYS 90 capsule 5    montelukast (SINGULAIR) 10 MG tablet TAKE 1 TABLET NIGHTLY 90 tablet 1    nystatin (MYCOSTATIN) 453278 UNIT/ML suspension Take 5 mLs by mouth 4 times daily 60 mL 1    hydrocortisone (ANUSOL-HC) 25 MG suppository Place 1 suppository rectally every 12 hours 24 suppository 0    albuterol sulfate (PROAIR RESPICLICK) 843 (90 Base) MCG/ACT aerosol powder inhalation Inhale 2 puffs into the lungs every 6 hours as needed for Wheezing or Shortness of Breath 1 Inhaler 5    ammonium lactate (LAC-HYDRIN) 12 % lotion Apply topically daily. 225 g 5    budesonide-formoterol (SYMBICORT) 160-4.5 MCG/ACT AERO Inhale 2 puffs into the lungs 2 times daily 1 Inhaler 11    fluocinonide (LIDEX) 0.05 % cream Apply topically 2 times daily. 60 g 1    lidocaine (XYLOCAINE) 5 % ointment Apply topically three times a day, as needed.  2 Tube 1    polyethylene glycol (GLYCOLAX) 17 g packet Take 17 g by mouth 2 times daily 100 each 5    methIMAzole (TAPAZOLE) 5 MG tablet Take 1 tablet by mouth See Admin Instructions 1 tablet by mouth daily except for Sunday, Monday, Wednesday take 1.5 tablets. 120 tablet 1    Simethicone (GAS RELIEF) 180 MG CAPS Take 180 mg by mouth 4 times daily as needed (bloating) 360 capsule 3           Allergies  Allergies   Allergen Reactions    Ace Inhibitors Other (See Comments)     angioedema    Acyclovir      Severe nausea  And anorexia. It happened after taking each 800 mg tablet and patient took one one day and one the next, then stopped an no more    Creon [Pancrelipase (Lip-Prot-Amyl)]      Swollen gums.  Dye [Iodides] Hives     Only reacts to dye that went into eyes, OK with IV contrast    Eggs Or Egg-Derived Products      Hives  this test in the [facility] Directory ([facility]). Egg White IgE 3. 59High   <=0.34 kU/L Final 02/10/2016  2:14 PM [facility]                  Gluten Meal      Abdominal pain and diarreha  Wheat IgE 0.53  <=0.34 kU/L Final 02/10/2016  2:14 PM [facility]       Milk-Related Compounds      Milk, Cow's IgE 1. 75High   <=0.34 kU/L Final 02/10/2016  2:14 PM [facility]    Fluorescein Other (See Comments)    Lisinopril Swelling    Macrobid [Nitrofurantoin Monohydrate Macrocrystals] Other (See Comments)    No Known Allergies     Ciprocinonide [Fluocinolone] Rash     Stiff, no nausea or rash    Sulfa Antibiotics Rash      Social   Social History     Tobacco Use    Smoking status: Never Smoker    Smokeless tobacco: Never Used   Substance Use Topics    Alcohol use: No        Family History   Problem Relation Age of Onset    Heart Disease Mother     High Blood Pressure Mother     Stroke Mother     Cancer Father         multiple myeloma        Review of Systems  A comprehensive review of systems was negative except for: Constitutional: positive for fatigue       Physical Exam    /67   Pulse 61   Temp 97.7 °F (36.5 °C) (Oral) Resp 18   Ht 5' 9\" (1.753 m)   Wt 142 lb 14.4 oz (64.8 kg)   LMP  (LMP Unknown)   SpO2 96%   BMI 21.10 kg/m²   General appearance: alert, cooperative, no distress, appears stated age  Anicteric, No Jaundice  Head: Normocephalic, without obvious abnormality  Lungs: clear to auscultation bilaterally, Normal Effort  Heart: regular rate and rhythm, normal S1 and S2, no murmurs or rubs  Abdomen: soft, non-tender; bowel sounds normal; no masses,  no organomegaly  Extremities: atraumatic, no cyanosis or edema  Skin: warm and dry  Neuro: intact  AAOX3      Data Review:    Recent Labs     05/31/22  1136   WBC 11.3*   HGB 5.9*   HCT 18.4*   MCV 79.8*        Recent Labs     05/31/22  1136      K 4.6      CO2 22   BUN 20   CREATININE 1.1       Recent Labs     05/31/22  1136   PROTIME 16.9*   INR 1.38*       Assessment:     Principal Problem:    Acute gastrointestinal bleeding  Resolved Problems:    * No resolved hospital problems. *    Severe anemia subacute, no definite GI bleeding but patient unable to see well enough but caretakers have not noticed any blood or melena. VSS. Consider ulcer, AVM, neoplasm etc    Recommendations:     EGD tomorrow if negative will need to decide if she wants or can tolerate a colonoscopy would get Palliative care involved to help with decision making  Repeat CBC to make sure not an error  Hold Xarelto if possible  Empiric PPI    Thank you for the opportunity to participate in Rusty Hsieh's care.     Ruel Hall MD

## 2022-05-31 NOTE — RT PROTOCOL NOTE
RT Inhaler-Nebulizer Bronchodilator Protocol Note    There is a bronchodilator order in the chart from a provider indicating to follow the RT Bronchodilator Protocol and there is an Initiate RT Inhaler-Nebulizer Bronchodilator Protocol order as well (see protocol at bottom of note). CXR Findings:  XR CHEST PORTABLE    Result Date: 5/31/2022  No acute radiographic abnormality of the chest.      The findings from the last RT Protocol Assessment were as follows:   History Pulmonary Disease: Chronic pulmonary disease  Respiratory Pattern: Regular pattern and RR 12-20 bpm  Breath Sounds: Clear breath sounds  Cough: Strong, spontaneous, non-productive  Indication for Bronchodilator Therapy: On home bronchodilators  Bronchodilator Assessment Score: 2    Aerosolized bronchodilator medication orders have been revised according to the RT Inhaler-Nebulizer Bronchodilator Protocol below. Respiratory Therapist to perform RT Therapy Protocol Assessment initially then follow the protocol. Repeat RT Therapy Protocol Assessment PRN for score 0-3 or on second treatment, BID, and PRN for scores above 3. No Indications - adjust the frequency to every 6 hours PRN wheezing or bronchospasm, if no treatments needed after 48 hours then discontinue using Per Protocol order mode. If indication present, adjust the RT bronchodilator orders based on the Bronchodilator Assessment Score as indicated below. Use Inhaler orders unless patient has one or more of the following: on home nebulizer, not able to hold breath for 10 seconds, is not alert and oriented, cannot activate and use MDI correctly, or respiratory rate 25 breaths per minute or more, then use the equivalent nebulizer order(s) with same Frequency and PRN reasons based on the score. If a patient is on this medication at home then do not decrease Frequency below that used at home.     0-3 - enter or revise RT bronchodilator order(s) to equivalent RT Bronchodilator order with Frequency of every 4 hours PRN for wheezing or increased work of breathing using Per Protocol order mode. 4-6 - enter or revise RT Bronchodilator order(s) to two equivalent RT bronchodilator orders with one order with BID Frequency and one order with Frequency of every 4 hours PRN wheezing or increased work of breathing using Per Protocol order mode. 7-10 - enter or revise RT Bronchodilator order(s) to two equivalent RT bronchodilator orders with one order with TID Frequency and one order with Frequency of every 4 hours PRN wheezing or increased work of breathing using Per Protocol order mode. 11-13 - enter or revise RT Bronchodilator order(s) to one equivalent RT bronchodilator order with QID Frequency and an Albuterol order with Frequency of every 4 hours PRN wheezing or increased work of breathing using Per Protocol order mode. Greater than 13 - enter or revise RT Bronchodilator order(s) to one equivalent RT bronchodilator order with every 4 hours Frequency and an Albuterol order with Frequency of every 2 hours PRN wheezing or increased work of breathing using Per Protocol order mode. RT to enter RT Home Evaluation for COPD & MDI Assessment order using Per Protocol order mode.     Electronically signed by Chivo Pollock RCP on 5/31/2022 at 3:46 PM

## 2022-05-31 NOTE — ED PROVIDER NOTES
[address] [facility]          ATTENDING PHYSICIAN NOTE       Date of evaluation: 5/31/2022    Chief Complaint     Fatigue (x a few days)      History of Present Illness     Luis Enrique Goldman is a 80 y.o. female who presents to the emergency room today complaining of several days of fatigue and low blood pressure. States that all weekend her blood pressures been running low in the 95F systolic, she has stopped taking her home blood pressure medications because of this. She complains of some pain in her bilateral legs particularly right leg and states she was diagnosed with a blood clot there about a week ago and started on blood thinners. She reports no chest pain or difficulty breathing. Reports no cough or fever. Review of Systems     Review of Systems  All systems were reviewed with the patient/family and negative except as otherwise documented in the HPI. 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 She has a past surgical history that includes Cholecystectomy; Colonoscopy (10/29/2009); Hemorrhoid surgery; Hysterectomy (in 40's); and Hysterectomy, total abdominal.  Her family history includes Cancer in her father; Heart Disease in her mother; High Blood Pressure in her mother; Stroke in her mother. She reports that she has never smoked.  She has never used smokeless tobacco. She reports that she does not drink alcohol and does not use drugs. Medications     Previous Medications    ALBUTEROL SULFATE (PROAIR RESPICLICK) 791 (90 BASE) MCG/ACT AEROSOL POWDER INHALATION    Inhale 2 puffs into the lungs every 6 hours as needed for Wheezing or Shortness of Breath    AMMONIUM LACTATE (LAC-HYDRIN) 12 % LOTION    Apply topically daily. ATENOLOL (TENORMIN) 50 MG TABLET    TAKE 1 TABLET DAILY    BUDESONIDE-FORMOTEROL (SYMBICORT) 160-4.5 MCG/ACT AERO    Inhale 2 puffs into the lungs 2 times daily    FELODIPINE (PLENDIL) 5 MG EXTENDED RELEASE TABLET    TAKE 1 TABLET NIGHTLY. DISCONTINUE 2.5 MG         FELODIPINE PRESCRIPTION    FLUOCINONIDE (LIDEX) 0.05 % CREAM    Apply topically 2 times daily. FLUTICASONE (FLONASE) 50 MCG/ACT NASAL SPRAY    PLACE 1 SPRAY IN EACH NOSTRIL DAILY    GABAPENTIN (NEURONTIN) 100 MG CAPSULE    TAKE 1 CAPSULE BY MOUTH 3 TIMES DAILY  DAYS. INTENDED SUPPLY: 30 DAYS    HYDROCORTISONE (ANUSOL-HC) 25 MG SUPPOSITORY    Place 1 suppository rectally every 12 hours    IPRATROPIUM (ATROVENT) 0.03 % NASAL SPRAY    USE 1 SPRAY IN EACH NOSTRIL AT BEDTIME    LIDOCAINE (XYLOCAINE) 5 % OINTMENT    Apply topically three times a day, as needed. METHIMAZOLE (TAPAZOLE) 5 MG TABLET    Take 1 tablet by mouth See Admin Instructions 1 tablet by mouth daily except for Sunday, Monday, Wednesday take 1.5 tablets. MONTELUKAST (SINGULAIR) 10 MG TABLET    TAKE 1 TABLET NIGHTLY    NYSTATIN (MYCOSTATIN) 060692 UNIT/ML SUSPENSION    Take 5 mLs by mouth 4 times daily    PANTOPRAZOLE (PROTONIX) 40 MG TABLET    TAKE 1 TABLET BY MOUTH TWICE A DAY    POLYETHYLENE GLYCOL (GLYCOLAX) 17 G PACKET    Take 17 g by mouth 2 times daily    RIVAROXABAN (XARELTO) 20 MG TABS TABLET    Take 1 tablet by mouth daily (with breakfast) To start after finishing Xarelto starter pack    RIVAROXABAN 15 & 20 MG STARTER PACK    Take as directed on package.     SIMETHICONE (GAS RELIEF) 180 MG CAPS    Take 180 mg by mouth 4 times daily as needed (bloating) SPIRONOLACTONE (ALDACTONE) 25 MG TABLET    TAKE 1 TABLET DAILY    TERBINAFINE (LAMISIL) 1 % CREAM    Apply topically 2 times daily. TRIAMCINOLONE (KENALOG) 0.1 % CREAM    Apply topically 2 times daily to reddened area right calf. VITAMIN D (ERGOCALCIFEROL) 1.25 MG (78741 UT) CAPS CAPSULE    Take 1 capsule by mouth once a week       Allergies     She is allergic to ace inhibitors, acyclovir, creon [pancrelipase (lip-prot-amyl)], dye [iodides], eggs or egg-derived products, gluten meal, milk-related compounds, fluorescein, lisinopril, macrobid [nitrofurantoin monohydrate macrocrystals], no known allergies, ciprocinonide [fluocinolone], and sulfa antibiotics. Physical Exam     INITIAL VITALS: BP: (!) 119/52, Temp: 98.2 °F (36.8 °C), Heart Rate: 77, Resp: 15, SpO2: 99 %   Physical Exam  Constitutional: Well-nourished appearing elderly female sitting up in the stretcher comfortable in no immediate distress  Eyes:  Sclera anicteric. HEENT:  Normocephalic, oropharynx moist.   Neck: normal range of motion, supple. Respiratory:  Even and non-labored, no distress   Cardiovascular:  Extremities warm, well perfused, 3 out of 6 systolic murmur, equal pulses in all extremities  GI:  Soft, nondistended   Musculoskeletal:  No edema, no deformities. Skin:  No rash, no lesions, no pallor   Neurologic:  Awake and alert. Moving all extremities purposefully and with grossly normal coordination. Psychiatric:  Normal mood. Behavior appropriate. Diagnostic Results     EKG   Normal sinus rhythm first-degree AV block nonspecific ST segment changes otherwise unchanged compared to baseline EKGs.     RADIOLOGY:  XR CHEST PORTABLE    (Results Pending)       LABS:   Results for orders placed or performed during the hospital encounter of 05/31/22   EKG 12 Lead   Result Value Ref Range    Ventricular Rate 60 BPM    Atrial Rate 60 BPM    P-R Interval 256 ms    QRS Duration 84 ms    Q-T Interval 428 ms    QTc Calculation (Bazett) 428 ms    P Axis 66 degrees    R Axis -6 degrees    T Axis 11 degrees    Diagnosis       Sinus rhythm with 1st degree A-V blockVoltage criteria for left ventricular hypertrophyAbnormal ECG       RECENT VITALS:  BP: (!) 119/52,Temp: 98.2 °F (36.8 °C), Heart Rate: 77, Resp: 15, SpO2: 99 %     Procedures         ED Course     Nursing Notes, Past Medical Hx, Past Surgical Hx, Social Hx,Allergies, and Family Hx were reviewed. patient was given the following medications:  Orders Placed This Encounter   Medications    0.9 % sodium chloride bolus       CONSULTS:  None    MEDICAL DECISIONMAKING / ASSESSMENT / Elaine Scooby is a 80 y.o. female who presents to the ED today with fatigue and feeling unwell for the past week with low blood pressures. On arrival she appears well, no distress, vital signs notable for relative hypotension. Given she was recently started on DOAC for PE, concern is for bleeding or anemia as the etiology of her symptoms though differential remains broad given no observed bleeding. IV established and labs sent. ECG without acute ischemia. Labs notable for low Hg, pt typed and screened and consented for blood transfusion. Suspect GIB at this point and given protonix IV. Discussed the patient's case with the hospitalist and they were admitted in stable condition. .    Critical Care:  Due to the immediate potential for life-threatening deterioration due to hypotension, GI bleeding on anticoagulants, I spent 32 minutes providing critical care. This time excludes time spent performing procedures but includes time spent on direct patient care, history retrieval, review of the chart, and discussions with patient, family, and consultant(s). Clinical Impression     1. Anemia, unspecified type    2. Iron deficiency anemia due to chronic blood loss        Disposition     PATIENT REFERRED TO:  No follow-up provider specified.     DISCHARGE MEDICATIONS:  New Prescriptions    No medications on file Warden Chandana MD  06/02/22 0020

## 2022-06-01 ENCOUNTER — ANESTHESIA EVENT (OUTPATIENT)
Dept: ENDOSCOPY | Age: 87
DRG: 378 | End: 2022-06-01
Payer: MEDICARE

## 2022-06-01 ENCOUNTER — ANESTHESIA (OUTPATIENT)
Dept: ENDOSCOPY | Age: 87
DRG: 378 | End: 2022-06-01
Payer: MEDICARE

## 2022-06-01 ENCOUNTER — APPOINTMENT (OUTPATIENT)
Dept: INTERVENTIONAL RADIOLOGY/VASCULAR | Age: 87
DRG: 378 | End: 2022-06-01
Payer: MEDICARE

## 2022-06-01 LAB
BILIRUBIN URINE: NEGATIVE
BLOOD, URINE: ABNORMAL
CLARITY: CLEAR
COLOR: YELLOW
GLUCOSE URINE: NEGATIVE MG/DL
HAPTOGLOBIN: 109 MG/DL (ref 30–200)
HCT VFR BLD CALC: 25.4 % (ref 36–48)
HEMOGLOBIN: 8.2 G/DL (ref 12–16)
KETONES, URINE: NEGATIVE MG/DL
LEUKOCYTE ESTERASE, URINE: NEGATIVE
MICROSCOPIC EXAMINATION: YES
NITRITE, URINE: NEGATIVE
PH UA: 6.5 (ref 5–8)
PROTEIN UA: NEGATIVE MG/DL
RBC UA: NORMAL /HPF (ref 0–4)
SPECIFIC GRAVITY UA: 1.01 (ref 1–1.03)
URINE REFLEX TO CULTURE: ABNORMAL
URINE TYPE: ABNORMAL
UROBILINOGEN, URINE: 0.2 E.U./DL
WBC UA: NORMAL /HPF (ref 0–5)

## 2022-06-01 PROCEDURE — 6360000002 HC RX W HCPCS: Performed by: INTERNAL MEDICINE

## 2022-06-01 PROCEDURE — C1769 GUIDE WIRE: HCPCS

## 2022-06-01 PROCEDURE — C9113 INJ PANTOPRAZOLE SODIUM, VIA: HCPCS | Performed by: INTERNAL MEDICINE

## 2022-06-01 PROCEDURE — 94640 AIRWAY INHALATION TREATMENT: CPT

## 2022-06-01 PROCEDURE — 6360000002 HC RX W HCPCS

## 2022-06-01 PROCEDURE — 0DJ08ZZ INSPECTION OF UPPER INTESTINAL TRACT, VIA NATURAL OR ARTIFICIAL OPENING ENDOSCOPIC: ICD-10-PCS | Performed by: INTERNAL MEDICINE

## 2022-06-01 PROCEDURE — 06H03DZ INSERTION OF INTRALUMINAL DEVICE INTO INFERIOR VENA CAVA, PERCUTANEOUS APPROACH: ICD-10-PCS | Performed by: RADIOLOGY

## 2022-06-01 PROCEDURE — 6370000000 HC RX 637 (ALT 250 FOR IP): Performed by: NURSE PRACTITIONER

## 2022-06-01 PROCEDURE — 3609012400 HC EGD TRANSORAL BIOPSY SINGLE/MULTIPLE: Performed by: INTERNAL MEDICINE

## 2022-06-01 PROCEDURE — 88305 TISSUE EXAM BY PATHOLOGIST: CPT

## 2022-06-01 PROCEDURE — 81001 URINALYSIS AUTO W/SCOPE: CPT

## 2022-06-01 PROCEDURE — C1880 VENA CAVA FILTER: HCPCS

## 2022-06-01 PROCEDURE — 6370000000 HC RX 637 (ALT 250 FOR IP): Performed by: INTERNAL MEDICINE

## 2022-06-01 PROCEDURE — 2580000003 HC RX 258: Performed by: EMERGENCY MEDICINE

## 2022-06-01 PROCEDURE — 85018 HEMOGLOBIN: CPT

## 2022-06-01 PROCEDURE — 85014 HEMATOCRIT: CPT

## 2022-06-01 PROCEDURE — 37191 INS ENDOVAS VENA CAVA FILTR: CPT

## 2022-06-01 PROCEDURE — 99221 1ST HOSP IP/OBS SF/LOW 40: CPT | Performed by: NURSE PRACTITIONER

## 2022-06-01 PROCEDURE — C1887 CATHETER, GUIDING: HCPCS

## 2022-06-01 PROCEDURE — 6360000004 HC RX CONTRAST MEDICATION: Performed by: RADIOLOGY

## 2022-06-01 PROCEDURE — 36415 COLL VENOUS BLD VENIPUNCTURE: CPT

## 2022-06-01 PROCEDURE — 3700000001 HC ADD 15 MINUTES (ANESTHESIA): Performed by: INTERNAL MEDICINE

## 2022-06-01 PROCEDURE — 2500000003 HC RX 250 WO HCPCS: Performed by: NURSE ANESTHETIST, CERTIFIED REGISTERED

## 2022-06-01 PROCEDURE — 3700000000 HC ANESTHESIA ATTENDED CARE: Performed by: INTERNAL MEDICINE

## 2022-06-01 PROCEDURE — C1894 INTRO/SHEATH, NON-LASER: HCPCS

## 2022-06-01 PROCEDURE — 2580000003 HC RX 258: Performed by: INTERNAL MEDICINE

## 2022-06-01 PROCEDURE — 7100000010 HC PHASE II RECOVERY - FIRST 15 MIN: Performed by: INTERNAL MEDICINE

## 2022-06-01 PROCEDURE — 1200000000 HC SEMI PRIVATE

## 2022-06-01 PROCEDURE — 6360000002 HC RX W HCPCS: Performed by: NURSE ANESTHETIST, CERTIFIED REGISTERED

## 2022-06-01 PROCEDURE — 2709999900 HC NON-CHARGEABLE SUPPLY: Performed by: INTERNAL MEDICINE

## 2022-06-01 PROCEDURE — 7100000011 HC PHASE II RECOVERY - ADDTL 15 MIN: Performed by: INTERNAL MEDICINE

## 2022-06-01 PROCEDURE — 99152 MOD SED SAME PHYS/QHP 5/>YRS: CPT

## 2022-06-01 RX ORDER — LIDOCAINE HYDROCHLORIDE 20 MG/ML
INJECTION, SOLUTION INTRAVENOUS PRN
Status: DISCONTINUED | OUTPATIENT
Start: 2022-06-01 | End: 2022-06-01 | Stop reason: SDUPTHER

## 2022-06-01 RX ORDER — IODIXANOL 320 MG/ML
150 INJECTION, SOLUTION INTRAVASCULAR
Status: COMPLETED | OUTPATIENT
Start: 2022-06-01 | End: 2022-06-01

## 2022-06-01 RX ORDER — ETOMIDATE 2 MG/ML
INJECTION INTRAVENOUS PRN
Status: DISCONTINUED | OUTPATIENT
Start: 2022-06-01 | End: 2022-06-01 | Stop reason: SDUPTHER

## 2022-06-01 RX ORDER — LIDOCAINE 4 G/G
1 PATCH TOPICAL DAILY
Status: DISCONTINUED | OUTPATIENT
Start: 2022-06-01 | End: 2022-06-07 | Stop reason: HOSPADM

## 2022-06-01 RX ORDER — ACETAMINOPHEN 325 MG/1
650 TABLET ORAL EVERY 4 HOURS PRN
Status: DISCONTINUED | OUTPATIENT
Start: 2022-06-01 | End: 2022-06-03

## 2022-06-01 RX ORDER — SODIUM CHLORIDE 9 MG/ML
INJECTION, SOLUTION INTRAVENOUS ONCE
Status: CANCELLED | OUTPATIENT
Start: 2022-06-01

## 2022-06-01 RX ORDER — PROPOFOL 10 MG/ML
INJECTION, EMULSION INTRAVENOUS PRN
Status: DISCONTINUED | OUTPATIENT
Start: 2022-06-01 | End: 2022-06-01 | Stop reason: SDUPTHER

## 2022-06-01 RX ADMIN — PROPOFOL 10 MG: 10 INJECTION, EMULSION INTRAVENOUS at 08:51

## 2022-06-01 RX ADMIN — PROPOFOL 10 MG: 10 INJECTION, EMULSION INTRAVENOUS at 08:53

## 2022-06-01 RX ADMIN — FLUTICASONE PROPIONATE 2 SPRAY: 50 SPRAY, METERED NASAL at 11:15

## 2022-06-01 RX ADMIN — ACETAMINOPHEN 650 MG: 650 SUPPOSITORY RECTAL at 06:42

## 2022-06-01 RX ADMIN — METHIMAZOLE 7.5 MG: 5 TABLET ORAL at 11:11

## 2022-06-01 RX ADMIN — GABAPENTIN 100 MG: 100 CAPSULE ORAL at 20:29

## 2022-06-01 RX ADMIN — SODIUM CHLORIDE, PRESERVATIVE FREE 10 ML: 5 INJECTION INTRAVENOUS at 11:18

## 2022-06-01 RX ADMIN — SODIUM CHLORIDE: 9 INJECTION, SOLUTION INTRAVENOUS at 08:43

## 2022-06-01 RX ADMIN — IODIXANOL 150 ML: 320 INJECTION, SOLUTION INTRAVASCULAR at 10:12

## 2022-06-01 RX ADMIN — ETOMIDATE 10 MG: 2 INJECTION, SOLUTION INTRAVENOUS at 08:51

## 2022-06-01 RX ADMIN — BUDESONIDE 500 MCG: 0.5 SUSPENSION RESPIRATORY (INHALATION) at 20:11

## 2022-06-01 RX ADMIN — SODIUM CHLORIDE, PRESERVATIVE FREE 10 ML: 5 INJECTION INTRAVENOUS at 20:29

## 2022-06-01 RX ADMIN — ARFORMOTEROL TARTRATE 15 MCG: 15 SOLUTION RESPIRATORY (INHALATION) at 20:11

## 2022-06-01 RX ADMIN — LIDOCAINE HYDROCHLORIDE 60 MG: 20 INJECTION, SOLUTION INTRAVENOUS at 08:48

## 2022-06-01 RX ADMIN — GABAPENTIN 100 MG: 100 CAPSULE ORAL at 14:30

## 2022-06-01 RX ADMIN — PROPOFOL 10 MG: 10 INJECTION, EMULSION INTRAVENOUS at 08:52

## 2022-06-01 RX ADMIN — MONTELUKAST 10 MG: 10 TABLET, FILM COATED ORAL at 20:29

## 2022-06-01 RX ADMIN — ETOMIDATE 10 MG: 2 INJECTION, SOLUTION INTRAVENOUS at 08:52

## 2022-06-01 RX ADMIN — PANTOPRAZOLE SODIUM 40 MG: 40 INJECTION, POWDER, FOR SOLUTION INTRAVENOUS at 11:12

## 2022-06-01 RX ADMIN — PANTOPRAZOLE SODIUM 40 MG: 40 INJECTION, POWDER, FOR SOLUTION INTRAVENOUS at 20:29

## 2022-06-01 RX ADMIN — GABAPENTIN 100 MG: 100 CAPSULE ORAL at 11:11

## 2022-06-01 RX ADMIN — PROPOFOL 10 MG: 10 INJECTION, EMULSION INTRAVENOUS at 08:57

## 2022-06-01 ASSESSMENT — ENCOUNTER SYMPTOMS
RESPIRATORY NEGATIVE: 1
GASTROINTESTINAL NEGATIVE: 1

## 2022-06-01 ASSESSMENT — PAIN SCALES - GENERAL: PAINLEVEL_OUTOF10: 5

## 2022-06-01 ASSESSMENT — PAIN - FUNCTIONAL ASSESSMENT: PAIN_FUNCTIONAL_ASSESSMENT: NONE - DENIES PAIN

## 2022-06-01 NOTE — PLAN OF CARE
Problem: ABCDS Injury Assessment  Goal: Absence of physical injury  Outcome: Progressing     Problem: Safety - Adult  Goal: Free from fall injury  Outcome: Progressing     Problem: Discharge Planning  Goal: Discharge to home or other facility with appropriate resources  Outcome: Progressing     Problem: Pain  Goal: Verbalizes/displays adequate comfort level or baseline comfort level  Outcome: Progressing     Problem: Chronic Conditions and Co-morbidities  Goal: Patient's chronic conditions and co-morbidity symptoms are monitored and maintained or improved  Outcome: Progressing     Problem: Skin/Tissue Integrity  Goal: Absence of new skin breakdown  Description: 1. Monitor for areas of redness and/or skin breakdown  2. Assess vascular access sites hourly  3. Every 4-6 hours minimum:  Change oxygen saturation probe site  4. Every 4-6 hours:  If on nasal continuous positive airway pressure, respiratory therapy assess nares and determine need for appliance change or resting period.   Outcome: Progressing

## 2022-06-01 NOTE — ANESTHESIA PRE PROCEDURE
Department of Anesthesiology  Preprocedure Note       Name:  Waleska Hidalgo   Age:  80 y.o.  :  1926                                          MRN:  [id number]         Date:  2022      Surgeon: Cyndi Noble):  Js Barrett MD    Procedure: Procedure(s):  EGD ESOPHAGOGASTRODUODENOSCOPY    Medications prior to admission:   Prior to Admission medications    Medication Sig Start Date End Date Taking? Authorizing Provider   fluticasone (FLONASE) 50 MCG/ACT nasal spray PLACE 1 SPRAY IN EACH NOSTRIL DAILY 22   Meredith Rose MD   triamcinolone (KENALOG) 0.1 % cream Apply topically 2 times daily to reddened area right calf. 22   Meredith Rose MD   rivaroxaban 15 & 20 MG Starter Pack Take as directed on package. 22   Meredith Rose MD   rivaroxaban (XARELTO) 20 MG TABS tablet Take 1 tablet by mouth daily (with breakfast) To start after finishing Xarelto starter pack 22   Meredith Rose MD   atenolol (TENORMIN) 50 MG tablet TAKE 1 TABLET DAILY 22   Meredith Rose MD   pantoprazole (PROTONIX) 40 MG tablet TAKE 1 TABLET BY MOUTH TWICE A DAY 3/23/22   Meredith Rose MD   spironolactone (ALDACTONE) 25 MG tablet TAKE 1 TABLET DAILY 22   Meredith Rose MD   felodipine (PLENDIL) 5 MG extended release tablet TAKE 1 TABLET NIGHTLY. DISCONTINUE 2.5 MG         FELODIPINE PRESCRIPTION 22   Meredith Rose MD   ipratropium (ATROVENT) 0.03 % nasal spray USE 1 SPRAY IN EACH NOSTRIL AT BEDTIME 22   Meredith Rose MD   terbinafine (LAMISIL) 1 % cream Apply topically 2 times daily. 1/3/22   Meredith Rose MD   vitamin D (ERGOCALCIFEROL) 1.25 MG (68485 UT) CAPS capsule Take 1 capsule by mouth once a week 21   Meredith Rose MD   gabapentin (NEURONTIN) 100 MG capsule TAKE 1 CAPSULE BY MOUTH 3 TIMES DAILY  DAYS.  INTENDED SUPPLY: 30 DAYS 21  Meredith Rose MD   montelukast (SINGULAIR) 10 MG tablet TAKE 1 TABLET NIGHTLY 21   Meredith Rose MD   nystatin (MYCOSTATIN) 476012 UNIT/ML suspension Take 5 mLs by mouth 4 times daily 8/11/21   Gilberto Dallas MD   hydrocortisone (ANUSOL-HC) 25 MG suppository Place 1 suppository rectally every 12 hours 5/14/21   Gilberto Dallas MD   albuterol sulfate (PROAIR RESPICLICK) 670 (90 Base) MCG/ACT aerosol powder inhalation Inhale 2 puffs into the lungs every 6 hours as needed for Wheezing or Shortness of Breath 1/29/21   Gilberto Dallas MD   ammonium lactate (LAC-HYDRIN) 12 % lotion Apply topically daily. 1/29/21   Gilberto Dallas MD   budesonide-formoterol [facility]) 160-4.5 MCG/ACT AERO Inhale 2 puffs into the lungs 2 times daily 1/29/21   Gilberto Dallas MD   fluocinonide (LIDEX) 0.05 % cream Apply topically 2 times daily. 1/29/21   Gilberto Dallas MD   lidocaine (XYLOCAINE) 5 % ointment Apply topically three times a day, as needed. 1/29/21   Gilberto Dallas MD   polyethylene glycol (GLYCOLAX) 17 g packet Take 17 g by mouth 2 times daily 1/29/21   Gilberto Dallas MD   methIMAzole (TAPAZOLE) 5 MG tablet Take 1 tablet by mouth See Admin Instructions 1 tablet by mouth daily except for Sunday, Monday, Wednesday take 1.5 tablets.  1/29/21   Gilberto Dallas MD   Simethicone (GAS RELIEF) 180 MG CAPS Take 180 mg by mouth 4 times daily as needed (bloating) 1/29/21   Gilberto Dallas MD       Current medications:    Current Facility-Administered Medications   Medication Dose Route Frequency Provider Last Rate Last Admin    0.9 % sodium chloride infusion   IntraVENous PRN Joseph Downing MD        albuterol (PROVENTIL) nebulizer solution 2.5 mg  2.5 mg Nebulization Q4H PRN Tiera Bowman MD        fluticasone [facility]) 50 MCG/ACT nasal spray 2 spray  2 spray Each Nostril Daily Tiera Bowman MD        gabapentin (NEURONTIN) capsule 100 mg  100 mg Oral TID Tiera Bowman MD   100 mg at 05/31/22 2127    montelukast (SINGULAIR) tablet 10 mg  10 mg Oral Nightly Tiera Bowman MD   10 mg at 05/31/22 2129    sodium chloride flush 0.9 % injection 5-40 mL  5-40 mL IntraVENous 2 times per day Yara Dumont MD   10 mL at 05/31/22 2127    sodium chloride flush 0.9 % injection 5-40 mL  5-40 mL IntraVENous PRN Yara Dumont MD        0.9 % sodium chloride infusion   IntraVENous PRN Yara Dumont MD        ondansetron (ZOFRAN-ODT) disintegrating tablet 4 mg  4 mg Oral Q8H PRN Yara Dumont MD        Or    ondansetron [facility]) injection 4 mg  4 mg IntraVENous Q6H PRN Yara Dumont MD        polyethylene glycol [facility]) packet 17 g  17 g Oral Daily PRN Yara Dumont MD        acetaminophen (TYLENOL) suppository 650 mg  650 mg Rectal Q6H PRN Yara Dumont MD   650 mg at 06/01/22 3094    pantoprazole (PROTONIX) injection 40 mg  40 mg IntraVENous BID Yara Dumont MD   40 mg at 05/31/22 1717    budesonide (PULMICORT) nebulizer suspension 500 mcg  0.5 mg Nebulization BID Yara Dumont MD   500 mcg at 05/31/22 2012    Arformoterol Tartrate (BROVANA) nebulizer solution 15 mcg  15 mcg Nebulization BID Yara Dumont MD   15 mcg at 05/31/22 2012    methIMAzole (TAPAZOLE) tablet 5 mg  5 mg Oral Once per day on Tue Thu Fri Sat Yara Dumont MD   5 mg at 05/31/22 1714    methIMAzole (TAPAZOLE) tablet 7.5 mg  7.5 mg Oral Once per day on Sun Mon Johnjanneth Lane MD           Allergies: Allergies   Allergen Reactions    Ace Inhibitors Other (See Comments)     angioedema    Acyclovir      Severe nausea  And anorexia. It happened after taking each 800 mg tablet and patient took one one day and one the next, then stopped an no more    Creon [Pancrelipase (Lip-Prot-Amyl)]      Swollen gums.  Dye [Iodides] Hives     Only reacts to dye that went into eyes, OK with IV contrast    Eggs Or Egg-Derived Products      Hives  this test in the [employer] ([facility]). Egg White IgE 3. 59High   <=0.34 kU/L Final 02/10/2016  2:14 PM  - [facility]                  Gluten Meal      Abdominal pain and diarreha  Wheat IgE 0.53  <=0.34 kU/L Final 02/10/2016  2:14 PM [facility] - [facility]       Milk-Related Compounds      Milk, Cow's IgE 1. 75High   <=0.34 kU/L Final 02/10/2016  2:14 PM [facility] - [facility]    Fluorescein Other (See Comments)    Lisinopril Swelling    Macrobid [Nitrofurantoin Monohydrate Macrocrystals] Other (See Comments)    No Known Allergies     Ciprocinonide [Fluocinolone] Rash     Stiff, no nausea or rash    Sulfa Antibiotics Rash       Problem List:    Patient Active Problem List   Diagnosis Code    GERD (gastroesophageal reflux disease) K21.9    Asthma J45.909    IBS (irritable bowel syndrome) K58.9    MGUS (monoclonal gammopathy of unknown significance) D47.2    Menopause Z78.0    Graves' disease E05.00    S/P colonoscopy Z98.890    Left renal mass N28.89    Osteoporosis M81.0    Alkaline phosphatase elevation R74.8    Hearing loss sensory, bilateral H90.3    Bacterial overgrowth syndrome K63.89    Erythrocytosis D75.1    Arthritis of knee, right M17.11    Essential hypertension I10    Multiple food allergies Z91.018    Protein malnutrition (HCC) E46    Celiac disease K90.0    Aortic regurgitation I35.1    Primary osteoarthritis of both knees M17.0    Hyperparathyroidism (HCC) E21.3    History of Graves' disease Z86.39    Age-related osteoporosis without current pathological fracture M81.0    Bilateral carpal tunnel syndrome G56.03    Herpes zoster without complication T57.2    Multiple thyroid nodules E04.2    Adrenal adenoma, left D35.02    Adrenal adenoma, right D35.01    Postherpetic neuralgia B02.29    Chronic pain syndrome G89.4    Advanced age R48    Chest pain in adult R07.9    Pulmonary embolism on right ([facility]) I26.99    Single subsegmental pulmonary embolism without acute cor pulmonale ([facility]) I26.93    Blindness of both eyes H54.3    Cataract H26.9    Corns and callosities L84    Cystoid macular degeneration, 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    Dermatophytosis of nail B35.1    DJD (degenerative joint disease) of knee M17.10    Drusen (degenerative) of macula, bilateral H35.363    Drusen (degenerative) of macula, unspecified eye H35.369    Exposure keratoconjunctivitis, left eye H16.212    Osteoarthrosis M19.90    Exudative age-related macular degeneration (HCC) H35.3290    Vitreous degeneration, bilateral H43.813    Mitral regurgitation I34.0    Chronic systolic congestive heart failure (HCC) I50.22    Coagulopathy (HCC) D68.9    Acute gastrointestinal bleeding K92.2       Past Medical History:        Diagnosis Date    Adrenal gland cyst (HCC)     Arthritis     Asthma     Bilateral carpal tunnel syndrome 8/23/2018    Cataract     Chronic systolic congestive heart failure (HCC) 4/12/2021    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    PCR    Clostridium difficile infection 1/27/15    AG positive    Degenerative joint disease of knee     Depression     Diverticulitis     GERD (gastroesophageal reflux disease)     Grave's disease 4/2/2012    Heart disease     Hypertension     IBS (irritable bowel syndrome)     Macular degeneration     MGUS (monoclonal gammopathy of unknown significance)     Osteoporosis     Poor vision     d/t macular degeneration    Simple cyst of kidney        Past Surgical History:        Procedure Laterality Date    CHOLECYSTECTOMY      COLONOSCOPY  10/29/2009    **see scanned report- SILVANA    HEMORRHOID SURGERY      HYSTERECTOMY  in 40's    rudolph/bso    HYSTERECTOMY, TOTAL ABDOMINAL         Social History:    Social History     Tobacco Use    Smoking status: Never Smoker    Smokeless tobacco: Never Used   Substance Use Topics    Alcohol use:  No                                Counseling given: Not Answered      Vital Signs (Current):   Vitals:    05/31/22 2012 05/31/22 2245 06/01/22 0359 06/01/22 0739   BP:  125/63 128/63 (!) 130/55   Pulse:  58 54 73   Resp:  16 18 18   Temp:  98.1 °F (36.7 °C) 97.3 °F (36.3 °C) 98.2 °F (36.8 °C)   TempSrc:  Oral Oral Oral   SpO2: 96% 92% 92% 93%   Weight:       Height:                                                  BP Readings from Last 3 Encounters:   06/01/22 (!) 130/55   05/06/22 (!) 141/74   01/13/22 (!) 144/77       NPO Status:                                                                                 BMI:   Wt Readings from Last 3 Encounters:   05/31/22 142 lb 14.4 oz (64.8 kg)   05/06/22 153 lb (69.4 kg)   01/13/22 150 lb (68 kg)     Body mass index is 21.1 kg/m². CBC:   Lab Results   Component Value Date    WBC 11.3 05/31/2022    RBC 2.30 05/31/2022    HGB 8.2 06/01/2022    HCT 25.4 06/01/2022    MCV 79.8 05/31/2022    RDW 15.9 05/31/2022     05/31/2022       CMP:   Lab Results   Component Value Date     05/31/2022    K 4.6 05/31/2022     05/31/2022    CO2 22 05/31/2022    BUN 20 05/31/2022    CREATININE 1.1 05/31/2022    GFRAA 56 05/31/2022    GFRAA >60 05/17/2013    AGRATIO 1.5 10/15/2020    LABGLOM 46 05/31/2022    GLUCOSE 115 05/31/2022    GLUCOSE 81 08/25/2011    PROT 5.5 05/31/2022    PROT 6.5 02/18/2013    PROT 6.5 02/18/2013    CALCIUM 9.2 05/31/2022    BILITOT 0.6 05/31/2022    ALKPHOS 113 05/31/2022    AST 10 05/31/2022    ALT 6 05/31/2022       POC Tests: No results for input(s): POCGLU, POCNA, POCK, POCCL, POCBUN, POCHEMO, POCHCT in the last 72 hours.     Coags:   Lab Results   Component Value Date    PROTIME 16.9 05/31/2022    INR 1.38 05/31/2022    APTT 22.2 10/12/2015       HCG (If Applicable): No results found for: PREGTESTUR, PREGSERUM, HCG, HCGQUANT     ABGs: No results found for: PHART, PO2ART, DUQ3NWT, NPM8WKG, BEART, V2PDDUCF     Type & Screen (If Applicable):  No results found for: LABABO, LABRH    Drug/Infectious Status (If Applicable):  No results found for: HIV, HEPCAB    COVID-19 Screening (If Applicable):   Lab Results   Component Value Date    COVID19 Not Detected 06/21/2020           Anesthesia Evaluation  Patient summary reviewed and Nursing notes reviewed no history of anesthetic complications:   Airway: Mallampati: I  TM distance: >3 FB   Neck ROM: full  Mouth opening: > = 3 FB   Dental: normal exam         Pulmonary:normal exam    (+) asthma:                            Cardiovascular:  Exercise tolerance: no interval change,   (+) hypertension:, valvular problems/murmurs (moderate AR/MR): MR, CHF: systolic and diastolic,       ECG reviewed  Rhythm: regular  Rate: normal           Beta Blocker:  No for medical reason (held during hospital stay)      ROS comment: Left ventricular cavity size is normal with normal left ventricular wall   thickness. Overall left ventricular systolic function appears normal with an ejection   fraction of 55%. No regional wall motion abnormalities   Normal diastolic function. Mild tricuspid regurgitation. Estimated pulmonary artery systolic pressure is at 24 mmHg assuming a right   atrial pressure of 3 mmHg. Neuro/Psych:   (+) psychiatric history: stable with treatmentdepression/anxiety    (-) neuromuscular disease           GI/Hepatic/Renal:   (+) GERD:,           Endo/Other:    (+) blood dyscrasia: anticoagulation therapy and anemia:., .                 Abdominal:       Abdomen: soft. Vascular: Other Findings:           Anesthesia Plan      MAC     ASA 3     (Patient has egg allergy listed on her allergy list with a hives description. She reports that she tested positive on allergy testing, but has eaten eggs all her life and still gets flu shot each year without issue.  )  Induction: intravenous. MIPS: Postoperative opioids intended and Prophylactic antiemetics administered. Anesthetic plan and risks discussed with patient. Plan discussed with CRNA and attending.                     Lalito Lam DO   6/1/2022

## 2022-06-01 NOTE — CONSULTS
[facility]  Palliative Medicine Consultation Note      Date Of Admission:5/31/2022  Date of consult: 06/01/22  Seen by [facility] in the past:  No    Recommendations:        Met with pt and called daughter Claude Hodge, who is her only child. Introduced palliative care and had a voluntary discussion about advance care planning. Pt and Claude Hodge report that pt has been living independently at home with aides coming in 3-5x/week for 4 hours at a time. Pt hopes to return home to her previous level of function. Claude Hodge is pt's HCPOA and she will email me the document. We also discussed code status and the pt says she would not want attempts at resuscitation nor would she want to be intubated, even if potentially temporary. \"I'm 89 and I think that's a good age. I've lived a good life. \"     1. Goals of Care/Advanced Care planning/Code status: changed to DNR/DNI (Limited- no to all interventions). Pt's main goal is to maintain her quality of life at home. 2. Pain: pt c/o lower back pain which is mild-moderate. She uses heat packs. Offered a lidocaine patch which she agreed to.   3. SOB: denies sob and is breathing comfortably on room air. 4. Anemia: p/w hgb 5.9, underwent EGD which did not reveal the source of bleeding. Pt/family declined colonoscopy. Last hgb 8.2. GI on board. 5. Disposition: d/c home with [place] [address] and [facility] when medically ready    Reason for Consult:         [x]  Goals of Care  [x]  Code Status Discussion/Advanced Care Planning   []  Psychosocial/Family Support  []  Symptom Management  []  Other (Specify)    Requesting Physician: Dr. Priyanka Kathleen:  Fatigue, hypotension    History Obtained From:  patient, family member - daughter, electronic medical record    History of Present Illness:         Yomaira Engle is a 80 y.o. female with PMH of acute left common femoral DVT from 5/6/2022, asthma, heart failure, depression, HTN who presented with fatigue, bilateral lower extremity edema, and hypotension. Patient was started on Xarelto 1 week ago for the DVT. Since then she has been progressively more fatigued, has been having low blood pressures in the 90s over 60s to 70s. She was found anemic with hgb 5.9. GI was consulted and performed EGD this morning, which did not find the source of anemia. GI discussed benefits vs burdens of colonoscopy with family, who opted to not do colonoscopy.      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    PCR    Clostridium difficile infection 1/27/15    AG positive    Degenerative joint disease of knee     Depression     Diverticulitis     GERD (gastroesophageal reflux disease)     Grave's disease 4/2/2012    Heart disease     Hypertension     IBS (irritable bowel syndrome)     Macular degeneration     MGUS (monoclonal gammopathy of unknown significance)     Osteoporosis     Poor vision     d/t macular degeneration    Simple cyst of kidney        Past Surgical History:        Procedure Laterality Date    CHOLECYSTECTOMY      COLONOSCOPY  10/29/2009    **see scanned report- OGI&LI    HEMORRHOID SURGERY      HYSTERECTOMY  in 40's    rudolph/bso    HYSTERECTOMY, TOTAL ABDOMINAL      IR IVC FILTER PLACEMENT W IMAGING N/A 06/01/2022    [employer], lot [id number], sn [id number]    IR IVC FILTER PLACEMENT W IMAGING  6/1/2022    IR IVC FILTER PLACEMENT W IMAGING 6/1/2022 [facility] SPECIAL PROCEDURES    UPPER GASTROINTESTINAL ENDOSCOPY N/A 6/1/2022    EGD BIOPSY performed by Js Barrett MD at [facility] ENDOSCOPY       Current Medications:    Medications Prior to Admission: fluticasone (FLONASE) 50 MCG/ACT nasal spray, PLACE 1 SPRAY IN EACH NOSTRIL DAILY  triamcinolone (KENALOG) 0.1 % cream, Apply topically 2 times daily to reddened area right calf.  rivaroxaban 15 & 20 MG Starter Pack, Take as directed on package. rivaroxaban (XARELTO) 20 MG TABS tablet, Take 1 tablet by mouth daily (with breakfast) To start after finishing Xarelto starter pack  atenolol (TENORMIN) 50 MG tablet, TAKE 1 TABLET DAILY  pantoprazole (PROTONIX) 40 MG tablet, TAKE 1 TABLET BY MOUTH TWICE A DAY  spironolactone (ALDACTONE) 25 MG tablet, TAKE 1 TABLET DAILY  felodipine (PLENDIL) 5 MG extended release tablet, TAKE 1 TABLET NIGHTLY. DISCONTINUE 2.5 MG         FELODIPINE PRESCRIPTION  ipratropium (ATROVENT) 0.03 % nasal spray, USE 1 SPRAY IN EACH NOSTRIL AT BEDTIME  terbinafine (LAMISIL) 1 % cream, Apply topically 2 times daily. vitamin D (ERGOCALCIFEROL) 1.25 MG (40785 UT) CAPS capsule, Take 1 capsule by mouth once a week  gabapentin (NEURONTIN) 100 MG capsule, TAKE 1 CAPSULE BY MOUTH 3 TIMES DAILY  DAYS. INTENDED SUPPLY: 30 DAYS  montelukast (SINGULAIR) 10 MG tablet, TAKE 1 TABLET NIGHTLY  nystatin (MYCOSTATIN) 604558 UNIT/ML suspension, Take 5 mLs by mouth 4 times daily  hydrocortisone (ANUSOL-HC) 25 MG suppository, Place 1 suppository rectally every 12 hours  albuterol sulfate (PROAIR RESPICLICK) 722 (90 Base) MCG/ACT aerosol powder inhalation, Inhale 2 puffs into the lungs every 6 hours as needed for Wheezing or Shortness of Breath  ammonium lactate (LAC-HYDRIN) 12 % lotion, Apply topically daily. budesonide-formoterol (SYMBICORT) 160-4.5 MCG/ACT AERO, Inhale 2 puffs into the lungs 2 times daily  fluocinonide (LIDEX) 0.05 % cream, Apply topically 2 times daily. lidocaine (XYLOCAINE) 5 % ointment, Apply topically three times a day, as needed. polyethylene glycol (GLYCOLAX) 17 g packet, Take 17 g by mouth 2 times daily  methIMAzole (TAPAZOLE) 5 MG tablet, Take 1 tablet by mouth See Admin Instructions 1 tablet by mouth daily except for Sunday, Monday, Wednesday take 1.5 tablets.   Simethicone (GAS RELIEF) 180 MG CAPS, Take 180 mg by mouth 4 times daily as needed (bloating)    Allergies:  Ace inhibitors, Acyclovir, Creon [pancrelipase (lip-prot-amyl)], Dye [iodides], Eggs or egg-derived products, Gluten meal, Milk-related compounds, Fluorescein, Lisinopril, Macrobid [nitrofurantoin monohydrate macrocrystals], No known allergies, Ciprocinonide [fluocinolone], and Sulfa antibiotics    Social History:    · TOBACCO: reports that she has never smoked. She has never used smokeless tobacco.  · ETOH:   reports no history of alcohol use. · Patient currently lives alone    Review of Systems -   Review of Systems   Constitutional: Positive for fatigue and unexpected weight change. HENT: Negative. Respiratory: Negative. Cardiovascular: Negative. Gastrointestinal: Negative. Genitourinary: Negative. Musculoskeletal: Negative. Skin: Negative. Neurological: Negative. Psychiatric/Behavioral: Negative.    :     Objective:        Physical Exam  Constitutional:       General: She is not in acute distress. HENT:      Head: Normocephalic and atraumatic. Cardiovascular:      Rate and Rhythm: Regular rhythm. Bradycardia present. Pulmonary:      Effort: Pulmonary effort is normal.      Breath sounds: Normal breath sounds. Abdominal:      General: Bowel sounds are normal.      Palpations: Abdomen is soft. Musculoskeletal:      Right lower leg: Edema present. Left lower leg: Edema present. Skin:     General: Skin is warm and dry. Neurological:      Mental Status: She is alert and oriented to person, place, and time. Palliative Performance Scale:  [x] 60% Ambulation reduced; Significant disease; Can't do hobbies/housework; intake normal or reduced; occasional assist; LOC full/confusion  [] 50% Mainly sit/lie;  Extensive disease; Can't do any work; Considerable assist; intake normal  Or reduced; LOC full/confusion  [] 40% Mainly in bed; Extensive disease; Mainly assist; intake normal or reduced; occasional assist; LOC full/confusion  [] 30% Bed Bound; Extensive disease; Total care; intake reduced; LOC full/confusion  [] 20% Bed Bound; Extensive disease; Total care; intake minimal; Drowsy/coma  [] 10% Bed Bound; Extensive disease; Total care; Mouth care only; Drowsy/coma  [] 0% Death      Vitals:    /65   Pulse 55   Temp 98.1 °F (36.7 °C) (Oral)   Resp 16   Ht 5' 9\" (1.753 m)   Wt 142 lb 14.4 oz (64.8 kg)   LMP  (LMP Unknown)   SpO2 93%   BMI 21.10 kg/m²     Labs:    BMP:   Recent Labs     05/31/22  1136      K 4.6      CO2 22   BUN 20   CREATININE 1.1   GLUCOSE 115*     CBC:   Recent Labs     05/31/22  1136 05/31/22  1658 06/01/22  0041   WBC 11.3*  --   --    HGB 5.9* 5.8* 8.2*   HCT 18.4* 17.5* 25.4*     --   --        LFT's:   Recent Labs     05/31/22  1658   AST 10*   ALT 6*   BILITOT 0.6   ALKPHOS 113     Troponin:   Recent Labs     05/31/22  1136   TROPONINI <0.01     BNP: No results for input(s): BNP in the last 72 hours. ABGs: No results for input(s): PHART, BSZ8TCD, PO2ART in the last 72 hours. INR:   Recent Labs     05/31/22  1136   INR 1.38*       U/A:  Recent Labs     05/31/22  1125 05/31/22  1125 06/01/22  0643   COLORU Yellow   < > Yellow   PHUR 6.5   < > 6.5   WBCUA None seen   < > None seen   RBCUA None seen   < > 0-2   CLARITYU Clear   < > Clear   SPECGRAV <=1.005   < > 1.010   LEUKOCYTESUR Negative   < > Negative   UROBILINOGEN 0.2   < > 0.2   BILIRUBINUR Negative   < > Negative   BLOODU TRACE-LYSED*   < > TRACE-LYSED*   GLUCOSEU Negative   < > Negative   AMORPHOUS Rare  --   --     < > = values in this interval not displayed. IR GUIDED IVC FILTER PLACEMENT   Final Result   IMPRESSION :      Normal IVC anatomy. Normal, patent right internal jugular vein      Placement of filter in the infra-renal IVC. Please note that this is a retrievable filter.       Fluoroscopy time : 1.6 minutes   Number of exposures obtained : 1 venographic run, 1 spot image   Moderate sedation was provided and monitored by an independent trained observer. Moderate sedation start time : 0956   Moderate sedation end time : 1017   Blood loss : minimal ( less than 5 cc )   Specimens : none             XR CHEST PORTABLE   Final Result      No acute radiographic abnormality of the chest.            Conclusion/Time spent:         Recommendations see above    Pt [id number], [id number], Family [phone]  Time spent with patient and/or family: 25 min  Time reviewing records: 10 min   Time communicating with staff: 5 min     A total of 40 minutes spent with the patient and family on unit greater than 50% in counseling regarding palliative care and in goals of care for the patient. Thank you to Dr. Lyric Jones for this consultation. We will continue to follow Ms. Hsieh's care as needed.     Jt King NP  [facility]

## 2022-06-01 NOTE — PROGRESS NOTES
This patient just get done with her 2nd blood transfusion. She was complaining of pain by abdominal area and her legs the whole shift. Was relieved by [facility] for a moment but came back again. Tylenol suppository was given this morning. This nurse stayed with this patient at the first 15 minutes af the transfusion and no untoward reaction was noted then. Urine was collected this morning.

## 2022-06-01 NOTE — PROGRESS NOTES
Physician Progress Note      Buster Sellers  [facility] #:                  [id number]  :                       1926  ADMIT DATE:       2022 10:50 AM  [address] [place] DATE:  RESPONDING  PROVIDER #:        Nate Hanna MD          QUERY TEXT:    Pt admitted with fatigue and has anemia documented. If possible, please   document in progress notes and discharge summary further specificity regarding   the acuity and type of anemia:    The medical record reflects the following:  Risk Factors: 80year old female  Clinical Indicators: On admission, hemoglobin 5.9. Per EGD report- No source   for anemia. Discussed findings with daughter, discussed whether we should do d   colonoscopy or not, discussed risks of missing a colon cancer if not done but   if she would not undergo surgery of a cancer that was found may be   unnecessary to do procedure. At this point she would prefer not to do   colonoscopy and accept the the risks of missing a colon cancer, the procedure   itself would have increased risks at her age also. Per H&P- Hb 5.9 from 14.4   on 2022. Started Xarelto 1 week ago for an acute le  Treatment: GI consult, blood transfusions, labs, imaging, EGD  Options provided:  -- Anemia due to acute blood loss  -- Anemia due to acute on chronic blood loss  -- Other - I will add my own diagnosis  -- Disagree - Not applicable / Not valid  -- Disagree - Clinically unable to determine / Unknown  -- Refer to Clinical Documentation Reviewer    PROVIDER RESPONSE TEXT:    This patient has acute blood loss anemia. Query created by:  Case Astorga on 2022 11:01 AM      Electronically signed by:  Nate Hanna MD 2022 12:07 PM

## 2022-06-01 NOTE — PLAN OF CARE
Problem: ABCDS Injury Assessment  Goal: Absence of physical injury  Outcome: Progressing     Problem: Safety - Adult  Goal: Free from fall injury  Outcome: Progressing     Problem: Pain  Goal: Verbalizes/displays adequate comfort level or baseline comfort level  Outcome: Progressing     Problem: Skin/Tissue Integrity  Goal: Absence of new skin breakdown  Description: 1. Monitor for areas of redness and/or skin breakdown  2. Assess vascular access sites hourly  3. Every 4-6 hours minimum:  Change oxygen saturation probe site  4. Every 4-6 hours:  If on nasal continuous positive airway pressure, respiratory therapy assess nares and determine need for appliance change or resting period.   Outcome: Progressing

## 2022-06-01 NOTE — ANESTHESIA POSTPROCEDURE EVALUATION
Department of Anesthesiology  Postprocedure Note    Patient: Waleska Hidalgo  MRN: [id number]  YOB: 1926  Date of evaluation: 6/1/2022  Time:  10:26 AM     Procedure Summary     Date: 06/01/22 Room / Location: [address] [place] [address] [facility] [address] / [facility]    Anesthesia Start: 8372 Anesthesia Stop: 4590    Procedure: EGD BIOPSY (N/A ) Diagnosis:       Iron deficiency anemia due to chronic blood loss      (anemia)    Surgeons: Js Barrett MD Responsible Provider: Carmen Dunne DO    Anesthesia Type: MAC ASA Status: 3          Anesthesia Type: No value filed. Yasmeen Phase I: Yasmeen Score: 10    Yasmeen Phase II: Yasmeen Score: 10    Last vitals: Reviewed and per EMR flowsheets. Anesthesia Post Evaluation    Patient location during evaluation: PACU  Patient participation: complete - patient participated  Level of consciousness: awake  Pain score: 0  Airway patency: patent  Nausea & Vomiting: no nausea and no vomiting  Complications: no  Cardiovascular status: blood pressure returned to baseline  Respiratory status: acceptable  Hydration status: euvolemic  There was medical reason for not using a multimodal analgesia pain management approach.

## 2022-06-01 NOTE — PROCEDURES
EGD REPORT    Patient:  Malika Charlton                  12/26/1926    Endoscopist: NATALIE Moon     Indication:  Severe anemia     Medications:  MAC    Pre-Anesthesia Assessment:  I have reviewed and am in agreement with patient history and medication, including previous response to sedation. Prior to the procedure, a History and Physical was performed, and patient medications and allergies were reviewed. The patient is competent. The risks and benefits of the procedure and the sedation options and risks were discussed with the patient. Risks discussed included but were not limited to infection, bleeding, perforation, death, and missed lesions. All questions were answered and informed consent was obtained. Patient identification and proposed procedure were verified by the physician and the nurse in the pre-procedure area in the procedure room. [place]ampatti: II  ASA Grade Assessment: 3     After reviewing the risks and benefits, the patient was deemed in satisfactory condition to undergo the procedure. The anesthesia plan was to use MAC anesthesia. Immediately prior to administration of medications, the patient was re-assessed for adequacy to receive sedatives and a time out was performed. Patient and healthcare providers were in agreement it was the correct patient and procedure. The heart rate, respiratory rate, oxygen saturations, blood pressure, adequacy of pulmonary ventilation, and response to care were monitored throughout the procedure. The physical status of the patient was re-assessed after the procedure. After obtaining informed consent, the endoscope was passed under direct vision. The endoscope was introduced through the mouth, and advanced to the second part of duodenum. The EGD was accomplished without difficulty. The patient tolerated the procedure well. Duodenum: Normal  Stomach: Numerous fundic appearing pops throughout, several biopsied.   Retroflexion did show a minimal hiatal hernia. Esophagus: No evidence of Cramer's or esophagitis. Estimated blood loss trace    No source for anemia  Discussed findings with daughter, discussed whether we should do d colonoscopy or not, discussed risks of missing a colon cancer if not done but if she would not undergo surgery of a cancer that was found may be unnecessary to do procedure.  At this point she would prefer not to do colonoscopy and accept the the risks of missing a colon cancer, the procedure itself would have increased risks ar her age also    Plan:  QD PPI  Palliative Care eval for goals of care

## 2022-06-01 NOTE — PROGRESS NOTES
Hospitalist Progress Note      PCP: Bhavin Almendarez MD    Date of Admission: 5/31/2022    CC fatigue, low BP. Hospital course  Patient is 70-year-old female with history of Graves' disease, hypertension, macular degeneration, recent DVT of common femoral vein on 5/6 was a started on Xarelto came into ER with a complaint of fatigue was noted to have hypotension. Hemoglobin 5.9 requiring 1 pack RBC transfusion. GI was consulted s/p EGD 6/1 no source of anemia. GI had a discussion with daughter risk outweighs benefits of colonoscopy due to her age. S/p IVC filter 6/1 due to DVT. Subjective  Patient seen and examined postprocedure today. Alert oriented x3. Denies any abdominal pain, nausea, vomiting, fever, chills. Complaining of some headache. Assessment and plan  #Acute blood loss anemia  S/p 1 pack RBC transfusion. Hemoglobin is stable. S/p EGD 6/1 no source of bleeding. GI did discussion with daughter regarding risk versus benefits of colonoscopy. Plan was not to pursue for colonoscopy due to her age. #Acute left, femoral DVT on 5/6/2022. Hold Xarelto in light of blood loss anemia s/p IVC filter on 6/1    #Graves' disease  Currently on methimazole. #Asthma stable  Continue budesonideRobert. Discussed with daughter over the phone. Likely discharge in 24 to 48 hours.     Medications:  Reviewed    Infusion Medications    sodium chloride       Scheduled Medications    lidocaine  1 patch TransDERmal Daily    fluocinonide   Topical BID    fluticasone  2 spray Each Nostril Daily    gabapentin  100 mg Oral TID    montelukast  10 mg Oral Nightly    sodium chloride flush  5-40 mL IntraVENous 2 times per day    pantoprazole  40 mg IntraVENous BID    budesonide  0.5 mg Nebulization BID    Arformoterol Tartrate  15 mcg Nebulization BID    methIMAzole  5 mg Oral Once per day on Tue Thu Fri Sat    methIMAzole  7.5 mg Oral Once per day on Sun Mon Wed     PRN Meds: acetaminophen, albuterol, sodium chloride flush, sodium chloride, ondansetron **OR** ondansetron, polyethylene glycol      Intake/Output Summary (Last 24 hours) at 6/1/2022 1544  Last data filed at 6/1/2022 1322  Gross per 24 hour   Intake 954.72 ml   Output 900 ml   Net 54.72 ml       Physical Exam Performed:    /68   Pulse 56   Temp 97.7 °F (36.5 °C) (Oral)   Resp 16   Ht 5' 9\" (1.753 m)   Wt 142 lb 14.4 oz (64.8 kg)   LMP  (LMP Unknown)   SpO2 94%   BMI 21.10 kg/m²     General elderly, appears to be stated age, low vision due to macular degeneration  HEENT head atraumatic, clear conjunctiva  Cardiac S1, S2 audible, regular rhythm and rate, no murmur noted  Respiratory bilateral clear to auscultate, no wheeze no rhonchi  Abdomen soft, nontender, bowel sound present. Neuro alert oriented x3, no focal neurodeficit noted  Skin bipedal edema. Labs:   Recent Labs     05/31/22  1136 05/31/22  1658 06/01/22  0041   WBC 11.3*  --   --    HGB 5.9* 5.8* 8.2*   HCT 18.4* 17.5* 25.4*     --   --      Recent Labs     05/31/22  1136      K 4.6      CO2 22   BUN 20   CREATININE 1.1   CALCIUM 9.2     Recent Labs     05/31/22  1658   AST 10*   ALT 6*   BILIDIR <0.2   BILITOT 0.6   ALKPHOS 113     Recent Labs     05/31/22  1136   INR 1.38*     Recent Labs     05/31/22  1136   TROPONINI <0.01       Urinalysis:      Lab Results   Component Value Date    NITRU Negative 06/01/2022    WBCUA None seen 06/01/2022    BACTERIA 2+ 12/21/2019    RBCUA 0-2 06/01/2022    BLOODU TRACE-LYSED 06/01/2022    SPECGRAV 1.010 06/01/2022    GLUCOSEU Negative 06/01/2022       Radiology:  IR GUIDED IVC FILTER PLACEMENT   Final Result   IMPRESSION :      Normal IVC anatomy. Normal, patent right internal jugular vein      Placement of filter in the infra-renal IVC. Please note that this is a retrievable filter.       Fluoroscopy time : 1.6 minutes   Number of exposures obtained : 1 venographic run, 1 spot image   Moderate sedation was provided and monitored by an independent trained observer. Moderate sedation start time : 0956   Moderate sedation end time : 1017   Blood loss : minimal ( less than 5 cc )   Specimens : none             XR CHEST PORTABLE   Final Result      No acute radiographic abnormality of the chest.              Assessment/Plan:    Active Hospital Problems    Diagnosis Date Noted    Acute gastrointestinal bleeding [K92.2] 05/31/2022     Priority: Medium         DVT Prophylaxis: SCD  Diet: ADULT DIET; Dysphagia - Pureed  Code Status: Limited    PT/OT Eval Status: in progress    Dispo - inpatient.  D/C in 1-2 days    Cassie Rubio MD    This chart was likely completed using voice recognition technology and may contain unintended grammatical , phraseology,and/or punctuation errors

## 2022-06-01 NOTE — PLAN OF CARE
Problem: ABCDS Injury Assessment  Goal: Absence of physical injury  6/1/2022 1951 by Gillie Lanes, RN  Outcome: Progressing  6/1/2022 1827 by Brittany Meehan RN  Outcome: Progressing     Problem: Safety - Adult  Goal: Free from fall injury  6/1/2022 1951 by Gillie Lanes, RN  Outcome: Progressing  6/1/2022 1827 by Brittany Meehan RN  Outcome: Progressing     Problem: Discharge Planning  Goal: Discharge to home or other facility with appropriate resources  6/1/2022 1951 by Gillie Lanes, RN  Outcome: Progressing  6/1/2022 1827 by Brittany Meehan RN  Outcome: Progressing     Problem: Pain  Goal: Verbalizes/displays adequate comfort level or baseline comfort level  6/1/2022 1951 by Gillie Lanes, RN  Outcome: Progressing  6/1/2022 1827 by Brittany Meehan RN  Outcome: Progressing     Problem: Chronic Conditions and Co-morbidities  Goal: Patient's chronic conditions and co-morbidity symptoms are monitored and maintained or improved  6/1/2022 1951 by Gillie Lanes, RN  Outcome: Progressing  6/1/2022 1827 by Brittany Meehan RN  Outcome: Progressing     Problem: Skin/Tissue Integrity  Goal: Absence of new skin breakdown  Description: 1. Monitor for areas of redness and/or skin breakdown  2. Assess vascular access sites hourly  3. Every 4-6 hours minimum:  Change oxygen saturation probe site  4. Every 4-6 hours:  If on nasal continuous positive airway pressure, respiratory therapy assess nares and determine need for appliance change or resting period.   6/1/2022 1951 by Gillie Lanes, RN  Outcome: Progressing  6/1/2022 1827 by Brittany Meehan RN  Outcome: Progressing

## 2022-06-02 LAB
ANION GAP SERPL CALCULATED.3IONS-SCNC: 8 MMOL/L (ref 3–16)
BASOPHILS ABSOLUTE: 0 K/UL (ref 0–0.2)
BASOPHILS RELATIVE PERCENT: 0.5 %
BUN BLDV-MCNC: 16 MG/DL (ref 7–20)
CALCIUM SERPL-MCNC: 8.8 MG/DL (ref 8.3–10.6)
CHLORIDE BLD-SCNC: 106 MMOL/L (ref 99–110)
CO2: 24 MMOL/L (ref 21–32)
CREAT SERPL-MCNC: 0.8 MG/DL (ref 0.6–1.2)
EOSINOPHILS ABSOLUTE: 0.1 K/UL (ref 0–0.6)
EOSINOPHILS RELATIVE PERCENT: 1.2 %
GFR AFRICAN AMERICAN: >60
GFR NON-AFRICAN AMERICAN: >60
GLUCOSE BLD-MCNC: 89 MG/DL (ref 70–99)
HCT VFR BLD CALC: 23 % (ref 36–48)
HEMOGLOBIN: 7.6 G/DL (ref 12–16)
LYMPHOCYTES ABSOLUTE: 1.7 K/UL (ref 1–5.1)
LYMPHOCYTES RELATIVE PERCENT: 17.2 %
MCH RBC QN AUTO: 27.3 PG (ref 26–34)
MCHC RBC AUTO-ENTMCNC: 32.9 G/DL (ref 31–36)
MCV RBC AUTO: 82.9 FL (ref 80–100)
MONOCYTES ABSOLUTE: 0.9 K/UL (ref 0–1.3)
MONOCYTES RELATIVE PERCENT: 9.2 %
NEUTROPHILS ABSOLUTE: 7.2 K/UL (ref 1.7–7.7)
NEUTROPHILS RELATIVE PERCENT: 71.9 %
PDW BLD-RTO: 16.3 % (ref 12.4–15.4)
PLATELET # BLD: 251 K/UL (ref 135–450)
PMV BLD AUTO: 7.8 FL (ref 5–10.5)
POTASSIUM REFLEX MAGNESIUM: 3.8 MMOL/L (ref 3.5–5.1)
RBC # BLD: 2.77 M/UL (ref 4–5.2)
SODIUM BLD-SCNC: 138 MMOL/L (ref 136–145)
WBC # BLD: 10 K/UL (ref 4–11)

## 2022-06-02 PROCEDURE — 97530 THERAPEUTIC ACTIVITIES: CPT

## 2022-06-02 PROCEDURE — 85025 COMPLETE CBC W/AUTO DIFF WBC: CPT

## 2022-06-02 PROCEDURE — 36415 COLL VENOUS BLD VENIPUNCTURE: CPT

## 2022-06-02 PROCEDURE — 6370000000 HC RX 637 (ALT 250 FOR IP): Performed by: INTERNAL MEDICINE

## 2022-06-02 PROCEDURE — 97116 GAIT TRAINING THERAPY: CPT

## 2022-06-02 PROCEDURE — 94640 AIRWAY INHALATION TREATMENT: CPT

## 2022-06-02 PROCEDURE — 97166 OT EVAL MOD COMPLEX 45 MIN: CPT

## 2022-06-02 PROCEDURE — 80048 BASIC METABOLIC PNL TOTAL CA: CPT

## 2022-06-02 PROCEDURE — 1200000000 HC SEMI PRIVATE

## 2022-06-02 PROCEDURE — 6370000000 HC RX 637 (ALT 250 FOR IP): Performed by: NURSE PRACTITIONER

## 2022-06-02 PROCEDURE — C9113 INJ PANTOPRAZOLE SODIUM, VIA: HCPCS | Performed by: INTERNAL MEDICINE

## 2022-06-02 PROCEDURE — 6360000002 HC RX W HCPCS: Performed by: INTERNAL MEDICINE

## 2022-06-02 PROCEDURE — 97535 SELF CARE MNGMENT TRAINING: CPT

## 2022-06-02 PROCEDURE — 97161 PT EVAL LOW COMPLEX 20 MIN: CPT

## 2022-06-02 PROCEDURE — 2580000003 HC RX 258: Performed by: INTERNAL MEDICINE

## 2022-06-02 PROCEDURE — 94761 N-INVAS EAR/PLS OXIMETRY MLT: CPT

## 2022-06-02 RX ADMIN — POLYETHYLENE GLYCOL 3350 17 G: 17 POWDER, FOR SOLUTION ORAL at 12:58

## 2022-06-02 RX ADMIN — BUDESONIDE 500 MCG: 0.5 SUSPENSION RESPIRATORY (INHALATION) at 20:50

## 2022-06-02 RX ADMIN — BUDESONIDE 500 MCG: 0.5 SUSPENSION RESPIRATORY (INHALATION) at 08:54

## 2022-06-02 RX ADMIN — ARFORMOTEROL TARTRATE 15 MCG: 15 SOLUTION RESPIRATORY (INHALATION) at 20:50

## 2022-06-02 RX ADMIN — GABAPENTIN 100 MG: 100 CAPSULE ORAL at 14:46

## 2022-06-02 RX ADMIN — GABAPENTIN 100 MG: 100 CAPSULE ORAL at 09:22

## 2022-06-02 RX ADMIN — METHIMAZOLE 5 MG: 5 TABLET ORAL at 09:23

## 2022-06-02 RX ADMIN — SODIUM CHLORIDE, PRESERVATIVE FREE 10 ML: 5 INJECTION INTRAVENOUS at 09:22

## 2022-06-02 RX ADMIN — ARFORMOTEROL TARTRATE 15 MCG: 15 SOLUTION RESPIRATORY (INHALATION) at 08:54

## 2022-06-02 RX ADMIN — GABAPENTIN 100 MG: 100 CAPSULE ORAL at 20:11

## 2022-06-02 RX ADMIN — PANTOPRAZOLE SODIUM 40 MG: 40 INJECTION, POWDER, FOR SOLUTION INTRAVENOUS at 20:12

## 2022-06-02 RX ADMIN — PANTOPRAZOLE SODIUM 40 MG: 40 INJECTION, POWDER, FOR SOLUTION INTRAVENOUS at 09:22

## 2022-06-02 RX ADMIN — ACETAMINOPHEN 650 MG: 325 TABLET ORAL at 20:11

## 2022-06-02 RX ADMIN — SODIUM CHLORIDE, PRESERVATIVE FREE 10 ML: 5 INJECTION INTRAVENOUS at 20:12

## 2022-06-02 RX ADMIN — MONTELUKAST 10 MG: 10 TABLET, FILM COATED ORAL at 20:11

## 2022-06-02 ASSESSMENT — PAIN DESCRIPTION - LOCATION
LOCATION: ARM
LOCATION: GENERALIZED
LOCATION: GENERALIZED

## 2022-06-02 ASSESSMENT — PAIN SCALES - GENERAL
PAINLEVEL_OUTOF10: 3
PAINLEVEL_OUTOF10: 2
PAINLEVEL_OUTOF10: 3

## 2022-06-02 ASSESSMENT — PAIN DESCRIPTION - DESCRIPTORS
DESCRIPTORS: ACHING
DESCRIPTORS: ACHING

## 2022-06-02 NOTE — PROGRESS NOTES
Physical Therapy  Facility/Department: [address] [place] [address]  Physical Therapy Initial Assessment/Treatment    Name: Bryan Ohara  : 1926  MRN: [id number]  Date of Service: 2022    Discharge Recommendations:  Bryan Ohara scored a 14/24 on the AM-PAC short mobility form. Current research shows that an AM-PAC score of 17 or less is typically not associated with a discharge to the patient's home setting. Based on the patient's AM-PAC score and their current functional mobility deficits, it is recommended that the patient have 3-5 sessions per week of Physical Therapy at d/c to increase the patient's independence. Please see assessment section for further patient specific details. If patient discharges prior to next session this note will serve as a discharge summary. Please see below for the latest assessment towards goals. Patient would benefit from continued therapy after discharge   PT Equipment Recommendations  Equipment Needed:  (defer)      Patient Diagnosis(es): The primary encounter diagnosis was Anemia, unspecified type. A diagnosis of Iron deficiency anemia due to chronic blood loss was also pertinent to this visit. 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 Past Surgical History:  has a past surgical history that includes Cholecystectomy; Colonoscopy (10/29/2009); Hemorrhoid surgery; Hysterectomy (in 40's);  Hysterectomy, total abdominal; Upper gastrointestinal endoscopy (N/A, 6/1/2022); IR GUIDED IVC FILTER PLACEMENT (N/A, 06/01/2022); and IR GUIDED IVC FILTER PLACEMENT (6/1/2022). Assessment   Body Structures, Functions, Activity Limitations Requiring Skilled Therapeutic Intervention: Decreased functional mobility   Assessment: Pt functioning bleow baseline at this time. Recommend further inpt PT upon D/C. Will cont PT while here to maximize independence. Treatment Diagnosis: decreased functional mobility  Therapy Prognosis: Good  Decision Making: Low Complexity  Requires PT Follow-Up: Yes  Activity Tolerance  Activity Tolerance: Patient tolerated evaluation without incident;Patient limited by pain; Patient limited by fatigue;Patient limited by endurance     Plan   Plan  Plan:  (2-5)  Current Treatment Recommendations: Strengthening,Transfer training,Gait training,Endurance training,Safety education & training  Safety Devices  Type of Devices: All fall risk precautions in place,Call light within reach,Chair alarm in place,Gait belt,Patient at risk for falls,Left in chair,Nurse notified     Restrictions  Position Activity Restriction  Other position/activity restrictions: activity as tolerated     Subjective   Pain: pt c/o right knee & back pain with mobility, not rated. (chronic)  General  Chart Reviewed: Yes  Patient assessed for rehabilitation services?: Yes  Additional Pertinent Hx: PMH: HTN, Graves disease, macular degeneration, recent DVT. Pt admitted with fatigue, low BP, found to be anemic. Dx: GI bleed  Family / Caregiver Present: No  Referring Practitioner: Dustin Vigil  Referral Date : 06/01/22  Diagnosis: GI bleed  Follows Commands: Within Functional Limits  Subjective  Subjective: Pt supine in bed & agreeable to PT. \"I haven't been out of bed in 3 days. \"         Social/Functional History  Social/Functional History  Lives With: Alone  Type of Home: Senior housing apartment  Home Layout: One level  Home Access: Elevator  Bathroom Shower/Tub: Tub/Shower unit (sponge bathes)  Bathroom Toilet: Handicap height  Home Equipment: Walker, rolling,Cane (sleeps in chair)  Receives Help From: Home health (HHA 3-5x week 3-4 hours at a time)  ADL Assistance: Needs assistance (aid assist with bathing. Pt dresses self and preps own meals)  Homemaking Assistance: Needs assistance (Aide assist with cleaning, cooking, laundry)  Ambulation Assistance: Independent (RW)  Transfer Assistance: Independent  Active : No  Patient's  Info: Aide and medicare transport  [address]: Impaired (mac degeneration)  Hearing  Hearing: Exceptions to [facility]  Hearing Exceptions: Hard of hearing/hearing concerns;Right hearing aid    Cognition   Orientation  Overall Orientation Status: Within Normal Limits  Cognition  Cognition Comment: some decreased memory, pt repeating self often     Objective   Heart Rate: 65  Heart Rate Source: Monitor  BP: 130/71  BP Location: Right upper arm  BP Method: Automatic  Patient Position: High fowlers  MAP (Calculated): 90.67  Resp: 18  SpO2: 99 %  O2 Device: None (Room air)              AROM RLE (degrees)  RLE AROM: WFL  RLE General AROM: except for knee ext decreased (OA, pain)  AROM LLE (degrees)  LLE AROM : WFL  Strength RLE  Strength RLE: WFL  Comment: except for knee ext 2+/5 (pain & swelling)  Strength LLE  Strength LLE: WFL        Bed Mobility Training  Bed Mobility Training: Yes  Supine to Sit: Stand-by assistance (increased time and effort)  Balance  Sitting: Intact  Standing: With support  Transfer Training  Transfer Training: Yes  Sit to Stand: Moderate assistance  Stand to Sit: Minimum assistance  Bed to Chair: Minimum assistance;Assist X2  Gait  Overall Level of Assistance: Minimum assistance;Assist X1;Assist X2 (Tomeka x1-2 at times)  Bed mobility  Supine to Sit: Stand by assistance (HOB elevated. slow & effortful.)  Scooting: Stand by assistance (seated)  Transfers  Sit to Stand:  Moderate Assistance (from bed & chair)  Stand to sit: Moderate Assistance  Stand Pivot Transfers: 2 Person Assistance (min A x 2 bed->chair with RW)  Ambulation  Surface: level tile  Device: Rolling Walker  Assistance: Minimal assistance  Quality of Gait: antalgic, decreased WB & stance time right LE  Gait Deviations: Slow Graciela;Decreased step length;Decreased step height  Distance: 5 ft forward & 5 ft backward  Comments: distance limited by right knee pain     Balance  Sitting - Static: Good  Sitting - Dynamic: Good  Standing - Static: Good;- (CGA with RW)  Standing - Dynamic: Fair;Poor (min A x 1-2  with RW)                                                       AM-PAC Score  AM-PAC Inpatient Mobility Raw Score : 14 (06/02/22 1051)  AM-PAC Inpatient T-Scale Score : 38.1 (06/02/22 1051)  Mobility Inpatient CMS 0-100% Score: 61.29 (06/02/22 1051)  Mobility Inpatient CMS G-Code Modifier : CL (06/02/22 1051)          Goals  Short Term Goals  Time Frame for Short term goals: D/C  Short term goal 1: supine<->sit SUPV  Short term goal 2: sit<->stand CGA  Short term goal 3: Amb 25 ft with RW CGA  Patient Goals   Patient goals : to eventually go home       Education  Patient Education  Education Given To: Patient  Education Provided: Role of Therapy;Plan of Care;Transfer Training  Education Method: Verbal  Education Outcome: Verbalized understanding;Demonstrated understanding;Continued education needed      Therapy Time   Individual Concurrent Group Co-treatment   Time In 0940         Time Out 1020         Minutes Marysol 354, PT

## 2022-06-02 NOTE — DISCHARGE INSTR - COC
Continuity of Care Form    Patient Name: Corinna Hathaway   :  1926  MRN:  [id number]    Admit date:  2022  Discharge date:  ***    Code Status Order: Limited   Advance Directives:   [place] Directive Type of Healthcare Directive Copy in [address] Agent's Name Healthcare Agent's Phone Number    [phone] Yes, patient has an advance directive for healthcare treatment -- -- -- -- --            Admitting Physician:  Margie Sun MD  PCP: Zaida Sotomayor MD    Discharging Nurse: [facility] Unit/Room#: 7171/8933-75  Discharging Unit Phone Number: ***    Emergency Contact:   Extended Emergency Contact Information  Primary Emergency Contact: [address] [place] [address] Phone: [phone]  Relation: Child  Secondary Emergency Contact: [facility]  Mobile Phone: [phone]  Relation: Brother/Sister    Past Surgical History:  Past Surgical History:   Procedure Laterality Date    CHOLECYSTECTOMY      COLONOSCOPY  10/29/2009    **see scanned report- OGI&LI    HEMORRHOID SURGERY      HYSTERECTOMY  in 40's    rudolph/bso    HYSTERECTOMY, TOTAL ABDOMINAL      IR IVC FILTER PLACEMENT W IMAGING N/A 2022    [place] [id number], sn [phone]    IR IVC FILTER PLACEMENT W IMAGING  2022    IR IVC FILTER PLACEMENT W IMAGING 2022 [facility] SPECIAL PROCEDURES    UPPER GASTROINTESTINAL ENDOSCOPY N/A 2022    EGD BIOPSY performed by Geri Fuchs MD at [facility] ENDOSCOPY       Immunization History:   Immunization History   Administered Date(s) Administered    COVID-19, Pfizer Purple top, DILUTE for use, 12+ yrs, 30mcg/0.3mL dose 2021, 2021, 10/11/2021    Influenza Virus Vaccine 2003, 2012, 10/17/2013, 10/02/2015, 09/15/2016    Influenza, High Dose (Fluzone 65 yrs and older) 2012, 10/17/2013, 10/01/2014, 10/02/2015, 09/15/2016, 09/07/2017, 10/22/2018, 09/09/2020    Influenza, High-dose, Quadv, 65 yrs +, IM (Fluzone) 09/09/2020, 09/09/2020, 09/24/2021    Influenza, Triv, inactivated, subunit, adjuvanted, IM (Fluad 65 yrs and older) 11/08/2019    Pneumococcal Conjugate 13-valent ([id number]) 12/15/2016    Pneumococcal Polysaccharide (Vscidrmor19) 04/02/2012    Tdap (Boostrix, Adacel) 07/03/2017    Zoster Recombinant (Shingrix) 04/16/2021, 07/02/2021       Active Problems:  Patient Active Problem List   Diagnosis Code    GERD (gastroesophageal reflux disease) K21.9    Asthma J45.909    IBS (irritable bowel syndrome) K58.9    MGUS (monoclonal gammopathy of unknown significance) D47.2    Menopause Z78.0    Graves' disease E05.00    S/P colonoscopy Z98.890    Left renal mass N28.89    Osteoporosis M81.0    Alkaline phosphatase elevation R74.8    Hearing loss sensory, bilateral H90.3    Bacterial overgrowth syndrome K63.89    Erythrocytosis D75.1    Arthritis of knee, right M17.11    Essential hypertension I10    Multiple food allergies Z91.018    Protein malnutrition (HCC) E46    Celiac disease K90.0    Aortic regurgitation I35.1    Primary osteoarthritis of both knees M17.0    Hyperparathyroidism (HCC) E21.3    History of Graves' disease Z86.39    Age-related osteoporosis without current pathological fracture M81.0    Bilateral carpal tunnel syndrome G56.03    Herpes zoster without complication I32.3    Multiple thyroid nodules E04.2    Adrenal adenoma, left D35.02    Adrenal adenoma, right D35.01    Postherpetic neuralgia B02.29    Chronic pain syndrome G80.1    Advanced age R48    Chest pain in adult R07.9    Pulmonary embolism on right (HCC) I26.99    Single subsegmental pulmonary embolism without acute cor pulmonale (HCC) I26.93    Blindness of both eyes H54.3    Cataract H26.9    Corns and callosities L84    Cystoid macular degeneration, 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    Dermatophytosis of nail B35.1    DJD (degenerative joint disease) of knee M17.10 Drusen (degenerative) of macula, bilateral H35.363    Drusen (degenerative) of macula, unspecified eye H35.369    Exposure keratoconjunctivitis, left eye H16.212    Osteoarthrosis M19.90    Exudative age-related macular degeneration (HCC) H35.3290    Vitreous degeneration, bilateral H43.813    Mitral regurgitation I34.0    Chronic systolic congestive heart failure (HCC) I50.22    Coagulopathy (HCC) D68.9    Acute gastrointestinal bleeding K92.2       Isolation/Infection:   Isolation            No Isolation          Patient Infection Status       Infection Onset Added Last Indicated Last Indicated By Review Planned Expiration Resolved Resolved By    None active    Resolved    COVID-19 (Rule Out) 06/21/20 06/21/20 06/21/20 COVID-19 (Ordered)   06/22/20 Rule-Out Test Resulted            Nurse Assessment:  Last Vital Signs: /71   Pulse 65   Temp 97.3 °F (36.3 °C) (Oral)   Resp 18   Ht 5' 9\" (1.753 m)   Wt 142 lb 14.4 oz (64.8 kg)   LMP  (LMP Unknown)   SpO2 99%   BMI 21.10 kg/m²     Last documented pain score (0-10 scale): Pain Level: 3  Last Weight:   Wt Readings from Last 1 Encounters:   05/31/22 142 lb 14.4 oz (64.8 kg)     Mental Status:  oriented    IV Access:  - None    Nursing Mobility/ADLs:  Walking   Dependent  Transfer  Assisted  Bathing  Assisted  Dressing  Assisted  Toileting  Assisted  Feeding  Assisted  Med Admin  Assisted  Med Delivery   prefers mixed with whole in applesauce 2 at a time    Wound Care Documentation and Therapy:        Elimination:  Continence: Bowel: Yes  Bladder: Yes  Urinary Catheter: None   Colostomy/Ileostomy/Ileal Conduit: No       Date of Last BM: 06/06/2022    Intake/Output Summary (Last 24 hours) at 6/2/2022 1138  Last data filed at 6/2/2022 0922  Gross per 24 hour   Intake 610 ml   Output 800 ml   Net -190 ml     I/O last 3 completed shifts: In: 0 [P.O.:600; I.V.:100]  Out: 800 [Urine:800]    Safety Concerns:      At Risk for Falls    Impairments/Disabilities: Vision and Hearing    Nutrition Therapy:  Current Nutrition Therapy:   - Oral Diet:  General, Dysphagia 2 mechanically altered, and Low Sodium (2gm)  - ***    Routes of Feeding: Oral  Liquids: Thin Liquids  Daily Fluid Restriction: no  Last Modified Barium Swallow with Video (Video Swallowing Test): not done    Treatments at the Time of Hospital Discharge:   Respiratory Treatments: ***  Oxygen Therapy:  is not on home oxygen therapy. Ventilator:    - No ventilator support    Heart Failure Instructions for Daily Management  Patient was treated for chronic systolic heart failure. she  will require the following:    Please weigh daily on the same scale and approximately the same time of day. Report weight gain of 3 pounds/day or 5 pounds/week to : [facility] MD and [facility] / Юлия Pacheco [phone]. Please use hospital discharge weight as baseline reference. Please monitor for signs and symptoms of and report to MD:  Worsening Heart Failure: sudden weight gain, shortness of breath, lower extremity or general edema/swelling, abdominal bloating/swelling, inability to lie flat, intolerance to usual activity, or cough (especially at night). Report these finding even if no increase in weight. Dehydration:  having difficulty or a decrease in urination, dizziness, worsening fatigue, or new onset/worsening of generalized weakness. Please continue a LOW SODIUM diet and LIMIT fluid intake to 48 - 64 ounces ( 1.5 - 2 liters) per day. Call [facility] MD and [facility] / Юлия Pacheco [phone] with any questions or concerns. Please continue heart failure education to patient and family/support system. See After Visit Summary for hospital follow up appointment details. Consider spiritual care referral for support and/or completion of advance directives . Consider: having the [facility] MD complete required 7 day follow up.   Patient's primary cardiologist is  [facility]      Rehab Therapies: Physical Therapy, Occupational Therapy, and SN  Weight Bearing Status/Restrictions: No weight bearing restrictions  Other Medical Equipment (for information only, NOT a DME order):  wheelchair, walker,  bedside commode, and hospital bed  Other Treatments: ***    Patient's personal belongings (please select all that are sent with patient):  Glasses, Hearing Aides bilateral, Dentures upper and lower    RN SIGNATURE:  Electronically signed by Dillon Garay RN on 6/7/22 at 2:22 PM EDT    CASE MANAGEMENT/SOCIAL WORK SECTION    Inpatient Status Date: 05/31/2022    Readmission Risk Assessment Score:  Readmission Risk              Risk of Unplanned Readmission:  17           Discharging to Facility/ [facility] [address]       Phone: [phone]       Fax: [phone]          Dialysis Facility (if applicable) NA   Name:  Address:  Dialysis Schedule:  Phone:  Fax:    / signature: Electronically signed by Favio Juan RN on 6/3/22 at 4:02 PM EDT    PHYSICIAN SECTION    Prognosis: Guarded    Condition at Discharge: Stable    Rehab Potential (if transferring to Rehab): Fair    Recommended Labs or Other Treatments After Discharge:     PT OT daily    CBC twice week ly to monitor hemoglobin level. Discharge hemoglobin 8.3. Stopped Xarelto 2/2 acute blood loss anemia    Follow up with SNF physician or PCP in 1 week    Repeat venous doppler of LE in 3 months and remove IVC filter if possible    Physician Certification: I certify the above information and transfer of Bayron Patterson  is necessary for the continuing treatment of the diagnosis listed and that she requires [facility] for greater 30 days.      Update Admission H&P: No change in H&P    PHYSICIAN SIGNATURE:  Electronically signed by Mohit Gotti MD - Juan M Catherine MD on 6/2/22 at 11:40 AM EDT, Savanah Danielson MD  On 6/7/2022.

## 2022-06-02 NOTE — CARE COORDINATION
[facility]    Patient aware and agreeable to services.  Faxed orders to [facility] for [facility] by 6/4    Caroline Garcia LPN  Care Transition Nurse  [address]  [phone]

## 2022-06-02 NOTE — CARE COORDINATION
CM  /SW  Cont to  Follow for  D/c planning  And  Needs :     -   Pt from home alone. -   Pt has COA services 5x week for 4 hrs  -    Pt plans to return home at [place] and is open to [address] spoke w/Lilia [phone] w/[facility] and she is following. UPDATE:    Patient  From [facility] Living  Apt  :  Per  PT rec:  Pt  Have  24  Hr  Supervision upon  D/C    Can  Arrange  C  @ d/c .      . Rec:  SNF IF Pt  Agreeable :  CM to fax requested  Referrals:    CM  Spoke with  Pt and  dgtr  Jigneshseun Galeana:  Via telephone  Who is  Living  [place] of state  :  She  Will review  List  And  Call CM  With  Preference  And  Choice  ( after  She  D/w  Her  Cousins  Who live  Local here)  :  Pro active  Referrals faxed:  While waiting for  dgtr to call back :  Pending - Request Sent     SUSY Martines at [address]         CM [facility]                 No pre cert  Needed  . -   Pt may need assist with  Transport  at [place].     -  CM/SW will continue to follow for DC needs/recs. Admitted:  W/     Acute gastrointestinal bleeding [K92.2] 05/31/2022   GI consulted:    Diet: ADULT DIET; Dysphagia - Pureed  Code Status: Limited     PT/OT Eval Status: in progress   Per  MD  Documentation:    Dispo - inpatient. D/C in 1-2 days      Electronically signed by Shiraz Jain, RN on 6/2/2022 at David Ann RN Case Manager  [facility]  [address] [place] [address] Road.   [address] [place] [address]  [phone]  Fax [phone]

## 2022-06-02 NOTE — PROGRESS NOTES
Occupational Therapy  Facility/Department: [id number] [facility] Initial Assessment & Tx    Name: Corinna Hathaway  : 1926  MRN: [id number]  Date of Service: 2022    Discharge Recommendations: Corinna Hathaway scored a 15/24 on the AM-PAC ADL Inpatient form. Current research shows that an AM-PAC score of 17 or less is typically not associated with a discharge to the patient's home setting. Based on the patient's AM-PAC score and their current ADL deficits, it is recommended that the patient have 3-5 sessions per week of Occupational Therapy at d/c to increase the patient's independence. Please see assessment section for further patient specific details. If patient discharges prior to next session this note will serve as a discharge summary. Please see below for the latest assessment towards goals. OT Equipment Recommendations  Equipment Needed: No       Patient Diagnosis(es): The primary encounter diagnosis was Anemia, unspecified type. A diagnosis of Iron deficiency anemia due to chronic blood loss was also pertinent to this visit. 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 Past Surgical History:  has a past surgical history that includes Cholecystectomy; Colonoscopy (10/29/2009); Hemorrhoid surgery; Hysterectomy (in 40's); Hysterectomy, total abdominal; Upper gastrointestinal endoscopy (N/A, 2022);  IR GUIDED IVC FILTER PLACEMENT (N/A, 2022); and IR GUIDED IVC FILTER PLACEMENT (6/1/2022). Treatment Diagnosis: impaired mobility, transfers, ADL      Assessment   Performance deficits / Impairments: Decreased functional mobility ; Decreased ADL status; Decreased endurance;Decreased posture  Assessment: From home alone reporting primarily IND pta, assist from [facility] aide 3-5x week for 3-4 hours at a times. Pt typically amb with RW. Currently requires ModA for transfers, Tomeka x1-2 with short mobility (Tomeka x2 bed to chair, Tomeka x1 amb 5' fwd and back from chair). Assist with ADLs. Functioning below baseline, would benefit from cont skilled OT while in acute and cont inpt at AL. Treatment Diagnosis: impaired mobility, transfers, ADL  Decision Making: Medium Complexity  REQUIRES OT FOLLOW-UP: Yes  Activity Tolerance  Activity Tolerance: Patient Tolerated treatment well;Patient limited by fatigue;Patient limited by pain        Plan   Plan  Times per Week: 2-5  Times per Day: Daily     Restrictions  Position Activity Restriction  Other position/activity restrictions: activity as tolerated    Subjective   General  Chart Reviewed: Yes  Additional Pertinent Hx: 80 y.o. female with PMH of acute left common femoral DVT from 5/6/2022, asthma, heart failure, depression, HTN who presented with fatigue, bilateral lower extremity edema, and hypotension. Patient was started on Xarelto 1 week ago for the DVT. Since then she has been progressively more fatigued, has been having low blood pressures in the 90s over 60s to 70s. She was found anemic with hgb 5.9. GI was consulted and performed EGD this morning, which did not find the source of anemia. Referring Practitioner: Ronald Nascimento MD  Diagnosis: acute GI bleed  Subjective  Subjective:  In bed agreeable, reporting pain \"from my neck to hips\"     Social/Functional History  Social/Functional History  Lives With: Alone  Type of Home: Senior housing apartment  Home Layout: One level  Home Access: Elevator  Bathroom Shower/Tub: Tub/Shower unit (sponge bathes)  Bathroom Toilet: Handicap height  Home Equipment: Walker, rolling,Cane (sleeps in chair)  Receives Help From: Home health (HHA 3-5x week 3-4 hours at a time)  ADL Assistance: Needs assistance (aid assist with bathing. Pt dresses self and preps own meals)  Homemaking Assistance: Needs assistance (Aide assist with cleaning, cooking, laundry)  Ambulation Assistance: Independent (RW)  Transfer Assistance: Independent  Active : No  Patient's  Info: Aide and medicare transport       Objective   Heart Rate: 65  Heart Rate Source: Monitor  BP: 130/71  BP Location: Right upper arm  BP Method: Automatic  Patient Position: High fowlers  MAP (Calculated): 90.67  Resp: 18  SpO2: 99 %  O2 Device: None (Room air)  Hearing: Exceptions to [facility]  Hearing Exceptions: Hard of hearing/hearing concerns;Right hearing aid          Safety Devices  Type of Devices: All fall risk precautions in place;Call light within reach; Chair alarm in place;Gait belt;Patient at risk for falls; Left in chair;Nurse notified  Bed Mobility Training  Bed Mobility Training: Yes  Supine to Sit: Stand-by assistance (increased time and effort)  Balance  Sitting: Intact  Standing: With support  Transfer Training  Transfer Training: Yes  Sit to Stand: Moderate assistance  Stand to Sit: Minimum assistance  Bed to Chair: Minimum assistance;Assist X2  Gait  Overall Level of Assistance: Minimum assistance;Assist X1;Assist X2 (Tomeka x1-2 at times)     AROM: Generally decreased, functional  Strength: Generally decreased, functional  Coordination: Generally decreased, functional  ADL  Feeding: Supervision  Grooming: Setup;Stand by assistance (wipe face)  LE Dressing: Minimal assistance (donning shoes EOB) anticipate more assist for socks and pants  Toileting:  (declined need)                 Cognition  Overall Cognitive Status: WFL                  Education Given To: Patient  Education Provided: Role of Therapy;Plan of Care; ADL Adaptive Strategies;Transfer Training  Education Method: Demonstration;Verbal  Education Outcome: Verbalized understanding;Demonstrated understanding                          AM-PAC Score        AM-PAC Inpatient Daily Activity Raw Score: 15 (06/02/22 1037)  AM-PAC Inpatient ADL T-Scale Score : 34.69 (06/02/22 1037)  ADL Inpatient CMS 0-100% Score: 56.46 (06/02/22 1037)  ADL Inpatient CMS G-Code Modifier : CK (06/02/22 1037)    Goals  Short Term Goals  Time Frame for Short term goals: dc  Short Term Goal 1: supine to sit SPVN  Short Term Goal 2: Fx mobility SBA with RW  Short Term Goal 3: Fx transfers SBA  Short Term Goal 4: UB/LB dressing SBA  Short Term Goal 5: grooming SBA in stance at sink       Therapy Time   Individual Concurrent Group Co-treatment   Time In 0939         Time Out 1020         Minutes 41              Timed Code Treatment Minutes:   26    Total Treatment Minutes:  [address], OT

## 2022-06-02 NOTE — PROGRESS NOTES
[address] [place] [address]  GI Progress Note          Keli Montes De Oca is a 80 y.o. female patient. 1. Anemia, unspecified type    2. Iron deficiency anemia due to chronic blood loss        Admit Date: 2022    Subjective:       No GI c/o or bleeding    Scheduled Meds:   lidocaine  1 patch TransDERmal Daily    fluticasone  2 spray Each Nostril Daily    gabapentin  100 mg Oral TID    montelukast  10 mg Oral Nightly    sodium chloride flush  5-40 mL IntraVENous 2 times per day    pantoprazole  40 mg IntraVENous BID    budesonide  0.5 mg Nebulization BID    Arformoterol Tartrate  15 mcg Nebulization BID    methIMAzole  5 mg Oral Once per day on Tue Thu Fri Sat    methIMAzole  7.5 mg Oral Once per day on Sun Mon Wed       Objective:       Patient Vitals for the past 24 hrs:   BP Temp Temp src Pulse Resp SpO2   22 0442 (!) 125/58 97.5 °F (36.4 °C) Oral 65 19 94 %   22 0030 (!) 145/63 97.8 °F (36.6 °C) Oral 62 16 93 %   22 134/67 98.1 °F (36.7 °C) Oral 58 16 94 %   22 -- -- -- -- 18 93 %   22 1534 133/68 97.7 °F (36.5 °C) Oral 56 16 94 %   22 1045 118/65 98.1 °F (36.7 °C) Oral 55 16 93 %   22 0937 (!) 129/55 -- -- 63 18 93 %   22 0927 (!) 124/56 -- -- 56 18 94 %   22 0909 (!) 124/53 97.2 °F (36.2 °C) Tympanic (!) 108 16 99 %   22 0840 -- 97.5 °F (36.4 °C) Temporal -- 18 --   22 0739 (!) 130/55 98.2 °F (36.8 °C) Oral 73 18 93 %       Exam:  VITALS:  BP (!) 125/58   Pulse 65   Temp 97.5 °F (36.4 °C) (Oral)   Resp 19   Ht 5' 9\" (1.753 m)   Wt 142 lb 14.4 oz (64.8 kg)   LMP  (LMP Unknown)   SpO2 94%   BMI 21.10 kg/m²   TEMPERATURE:  Current - Temp: 97.5 °F (36.4 °C);  Max - Temp  Av.8 °F (36.6 °C)  Min: 97.2 °F (36.2 °C)  Max: 98.2 °F (36.8 °C)    NAD  General appearance: alert, appears stated age, cooperative and no distress  Abdomen: soft, non-tender; bowel sounds normal; no masses,  no organomegaly  AAOx3, No asterixis or encephalopathy  Extremities: No edema. Recent Labs     05/31/22  1136 05/31/22  1658 06/01/22  0041   WBC 11.3*  --   --    HGB 5.9* 5.8* 8.2*   HCT 18.4* 17.5* 25.4*   MCV 79.8*  --   --      --   --      Recent Labs     05/31/22  1136      K 4.6      CO2 22   BUN 20   CREATININE 1.1     Recent Labs     05/31/22  1658   AST 10*   ALT 6*   BILIDIR <0.2   BILITOT 0.6   ALKPHOS 113       Assessment:       Principal Problem:    Acute gastrointestinal bleeding  Resolved Problems:    * No resolved hospital problems. *    Discussed with patient will hold off on colonoscopy due to age increased risks, she is in agreement with this and understands small chance of missing colon cancer but would not consent to surgery anyway    No signs of active bleeding    Recommendations:        Will sign off recall GI if needed    Herbie Canavan, MD  6/2/2022  7:28 AM

## 2022-06-02 NOTE — PROGRESS NOTES
Internal Medicine PGY-2 Resident Progress Note        PCP: Martinez Beasley MD    Date of Admission: 5/31/2022    Chief Complaint: Fatigue    Subjective:   No acute events overnight. Patient seen at the bedside this AM.  She was sitting up in her chair. Patient states that it is the first day that she is sitting back up in her chair and it feels difficult for her. She complains of some mild back pain as well as difficulty moving around. She has no other complaints. Patient was seen by PT and OT with recommendations for inpatient rehab after discharge. Patient agreeable to go to SNF on discharge. We will keep the patient 1 more day to monitor her hemoglobin. Medications:  Reviewed    Infusion Medications    sodium chloride       Scheduled Medications    lidocaine  1 patch TransDERmal Daily    fluticasone  2 spray Each Nostril Daily    gabapentin  100 mg Oral TID    montelukast  10 mg Oral Nightly    sodium chloride flush  5-40 mL IntraVENous 2 times per day    pantoprazole  40 mg IntraVENous BID    budesonide  0.5 mg Nebulization BID    Arformoterol Tartrate  15 mcg Nebulization BID    methIMAzole  5 mg Oral Once per day on Tue Thu Fri Sat    methIMAzole  7.5 mg Oral Once per day on Sun Mon Wed     PRN Meds: acetaminophen, hydroxypropyl methylcellulose, albuterol, sodium chloride flush, sodium chloride, ondansetron **OR** ondansetron, polyethylene glycol      Intake/Output Summary (Last 24 hours) at 6/2/2022 1436  Last data filed at 6/2/2022 3190  Gross per 24 hour   Intake 250 ml   Output 800 ml   Net -550 ml       Physical Exam Performed:    /73   Pulse 79   Temp 97.5 °F (36.4 °C) (Oral)   Resp 18   Ht 5' 9\" (1.753 m)   Wt 142 lb 14.4 oz (64.8 kg)   LMP  (LMP Unknown)   SpO2 98%   BMI 21.10 kg/m²     General appearance: No apparent distress, appears stated age and cooperative. HEENT: Pupils equal, round, and reactive to light. Conjunctivae mildly pink.   Neck: Supple, with full range of motion. No jugular venous distention. Trachea midline. Respiratory:  Normal respiratory effort. Clear to auscultation, bilaterally without Rales/Wheezes/Rhonchi. Cardiovascular: Regular rate and rhythm with normal S1/S2 without murmurs, rubs or gallops. Abdomen: Soft, non-tender, non-distended with normal bowel sounds. Musculoskeletal: No clubbing, cyanosis or edema bilaterally. Full range of motion without deformity. 1+ bilateral pitting edema. Skin: Skin color, texture, turgor normal.  No rashes or lesions. Neurologic:  Neurovascularly intact without any focal sensory/motor deficits. Cranial nerves: II-XII intact, grossly non-focal.  Psychiatric: Alert and oriented, thought content appropriate, normal insight  Capillary Refill: Brisk,< 3 seconds   Peripheral Pulses: +2 palpable, equal bilaterally       Labs:   Recent Labs     05/31/22  1136 05/31/22  1136 05/31/22  1658 06/01/22  0041 06/02/22  0704   WBC 11.3*  --   --   --  10.0   HGB 5.9*   < > 5.8* 8.2* 7.6*   HCT 18.4*   < > 17.5* 25.4* 23.0*     --   --   --  251    < > = values in this interval not displayed. Recent Labs     05/31/22  1136 06/02/22  0704    138   K 4.6 3.8    106   CO2 22 24   BUN 20 16   CREATININE 1.1 0.8   CALCIUM 9.2 8.8     Recent Labs     05/31/22  1658   AST 10*   ALT 6*   BILIDIR <0.2   BILITOT 0.6   ALKPHOS 113     Recent Labs     05/31/22  1136   INR 1.38*     Recent Labs     05/31/22  1136   TROPONINI <0.01       Urinalysis:      Lab Results   Component Value Date    NITRU Negative 06/01/2022    WBCUA None seen 06/01/2022    BACTERIA 2+ 12/21/2019    RBCUA 0-2 06/01/2022    BLOODU TRACE-LYSED 06/01/2022    SPECGRAV 1.010 06/01/2022    GLUCOSEU Negative 06/01/2022       Radiology:  IR GUIDED IVC FILTER PLACEMENT   Final Result   IMPRESSION :      Normal IVC anatomy. Normal, patent right internal jugular vein      Placement of filter in the infra-renal IVC.   Please note that this is a retrievable filter. Fluoroscopy time : 1.6 minutes   Number of exposures obtained : 1 venographic run, 1 spot image   Moderate sedation was provided and monitored by an independent trained observer. Moderate sedation start time : 0956   Moderate sedation end time : 1017   Blood loss : minimal ( less than 5 cc )   Specimens : none             XR CHEST PORTABLE   Final Result      No acute radiographic abnormality of the chest.              Assessment/Plan:    Acute blood loss anemia  S/p 2U RBC transfusion. Hemoglobin is stable. Will monitor 1 more day. S/p EGD 6/1 no source of bleeding. GI did discussion with daughter regarding risk versus benefits of colonoscopy. Plan was not to pursue for colonoscopy due to her age.     Acute left, femoral DVT on 5/6/2022. Hold Xarelto in light of blood loss anemia  - s/p IVC filter on 6/1     Graves' disease  Currently on methimazole.     Asthma stable  Continue budesonide, Brovana.       DVT Prophylaxis: Lovenox  Diet: ADULT DIET;  Dysphagia - Minced and Moist  Code Status: Limited  PT/OT Eval Status: Recommend SNF, Pt agreeable  Dispo - Likely tomorrow to SNF    I will discuss the patient with MD Vannessa Salvador MD  Internal Medicine Resident PGY-2

## 2022-06-03 LAB
BLOOD BANK DISPENSE STATUS: NORMAL
BLOOD BANK PRODUCT CODE: NORMAL
BPU ID: NORMAL
DESCRIPTION BLOOD BANK: NORMAL
HCT VFR BLD CALC: 20.8 % (ref 36–48)
HEMOGLOBIN: 6.8 G/DL (ref 12–16)

## 2022-06-03 PROCEDURE — 36415 COLL VENOUS BLD VENIPUNCTURE: CPT

## 2022-06-03 PROCEDURE — 36430 TRANSFUSION BLD/BLD COMPNT: CPT

## 2022-06-03 PROCEDURE — 97535 SELF CARE MNGMENT TRAINING: CPT

## 2022-06-03 PROCEDURE — 6370000000 HC RX 637 (ALT 250 FOR IP): Performed by: INTERNAL MEDICINE

## 2022-06-03 PROCEDURE — 94640 AIRWAY INHALATION TREATMENT: CPT

## 2022-06-03 PROCEDURE — 6370000000 HC RX 637 (ALT 250 FOR IP): Performed by: NURSE PRACTITIONER

## 2022-06-03 PROCEDURE — 2580000003 HC RX 258: Performed by: INTERNAL MEDICINE

## 2022-06-03 PROCEDURE — 85014 HEMATOCRIT: CPT

## 2022-06-03 PROCEDURE — 6360000002 HC RX W HCPCS: Performed by: INTERNAL MEDICINE

## 2022-06-03 PROCEDURE — 85018 HEMOGLOBIN: CPT

## 2022-06-03 PROCEDURE — 97530 THERAPEUTIC ACTIVITIES: CPT

## 2022-06-03 PROCEDURE — C9113 INJ PANTOPRAZOLE SODIUM, VIA: HCPCS | Performed by: INTERNAL MEDICINE

## 2022-06-03 PROCEDURE — 1200000000 HC SEMI PRIVATE

## 2022-06-03 RX ORDER — ACETAMINOPHEN 160 MG/5ML
650 SOLUTION ORAL EVERY 4 HOURS PRN
Status: DISCONTINUED | OUTPATIENT
Start: 2022-06-03 | End: 2022-06-07 | Stop reason: HOSPADM

## 2022-06-03 RX ORDER — SIMETHICONE 80 MG
80 TABLET,CHEWABLE ORAL EVERY 6 HOURS PRN
Status: DISCONTINUED | OUTPATIENT
Start: 2022-06-03 | End: 2022-06-07 | Stop reason: HOSPADM

## 2022-06-03 RX ORDER — SODIUM CHLORIDE 9 MG/ML
INJECTION, SOLUTION INTRAVENOUS PRN
Status: DISCONTINUED | OUTPATIENT
Start: 2022-06-03 | End: 2022-06-07 | Stop reason: HOSPADM

## 2022-06-03 RX ADMIN — ARFORMOTEROL TARTRATE 15 MCG: 15 SOLUTION RESPIRATORY (INHALATION) at 21:29

## 2022-06-03 RX ADMIN — SODIUM CHLORIDE, PRESERVATIVE FREE 10 ML: 5 INJECTION INTRAVENOUS at 21:41

## 2022-06-03 RX ADMIN — BUDESONIDE 500 MCG: 0.5 SUSPENSION RESPIRATORY (INHALATION) at 09:12

## 2022-06-03 RX ADMIN — POLYETHYLENE GLYCOL 3350 17 G: 17 POWDER, FOR SOLUTION ORAL at 15:52

## 2022-06-03 RX ADMIN — PANTOPRAZOLE SODIUM 40 MG: 40 INJECTION, POWDER, FOR SOLUTION INTRAVENOUS at 09:41

## 2022-06-03 RX ADMIN — BUDESONIDE 500 MCG: 0.5 SUSPENSION RESPIRATORY (INHALATION) at 21:30

## 2022-06-03 RX ADMIN — SODIUM CHLORIDE, PRESERVATIVE FREE 10 ML: 5 INJECTION INTRAVENOUS at 09:44

## 2022-06-03 RX ADMIN — GABAPENTIN 100 MG: 100 CAPSULE ORAL at 09:47

## 2022-06-03 RX ADMIN — PANTOPRAZOLE SODIUM 40 MG: 40 INJECTION, POWDER, FOR SOLUTION INTRAVENOUS at 21:41

## 2022-06-03 RX ADMIN — MONTELUKAST 10 MG: 10 TABLET, FILM COATED ORAL at 21:17

## 2022-06-03 RX ADMIN — ARFORMOTEROL TARTRATE 15 MCG: 15 SOLUTION RESPIRATORY (INHALATION) at 09:12

## 2022-06-03 RX ADMIN — FLUTICASONE PROPIONATE 2 SPRAY: 50 SPRAY, METERED NASAL at 09:41

## 2022-06-03 RX ADMIN — METHIMAZOLE 5 MG: 5 TABLET ORAL at 09:41

## 2022-06-03 RX ADMIN — GABAPENTIN 100 MG: 100 CAPSULE ORAL at 21:17

## 2022-06-03 RX ADMIN — GABAPENTIN 100 MG: 100 CAPSULE ORAL at 15:52

## 2022-06-03 ASSESSMENT — PAIN SCALES - GENERAL: PAINLEVEL_OUTOF10: 2

## 2022-06-03 NOTE — DISCHARGE SUMMARY
HOSPITALISTS DISCHARGE SUMMARY    Patient Demographics    Patient. Shannan Reid  Date of Birth. 12/26/1926  MRN. [id number]     Primary care provider. Angely Lin MD  (Tel: [phone])    Admit date: 5/31/2022    Discharge date (blank if same as Note Date): Note Date: 6/3/2022     Reason for Hospitalization. Chief Complaint   Patient presents with    Fatigue     x a few days         Significant Findings. Principal Problem:    Acute gastrointestinal bleeding  Resolved Problems:    * No resolved hospital problems. *       Problems and results from this hospitalization that need follow up. Acute blood loss anemia  GI bleeding  Significant test results and incidental findings. 1.   IR GUIDED IVC FILTER PLACEMENT   Final Result   IMPRESSION :      Normal IVC anatomy. Normal, patent right internal jugular vein      Placement of filter in the infra-renal IVC. Please note that this is a retrievable filter. Fluoroscopy time : 1.6 minutes   Number of exposures obtained : 1 venographic run, 1 spot image   Moderate sedation was provided and monitored by an independent trained observer. Moderate sedation start time : 0956   Moderate sedation end time : 1017   Blood loss : minimal ( less than 5 cc )   Specimens : none             XR CHEST PORTABLE   Final Result      No acute radiographic abnormality of the chest.        Invasive procedures and treatments. 1. IVC filter placement     [facility] Course. GI bleed sec to Acute blood loss anemia  S/p 2U RBC transfusion. Hemoglobin is stable. Will monitor 1 more day. S/p EGD 6/1 no source of bleeding. GI did discussion with daughter regarding risk versus benefits of colonoscopy. Tonny Riddle was not to pursue for colonoscopy due to her age. GOT IVC filter, will stop xarelto, does have recent DVT history       Acute left, femoral DVT on 5/6/2022.   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     Asthma stable  Continue budesonide, Brovana.             Consults. IP CONSULT TO HOSPITALIST  IP CONSULT TO GI  IP CONSULT TO INTERVENTIONAL RADIOLOGY  IP CONSULT TO PALLIATIVE CARE  IP CONSULT TO HOME CARE NEEDS    Physical examination on discharge day. /65   Pulse 78   Temp 98.2 °F (36.8 °C) (Oral)   Resp 18   Ht 5' 9\" (1.753 m)   Wt 148 lb 2.4 oz (67.2 kg)   LMP  (LMP Unknown)   SpO2 95%   BMI 21.88 kg/m²   General appearance. Alert. Looks comfortable. HEENT. Sclera clear. Moist mucus membranes. Cardiovascular. Regular rate and rhythm, normal S1, S2. No murmur. Respiratory. Not using accessory muscles. Clear to auscultation bilaterally, no wheeze. Gastrointestinal. Abdomen soft, non-tender, not distended, normal bowel sounds  Neurology. Facial symmetry. No speech deficits. Moving all extremities equally. Condition at time of discharge stable     Medication instructions provided to patient at discharge. Medication List      CONTINUE taking these medications    ammonium lactate 12 % lotion  Commonly known as: Lac-Hydrin  Apply topically daily. atenolol 50 MG tablet  Commonly known as: TENORMIN  TAKE 1 TABLET DAILY     budesonide-formoterol 160-4.5 MCG/ACT Aero  Commonly known as: Symbicort  Inhale 2 puffs into the lungs 2 times daily     felodipine 5 MG extended release tablet  Commonly known as: PLENDIL  TAKE 1 TABLET NIGHTLY. DISCONTINUE 2.5 MG         FELODIPINE PRESCRIPTION     fluocinonide 0.05 % cream  Commonly known as: LIDEX  Apply topically 2 times daily. fluticasone 50 MCG/ACT nasal spray  Commonly known as: FLONASE  PLACE 1 SPRAY IN EACH NOSTRIL DAILY     gabapentin 100 MG capsule  Commonly known as: NEURONTIN  TAKE 1 CAPSULE BY MOUTH 3 TIMES DAILY  DAYS.  INTENDED SUPPLY: 30 DAYS     hydrocortisone 25 MG suppository  Commonly known as: ANUSOL-HC  Place 1 suppository rectally every 12 hours     ipratropium 0.03 % nasal spray  Commonly known as: ATROVENT  USE 1 SPRAY IN EACH NOSTRIL AT BEDTIME     lidocaine 5 % ointment  Commonly known as: XYLOCAINE  Apply topically three times a day, as needed. methIMAzole 5 MG tablet  Commonly known as: TAPAZOLE  Take 1 tablet by mouth See Admin Instructions 1 tablet by mouth daily except for Sunday, Monday, Wednesday take 1.5 tablets. montelukast 10 MG tablet  Commonly known as: SINGULAIR  TAKE 1 TABLET NIGHTLY     nystatin 245942 UNIT/ML suspension  Commonly known as: MYCOSTATIN  Take 5 mLs by mouth 4 times daily     pantoprazole 40 MG tablet  Commonly known as: PROTONIX  TAKE 1 TABLET BY MOUTH TWICE A DAY     polyethylene glycol 17 g packet  Commonly known as: GLYCOLAX  Take 17 g by mouth 2 times daily     ProAir RespiClick 365 (90 Base) MCG/ACT aerosol powder inhalation  Generic drug: albuterol sulfate  Inhale 2 puffs into the lungs every 6 hours as needed for Wheezing or Shortness of Breath     Simethicone 180 MG Caps  Commonly known as: Gas Relief  Take 180 mg by mouth 4 times daily as needed (bloating)     spironolactone 25 MG tablet  Commonly known as: ALDACTONE  TAKE 1 TABLET DAILY     terbinafine 1 % cream  Commonly known as: LAMISIL  Apply topically 2 times daily. triamcinolone 0.1 % cream  Commonly known as: KENALOG  Apply topically 2 times daily to reddened area right calf.     vitamin D 1.25 MG (39807 UT) Caps capsule  Commonly known as: ERGOCALCIFEROL  Take 1 capsule by mouth once a week        STOP taking these medications    rivaroxaban 15 & 20 MG Starter Pack     rivaroxaban 20 MG Tabs tablet  Commonly known as: Xarelto            Discharge recommendations given to patient. Follow Up. PCP, GI  Disposition. SNF  Activity. activity as tolerated  Diet: ADULT DIET; Dysphagia - Minced and Moist; Low Sodium (2 gm)      Spent 35 minutes in discharge process.     Signed:  Carmel Barrow MD     6/3/2022 1:21 PM

## 2022-06-03 NOTE — PLAN OF CARE
Problem: Safety - Adult  Goal: Free from fall injury  Outcome: Progressing  Pt used call light before getting up. Pt remained free of fall through out shift      Problem: Skin/Tissue Integrity  Goal: Absence of new skin breakdown  Description: 1. Monitor for areas of redness and/or skin breakdown  2. Assess vascular access sites hourly  3. Every 4-6 hours minimum:  Change oxygen saturation probe site  4. Every 4-6 hours:  If on nasal continuous positive airway pressure, respiratory therapy assess nares and determine need for appliance change or resting period. Outcome: Progressing  Pt skin remained dry.  No new redness or sores seen during shift

## 2022-06-03 NOTE — PROGRESS NOTES
Palliative Care Chart Review  and Check in Note:     NAME:  Keli Montes De Oca  Admit Date: 5/31/2022  Hospital Day:  Hospital Day: 4   Current Code status: Limited    Palliative care is continuing to following Ms. Hsieh for symptom management,  and goals of care discussion as needed. Patient's chart reviewed today 6/3/22. Met with daughter Prema Staples at the bedside. She provided HCPOA which names Prema Staples as pt's agent and grandson Catrachita Raymond as alternate agent. Placed a copy on pt's chart. The following are the currently established goals/code status, and Symptom management. Goals of care: Pt hopes to get stronger at SNF and then return home.     Code status: DNR/DNI (Limited- no to all interventions)    Discharge plan: D/c to SNF when able    Laquita Beltrán NP  [address]

## 2022-06-03 NOTE — CARE COORDINATION
CM  /SW  Cont to  Follow for  D/c planning  And  Needs :     -   Pt from home alone. Sr. Living [address]   -   Pt has COA services 5x week for 4 hrs     . Rec:  SNF IF Pt  Agreeable :  CM to fax requested  Referrals:    CM  Spoke with  Pt and  dgtr  Jessica Cover:  Via telephone  [place]  [place] : and  Planning to  Come down to see  Mom today :    -  She  Will review  List  And  Call CM  With  Preference  And  Choice  ( after  She  D/w  Her  Cousins  Who live  Local here)  :  Pro active  Referrals faxed:  While waiting for  dgtr to call back :    Pending - Request Sent     SUSY [facility] Pending/  No beds avail        CM [facility] Declined         CM [place] Pending/ No Beds          [address]  reviewing         CM Bobbi at [facility] No beds avail        CM [facility] No beds avail        [facility]  No call back            No pre cert  Needed  . -   Pt may need assist with  Transport  at DC.     -  CM/SW will continue to follow for DC needs/recs. Monitoring  HH,  Plan  To  D/C  Once  Stable  ;  Pending  SNF acceptance / placement. Electronically signed by Christa Woodall RN on 6/3/2022 at 11:12 AM     Christa Woodall RN Case Manager  The [address]   [facility] [address]  [phone]  Fax [phone]

## 2022-06-03 NOTE — PROGRESS NOTES
HOSPITALISTS PROGRESS NOTE    6/3/2022 5:47 PM        Name: Aida Younger . Admitted: 5/31/2022  Primary Care Provider: Humera Wen MD (Tel: [phone])      Problem List  Principal Problem:    Acute gastrointestinal bleeding  Resolved Problems:    * No resolved hospital problems. *       Assessment & Plan:      Acute blood loss anemia  S/p 2U RBC transfusion. Hemoglobin is stable. Will monitor 1 more day. S/p EGD 6/1 no source of bleeding. GI did discussion with daughter regarding risk versus benefits of colonoscopy. Denae Damon was not to pursue for colonoscopy due to her age. Hemoglobin drop, will transfuse 1 unit of prbc     Acute left, femoral DVT on 5/6/2022. Hold Xarelto in light of blood loss anemia  - s/p IVC filter on 6/1     Graves' disease  Currently on methimazole.     Asthma stable  Continue budesonide, Brovana.     IV Access: peripheral  Haley:  Diet: ADULT DIET; Dysphagia - Minced and Moist; Low Sodium (2 gm)  Code:Limited  DVT PPX SCDs  Disposition given downtrending hemoglobin, hold discharge       Brief Course:       CC: Low hb    Subjective:  .   Patient comfortable   Denies any new complaints  Drop in hb,     Reviewed interval ancillary notes    Current Medications  simethicone (MYLICON) chewable tablet 80 mg, Q6H PRN  acetaminophen (TYLENOL) 160 MG/5ML solution 650 mg, Q4H PRN  0.9 % sodium chloride infusion, PRN  lidocaine 4 % external patch 1 patch, Daily  hydroxypropyl methylcellulose (GONIOSOL) 2.5 % ophthalmic solution 1 drop, PRN  albuterol (PROVENTIL) nebulizer solution 2.5 mg, Q4H PRN  fluticasone (FLONASE) 50 MCG/ACT nasal spray 2 spray, Daily  gabapentin (NEURONTIN) capsule 100 mg, TID  montelukast (SINGULAIR) tablet 10 mg, Nightly  sodium chloride flush 0.9 % injection 5-40 mL, 2 times per day  sodium chloride flush 0.9 % injection 5-40 mL, PRN  0.9 % sodium chloride infusion, PRN  ondansetron (ZOFRAN-ODT) disintegrating tablet 4 mg, Q8H PRN   Or  ondansetron (ZOFRAN) injection 4 mg, Q6H PRN  polyethylene glycol (GLYCOLAX) packet 17 g, Daily PRN  pantoprazole (PROTONIX) injection 40 mg, BID  budesonide (PULMICORT) nebulizer suspension 500 mcg, BID  Arformoterol Tartrate (BROVANA) nebulizer solution 15 mcg, BID  methIMAzole (TAPAZOLE) tablet 5 mg, Once per day on Tue Thu Fri Sat  methIMAzole (TAPAZOLE) tablet 7.5 mg, Once per day on Sun Mon Wed        Objective:  /67   Pulse 88   Temp 98.1 °F (36.7 °C) (Oral)   Resp 18   Ht 5' 9\" (1.753 m)   Wt 148 lb 2.4 oz (67.2 kg)   LMP  (LMP Unknown)   SpO2 96%   BMI 21.88 kg/m²     Intake/Output Summary (Last 24 hours) at 6/3/2022 1747  Last data filed at 6/3/2022 1407  Gross per 24 hour   Intake 690 ml   Output 1400 ml   Net -710 ml      Wt Readings from Last 3 Encounters:   06/03/22 148 lb 2.4 oz (67.2 kg)   05/06/22 153 lb (69.4 kg)   01/13/22 150 lb (68 kg)       General appearance:  Appears comfortable  Eyes: Sclera clear. Pupils equal.  ENT: Moist oral mucosa. Trachea midline, no adenopathy. Cardiovascular: Regular rhythm, normal S1, S2. No murmur. No edema in lower extremities  Respiratory: Not using accessory muscles. Good inspiratory effort. Clear to auscultation bilaterally, no wheeze or crackles. GI: Abdomen soft, no tenderness, not distended, normal bowel sounds  Musculoskeletal: No cyanosis in digits, neck supple  Neurology: CN 2-12 grossly intact. No speech or motor deficits  Psych: Normal affect. Alert and oriented in time, place and person  Skin: Warm, dry, normal turgor  Extremity exam shows brisk capillary refill.   Peripheral pulses are palpable in lower extremities     Labs and Tests:  CBC:   Recent Labs     06/01/22  0041 06/02/22  0704 06/03/22  1529   WBC  --  10.0  --    HGB 8.2* 7.6* 6.8*   PLT  --  251  --      BMP:    Recent Labs     06/02/22  0704      K 3.8      CO2 24   BUN 16   CREATININE 0.8   GLUCOSE 89 Hepatic: No results for input(s): AST, ALT, ALB, BILITOT, ALKPHOS in the last 72 hours. IR GUIDED IVC FILTER PLACEMENT   Final Result   IMPRESSION :      Normal IVC anatomy. Normal, patent right internal jugular vein      Placement of filter in the infra-renal IVC. Please note that this is a retrievable filter. Fluoroscopy time : 1.6 minutes   Number of exposures obtained : 1 venographic run, 1 spot image   Moderate sedation was provided and monitored by an independent trained observer.    Moderate sedation start time : 0956   Moderate sedation end time : 1017   Blood loss : minimal ( less than 5 cc )   Specimens : none             XR CHEST PORTABLE   Final Result      No acute radiographic abnormality of the chest.                Hunter Velazquez MD   6/3/2022 5:47 PM

## 2022-06-03 NOTE — CARE COORDINATION
SUSY  recv'd  Call from  [facility] who states  d/c  On Hold  D/T critical HH   6.8 hgb. CM  Moved  Transport to  [facility] at  1600, w/ [employer]  [phone]   And  will follow up with Weekend  CM /SW    Electronically signed by Litzy Mcfadden RN on 6/3/2022 at 4:24 PM     ++++++++++++++++++++++++++++++++++++++++++++            Case Management Assessment            Discharge Note                    Date / Time of Note: 6/3/2022 4:16 PM                  Discharge Note Completed by: Litzy Mcfadden RN    Patient Name: Keli Montes De Oca   YOB: 1926  Diagnosis: Acute gastrointestinal bleeding [K92.2]  Anemia, unspecified type [D64.9]   Date / Time: 5/31/2022 10:50 AM    Current PCP: Luis Kathleen MD  Clinic patient: No    Hospitalization in the last 30 days: No    Advance Directives:  Code Status: [address] DNR form completed and on chart: Yes    Financial:  Payor: MEDICARE / Plan: MEDICARE PART A AND B / Product Type: *No Product type* /      Pharmacy:    [facility] [place] [id number] [phone]  [address]  Phone: [phone] Fax: [phone] - [address] [phone] [facility] [phone]  [address] [place] [address]  Phone: [phone] Fax: [phone]      Assistance purchasing medications?: Potential Assistance Purchasing Medications: No  Assistance provided by Case Management: None at this time    Does patient want to participate in local refill/ meds to beds program?: Yes    Meds To Beds General Rules:  1. Can ONLY be done Monday- Friday between 8:30am-5pm  2. Prescription(s) must be in pharmacy by 3pm to be filled same day  3. Copy of patient's insurance/ prescription drug card and patient face sheet must be sent along with the prescription(s)  4. Cost of Rx cannot be added to hospital bill.  If financial assistance is needed, please contact unit  or ;  or  CANNOT provide pharmacy voucher for patients co-pays  5. Patients can then  the prescription on their way out of the hospital at discharge, or pharmacy can deliver to the bedside if staff is available. (payment due at time of pick-up or delivery - cash, check, or card accepted)     Able to afford home medications/ co-pay costs: Yes    ADLS:  Current PT AM-PAC Score: 14 /24  Current OT AM-PAC Score: 16 /24      DISCHARGE Disposition:    [address] [place] [address]      REPORT Phone: [phone]      [address] [place] [address] Fax: [phone]          LOC at discharge: Skilled  JUDSON Completed: Yes    Notification completed in HENS/PAS?:  Yes : CM has completed HENS online through secure website for SNF admission at [facility]  .    Document ID #: Document ID : [id number]    IMM Completed:   Not Indicated    Transportation:  Transportation PLAN for discharge: EMS transportation   Mode of Transport: Ambulance stretcher - BLS  Reason for medical transport: Bed confined: Meets the following criteria 1) unable to get out of bed without assistance or ambulate, 2) unable to safely sit up in a wheelchair, 3) unable to maintain erect seating position in a chair for time needed for transport and Other: High fall risk  R knee  back pain:  [address]  Name of [address] [place] [address],[facility]: [facility]  Phone: [phone]  Time of Transport: 2000    Transport form completed: Yes    Home Care:  [address] ordered at discharge: No  [address]: Not Applicable  Orders faxed: No    Durable Medical Equipment:  DME Provider: defer  Equipment obtained during hospitalization: defer    Home Oxygen and Respiratory Equipment:  Oxygen needed at discharge?: No  [address]: Not Applicable  Portable tank available for discharge?: No    Dialysis:  Dialysis patient: No    Dialysis Center:  Not Applicable    Hospice Services:  Location: Not Applicable  Agency: Not Applicable    Consents signed: No    Referrals made at [facility] for outpatient continued care:  Not Applicable    Additional CM Notes:     CM  Confirmed  D/ SNF  : CM spoke with  Portillo Eugene  In admissions and can  Accept the pt  This evening. Transport arranged as  Noted  above     Patient  And  Daughter  Agreeable to  [facility]     RN call report  , CM  Faxed  JUDSON , AVS  , HENS submitted  , No pre cert  Needed.  Home [address]  Will call report        The Plan for Transition of Care is related to the following treatment goals of Acute gastrointestinal bleeding [K92.2]  Anemia, unspecified type [D64.9]    The Patient and/or patient representative Sheron Beebe and her family were provided with a choice of provider and agrees with the discharge plan Yes    Freedom of choice list was provided with basic dialogue that supports the patient's individualized plan of care/goals and shares the quality data associated with the providers.  Yes    Care Transitions patient: No    Poncho Flynn RN  [facility]  Case Management Department  Ph: [phone]

## 2022-06-03 NOTE — PROGRESS NOTES
Occupational Therapy  Facility/Department: [address] [place] [address]  Occupational Therapy Treatment    Name: Olesya Alfredo  : 1926  MRN: [id number]  Date of Service: 6/3/2022    Discharge Recommendations: Olesya Alfredo scored a 16/24 on the AM-PAC ADL Inpatient form. Current research shows that an AM-PAC score of 17 or less is typically not associated with a discharge to the patient's home setting. Based on the patient's AM-PAC score and their current ADL deficits, it is recommended that the patient have 3-5 sessions per week of Occupational Therapy at d/c to increase the patient's independence. Please see assessment section for further patient specific details. If patient discharges prior to next session this note will serve as a discharge summary. Please see below for the latest assessment towards goals. Patient Diagnosis(es): The primary encounter diagnosis was Anemia, unspecified type. A diagnosis of Iron deficiency anemia due to chronic blood loss was also pertinent to this visit. 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 Past Surgical History:  has a past surgical history that includes Cholecystectomy; Colonoscopy (10/29/2009); Hemorrhoid surgery; Hysterectomy (in 40's); Hysterectomy, total abdominal; Upper gastrointestinal endoscopy (N/A, 2022); IR GUIDED IVC FILTER PLACEMENT (N/A, 2022); and IR GUIDED IVC FILTER PLACEMENT (2022).     Treatment Diagnosis: impaired mobility, transfers, ADL      Assessment   Performance deficits / Impairments: Decreased functional mobility ; Decreased ADL status; Decreased endurance;Decreased posture  Assessment: From home alone reporting primarily IND pta. Currently requires ModA for transfers and fx mobility bed to chair. Assist with ADLs this date, grooming, UB/LB bathing and dressing. Limited by pain in R hip and back this date. Functioning below baseline, would benefit from cont skilled OT while in acute and cont inpt at [place]. Treatment Diagnosis: impaired mobility, transfers, ADL  REQUIRES OT FOLLOW-UP: Yes  Activity Tolerance  Activity Tolerance: Patient Tolerated treatment well;Patient limited by pain        Plan   Plan  Times per Week: 2-5  Times per Day: Daily     Restrictions  Position Activity Restriction  Other position/activity restrictions: activity as tolerated    Subjective   General  Chart Reviewed: Yes  Patient assessed for rehabilitation services?: Yes  Additional Pertinent Hx: 80 y.o. female with PMH of acute left common femoral DVT from 5/6/2022, asthma, heart failure, depression, HTN who presented with fatigue, bilateral lower extremity edema, and hypotension. Patient was started on Xarelto 1 week ago for the DVT. Since then she has been progressively more fatigued, has been having low blood pressures in the 90s over 60s to 70s. She was found anemic with hgb 5.9. GI was consulted and performed EGD this morning, which did not find the source of anemia. Referring Practitioner: Isaías Fuchs MD  Diagnosis: acute GI bleed  Subjective  Subjective:  In bed agreeable to session     Social/Functional History  Social/Functional History  Lives With: Alone  Type of Home: Senior housing apartment  Home Layout: One level  Home Access: Elevator  Bathroom Shower/Tub: Tub/Shower unit (sponge bathes)  Bathroom Toilet: Handicap height  Home Equipment: Delman Ormond (sleeps in chair)  Receives Help From: [employer] (HHA 3-5x week 3-4 hours at a time)  ADL Assistance: Needs assistance (aid assist with bathing. Pt dresses self and preps own meals)  Homemaking Assistance: Needs assistance (Aide assist with cleaning, cooking, laundry)  Ambulation Assistance: Independent (RW)  Transfer Assistance: Independent  Active : No  Patient's  Info: Aide and medicare transport       Objective   Heart Rate: 73  Heart Rate Source: Monitor  BP: 120/66  BP Location: Left upper arm  BP Method: Automatic  Patient Position: Semi fowlers  MAP (Calculated): 84  Resp: 18  SpO2: 94 %  O2 Device: None (Room air)             Safety Devices  Type of Devices: All fall risk precautions in place;Call light within reach; Chair alarm in place;Gait belt;Patient at risk for falls; Left in chair;Nurse notified  Bed Mobility Training  Bed Mobility Training: Yes  Supine to Sit: Stand-by assistance (increased time and effort, use of rail)  Balance  Sitting: Intact  Standing: With support  Transfer Training  Transfer Training: Yes  Sit to Stand: Moderate assistance  Stand to Sit: Moderate assistance  Bed to Chair: Moderate assistance  Gait  Overall Level of Assistance:  Moderate assistance;Assist X1  Assistive Device: Walker, rolling        ADL  Feeding: Supervision  Grooming: Setup;Stand by assistance (wipe face, oral care, apply lotion)  UE Bathing: Minimal assistance  LE Bathing: Minimal assistance  UE Dressing: Minimal assistance  LE Dressing: Minimal assistance (for socks, anticipate more assist for pants and underwear)  Toileting:  (declined need)                                 A/AROM Exercises: LE- ankle pumps, knee flex/ext, hip flex/ext x15 BLE  Education Given To: Patient  Education Provided: Role of Therapy;Plan of Care;ADL Adaptive Strategies;Transfer Training  Education Method: Demonstration;Verbal  Education Outcome: Verbalized understanding;Demonstrated understanding                      AM-PAC Score        AM-PAC Inpatient Daily Activity Raw

## 2022-06-04 LAB
ANION GAP SERPL CALCULATED.3IONS-SCNC: 7 MMOL/L (ref 3–16)
BUN BLDV-MCNC: 16 MG/DL (ref 7–20)
CALCIUM SERPL-MCNC: 8.5 MG/DL (ref 8.3–10.6)
CHLORIDE BLD-SCNC: 107 MMOL/L (ref 99–110)
CO2: 23 MMOL/L (ref 21–32)
CREAT SERPL-MCNC: 0.8 MG/DL (ref 0.6–1.2)
GFR AFRICAN AMERICAN: >60
GFR NON-AFRICAN AMERICAN: >60
GLUCOSE BLD-MCNC: 93 MG/DL (ref 70–99)
HCT VFR BLD CALC: 22.4 % (ref 36–48)
HCT VFR BLD CALC: 22.5 % (ref 36–48)
HEMOGLOBIN: 7.4 G/DL (ref 12–16)
HEMOGLOBIN: 7.6 G/DL (ref 12–16)
MCH RBC QN AUTO: 28.5 PG (ref 26–34)
MCHC RBC AUTO-ENTMCNC: 33 G/DL (ref 31–36)
MCV RBC AUTO: 86.4 FL (ref 80–100)
PDW BLD-RTO: 18.7 % (ref 12.4–15.4)
PLATELET # BLD: 240 K/UL (ref 135–450)
PMV BLD AUTO: 7.7 FL (ref 5–10.5)
POTASSIUM REFLEX MAGNESIUM: 3.7 MMOL/L (ref 3.5–5.1)
RBC # BLD: 2.59 M/UL (ref 4–5.2)
SODIUM BLD-SCNC: 137 MMOL/L (ref 136–145)
WBC # BLD: 10.1 K/UL (ref 4–11)

## 2022-06-04 PROCEDURE — 6370000000 HC RX 637 (ALT 250 FOR IP): Performed by: INTERNAL MEDICINE

## 2022-06-04 PROCEDURE — 2580000003 HC RX 258: Performed by: INTERNAL MEDICINE

## 2022-06-04 PROCEDURE — 1200000000 HC SEMI PRIVATE

## 2022-06-04 PROCEDURE — 85018 HEMOGLOBIN: CPT

## 2022-06-04 PROCEDURE — 6370000000 HC RX 637 (ALT 250 FOR IP): Performed by: NURSE PRACTITIONER

## 2022-06-04 PROCEDURE — 94640 AIRWAY INHALATION TREATMENT: CPT

## 2022-06-04 PROCEDURE — 85014 HEMATOCRIT: CPT

## 2022-06-04 PROCEDURE — 6360000002 HC RX W HCPCS: Performed by: INTERNAL MEDICINE

## 2022-06-04 PROCEDURE — 85027 COMPLETE CBC AUTOMATED: CPT

## 2022-06-04 PROCEDURE — 80048 BASIC METABOLIC PNL TOTAL CA: CPT

## 2022-06-04 PROCEDURE — 36415 COLL VENOUS BLD VENIPUNCTURE: CPT

## 2022-06-04 PROCEDURE — C9113 INJ PANTOPRAZOLE SODIUM, VIA: HCPCS | Performed by: INTERNAL MEDICINE

## 2022-06-04 PROCEDURE — 94761 N-INVAS EAR/PLS OXIMETRY MLT: CPT

## 2022-06-04 RX ORDER — BISACODYL 10 MG
10 SUPPOSITORY, RECTAL RECTAL DAILY PRN
Status: DISCONTINUED | OUTPATIENT
Start: 2022-06-04 | End: 2022-06-07 | Stop reason: HOSPADM

## 2022-06-04 RX ADMIN — BUDESONIDE 500 MCG: 0.5 SUSPENSION RESPIRATORY (INHALATION) at 20:27

## 2022-06-04 RX ADMIN — SODIUM CHLORIDE, PRESERVATIVE FREE 10 ML: 5 INJECTION INTRAVENOUS at 10:27

## 2022-06-04 RX ADMIN — SIMETHICONE 80 MG: 80 TABLET, CHEWABLE ORAL at 10:26

## 2022-06-04 RX ADMIN — GABAPENTIN 100 MG: 100 CAPSULE ORAL at 10:27

## 2022-06-04 RX ADMIN — GABAPENTIN 100 MG: 100 CAPSULE ORAL at 20:11

## 2022-06-04 RX ADMIN — GABAPENTIN 100 MG: 100 CAPSULE ORAL at 16:30

## 2022-06-04 RX ADMIN — ARFORMOTEROL TARTRATE 15 MCG: 15 SOLUTION RESPIRATORY (INHALATION) at 20:26

## 2022-06-04 RX ADMIN — PANTOPRAZOLE SODIUM 40 MG: 40 INJECTION, POWDER, FOR SOLUTION INTRAVENOUS at 10:27

## 2022-06-04 RX ADMIN — MONTELUKAST 10 MG: 10 TABLET, FILM COATED ORAL at 20:11

## 2022-06-04 RX ADMIN — SODIUM CHLORIDE, PRESERVATIVE FREE 10 ML: 5 INJECTION INTRAVENOUS at 20:11

## 2022-06-04 RX ADMIN — POLYETHYLENE GLYCOL 3350 17 G: 17 POWDER, FOR SOLUTION ORAL at 10:27

## 2022-06-04 RX ADMIN — FLUTICASONE PROPIONATE 2 SPRAY: 50 SPRAY, METERED NASAL at 10:35

## 2022-06-04 RX ADMIN — BUDESONIDE 500 MCG: 0.5 SUSPENSION RESPIRATORY (INHALATION) at 09:32

## 2022-06-04 RX ADMIN — PANTOPRAZOLE SODIUM 40 MG: 40 INJECTION, POWDER, FOR SOLUTION INTRAVENOUS at 20:11

## 2022-06-04 RX ADMIN — ARFORMOTEROL TARTRATE 15 MCG: 15 SOLUTION RESPIRATORY (INHALATION) at 09:32

## 2022-06-04 RX ADMIN — BISACODYL 10 MG: 10 SUPPOSITORY RECTAL at 15:36

## 2022-06-04 RX ADMIN — METHIMAZOLE 5 MG: 5 TABLET ORAL at 10:35

## 2022-06-04 ASSESSMENT — PAIN DESCRIPTION - LOCATION: LOCATION: BACK;HIP;ABDOMEN

## 2022-06-04 ASSESSMENT — PAIN SCALES - GENERAL
PAINLEVEL_OUTOF10: 8
PAINLEVEL_OUTOF10: 0

## 2022-06-04 ASSESSMENT — PAIN DESCRIPTION - DESCRIPTORS: DESCRIPTORS: CRAMPING

## 2022-06-04 NOTE — CARE COORDINATION
TARAS Following DC anticipated for tomorrow, SW called [facility] [phone] and pushed her transport back to tomorrow.      Electronically signed by LUIS FERNANDO Menchaca, JAYY on 6/4/2022 at 1:15 PM   [phone]

## 2022-06-04 NOTE — PLAN OF CARE
Problem: Discharge Planning  Goal: Discharge to home or other facility with appropriate resources  6/4/2022 1332 by Roya Davis RN  Outcome: Progressing     Problem: Skin/Tissue Integrity  Goal: Absence of new skin breakdown  Description: 1. Monitor for areas of redness and/or skin breakdown  2. Assess vascular access sites hourly  3. Every 4-6 hours minimum:  Change oxygen saturation probe site  4. Every 4-6 hours:  If on nasal continuous positive airway pressure, respiratory therapy assess nares and determine need for appliance change or resting period.   6/4/2022 1332 by Roya Davis, RN  Outcome: Progressing

## 2022-06-05 LAB
ABO/RH: NORMAL
ANION GAP SERPL CALCULATED.3IONS-SCNC: 8 MMOL/L (ref 3–16)
ANTIBODY SCREEN: NORMAL
BASOPHILS ABSOLUTE: 0 K/UL (ref 0–0.2)
BASOPHILS RELATIVE PERCENT: 0.2 %
BLOOD BANK DISPENSE STATUS: NORMAL
BLOOD BANK PRODUCT CODE: NORMAL
BPU ID: NORMAL
BUN BLDV-MCNC: 18 MG/DL (ref 7–20)
CALCIUM SERPL-MCNC: 8.3 MG/DL (ref 8.3–10.6)
CHLORIDE BLD-SCNC: 104 MMOL/L (ref 99–110)
CO2: 24 MMOL/L (ref 21–32)
CREAT SERPL-MCNC: 0.8 MG/DL (ref 0.6–1.2)
DESCRIPTION BLOOD BANK: NORMAL
EOSINOPHILS ABSOLUTE: 0.1 K/UL (ref 0–0.6)
EOSINOPHILS RELATIVE PERCENT: 1.4 %
GFR AFRICAN AMERICAN: >60
GFR NON-AFRICAN AMERICAN: >60
GLUCOSE BLD-MCNC: 98 MG/DL (ref 70–99)
HCT VFR BLD CALC: 20.6 % (ref 36–48)
HEMOGLOBIN: 6.7 G/DL (ref 12–16)
LYMPHOCYTES ABSOLUTE: 1.5 K/UL (ref 1–5.1)
LYMPHOCYTES RELATIVE PERCENT: 15 %
MCH RBC QN AUTO: 27.9 PG (ref 26–34)
MCHC RBC AUTO-ENTMCNC: 32.6 G/DL (ref 31–36)
MCV RBC AUTO: 85.5 FL (ref 80–100)
MONOCYTES ABSOLUTE: 1 K/UL (ref 0–1.3)
MONOCYTES RELATIVE PERCENT: 9.6 %
NEUTROPHILS ABSOLUTE: 7.6 K/UL (ref 1.7–7.7)
NEUTROPHILS RELATIVE PERCENT: 73.8 %
PDW BLD-RTO: 18.8 % (ref 12.4–15.4)
PLATELET # BLD: 245 K/UL (ref 135–450)
PMV BLD AUTO: 7.5 FL (ref 5–10.5)
POTASSIUM REFLEX MAGNESIUM: 3.6 MMOL/L (ref 3.5–5.1)
RBC # BLD: 2.41 M/UL (ref 4–5.2)
SODIUM BLD-SCNC: 136 MMOL/L (ref 136–145)
WBC # BLD: 10.2 K/UL (ref 4–11)

## 2022-06-05 PROCEDURE — 6370000000 HC RX 637 (ALT 250 FOR IP): Performed by: INTERNAL MEDICINE

## 2022-06-05 PROCEDURE — 86850 RBC ANTIBODY SCREEN: CPT

## 2022-06-05 PROCEDURE — 1200000000 HC SEMI PRIVATE

## 2022-06-05 PROCEDURE — 86901 BLOOD TYPING SEROLOGIC RH(D): CPT

## 2022-06-05 PROCEDURE — 86923 COMPATIBILITY TEST ELECTRIC: CPT

## 2022-06-05 PROCEDURE — 85025 COMPLETE CBC W/AUTO DIFF WBC: CPT

## 2022-06-05 PROCEDURE — C9113 INJ PANTOPRAZOLE SODIUM, VIA: HCPCS | Performed by: INTERNAL MEDICINE

## 2022-06-05 PROCEDURE — P9016 RBC LEUKOCYTES REDUCED: HCPCS

## 2022-06-05 PROCEDURE — 94640 AIRWAY INHALATION TREATMENT: CPT

## 2022-06-05 PROCEDURE — 86900 BLOOD TYPING SEROLOGIC ABO: CPT

## 2022-06-05 PROCEDURE — 80048 BASIC METABOLIC PNL TOTAL CA: CPT

## 2022-06-05 PROCEDURE — 36430 TRANSFUSION BLD/BLD COMPNT: CPT

## 2022-06-05 PROCEDURE — 6360000002 HC RX W HCPCS: Performed by: INTERNAL MEDICINE

## 2022-06-05 PROCEDURE — 6370000000 HC RX 637 (ALT 250 FOR IP): Performed by: NURSE PRACTITIONER

## 2022-06-05 PROCEDURE — 36415 COLL VENOUS BLD VENIPUNCTURE: CPT

## 2022-06-05 PROCEDURE — 2580000003 HC RX 258: Performed by: INTERNAL MEDICINE

## 2022-06-05 RX ORDER — METHOCARBAMOL 500 MG/1
750 TABLET, FILM COATED ORAL EVERY 6 HOURS PRN
Status: DISCONTINUED | OUTPATIENT
Start: 2022-06-05 | End: 2022-06-07 | Stop reason: HOSPADM

## 2022-06-05 RX ORDER — DOCUSATE SODIUM 100 MG/1
100 CAPSULE, LIQUID FILLED ORAL 2 TIMES DAILY
Status: DISCONTINUED | OUTPATIENT
Start: 2022-06-05 | End: 2022-06-07 | Stop reason: HOSPADM

## 2022-06-05 RX ORDER — SODIUM CHLORIDE 9 MG/ML
INJECTION, SOLUTION INTRAVENOUS PRN
Status: DISCONTINUED | OUTPATIENT
Start: 2022-06-05 | End: 2022-06-07 | Stop reason: HOSPADM

## 2022-06-05 RX ADMIN — POLYETHYLENE GLYCOL 3350 17 G: 17 POWDER, FOR SOLUTION ORAL at 09:08

## 2022-06-05 RX ADMIN — METHIMAZOLE 7.5 MG: 5 TABLET ORAL at 08:52

## 2022-06-05 RX ADMIN — SODIUM CHLORIDE, PRESERVATIVE FREE 10 ML: 5 INJECTION INTRAVENOUS at 20:03

## 2022-06-05 RX ADMIN — DOCUSATE SODIUM 100 MG: 100 CAPSULE, LIQUID FILLED ORAL at 20:02

## 2022-06-05 RX ADMIN — BUDESONIDE 500 MCG: 0.5 SUSPENSION RESPIRATORY (INHALATION) at 10:01

## 2022-06-05 RX ADMIN — GABAPENTIN 100 MG: 100 CAPSULE ORAL at 20:03

## 2022-06-05 RX ADMIN — DOCUSATE SODIUM 100 MG: 100 CAPSULE, LIQUID FILLED ORAL at 13:58

## 2022-06-05 RX ADMIN — GABAPENTIN 100 MG: 100 CAPSULE ORAL at 13:58

## 2022-06-05 RX ADMIN — MONTELUKAST 10 MG: 10 TABLET, FILM COATED ORAL at 20:03

## 2022-06-05 RX ADMIN — PANTOPRAZOLE SODIUM 40 MG: 40 INJECTION, POWDER, FOR SOLUTION INTRAVENOUS at 08:52

## 2022-06-05 RX ADMIN — GABAPENTIN 100 MG: 100 CAPSULE ORAL at 08:52

## 2022-06-05 RX ADMIN — FLUTICASONE PROPIONATE 2 SPRAY: 50 SPRAY, METERED NASAL at 08:53

## 2022-06-05 RX ADMIN — ARFORMOTEROL TARTRATE 15 MCG: 15 SOLUTION RESPIRATORY (INHALATION) at 10:01

## 2022-06-05 RX ADMIN — ARFORMOTEROL TARTRATE 15 MCG: 15 SOLUTION RESPIRATORY (INHALATION) at 20:28

## 2022-06-05 RX ADMIN — METHOCARBAMOL 750 MG: 500 TABLET ORAL at 08:58

## 2022-06-05 RX ADMIN — SODIUM CHLORIDE, PRESERVATIVE FREE 10 ML: 5 INJECTION INTRAVENOUS at 08:52

## 2022-06-05 RX ADMIN — PANTOPRAZOLE SODIUM 40 MG: 40 INJECTION, POWDER, FOR SOLUTION INTRAVENOUS at 20:02

## 2022-06-05 RX ADMIN — BUDESONIDE 500 MCG: 0.5 SUSPENSION RESPIRATORY (INHALATION) at 20:28

## 2022-06-05 ASSESSMENT — PAIN SCALES - WONG BAKER
WONGBAKER_NUMERICALRESPONSE: 2
WONGBAKER_NUMERICALRESPONSE: 4

## 2022-06-05 ASSESSMENT — PAIN DESCRIPTION - ORIENTATION: ORIENTATION: RIGHT;LEFT;LOWER

## 2022-06-05 ASSESSMENT — PAIN DESCRIPTION - LOCATION
LOCATION: HIP;BACK
LOCATION: BACK;HIP

## 2022-06-05 ASSESSMENT — PAIN SCALES - GENERAL
PAINLEVEL_OUTOF10: 3
PAINLEVEL_OUTOF10: 4
PAINLEVEL_OUTOF10: 4

## 2022-06-05 ASSESSMENT — PAIN DESCRIPTION - DESCRIPTORS
DESCRIPTORS: ACHING
DESCRIPTORS: ACHING;SORE

## 2022-06-05 NOTE — PROGRESS NOTES
Hospitalist Progress Note      PCP: Lyndsey Magallanes MD    Date of Admission: 5/31/2022    Chief Complaint: GI bleed    Hospital Course: admitted with GI bleed. Did not want to have colonoscopy. Hemoglobin decreased and required a transfusion 6/4. Subjective: had a BM, which was dark, likely old blood. Aches and pain all over today. Hgb down to 6.7 from 7.4 yesterday. Agreeable to transfusion. Will get CT abd. D.w family over the phone. Medications:  Reviewed    Infusion Medications    sodium chloride      sodium chloride      sodium chloride       Scheduled Medications    lidocaine  1 patch TransDERmal Daily    fluticasone  2 spray Each Nostril Daily    gabapentin  100 mg Oral TID    montelukast  10 mg Oral Nightly    sodium chloride flush  5-40 mL IntraVENous 2 times per day    pantoprazole  40 mg IntraVENous BID    budesonide  0.5 mg Nebulization BID    Arformoterol Tartrate  15 mcg Nebulization BID    methIMAzole  5 mg Oral Once per day on Tue Thu Fri Sat    methIMAzole  7.5 mg Oral Once per day on Sun Mon Wed     PRN Meds: methocarbamol, sodium chloride, bisacodyl, simethicone, acetaminophen, sodium chloride, hydroxypropyl methylcellulose, albuterol, sodium chloride flush, sodium chloride, ondansetron **OR** ondansetron, polyethylene glycol      Intake/Output Summary (Last 24 hours) at 6/5/2022 1122  Last data filed at 6/5/2022 1036  Gross per 24 hour   Intake 300 ml   Output 1300 ml   Net -1000 ml       Physical Exam Performed:    /60   Pulse 85   Temp 98.6 °F (37 °C) (Oral)   Resp 18   Ht 5' 9\" (1.753 m)   Wt 150 lb 5.7 oz (68.2 kg)   LMP  (LMP Unknown)   SpO2 96%   BMI 22.20 kg/m²     General appearance: No apparent distress, appears stated age and cooperative. Respiratory:  Normal respiratory effort. Clear to auscultation, bilaterally without Rales/Wheezes/Rhonchi.   Cardiovascular: Regular rate and rhythm with normal S1/S2 without murmurs, rubs or gallops. Abdomen: Soft, non-tender, non-distended with normal bowel sounds. Musculoskeletal: No clubbing, cyanosis or edema bilaterally. Full range of motion without deformity. Skin: Skin color, texture, turgor normal.  No rashes or lesions. Neurologic:  Neurovascularly intact without any focal sensory/motor deficits. Cranial nerves: II-XII intact, grossly non-focal.  Psychiatric: Alert and oriented, thought content appropriate, normal insight  Capillary Refill: Brisk,3 seconds, normal   Peripheral Pulses: +2 palpable, equal bilaterally       Labs:   Recent Labs     06/04/22  0300 06/04/22 0624 06/05/22  0818   WBC  --  10.1 10.2   HGB 7.6* 7.4* 6.7*   HCT 22.5* 22.4* 20.6*   PLT  --  240 245     Recent Labs     06/04/22 0624 06/05/22 0818    136   K 3.7 3.6    104   CO2 23 24   BUN 16 18   CREATININE 0.8 0.8   CALCIUM 8.5 8.3     No results for input(s): AST, ALT, BILIDIR, BILITOT, ALKPHOS in the last 72 hours. No results for input(s): INR in the last 72 hours. No results for input(s): Baljit Clap in the last 72 hours. Urinalysis:      Lab Results   Component Value Date    NITRU Negative 06/01/2022    WBCUA None seen 06/01/2022    BACTERIA 2+ 12/21/2019    RBCUA 0-2 06/01/2022    BLOODU TRACE-LYSED 06/01/2022    SPECGRAV 1.010 06/01/2022    GLUCOSEU Negative 06/01/2022       Radiology:  IR GUIDED IVC FILTER PLACEMENT   Final Result   IMPRESSION :      Normal IVC anatomy. Normal, patent right internal jugular vein      Placement of filter in the infra-renal IVC. Please note that this is a retrievable filter. Fluoroscopy time : 1.6 minutes   Number of exposures obtained : 1 venographic run, 1 spot image   Moderate sedation was provided and monitored by an independent trained observer.    Moderate sedation start time : 0956   Moderate sedation end time : 1017   Blood loss : minimal ( less than 5 cc )   Specimens : none             XR CHEST PORTABLE   Final Result      No acute radiographic abnormality of the chest.      CT ABDOMEN PELVIS W IV CONTRAST Additional Contrast? Oral    (Results Pending)           Assessment/Plan:    Active Hospital Problems    Diagnosis     Acute gastrointestinal bleeding [K92.2]      Priority: Medium     PLAN:    Acute GI bleeding  Patient declined colonoscopy. Unfortunately, hemoglobin still dropping every other day without transfusion. Giving another unit today. Will obtain CT A/P as a start. Potentially may need to re involve GI vs hospice/[palliative care. Constipation  Resolved with laxatives    Anemia of acute blood loss  Secondary to GI bleed. Continue to monitor. Transfusing today. Hyperthyroidism  Clinically stable. Continue methimazole. Left femoral DVT (5/6/2022):  Post IVC filter placement 6/1. Off anticoagulation since admission. Discussed with the patient. Questions answered    DVT Prophylaxis: SCD only  Diet: ADULT DIET; Dysphagia - Minced and Moist; Low Sodium (2 gm)  Code Status: Limited    PT/OT Eval Status:  In process    Dispo -discharge when hemoglobin is stable without transfusion    Darryn Samuel MD

## 2022-06-05 NOTE — PLAN OF CARE
Problem: Safety - Adult  Goal: Free from fall injury  Outcome: Progressing  Flowsheets (Taken 6/5/2022 0209)  Free From Fall Injury:   Instruct family/caregiver on patient safety   Based on caregiver fall risk screen, instruct family/caregiver to ask for assistance with transferring infant if caregiver noted to have fall risk factors     Problem: ABCDS Injury Assessment  Goal: Absence of physical injury  Outcome: Progressing  Flowsheets (Taken 6/5/2022 0209)  Absence of Physical Injury: Implement safety measures based on patient assessment

## 2022-06-05 NOTE — PLAN OF CARE
Problem: Safety - Adult  Goal: Free from fall injury  6/5/2022 0743 by Ammon Stafford RN  Outcome: Progressing     Problem: Discharge Planning  Goal: Discharge to home or other facility with appropriate resources  Outcome: Progressing     Problem: Skin/Tissue Integrity  Goal: Absence of new skin breakdown  Description: 1. Monitor for areas of redness and/or skin breakdown  2. Assess vascular access sites hourly  3. Every 4-6 hours minimum:  Change oxygen saturation probe site  4. Every 4-6 hours:  If on nasal continuous positive airway pressure, respiratory therapy assess nares and determine need for appliance change or resting period.   Outcome: Progressing

## 2022-06-06 ENCOUNTER — APPOINTMENT (OUTPATIENT)
Dept: CT IMAGING | Age: 87
DRG: 378 | End: 2022-06-06
Payer: MEDICARE

## 2022-06-06 LAB
ANION GAP SERPL CALCULATED.3IONS-SCNC: 8 MMOL/L (ref 3–16)
BASOPHILS ABSOLUTE: 0 K/UL (ref 0–0.2)
BASOPHILS RELATIVE PERCENT: 0.3 %
BUN BLDV-MCNC: 15 MG/DL (ref 7–20)
CALCIUM SERPL-MCNC: 8.8 MG/DL (ref 8.3–10.6)
CHLORIDE BLD-SCNC: 106 MMOL/L (ref 99–110)
CO2: 25 MMOL/L (ref 21–32)
CREAT SERPL-MCNC: 0.8 MG/DL (ref 0.6–1.2)
EOSINOPHILS ABSOLUTE: 0.2 K/UL (ref 0–0.6)
EOSINOPHILS RELATIVE PERCENT: 2 %
FOLATE: 3.59 NG/ML (ref 4.78–24.2)
GFR AFRICAN AMERICAN: >60
GFR NON-AFRICAN AMERICAN: >60
GLUCOSE BLD-MCNC: 92 MG/DL (ref 70–99)
HCT VFR BLD CALC: 23.7 % (ref 36–48)
HEMOGLOBIN: 7.6 G/DL (ref 12–16)
IRON SATURATION: 5 % (ref 15–50)
IRON: 15 UG/DL (ref 37–145)
LYMPHOCYTES ABSOLUTE: 1.5 K/UL (ref 1–5.1)
LYMPHOCYTES RELATIVE PERCENT: 15.7 %
MCH RBC QN AUTO: 27.6 PG (ref 26–34)
MCHC RBC AUTO-ENTMCNC: 32.1 G/DL (ref 31–36)
MCV RBC AUTO: 85.7 FL (ref 80–100)
MONOCYTES ABSOLUTE: 0.8 K/UL (ref 0–1.3)
MONOCYTES RELATIVE PERCENT: 9.1 %
NEUTROPHILS ABSOLUTE: 6.8 K/UL (ref 1.7–7.7)
NEUTROPHILS RELATIVE PERCENT: 72.9 %
OCCULT BLOOD DIAGNOSTIC: ABNORMAL
PDW BLD-RTO: 18.6 % (ref 12.4–15.4)
PLATELET # BLD: 242 K/UL (ref 135–450)
PMV BLD AUTO: 7.6 FL (ref 5–10.5)
POTASSIUM REFLEX MAGNESIUM: 3.9 MMOL/L (ref 3.5–5.1)
RBC # BLD: 2.77 M/UL (ref 4–5.2)
SODIUM BLD-SCNC: 139 MMOL/L (ref 136–145)
TOTAL IRON BINDING CAPACITY: 279 UG/DL (ref 260–445)
VITAMIN B-12: 329 PG/ML (ref 211–911)
WBC # BLD: 9.3 K/UL (ref 4–11)

## 2022-06-06 PROCEDURE — 6360000002 HC RX W HCPCS: Performed by: INTERNAL MEDICINE

## 2022-06-06 PROCEDURE — 80048 BASIC METABOLIC PNL TOTAL CA: CPT

## 2022-06-06 PROCEDURE — 2580000003 HC RX 258: Performed by: INTERNAL MEDICINE

## 2022-06-06 PROCEDURE — 6360000004 HC RX CONTRAST MEDICATION: Performed by: INTERNAL MEDICINE

## 2022-06-06 PROCEDURE — 94640 AIRWAY INHALATION TREATMENT: CPT

## 2022-06-06 PROCEDURE — 83540 ASSAY OF IRON: CPT

## 2022-06-06 PROCEDURE — 6370000000 HC RX 637 (ALT 250 FOR IP): Performed by: NURSE PRACTITIONER

## 2022-06-06 PROCEDURE — C9113 INJ PANTOPRAZOLE SODIUM, VIA: HCPCS | Performed by: INTERNAL MEDICINE

## 2022-06-06 PROCEDURE — 6370000000 HC RX 637 (ALT 250 FOR IP): Performed by: INTERNAL MEDICINE

## 2022-06-06 PROCEDURE — 97116 GAIT TRAINING THERAPY: CPT

## 2022-06-06 PROCEDURE — 85025 COMPLETE CBC W/AUTO DIFF WBC: CPT

## 2022-06-06 PROCEDURE — 74177 CT ABD & PELVIS W/CONTRAST: CPT

## 2022-06-06 PROCEDURE — 1200000000 HC SEMI PRIVATE

## 2022-06-06 PROCEDURE — 83550 IRON BINDING TEST: CPT

## 2022-06-06 PROCEDURE — 82746 ASSAY OF FOLIC ACID SERUM: CPT

## 2022-06-06 PROCEDURE — 36415 COLL VENOUS BLD VENIPUNCTURE: CPT

## 2022-06-06 PROCEDURE — 97530 THERAPEUTIC ACTIVITIES: CPT

## 2022-06-06 PROCEDURE — 82270 OCCULT BLOOD FECES: CPT

## 2022-06-06 PROCEDURE — 82607 VITAMIN B-12: CPT

## 2022-06-06 RX ORDER — FOLIC ACID 1 MG/1
1 TABLET ORAL DAILY
Status: DISCONTINUED | OUTPATIENT
Start: 2022-06-06 | End: 2022-06-07 | Stop reason: HOSPADM

## 2022-06-06 RX ORDER — LANOLIN ALCOHOL/MO/W.PET/CERES
500 CREAM (GRAM) TOPICAL DAILY
Status: DISCONTINUED | OUTPATIENT
Start: 2022-06-06 | End: 2022-06-07 | Stop reason: HOSPADM

## 2022-06-06 RX ADMIN — MONTELUKAST 10 MG: 10 TABLET, FILM COATED ORAL at 20:21

## 2022-06-06 RX ADMIN — DOCUSATE SODIUM 100 MG: 100 CAPSULE, LIQUID FILLED ORAL at 20:21

## 2022-06-06 RX ADMIN — MINERAL OIL 330 ML: 1000 LIQUID ORAL at 15:35

## 2022-06-06 RX ADMIN — DOCUSATE SODIUM 100 MG: 100 CAPSULE, LIQUID FILLED ORAL at 08:48

## 2022-06-06 RX ADMIN — HYPROMELLOSE 2906 (4000 MPA.S) AND HYPROMELLOSE 2906 (50 MPA.S) 1 DROP: 25; 25 SOLUTION OPHTHALMIC at 17:18

## 2022-06-06 RX ADMIN — FLUTICASONE PROPIONATE 2 SPRAY: 50 SPRAY, METERED NASAL at 08:50

## 2022-06-06 RX ADMIN — PANTOPRAZOLE SODIUM 40 MG: 40 INJECTION, POWDER, FOR SOLUTION INTRAVENOUS at 20:18

## 2022-06-06 RX ADMIN — CYANOCOBALAMIN TAB 1000 MCG 500 MCG: 1000 TAB at 20:21

## 2022-06-06 RX ADMIN — SODIUM CHLORIDE, PRESERVATIVE FREE 10 ML: 5 INJECTION INTRAVENOUS at 20:18

## 2022-06-06 RX ADMIN — IOPAMIDOL 75 ML: 755 INJECTION, SOLUTION INTRAVENOUS at 11:09

## 2022-06-06 RX ADMIN — BUDESONIDE 500 MCG: 0.5 SUSPENSION RESPIRATORY (INHALATION) at 22:15

## 2022-06-06 RX ADMIN — ACETAMINOPHEN 650 MG: 650 SOLUTION ORAL at 10:27

## 2022-06-06 RX ADMIN — SODIUM CHLORIDE, PRESERVATIVE FREE 10 ML: 5 INJECTION INTRAVENOUS at 08:50

## 2022-06-06 RX ADMIN — ACETAMINOPHEN 650 MG: 650 SOLUTION ORAL at 16:15

## 2022-06-06 RX ADMIN — METHIMAZOLE 7.5 MG: 5 TABLET ORAL at 08:48

## 2022-06-06 RX ADMIN — PANTOPRAZOLE SODIUM 40 MG: 40 INJECTION, POWDER, FOR SOLUTION INTRAVENOUS at 08:48

## 2022-06-06 RX ADMIN — POLYETHYLENE GLYCOL 3350 17 G: 17 POWDER, FOR SOLUTION ORAL at 10:27

## 2022-06-06 RX ADMIN — BUDESONIDE 500 MCG: 0.5 SUSPENSION RESPIRATORY (INHALATION) at 09:44

## 2022-06-06 RX ADMIN — GABAPENTIN 100 MG: 100 CAPSULE ORAL at 14:41

## 2022-06-06 RX ADMIN — IRON SUCROSE 200 MG: 20 INJECTION, SOLUTION INTRAVENOUS at 14:29

## 2022-06-06 RX ADMIN — FOLIC ACID 1 MG: 1 TABLET ORAL at 20:21

## 2022-06-06 RX ADMIN — GABAPENTIN 100 MG: 100 CAPSULE ORAL at 20:21

## 2022-06-06 RX ADMIN — GABAPENTIN 100 MG: 100 CAPSULE ORAL at 08:49

## 2022-06-06 RX ADMIN — ARFORMOTEROL TARTRATE 15 MCG: 15 SOLUTION RESPIRATORY (INHALATION) at 09:43

## 2022-06-06 RX ADMIN — ARFORMOTEROL TARTRATE 15 MCG: 15 SOLUTION RESPIRATORY (INHALATION) at 22:15

## 2022-06-06 RX ADMIN — ACETAMINOPHEN 650 MG: 650 SOLUTION ORAL at 20:21

## 2022-06-06 ASSESSMENT — PAIN DESCRIPTION - ONSET
ONSET: ON-GOING

## 2022-06-06 ASSESSMENT — PAIN DESCRIPTION - FREQUENCY
FREQUENCY: INTERMITTENT

## 2022-06-06 ASSESSMENT — PAIN SCALES - GENERAL
PAINLEVEL_OUTOF10: 5
PAINLEVEL_OUTOF10: 4
PAINLEVEL_OUTOF10: 0
PAINLEVEL_OUTOF10: 3
PAINLEVEL_OUTOF10: 6
PAINLEVEL_OUTOF10: 5
PAINLEVEL_OUTOF10: 3
PAINLEVEL_OUTOF10: 0
PAINLEVEL_OUTOF10: 4

## 2022-06-06 ASSESSMENT — PAIN DESCRIPTION - DESCRIPTORS
DESCRIPTORS: ACHING

## 2022-06-06 ASSESSMENT — PAIN DESCRIPTION - ORIENTATION
ORIENTATION: RIGHT;LEFT
ORIENTATION: LEFT;RIGHT
ORIENTATION: RIGHT;LEFT

## 2022-06-06 ASSESSMENT — PAIN DESCRIPTION - LOCATION
LOCATION: GENERALIZED;HIP
LOCATION: FOOT
LOCATION: GENERALIZED
LOCATION: GENERALIZED
LOCATION: HIP
LOCATION: FOOT

## 2022-06-06 ASSESSMENT — PAIN - FUNCTIONAL ASSESSMENT
PAIN_FUNCTIONAL_ASSESSMENT: PREVENTS OR INTERFERES SOME ACTIVE ACTIVITIES AND ADLS
PAIN_FUNCTIONAL_ASSESSMENT: ACTIVITIES ARE NOT PREVENTED
PAIN_FUNCTIONAL_ASSESSMENT: PREVENTS OR INTERFERES WITH MANY ACTIVE NOT PASSIVE ACTIVITIES

## 2022-06-06 ASSESSMENT — PAIN DESCRIPTION - PAIN TYPE
TYPE: CHRONIC PAIN
TYPE: CHRONIC PAIN;NEUROPATHIC PAIN
TYPE: CHRONIC PAIN
TYPE: ACUTE PAIN;CHRONIC PAIN
TYPE: CHRONIC PAIN

## 2022-06-06 ASSESSMENT — PAIN SCALES - WONG BAKER: WONGBAKER_NUMERICALRESPONSE: 2

## 2022-06-06 NOTE — CARE COORDINATION
CM cont to follow for  D/c planning ,  Pt  Plans to d/c to SNF      HENS  Submitted  And  No pre cer t needed:    Will need  medical transport upon d/c     D/C once  HH stable w/o transfusion . DISCHARGE Disposition:     [facility]                       [address] [address] [place] [facility] [address]      REPORT Phone: [phone]      [address] [place] [address] Fax: [phone]          . CM spoke with [place]  [phone] in admissions and updated her on  Pt  Status. Electronically signed by Mohit Caban RN on 6/6/2022 at 9:53 AM         Mohit Caban  RN Case Manager  [facility]  [address] [place] [address].   [facility] [address] [phone]  Fax [phone]

## 2022-06-06 NOTE — PROGRESS NOTES
Physical Therapy  Facility/Department: [address] [place] [address]  Physical Therapy Treatment    Name: Bryan Ohara  : 1926  MRN: [id number]  Date of Service: 2022    Discharge Recommendations:  Bryan Ohara scored a 15/24 on the AM-PAC short mobility form. Current research shows that an AM-PAC score of 17 or less is typically not associated with a discharge to the patient's home setting. Based on the patient's AM-PAC score and their current functional mobility deficits, it is recommended that the patient have 3-5 sessions per week of Physical Therapy at d/c to increase the patient's independence. Please see assessment section for further patient specific details. If patient discharges prior to next session this note will serve as a discharge summary. Please see below for the latest assessment towards goals. DME - defer to next LOC          Patient Diagnosis(es): The primary encounter diagnosis was Anemia, unspecified type. A diagnosis of Iron deficiency anemia due to chronic blood loss was also pertinent to this visit. 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 Past Surgical History:  has a past surgical history that includes Cholecystectomy; Colonoscopy (10/29/2009); Hemorrhoid surgery; Hysterectomy (in 40's); Hysterectomy, total abdominal; Upper gastrointestinal endoscopy (N/A, 2022);  IR GUIDED IVC FILTER PLACEMENT (N/A, 2022); and IR GUIDED IVC FILTER PLACEMENT (6/1/2022). Assessment   Assessment: Pt remains below baseline, however was able to ambulate with RW and min assist this PM. Still needs mod to max assist for sit to stand. Recommend continued iP therapies to maximize mobility, safety and independence  Activity Tolerance  Activity Tolerance: Patient tolerated treatment well     Plan   Plan  Plan:  (2-5)  Current Treatment Recommendations: Strengthening,Transfer training,Gait training,Endurance training,Safety education & training  Safety Devices  Type of Devices: All fall risk precautions in place,Call light within reach,Chair alarm in place,Gait belt,Patient at risk for falls,Left in chair,Nurse notified     Restrictions  Position Activity Restriction  Other position/activity restrictions: activity as tolerated     Subjective   Subjective  Subjective: Pt in bed upon PT entry, agreeable to working with PT - \" I haven't been OOB for a few days\"   HgB  7.6 today         Social/Functional History  Social/Functional History  Lives With: Alone  Type of Home: Senior housing apartment  Home Layout: One level  Home Access: Elevator  Bathroom Shower/Tub: Tub/Shower unit (sponge bathes)  Bathroom Toilet: Handicap height  Home Equipment: Weston Woo (sleeps in chair)  Receives Help From: Home health (HHA 3-5x week 3-4 hours at a time)  ADL Assistance: Needs assistance (aid assist with bathing.  Pt dresses self and preps own meals)  Homemaking Assistance: Needs assistance (Aide assist with cleaning, cooking, laundry)  Ambulation Assistance: Independent (RW)  Transfer Assistance: Independent  Active : No  Patient's  Info: Aide and medicare transport          Objective   Heart Rate: 95  Heart Rate Source: Monitor  BP: 119/68  BP Location: Left upper arm  BP Method: Automatic  Patient Position: Semi fowlers  MAP (Calculated): 85  Resp: 17  SpO2: 95 %  O2 Device: None (Room air)            Bed mobility  Supine to Sit: Minimal assistance (HOB elevated, use of side rail)  Scooting: Contact guard assistance  Transfers  Sit to Stand: Moderate Assistance;Maximum Assistance (mod to max from eob and from chair)  Stand to sit: Moderate Assistance  Bed to Chair: Minimal assistance (small steps bed to chair)  Ambulation  Surface: level tile  Device: Rolling Walker  Assistance: Minimal assistance  Quality of Gait: decreased step height and length, slow, vc to stand upright  Distance: 14 ft x 2, 3 ft. Balance  Comments: good stability with sitting on eob and in chair. Good stability with gait with RW and min assist                  AM-PAC Score  -PAC Inpatient Mobility Raw Score : 15 (06/06/22 1358)  AM-PAC Inpatient T-Scale Score : 39.45 (06/06/22 1358)  Mobility Inpatient CMS 0-100% Score: 57.7 (06/06/22 1358)  Mobility Inpatient CMS G-Code Modifier : CK (06/06/22 1358)          Goals  Short Term Goals  Time Frame for Short term goals: D/C  ongoing 6/6  Short term goal 1: supine<->sit SUPV  Short term goal 2: sit<->stand CGA  Short term goal 3: Amb 25 ft with RW CGA  Patient Goals   Patient goals : to eventually go home       Education - role of PT, functional mobility, transfers and gait.      Therapy Time   Individual Concurrent Group Co-treatment   Time In 1325         Time Out 1355         Minutes 30              Timed Code Treatment Minutes:   30    Total Treatment Minutes:  [address], [id number]

## 2022-06-06 NOTE — PLAN OF CARE
Problem: Safety - Adult  Goal: Free from fall injury  Outcome: Progressing  Flowsheets (Taken 6/6/2022 0421 by Rajiv Delgado RN)  Free From Fall Injury: Instruct family/caregiver on patient safety  Note: Discussed with pt importance to keep bed low and locked with alarm activated, nonskid socks on when out of bed, call light and belongings within reach- will monitor. Problem: Pain  Goal: Verbalizes/displays adequate comfort level or baseline comfort level  Outcome: Progressing  Note: Patient complains of pain at level 6/10. Patient describes pain as ache. Patient requests pain medication. Patient medicated with tylenol . Will continue to monitor.

## 2022-06-06 NOTE — PROGRESS NOTES
Hospitalist Progress Note      PCP: Terry Turk MD    Date of Admission: 5/31/2022    Chief Complaint: GI bleed    Hospital Course: admitted with GI bleed. Did not want to have colonoscopy. Hemoglobin decreased and required a transfusion 6/4. Subjective:     Patient feels constipated, also reports chronic aches and pains. Denies any new discomfort. CAT scan was done results discussed with family over the phone. Discussed with IR, no urgent intervention required, IVC filter does not need to be repositioned. Medications:  Reviewed    Infusion Medications    sodium chloride      sodium chloride      sodium chloride       Scheduled Medications    iron sucrose  200 mg IntraVENous Q24H    docusate sodium  100 mg Oral BID    lidocaine  1 patch TransDERmal Daily    fluticasone  2 spray Each Nostril Daily    gabapentin  100 mg Oral TID    montelukast  10 mg Oral Nightly    sodium chloride flush  5-40 mL IntraVENous 2 times per day    pantoprazole  40 mg IntraVENous BID    budesonide  0.5 mg Nebulization BID    Arformoterol Tartrate  15 mcg Nebulization BID    methIMAzole  5 mg Oral Once per day on Tue Thu Fri Sat    methIMAzole  7.5 mg Oral Once per day on Sun Mon Wed     PRN Meds: methocarbamol, sodium chloride, bisacodyl, simethicone, acetaminophen, sodium chloride, hydroxypropyl methylcellulose, albuterol, sodium chloride flush, sodium chloride, ondansetron **OR** ondansetron, polyethylene glycol      Intake/Output Summary (Last 24 hours) at 6/6/2022 1743  Last data filed at 6/6/2022 1720  Gross per 24 hour   Intake 480 ml   Output 1100 ml   Net -620 ml       Physical Exam Performed:    /70   Pulse 89   Temp 98 °F (36.7 °C) (Oral)   Resp 17   Ht 5' 9\" (1.753 m)   Wt 149 lb 11.1 oz (67.9 kg)   LMP  (LMP Unknown)   SpO2 98%   BMI 22.11 kg/m²     General appearance: No apparent distress, appears stated age and cooperative. Respiratory:  Normal respiratory effort.  Clear to auscultation, bilaterally without Rales/Wheezes/Rhonchi. Cardiovascular: Regular rate and rhythm with normal S1/S2 without murmurs, rubs or gallops. Abdomen: Soft, non-tender, non-distended with normal bowel sounds. Musculoskeletal: No clubbing, cyanosis or edema bilaterally. Full range of motion without deformity. Skin: Skin color, texture, turgor normal.  No rashes or lesions. Neurologic:  Neurovascularly intact without any focal sensory/motor deficits. Cranial nerves: II-XII intact, grossly non-focal.  Psychiatric: Alert and oriented, thought content appropriate, normal insight  Capillary Refill: Brisk,3 seconds, normal   Peripheral Pulses: +2 palpable, equal bilaterally       Labs:   Recent Labs     06/04/22 0624 06/05/22  0818 06/06/22  0609   WBC 10.1 10.2 9.3   HGB 7.4* 6.7* 7.6*   HCT 22.4* 20.6* 23.7*    245 242     Recent Labs     06/04/22 0624 06/05/22  0818 06/06/22  0609    136 139   K 3.7 3.6 3.9    104 106   CO2 23 24 25   BUN 16 18 15   CREATININE 0.8 0.8 0.8   CALCIUM 8.5 8.3 8.8     No results for input(s): AST, ALT, BILIDIR, BILITOT, ALKPHOS in the last 72 hours. No results for input(s): INR in the last 72 hours. No results for input(s): Les Shweta in the last 72 hours. Urinalysis:      Lab Results   Component Value Date    NITRU Negative 06/01/2022    WBCUA None seen 06/01/2022    BACTERIA 2+ 12/21/2019    RBCUA 0-2 06/01/2022    BLOODU TRACE-LYSED 06/01/2022    SPECGRAV 1.010 06/01/2022    GLUCOSEU Negative 06/01/2022       Radiology:  CT ABDOMEN PELVIS W IV CONTRAST Additional Contrast? Oral   Final Result      1. IVC filter in place with some of the legs extending beyond the IVC wall and one of them close to adjacent small bowel wall. 2.  Nonocclusive bilateral femoral vein thrombi. 3.  Distended rectum with impacted stool. 4.  Stable bilateral renal angiomyolipomas. 5.  Colonic diverticulosis.    6.  Stable focal intrahepatic ductal dilation in the inferior liver without evidence of stone or obstructing mass. IR GUIDED IVC FILTER PLACEMENT   Final Result   IMPRESSION :      Normal IVC anatomy. Normal, patent right internal jugular vein      Placement of filter in the infra-renal IVC. Please note that this is a retrievable filter. Fluoroscopy time : 1.6 minutes   Number of exposures obtained : 1 venographic run, 1 spot image   Moderate sedation was provided and monitored by an independent trained observer. Moderate sedation start time : 0956   Moderate sedation end time : 1017   Blood loss : minimal ( less than 5 cc )   Specimens : none             XR CHEST PORTABLE   Final Result      No acute radiographic abnormality of the chest.              Assessment/Plan:    Active Hospital Problems    Diagnosis     Acute gastrointestinal bleeding [K92.2]      Priority: Medium     PLAN:    Acute GI bleeding  Patient declined colonoscopy. Unfortunately, hemoglobin still dropping every other day without transfusion. CT AP was nonacute. There is diverticulosis and fecal impaction. Will give enema. Monitor stool output and obtain FOBT. Gastroenterology signed off. Constipation  Enema ordered    Anemia of acute blood loss, iron deficiency  Secondary to GI bleed. Continue to monitor. Patient required multiple units of transfusion. Hemoglobin is above 7 today. Will start on Venofer    Hyperthyroidism  Clinically stable. Continue methimazole. Left femoral DVT (5/6/2022):  Post IVC filter placement 6/1. Off anticoagulation since admission. Discussed IVC filter position with IR on 6/6-no intervention needed    Discussed with the patient. Questions answered    DVT Prophylaxis: SCD only  Diet: ADULT DIET; Dysphagia - Minced and Moist; Low Sodium (2 gm)  Code Status: Limited    PT/OT Eval Status:  In process    Dispo -discharge when hemoglobin is stable without transfusion    Inga Aguilar MD

## 2022-06-07 VITALS
TEMPERATURE: 98.5 F | HEART RATE: 95 BPM | RESPIRATION RATE: 18 BRPM | DIASTOLIC BLOOD PRESSURE: 71 MMHG | HEIGHT: 69 IN | WEIGHT: 148.59 LBS | BODY MASS INDEX: 22.01 KG/M2 | SYSTOLIC BLOOD PRESSURE: 118 MMHG | OXYGEN SATURATION: 99 %

## 2022-06-07 PROBLEM — D64.9 ANEMIA: Status: ACTIVE | Noted: 2022-06-07

## 2022-06-07 LAB
ANION GAP SERPL CALCULATED.3IONS-SCNC: 9 MMOL/L (ref 3–16)
BASOPHILS ABSOLUTE: 0.1 K/UL (ref 0–0.2)
BASOPHILS RELATIVE PERCENT: 0.6 %
BUN BLDV-MCNC: 11 MG/DL (ref 7–20)
CALCIUM SERPL-MCNC: 8.8 MG/DL (ref 8.3–10.6)
CHLORIDE BLD-SCNC: 106 MMOL/L (ref 99–110)
CO2: 24 MMOL/L (ref 21–32)
CREAT SERPL-MCNC: 0.8 MG/DL (ref 0.6–1.2)
EOSINOPHILS ABSOLUTE: 0.2 K/UL (ref 0–0.6)
EOSINOPHILS RELATIVE PERCENT: 2 %
GFR AFRICAN AMERICAN: >60
GFR NON-AFRICAN AMERICAN: >60
GLUCOSE BLD-MCNC: 90 MG/DL (ref 70–99)
HCT VFR BLD CALC: 25.1 % (ref 36–48)
HEMOGLOBIN: 8.2 G/DL (ref 12–16)
LYMPHOCYTES ABSOLUTE: 1.3 K/UL (ref 1–5.1)
LYMPHOCYTES RELATIVE PERCENT: 13.5 %
MCH RBC QN AUTO: 28 PG (ref 26–34)
MCHC RBC AUTO-ENTMCNC: 32.7 G/DL (ref 31–36)
MCV RBC AUTO: 85.5 FL (ref 80–100)
MONOCYTES ABSOLUTE: 0.7 K/UL (ref 0–1.3)
MONOCYTES RELATIVE PERCENT: 7.8 %
NEUTROPHILS ABSOLUTE: 7.3 K/UL (ref 1.7–7.7)
NEUTROPHILS RELATIVE PERCENT: 76.1 %
PDW BLD-RTO: 19 % (ref 12.4–15.4)
PLATELET # BLD: 267 K/UL (ref 135–450)
PMV BLD AUTO: 7.5 FL (ref 5–10.5)
RBC # BLD: 2.94 M/UL (ref 4–5.2)
SODIUM BLD-SCNC: 139 MMOL/L (ref 136–145)
T4 FREE: 0.3 NG/DL (ref 0.9–1.8)
TSH REFLEX: 14.72 UIU/ML (ref 0.27–4.2)
WBC # BLD: 9.6 K/UL (ref 4–11)

## 2022-06-07 PROCEDURE — 6360000002 HC RX W HCPCS: Performed by: INTERNAL MEDICINE

## 2022-06-07 PROCEDURE — 6370000000 HC RX 637 (ALT 250 FOR IP): Performed by: INTERNAL MEDICINE

## 2022-06-07 PROCEDURE — 36415 COLL VENOUS BLD VENIPUNCTURE: CPT

## 2022-06-07 PROCEDURE — 80048 BASIC METABOLIC PNL TOTAL CA: CPT

## 2022-06-07 PROCEDURE — 6370000000 HC RX 637 (ALT 250 FOR IP): Performed by: NURSE PRACTITIONER

## 2022-06-07 PROCEDURE — 2580000003 HC RX 258: Performed by: INTERNAL MEDICINE

## 2022-06-07 PROCEDURE — 94640 AIRWAY INHALATION TREATMENT: CPT

## 2022-06-07 PROCEDURE — 84443 ASSAY THYROID STIM HORMONE: CPT

## 2022-06-07 PROCEDURE — 85025 COMPLETE CBC W/AUTO DIFF WBC: CPT

## 2022-06-07 PROCEDURE — C9113 INJ PANTOPRAZOLE SODIUM, VIA: HCPCS | Performed by: INTERNAL MEDICINE

## 2022-06-07 PROCEDURE — 84439 ASSAY OF FREE THYROXINE: CPT

## 2022-06-07 RX ORDER — FOLIC ACID 1 MG/1
1 TABLET ORAL DAILY
Qty: 30 TABLET | Refills: 3 | Status: SHIPPED | OUTPATIENT
Start: 2022-06-07 | End: 2022-06-07 | Stop reason: HOSPADM

## 2022-06-07 RX ORDER — FERROUS SULFATE 325(65) MG
325 TABLET ORAL
Qty: 90 TABLET | Refills: 1 | Status: SHIPPED | OUTPATIENT
Start: 2022-06-07 | End: 2022-07-14 | Stop reason: SDUPTHER

## 2022-06-07 RX ORDER — FOLIC ACID 1 MG/1
1 TABLET ORAL DAILY
Qty: 30 TABLET | Refills: 11 | Status: SHIPPED | OUTPATIENT
Start: 2022-06-07 | End: 2022-07-14 | Stop reason: SDUPTHER

## 2022-06-07 RX ORDER — METHOCARBAMOL 750 MG/1
750 TABLET, FILM COATED ORAL EVERY 6 HOURS PRN
Qty: 30 TABLET | Refills: 0 | Status: SHIPPED | OUTPATIENT
Start: 2022-06-07 | End: 2022-06-17

## 2022-06-07 RX ADMIN — FOLIC ACID 1 MG: 1 TABLET ORAL at 10:18

## 2022-06-07 RX ADMIN — CYANOCOBALAMIN TAB 1000 MCG 500 MCG: 1000 TAB at 10:18

## 2022-06-07 RX ADMIN — PANTOPRAZOLE SODIUM 40 MG: 40 INJECTION, POWDER, FOR SOLUTION INTRAVENOUS at 10:16

## 2022-06-07 RX ADMIN — SODIUM CHLORIDE, PRESERVATIVE FREE 5 ML: 5 INJECTION INTRAVENOUS at 10:16

## 2022-06-07 RX ADMIN — BUDESONIDE 500 MCG: 0.5 SUSPENSION RESPIRATORY (INHALATION) at 08:17

## 2022-06-07 RX ADMIN — ARFORMOTEROL TARTRATE 15 MCG: 15 SOLUTION RESPIRATORY (INHALATION) at 08:17

## 2022-06-07 RX ADMIN — FLUTICASONE PROPIONATE 2 SPRAY: 50 SPRAY, METERED NASAL at 10:16

## 2022-06-07 RX ADMIN — DOCUSATE SODIUM 100 MG: 100 CAPSULE, LIQUID FILLED ORAL at 10:18

## 2022-06-07 RX ADMIN — GABAPENTIN 100 MG: 100 CAPSULE ORAL at 10:33

## 2022-06-07 RX ADMIN — METHIMAZOLE 5 MG: 5 TABLET ORAL at 10:16

## 2022-06-07 ASSESSMENT — PAIN SCALES - GENERAL: PAINLEVEL_OUTOF10: 5

## 2022-06-07 ASSESSMENT — PAIN DESCRIPTION - LOCATION: LOCATION: OTHER (COMMENT)

## 2022-06-07 NOTE — PLAN OF CARE
Problem: Safety - Adult  Goal: Free from fall injury  6/7/2022 1435 by Rayshawn Yang RN  Outcome: Completed  6/7/2022 0119 by Maxwell Bolaños, RN  Outcome: Progressing  Flowsheets (Taken 6/7/2022 0118)  Free From Fall Injury: Instruct family/caregiver on patient safety

## 2022-06-07 NOTE — PROGRESS NOTES
Patient is A&O x4 but can be forgetful at times. RA, sat 98%. No complaints of SOB. Medicated for c/o generalized and hip pain as needed with tylenol and heat pack. Respirations appear to easy and unlabored. Lungs clear. Respirations easy with no complaints of cough. No complaints of nausea/vomiting/diarrhea. Up with assist/walker x2 to the bathroom/BSC as needed. Right FA PIVs intact and flushed. Tolerating dysphagia diet. Plan of care and safety measures reviewed with the patient. Call light in reach and bed alarm in place. Will continue to monitor.   Electronically signed by Tre Gaines RN on 6/6/2022 at 10:51 PM

## 2022-06-07 NOTE — PROGRESS NOTES
Hospitalist Progress Note      PCP: Meredith Rose MD    Date of Admission: 5/31/2022    Chief Complaint: GI bleed    Hospital Course: admitted with GI bleed. Did not want to have colonoscopy. Hemoglobin decreased and required a transfusion 6/4. Subjective:     Moved large dark BM after enema yesterday. Tolerating PO today. Having usual aches and pain of \"80 year old body\". Medications:  Reviewed    Infusion Medications    sodium chloride      sodium chloride      sodium chloride       Scheduled Medications    iron sucrose  200 mg IntraVENous R41A    folic acid  1 mg Oral Daily    vitamin B-12  500 mcg Oral Daily    docusate sodium  100 mg Oral BID    lidocaine  1 patch TransDERmal Daily    fluticasone  2 spray Each Nostril Daily    gabapentin  100 mg Oral TID    montelukast  10 mg Oral Nightly    sodium chloride flush  5-40 mL IntraVENous 2 times per day    pantoprazole  40 mg IntraVENous BID    budesonide  0.5 mg Nebulization BID    Arformoterol Tartrate  15 mcg Nebulization BID    methIMAzole  5 mg Oral Once per day on Tue Thu Fri Sat    methIMAzole  7.5 mg Oral Once per day on Sun Mon Wed     PRN Meds: methocarbamol, sodium chloride, bisacodyl, simethicone, acetaminophen, sodium chloride, hydroxypropyl methylcellulose, albuterol, sodium chloride flush, sodium chloride, ondansetron **OR** ondansetron, polyethylene glycol      Intake/Output Summary (Last 24 hours) at 6/7/2022 0847  Last data filed at 6/7/2022 0347  Gross per 24 hour   Intake 840 ml   Output 1000 ml   Net -160 ml       Physical Exam Performed:    /60   Pulse 76   Temp 98.5 °F (36.9 °C) (Oral)   Resp 16   Ht 5' 9\" (1.753 m)   Wt 148 lb 9.4 oz (67.4 kg)   LMP  (LMP Unknown)   SpO2 94%   BMI 21.94 kg/m²     General appearance: No apparent distress, appears stated age and cooperative. Respiratory:  Normal respiratory effort.  Clear to auscultation, bilaterally without Rales/Wheezes/Rhonchi. Cardiovascular: Regular rate and rhythm with normal S1/S2 without murmurs, rubs or gallops. Abdomen: Soft, non-tender, non-distended with normal bowel sounds. Musculoskeletal: No clubbing, cyanosis or edema bilaterally. Full range of motion without deformity. Skin: Skin color, texture, turgor normal.  No rashes or lesions. Neurologic:  Neurovascularly intact without any focal sensory/motor deficits. Cranial nerves: II-XII intact, grossly non-focal.  Psychiatric: Alert and oriented, thought content appropriate, normal insight  Capillary Refill: Brisk,3 seconds, normal   Peripheral Pulses: +2 palpable, equal bilaterally       Labs:   Recent Labs     06/05/22  0818 06/06/22  0609 06/07/22  0658   WBC 10.2 9.3 9.6   HGB 6.7* 7.6* 8.2*   HCT 20.6* 23.7* 25.1*    242 267     Recent Labs     06/05/22  0818 06/06/22  0609    139   K 3.6 3.9    106   CO2 24 25   BUN 18 15   CREATININE 0.8 0.8   CALCIUM 8.3 8.8     No results for input(s): AST, ALT, BILIDIR, BILITOT, ALKPHOS in the last 72 hours. No results for input(s): INR in the last 72 hours. No results for input(s): Earlis Dailey in the last 72 hours. Urinalysis:      Lab Results   Component Value Date    NITRU Negative 06/01/2022    WBCUA None seen 06/01/2022    BACTERIA 2+ 12/21/2019    RBCUA 0-2 06/01/2022    BLOODU TRACE-LYSED 06/01/2022    SPECGRAV 1.010 06/01/2022    GLUCOSEU Negative 06/01/2022       Radiology:  CT ABDOMEN PELVIS W IV CONTRAST Additional Contrast? Oral   Final Result      1. IVC filter in place with some of the legs extending beyond the IVC wall and one of them close to adjacent small bowel wall. 2.  Nonocclusive bilateral femoral vein thrombi. 3.  Distended rectum with impacted stool. 4.  Stable bilateral renal angiomyolipomas. 5.  Colonic diverticulosis. 6.  Stable focal intrahepatic ductal dilation in the inferior liver without evidence of stone or obstructing mass. IR GUIDED IVC FILTER PLACEMENT   Final Result   IMPRESSION :      Normal IVC anatomy. Normal, patent right internal jugular vein      Placement of filter in the infra-renal IVC. Please note that this is a retrievable filter. Fluoroscopy time : 1.6 minutes   Number of exposures obtained : 1 venographic run, 1 spot image   Moderate sedation was provided and monitored by an independent trained observer. Moderate sedation start time : 0956   Moderate sedation end time : 1017   Blood loss : minimal ( less than 5 cc )   Specimens : none             XR CHEST PORTABLE   Final Result      No acute radiographic abnormality of the chest.              Assessment/Plan:    Active Hospital Problems    Diagnosis     Acute gastrointestinal bleeding [K92.2]      Priority: Medium     PLAN:    Acute GI bleeding  Patient declined colonoscopy. It took several days for hemoglobin to stabilize. CT AP was nonacute. There is diverticulosis and fecal impaction. Large BM after enema did not have any visible blood, occult stool positive. Gastroenterology signed off. Constipation  Enema ordered. Resolved. Anemia of acute blood loss, iron deficiency  Secondary to GI bleed. Continue to monitor. Patient required multiple units of transfusion. Hemoglobin is above 7 today. Continue Venofer    Hyperthyroidism  Clinically stable. Continue methimazole. Left femoral DVT (5/6/2022):  Post IVC filter placement 6/1. Off anticoagulation since admission. Discussed IVC filter position with IR on 6/6-no intervention needed. Discussed with the patient. Questions answered    DVT Prophylaxis: SCD only  Diet: ADULT DIET; Dysphagia - Minced and Moist; Low Sodium (2 gm)  Code Status: Limited    PT/OT Eval Status:  In process    Dispo -discharge today     Savanah Danielson MD

## 2022-06-07 NOTE — CARE COORDINATION
Case Management Assessment            Discharge Note                    Date / Time of Note: 6/7/2022 10:07 AM                  Discharge Note Completed by: Elham Tena RN    Patient Name: Madelin Akers   YOB: 1926  Diagnosis: Acute gastrointestinal bleeding [K92.2]  Anemia, unspecified type [D64.9]   Date / Time: 5/31/2022 10:50 AM    Current PCP: Rehana Mendosa MD  Clinic patient: No    Hospitalization in the last 30 days: No    Advance Directives:  Code Status: Limited  [place] DNR form completed and on chart: Yes    Financial:  Payor: MEDICARE / Plan: MEDICARE PART A AND B / Product Type: *No Product type* /      Pharmacy:    [facility] Aniyah Mcpherson, [address] [facility] [phone] - f [phone]  [address] [place] [address]  Phone: [phone] Fax: [phone] - [facility], [address] [place] [address] [phone] Artemio Benz [phone]  [address] [place] [address]  Phone: [phone] Fax: [phone]      Assistance purchasing medications?: Potential Assistance Purchasing Medications: No  Assistance provided by Case Management: None at this time    Does patient want to participate in local refill/ meds to beds program?: Yes    Meds To Beds General Rules:  1. Can ONLY be done Monday- Friday between 8:30am-5pm  2. Prescription(s) must be in pharmacy by 3pm to be filled same day  3. Copy of patient's insurance/ prescription drug card and patient face sheet must be sent along with the prescription(s)  4. Cost of Rx cannot be added to hospital bill. If financial assistance is needed, please contact unit  or ;  or  CANNOT provide pharmacy voucher for patients co-pays  5.  Patients can then  the prescription on their way out of the hospital at discharge, or pharmacy can deliver to the bedside if staff is available. (payment due at time of pick-up or delivery - cash, check, or card accepted)     Able to afford home medications/ co-pay costs: Yes    ADLS:  Current PT AM-PAC Score: 15 /24  Current OT AM-PAC Score: 16 /24      DISCHARGE Disposition:    [facility]             [address] [facility] [address] [facility] [address]      REPORT Phone: [phone]      [address] [place] [address] Fax: [phone]          LOC at discharge: Skilled  JUDSON Completed: Yes    Notification completed in HENS/PAS?:  Yes : CM has completed HENS online through secure website for SNF admission at [facility]  . Document ID #: Document ID : [id number]    IMM Completed:   Not Indicated    Transportation:  Transportation PLAN for discharge: EMS transportation   Mode of Transport: Ambulance stretcher - BLS  Reason for medical transport: Bed confined: Meets the following criteria 1) unable to get out of bed without assistance or ambulate, 2) unable to safely sit up in a wheelchair, 3) unable to maintain erect seating position in a chair for time needed for transport and Other: High fall risk  R knee  back pain:  [address] [facility]  Name of [address] [place] [address],[facility]: [facility]  Phone: [phone]  Time of Transport: 2000    Transport form completed: Yes    Home Care:  [address] ordered at discharge: No  [address]: Not Applicable  Orders faxed: No    Durable Medical Equipment:  DME Provider: defer  Equipment obtained during hospitalization: defer    Home Oxygen and Respiratory Equipment:  Oxygen needed at discharge?: No  [address]: Not Applicable  Portable tank available for discharge?: No    Dialysis:  Dialysis patient: No    Dialysis Center:  Not Applicable    Hospice Services:  Location: Not Applicable  Agency: Not Applicable    Consents signed: No    Referrals made at [facility] for outpatient continued care:  Not Applicable    Additional CM Notes:     CM  Confirmed  D/C to SNF  :     CM spoke with  Mamie Quiros  In admissions and can  Accept the pt  This afternoon.       Transport arranged as  Noted above     Patient  And  Daughter  Agreeable to  [facility]     RN call report  . CM  Faxed  STEPHANIE ROPER  .  KM submitted  , No pre cert  Needed. RN Lorena Cooney:    Aware  And  Will call report        The Plan for Transition of Care is related to the following treatment goals of Acute gastrointestinal bleeding [K92.2]  Anemia, unspecified type [D64.9]    The Patient and/or patient representative Juancarlos Gomez and her family were provided with a choice of provider and agrees with the discharge plan Yes    Freedom of choice list was provided with basic dialogue that supports the patient's individualized plan of care/goals and shares the quality data associated with the providers.  Yes    Care Transitions patient: No    Bradley Perla RN  [facility]  Case Management Department  Ph: [phone]

## 2022-06-14 ENCOUNTER — OFFICE VISIT (OUTPATIENT)
Dept: CARDIOLOGY CLINIC | Age: 87
End: 2022-06-14
Payer: MEDICARE

## 2022-06-14 ENCOUNTER — HOSPITAL ENCOUNTER (OUTPATIENT)
Dept: GENERAL RADIOLOGY | Age: 87
Discharge: HOME | End: 2022-06-14
Payer: COMMERCIAL

## 2022-06-14 ENCOUNTER — HOSPITAL ENCOUNTER (OUTPATIENT)
Age: 87
Discharge: HOME | End: 2022-06-14
Payer: COMMERCIAL

## 2022-06-14 ENCOUNTER — TELEPHONE (OUTPATIENT)
Dept: CARDIOLOGY CLINIC | Age: 87
End: 2022-06-14

## 2022-06-14 VITALS
HEART RATE: 60 BPM | HEIGHT: 69 IN | BODY MASS INDEX: 21.92 KG/M2 | SYSTOLIC BLOOD PRESSURE: 102 MMHG | WEIGHT: 148 LBS | DIASTOLIC BLOOD PRESSURE: 72 MMHG

## 2022-06-14 DIAGNOSIS — R52 PAIN: ICD-10-CM

## 2022-06-14 DIAGNOSIS — R52 PAIN: Primary | ICD-10-CM

## 2022-06-14 PROCEDURE — G8420 CALC BMI NORM PARAMETERS: HCPCS | Performed by: INTERNAL MEDICINE

## 2022-06-14 PROCEDURE — 73502 X-RAY EXAM HIP UNI 2-3 VIEWS: CPT

## 2022-06-14 PROCEDURE — 1090F PRES/ABSN URINE INCON ASSESS: CPT | Performed by: INTERNAL MEDICINE

## 2022-06-14 PROCEDURE — 99214 OFFICE O/P EST MOD 30 MIN: CPT | Performed by: INTERNAL MEDICINE

## 2022-06-14 PROCEDURE — 1123F ACP DISCUSS/DSCN MKR DOCD: CPT | Performed by: INTERNAL MEDICINE

## 2022-06-14 PROCEDURE — 1111F DSCHRG MED/CURRENT MED MERGE: CPT | Performed by: INTERNAL MEDICINE

## 2022-06-14 PROCEDURE — 1036F TOBACCO NON-USER: CPT | Performed by: INTERNAL MEDICINE

## 2022-06-14 PROCEDURE — G8427 DOCREV CUR MEDS BY ELIG CLIN: HCPCS | Performed by: INTERNAL MEDICINE

## 2022-06-14 NOTE — PROGRESS NOTES
[address]   Dr Kacey Gong. Raimundo Walton MD, [address] [facility]                                                                Outpatient Follow Up Note         06/14/22  HPI:  Luis Enrique Goldman is 80 y.o. female who presents today with MR/AR HTN HLD here today from [facility] where she is currently rehabbing. Apparently about a month ago she got weak in her legs and fell and has not been able to walk since that time. She was kept at [facility], [facility]. for about a week and then discharged to [facility] where she has been [place]. Denies any chest pains or shortness of breath and no dizziness. She does have pain in both legs and both hips that she thinks has been present ever since she fell and we checked the records and there has been no evidence for x-rays. We will obtain x-rays today. On examination all else is stable. No peripheral edema. Lungs are clear.       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    PCR    Clostridium difficile infection 1/27/15    AG positive    Degenerative joint disease of knee     Depression     Diverticulitis     GERD (gastroesophageal reflux disease)     Grave's disease 4/2/2012    Heart disease     Hypertension     IBS (irritable bowel syndrome)     Macular degeneration     MGUS (monoclonal gammopathy of unknown significance)     Osteoporosis     Poor vision     d/t macular degeneration    Simple cyst of kidney      PSH  Past Surgical History: Procedure Laterality Date    CHOLECYSTECTOMY      COLONOSCOPY  10/29/2009    **see scanned report- OGI&LI    HEMORRHOID SURGERY      HYSTERECTOMY (CERVIX STATUS UNKNOWN)  in 40's    rudolph/bso    HYSTERECTOMY, TOTAL ABDOMINAL (CERVIX REMOVED)      IR IVC FILTER PLACEMENT W IMAGING N/A 06/01/2022    lowell sheppard, lot [id number], sn [id number]    IR IVC FILTER PLACEMENT W IMAGING  6/1/2022    IR IVC FILTER PLACEMENT W IMAGING 6/1/2022 [facility] PROCEDURES    UPPER GASTROINTESTINAL ENDOSCOPY N/A 6/1/2022    EGD BIOPSY performed by Salena Spann MD at [facility]:       Social History     Tobacco Use   Smoking Status Never Smoker   Smokeless Tobacco Never Used     Current Medications:  Prior to Visit Medications    Medication Sig Taking? Authorizing Provider   methocarbamol (ROBAXIN) 750 MG tablet Take 1 tablet by mouth every 6 hours as needed (muscle spasms) Yes Bi Nuñez MD   ferrous sulfate (IRON 325) 325 (65 Fe) MG tablet Take 1 tablet by mouth daily (with breakfast) Yes Bi Nuñez MD   folic acid (FOLVITE) 1 MG tablet Take 1 tablet by mouth daily Yes Bi Nuñez MD   fluticasone (FLONASE) 50 MCG/ACT nasal spray PLACE 1 SPRAY IN EACH NOSTRIL DAILY Yes Danielle Preston MD   triamcinolone (KENALOG) 0.1 % cream Apply topically 2 times daily to reddened area right calf. Yes Danielle Preston MD   atenolol (TENORMIN) 50 MG tablet TAKE 1 TABLET DAILY Yes Danielle Preston MD   pantoprazole (PROTONIX) 40 MG tablet TAKE 1 TABLET BY MOUTH TWICE A DAY Yes Danielle Preston MD   spironolactone (ALDACTONE) 25 MG tablet TAKE 1 TABLET DAILY Yes Danielle Preston MD   felodipine (PLENDIL) 5 MG extended release tablet TAKE 1 TABLET NIGHTLY. DISCONTINUE 2.5 MG         FELODIPINE PRESCRIPTION Yes Danielle Preston MD   ipratropium (ATROVENT) 0.03 % nasal spray USE 1 SPRAY IN EACH NOSTRIL AT BEDTIME Yes Danielle Preston MD   terbinafine (LAMISIL) 1 % cream Apply topically 2 times daily.  Yes Danielle Preston MD vitamin D (ERGOCALCIFEROL) 1.25 MG (19034 UT) CAPS capsule Take 1 capsule by mouth once a week Yes Yahir Rodrigues MD   montelukast (SINGULAIR) 10 MG tablet TAKE 1 TABLET NIGHTLY Yes Yahir Rodrigues MD   nystatin (MYCOSTATIN) 336646 UNIT/ML suspension Take 5 mLs by mouth 4 times daily Yes Yahir Rodrigues MD   hydrocortisone (ANUSOL-HC) 25 MG suppository Place 1 suppository rectally every 12 hours Yes Yahir Rodrigues MD   albuterol sulfate (PROAIR RESPICLICK) 589 (90 Base) MCG/ACT aerosol powder inhalation Inhale 2 puffs into the lungs every 6 hours as needed for Wheezing or Shortness of Breath Yes Yahir Rodrigues MD   ammonium lactate (LAC-HYDRIN) 12 % lotion Apply topically daily. Yes Yahir Rodrigues MD   budesonide-formoterol [facility]) 160-4.5 MCG/ACT AERO Inhale 2 puffs into the lungs 2 times daily Yes Yahir Rodrigues MD   fluocinonide (LIDEX) 0.05 % cream Apply topically 2 times daily. Yes Yahir Rodrigues MD   lidocaine (XYLOCAINE) 5 % ointment Apply topically three times a day, as needed. Yes Yahir Rodrigues MD   polyethylene glycol (GLYCOLAX) 17 g packet Take 17 g by mouth 2 times daily Yes Yahir Rodrigues MD   methIMAzole (TAPAZOLE) 5 MG tablet Take 1 tablet by mouth See Admin Instructions 1 tablet by mouth daily except for Sunday, Monday, Wednesday take 1.5 tablets. Yes Yahir Rodrigues MD   Simethicone (GAS RELIEF) 180 MG CAPS Take 180 mg by mouth 4 times daily as needed (bloating) Yes Yahir Rodrigues MD   gabapentin (NEURONTIN) 100 MG capsule TAKE 1 CAPSULE BY MOUTH 3 TIMES DAILY  DAYS. INTENDED SUPPLY: 27 DAYS  Yahir Rodrigues MD     Family History  Family History   Problem Relation Age of Onset    Heart Disease Mother     High Blood Pressure Mother     Stroke Mother     Cancer Father         multiple myeloma     · ROS:   · Cardiovascular: Reviewed in HPI  · Allergic/Immunologic: No nasal congestion or hives. · All other ROS are reviewed and are unremarkable.     PHYSICAL EXAM:      Wt Readings from Last 3 Encounters:   06/14/22 148 lb (67.1 kg)   06/07/22 148 lb 9.4 oz (67.4 kg)   05/06/22 153 lb (69.4 kg)       BP Readings from Last 3 Encounters:   06/14/22 102/72   06/07/22 118/71   05/06/22 (!) 141/74       Pulse Readings from Last 3 Encounters:   06/14/22 60   06/07/22 95   05/06/22 58       Exam   ENT: Cranial nerves II through XII intact grossly except that she is legally blind. Head eyes ears nose and throat unremarkable. NEUROLOGIC: Awake and alert and oriented x3. She is able to recognize me. Moves all extremities well for 80year-old. Constitutional: This patient is oriented to person, place, and time. Also appears well-developed and well-nourished and in no acute distress. HEENT: Normocephalic and atraumatic. Sclerae anicteric. No xanthelasmas. Conjunctiva white, no subconjunctival hemorrhage   External inspection of ears nose teeth & gums   Eyes:PERRLA EOM's intact. Neck: Neck supple. No JVD present. Carotids without bruits. No mass and no thyromegaly. No lymphadenopathy    Cardiovascular: RRR, normal S1 and S2; no murmur/gallop or rub, PMI nondisplaced  Pulmonary/Chest: Effort normal.  Lungs clear to auscultation. Chest wall nontender  Abdominal: soft, nontender, nondistended. + bowel sounds; no organomegaly or bruits. Aorta normal  Extremities: No edema  Pulses are 2+ radial/carotid/dorsalis pedis bilaterally. Cap refill brisk. Neurological: No cranial nerve deficit. Psychiatric: This patient has a normal mood and affect and the speech is normal as is behavior    Assessment:    MR / AR   Echo 1/2017 EF 55% m/m MR moderate insuff /  similar to prior echo on 06/36/46   LV Diastolic Dimension: 3.9 cm LV Systolic Dimension: 2.7 cm   The aortic valve appears normal in structure and function.   The aortic valve appears tricuspid.   Moderate aortic regurgitation is present.   No evidence of aortic stenosis.   On BB   Summary 6/23/20   Left ventricular cavity size is normal with normal left ventricular wall   thickness. Overall left ventricular systolic function appears normal with an ejection   fraction of 55%. No regional wall motion abnormalities   Normal diastolic function. Mild tricuspid regurgitation. Estimated pulmonary artery systolic pressure is at 24 mmHg assuming a right   atrial pressure of 3 mmHg. EK18  55 BPM 18   VT interval 218 ms  QRS duration 88 ms  QT/QTc 434/415 ms  P-R-T axes 67 8 33    EKG on 8/10/2021 sinus rhythm 75. Ventricular hypertrophy by voltages. Late transition. HTN  Optimal  EKG on January 3, 2018 sinus rhythm 61/m. Nonspecific lateral ST and T wave changes. Stable pattern. PLAN: Continue current medicines. We will obtain x-rays of her hips.,  Return to see me in 4 months. Continue all other medications. Jessica Gupta M.D.  [facility]

## 2022-06-14 NOTE — TELEPHONE ENCOUNTER
Patient's daughter, Mary Lopez called. Devonte Pablo has appt w/Dr. Dorothea Treadwell today. She states Devonte Pablo will be coming from a rehab facility, and has been complaining of hip pain. She was supposed to get a hip x-ray done at facility, but this has not happened yet. Mary Lopez would like to see if Dr. Dorothea Treadwell could order this and have it done  downstairs while she is there today.

## 2022-06-14 NOTE — TELEPHONE ENCOUNTER
Called Ayo Randall to let her know that I will talk to Megha Aguero about ordering a hip x-ray so she can get it done today down in the ER due to transport. Will attached this paper work to her papers.

## 2022-06-22 ENCOUNTER — TELEPHONE (OUTPATIENT)
Dept: PRIMARY CARE CLINIC | Age: 87
End: 2022-06-22

## 2022-06-23 DIAGNOSIS — J30.89 OTHER ALLERGIC RHINITIS: ICD-10-CM

## 2022-06-23 RX ORDER — MONTELUKAST SODIUM 10 MG/1
TABLET ORAL
Qty: 90 TABLET | Refills: 3 | Status: SHIPPED | OUTPATIENT
Start: 2022-06-23

## 2022-06-23 NOTE — TELEPHONE ENCOUNTER
Medication:   Requested Prescriptions     Pending Prescriptions Disp Refills    montelukast (SINGULAIR) 10 MG tablet [Pharmacy Med Name: MONTELUKAST SOD 10 MG TABLET] 90 tablet 3     Sig: TAKE 1 TABLET BY MOUTH EVERY DAY AT NIGHT        Last Filled:      Patient Phone Number: [phone] (home) [phone] (work)    Last appt: 5/6/2022   Next appt: 7/14/2022    Last OARRS:   RX Monitoring 9/11/2019   Periodic Controlled Substance Monitoring Assessed functional status.

## 2022-07-07 ENCOUNTER — TELEPHONE (OUTPATIENT)
Dept: PRIMARY CARE CLINIC | Age: 87
End: 2022-07-07

## 2022-07-11 ENCOUNTER — CARE COORDINATION (OUTPATIENT)
Dept: CASE MANAGEMENT | Age: 87
End: 2022-07-11

## 2022-07-11 NOTE — CARE COORDINATION
Re 45 Transitions Initial Follow Up Call    Call within 2 business days of discharge: Yes    Patient: Agapito Farrell Patient : 1926   MRN: [id number]  Reason for Admission: anemia due to xarelto bleed  Discharge Date: 22 RARS: Readmission Risk Score: 12.9 ( )      Last Discharge [facility]       Complaint Diagnosis Description Type Department Provider    22 Fatigue Anemia, unspecified type . .. ED to Hosp-Admission (Discharged) (ADMITTED) [facility] 6 Raven Ocampo MD; Yogi Carson. .. Acute Care Course:   Pt to [facility] from  to  with anemia. She had a DVT on  and was started on xarelto. She then went to [facility] for 31 days with a d/c on  to home with [facility]. HFU made:   PCP   Dr aTcos [facility]    Sig Hx:   HTN, aortic regurg, PE hx, CHF, GERD, IBS, Celiac, Graves, macular degeneration. DME: w/c walker    Conversation:   Spoke with José Miguel Garcia after 2 IDs. She states mom is doing much better but still weak. She is using a w/c but able to transfer self and able to use walker in the BR. Am [facility] nurse has been out. States that [facility] will arrange transportation to the upcoming appts. She is aware of appts. She has help daily in home. Her appetite is good. There are no loose rugs. Reviewed meds and she states mom manages her own meds. She states she is planning on asking MD for blister packs for the meds. Reviewed meds and educated to bring in to MD. They do monitor for SOB, edema, and constipation. Denies further needs. Follow up plan: Will close as needs met            Spoke with: [address]: [employer] services provided:  Education of patient/family/caregiver/guardian to support self-management-CHF and constipation     Transitions of Care Initial Call    Was this an external facility discharge?  No Discharge Facility: [facility]    Challenges to be reviewed by the provider   Additional needs identified to be addressed with provider: No  none             Method of communication with provider : none    Advance Care Planning:   Does patient have an Advance Directive: reviewed and current. Care Transition Nurse contacted the family by telephone to perform post hospital discharge assessment. Verified name and  with family as identifiers. Provided introduction to self, and explanation of the CTN role. CTN reviewed discharge instructions, medical action plan and red flags with family who verbalized understanding. Family given an opportunity to ask questions and does not have any further questions or concerns at this time. Were discharge instructions available to patient? Yes. Reviewed appropriate site of care based on symptoms and resources available to patient including: PCP. The family agrees to contact the PCP office for questions related to their healthcare. Medication reconciliation was performed with family, who verbalizes understanding of administration of home medications. Advised obtaining a 90-day supply of all daily and as-needed medications. Was patient discharged with a pulse oximeter? no    CTN provided contact information. No further follow-up call indicated based on severity of symptoms and risk factors.   Plan for next call: none          Care Transitions 24 Hour Call    Do you have any ongoing symptoms?: No  Do you have all of your prescriptions and are they filled?: Yes  Have you scheduled your follow up appointment?: Yes  How are you going to get to your appointment?: Other (Comment: [facility] arranges transport)  Were you discharged with any Home Care or Post Acute Services: Yes  Post Acute Services: Home Health (Comment: [address])  Patient DME: Straight cane, Walker  Do you have support at home?: Alone  Do you feel like you have everything you need to keep you well at home?: Yes  Are you an active caregiver in your home?: No  Care Transitions Interventions         Follow Up  Future

## 2022-07-12 ENCOUNTER — TELEPHONE (OUTPATIENT)
Dept: PRIMARY CARE CLINIC | Age: 87
End: 2022-07-12

## 2022-07-12 NOTE — TELEPHONE ENCOUNTER
Marianna Sneed from [facility] stated that pt got released from [facility] due to a GI bleed and wondering if PCP can find orders for skilled nursing, PT and OT.  Call [phone]

## 2022-07-13 ENCOUNTER — TELEPHONE (OUTPATIENT)
Dept: PRIMARY CARE CLINIC | Age: 87
End: 2022-07-13

## 2022-07-13 NOTE — PROGRESS NOTES
2022     Marquis Evans (:  1926) is a 80 y.o. 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 She was started on outpatient Eliquis unfortunately bled from hemoglobin 14.4 down to 5.9, presented with complaint of fatigue and hypotension and subsequently hospitalized from May 29 to  8during which Eliquis was stopped and IVC filter was placed and she was transfused several units of packed red blood cells. Active bleeding based on hemoglobin values for several days, though no bleed source identified. EGD negative for bleed source, colonoscopy deferred given patient age. Significant fecal impaction required enema, large bowel movement resulted not grossly positive though Hemoccult positive    Discharge hemoglobin 8.2. Patient chose to stop iron upon leaving the nursing home last week. She has not resumed aspirin.     Complains of right hip pain, x-ray negative, since that she has been in the hospital.  Evidently they put a heating pad on it in the hospital.  She is not clear what interventions were undertaken at the nursing home where she spent nearly 6 weeks. As she puts it \"I was able to walk into the hospital but I have not been able to walk since. \"    Review of Systems   Constitutional: Negative for activity change, appetite change, fatigue and unexpected weight change. HENT: Negative for dental problem, sinus pain, sore throat and trouble swallowing. Eyes: Negative for pain and visual disturbance. Respiratory: Negative for apnea, cough, chest tightness, shortness of breath and wheezing. Cardiovascular: Negative for chest pain and palpitations. Gastrointestinal: Negative for abdominal pain, blood in stool, constipation, diarrhea, nausea, rectal pain and vomiting. Endocrine: Negative for cold intolerance, heat intolerance, polydipsia, polyphagia and polyuria. Genitourinary: Negative for difficulty urinating, dysuria, flank pain, frequency, hematuria, pelvic pain, urgency, vaginal bleeding and vaginal discharge. Musculoskeletal: Negative for arthralgias, back pain, gait problem, joint swelling, myalgias, neck pain and neck stiffness. Skin: Negative for color change and rash. Neurological: Negative for dizziness, tremors, syncope, speech difficulty, weakness, light-headedness and headaches. Hematological: Negative for adenopathy. Does not bruise/bleed easily. Psychiatric/Behavioral: Negative for agitation, behavioral problems, decreased concentration, sleep disturbance and suicidal ideas. The patient is not nervous/anxious and is not hyperactive. Prior to Visit Medications    Medication Sig Taking? Authorizing Provider   budesonide-formoterol (SYMBICORT) 160-4.5 MCG/ACT AERO Inhale 2 puffs into the lungs 2 times daily Yes Stu Duckworth MD   gabapentin (NEURONTIN) 100 MG capsule Take 1 capsule by mouth in the morning and at bedtime for 180 days.  Intended supply: 90 days Yes Stu Duckworth MD   folic acid (FOLVITE) 1 MG tablet Take 1 tablet by mouth daily Amanda Mora MD   albuterol sulfate (PROAIR RESPICLICK) 468 (90 Base) MCG/ACT aerosol powder inhalation Inhale 2 puffs into the lungs every 6 hours as needed for Wheezing or Shortness of Breath Amanda Mora MD   felodipine (PLENDIL) 5 MG extended release tablet Take 1 tablet by mouth daily Amanda Mora MD   Simethicone (GAS RELIEF) 180 MG CAPS Take 180 mg by mouth 4 times daily as needed (bloating) Amanda Mora MD   polyethylene glycol (GLYCOLAX) 17 g packet Take 17 g by mouth 2 times daily Amanda Mora MD   terbinafine (LAMISIL) 1 % cream Apply topically 2 times daily. Amanda Mora MD   nystatin (MYCOSTATIN) 464282 UNIT/ML suspension Take 5 mLs by mouth 4 times daily Amanda Mora MD   ferrous sulfate (IRON 325) 325 (65 Fe) MG tablet Take 1 tablet by mouth daily (with breakfast) Amanda Mora MD   montelukast (SINGULAIR) 10 MG tablet TAKE 1 TABLET BY MOUTH EVERY DAY AT NIGHT Amanda Mora MD   fluticasone (FLONASE) 50 MCG/ACT nasal spray PLACE 1 SPRAY IN EACH NOSTRIL DAILY Amanda Mora MD   triamcinolone (KENALOG) 0.1 % cream Apply topically 2 times daily to reddened area right calf. Amanda Mora MD   atenolol (TENORMIN) 50 MG tablet TAKE 1 TABLET DAILY Amanda Mora MD   pantoprazole (PROTONIX) 40 MG tablet TAKE 1 TABLET BY MOUTH TWICE A DAY Amanda Mora MD   spironolactone (ALDACTONE) 25 MG tablet TAKE 1 TABLET DAILY Amanda Mora MD   ipratropium (ATROVENT) 0.03 % nasal spray USE 1 SPRAY IN EACH NOSTRIL AT BEDTIME Amanda Mora MD   vitamin D (ERGOCALCIFEROL) 1.25 MG (98490 UT) CAPS capsule Take 1 capsule by mouth once a week Amanda Mora MD   hydrocortisone (ANUSOL-HC) 25 MG suppository Place 1 suppository rectally every 12 hours Amanda Mora MD   ammonium lactate (LAC-HYDRIN) 12 % lotion Apply topically daily.  Amanda Mora MD   lidocaine (XYLOCAINE) 5 % ointment Apply topically three times a day, as needed. Yes Raulito Hernandez MD   methIMAzole (TAPAZOLE) 5 MG tablet Take 1 tablet by mouth See Admin Instructions 1 tablet by mouth daily except for Sunday, Monday, Wednesday take 1.5 tablets. Yes Raulito Hernandez MD        Allergies   Allergen Reactions    Ace Inhibitors Other (See Comments)     angioedema    Acyclovir      Severe nausea  And anorexia. It happened after taking each 800 mg tablet and patient took one one day and one the next, then stopped an no more    Creon [Pancrelipase (Lip-Prot-Amyl)]      Swollen gums.  Dye [Iodides] Hives     Only reacts to dye that went into eyes, OK with IV contrast    Eggs Or Egg-Derived Products      Hives  this test in the [facility] ([facility]). Egg White IgE 3. 59High   <=0.34 kU/L Final 02/10/2016  2:14 PM [facility]                  Gluten Meal      Abdominal pain and diarreha  Wheat IgE 0.53  <=0.34 kU/L Final 02/10/2016  2:14 PM [facility]       Milk-Related Compounds      Milk, Cow's IgE 1. 75High   <=0.34 kU/L Final 02/10/2016  2:14 PM [facility]    Fluorescein Other (See Comments)    Lisinopril Swelling    Macrobid [Nitrofurantoin Monohydrate Macrocrystals] Other (See Comments)    No Known Allergies     Ciprocinonide [Fluocinolone] Rash     Stiff, no nausea or rash    Sulfa Antibiotics Rash       Past Medical History:   Diagnosis Date    Adrenal gland cyst ([facility] [address]     Arthritis     Asthma     Bilateral carpal tunnel syndrome 8/23/2018    Cataract     Chronic systolic congestive heart failure ([facility] [address] 4/12/2021    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    PCR    Clostridium difficile infection 1/27/15    AG positive    Degenerative joint disease of knee     Depression     Diverticulitis     GERD (gastroesophageal reflux disease)     Grave's disease 4/2/2012    Heart disease     Hypertension     IBS (irritable bowel syndrome)     Macular degeneration     MGUS (monoclonal gammopathy of unknown significance)     Osteoporosis     Poor vision     d/t macular degeneration    Simple cyst of kidney        Past Surgical History:   Procedure Laterality Date    CHOLECYSTECTOMY      COLONOSCOPY  10/29/2009    **see scanned report- [facility]    HEMORRHOID SURGERY      HYSTERECTOMY (CERVIX STATUS UNKNOWN)  in 40's    rudolph/bso    HYSTERECTOMY, TOTAL ABDOMINAL (CERVIX REMOVED)      IR IVC FILTER PLACEMENT W IMAGING N/A 06/01/2022    [address] [place] [id number], sn [id number]    IR IVC FILTER PLACEMENT W IMAGING  6/1/2022    IR IVC FILTER PLACEMENT W IMAGING 6/1/2022 [facility]    UPPER GASTROINTESTINAL ENDOSCOPY N/A 6/1/2022    EGD BIOPSY performed by Kofi Puentes MD at [address] [facility] [address] History     Socioeconomic History    Marital status: Single     Spouse name: Not on file    Number of children: Not on file    Years of education: Not on file    Highest education level: Not on file   Occupational History    Not on file   Tobacco Use    Smoking status: Never Smoker    Smokeless tobacco: Never Used   Vaping Use    Vaping Use: Never used   Substance and Sexual Activity    Alcohol use: No    Drug use: No    Sexual activity: Not Currently   Other Topics Concern    Not on file   Social History Narrative    Not on file     Social Determinants of Health     Financial Resource Strain: Low Risk     Difficulty of Paying Living Expenses: Not hard at all   Food Insecurity: No Food Insecurity    Worried About Running Out of Food in the Last Year: Never true    Sherie of Food in the Last Year: Never true   Transportation Needs:     Lack of Transportation (Medical): Not on file    Lack of Transportation (Non-Medical):  Not on file   Physical Activity:     Days of Exercise per Week: Not on file    Minutes of Exercise per Session: Not on file   Stress:     Feeling of Stress : Not on file   Social Connections:     Frequency of Communication with Friends and distended. UROGENITAL:  Deferred  EXTREMITIES: Her left great toe tip callus without evidence of underlying ulcer shows no erythema or tenderness. Toe warm and well perfused without clubbing cyanosis or edema. 1+ pulses in lower extremities. Capillary refill 2-3 seconds with dependent rubor but no foot ulceration. NEURO:  Cranial nerves 2-12 grossly intact. Normal muscle bulk and tone. No resting tremor, cogwheeling, normal rapid alternating movements in the hands and feet. Vibration and monofilament sensation nearly absent below the midcalf. Significant skin changes stocking paresthesia. Normal gait and station. MUSCULOSKELETAL: Pronounced osteoarthritic changes, right knee grossly deformed. No active synovitis joint warmth or erythema. Proximal to the deformed right knee is her quite tender right trochanteric bursa. SKIN:  No worrisome lesions, skin woody, with scaling on the distal lower extremities. PSYCH:  No psychomotor retardation or agitation. Good eye contact. Unrestricted affect range. Mood congruent with affect. Linear thought. LABS  Lab Review   No results displayed because visit has over 200 results.       Orders Only on 05/06/2022   Component Date Value    Uric Acid, Serum 05/06/2022 7.2*    CRP 05/06/2022 <3.0     Sodium 05/06/2022 141     Potassium 05/06/2022 4.4     Chloride 05/06/2022 105     CO2 05/06/2022 24     Anion Gap 05/06/2022 12     Glucose 05/06/2022 102*    BUN 05/06/2022 10     CREATININE 05/06/2022 0.9     GFR Non- 05/06/2022 58*    GFR  05/06/2022 >60     Calcium 05/06/2022 10.1     WBC 05/06/2022 8.7     RBC 05/06/2022 5.43*    Hemoglobin 05/06/2022 14.4     Hematocrit 05/06/2022 44.3     MCV 05/06/2022 81.6     MCH 05/06/2022 26.5     MCHC 05/06/2022 32.5     RDW 05/06/2022 15.9*    Platelets 13/09/7525 219     MPV 05/06/2022 8.7     Neutrophils % 05/06/2022 57.2     Lymphocytes % 05/06/2022 32.3      Atrial Rate 10/16/2021 64     P-R Interval 10/16/2021 250     QRS Duration 10/16/2021 82     Q-T Interval 10/16/2021 396     QTc Calculation (Bazett) 10/16/2021 408     P Axis 10/16/2021 61     R Axis 10/16/2021 -10     T Axis 10/16/2021 24     Diagnosis 10/16/2021 EKG performed in ER and to be interpreted by ER physician. Confirmed by MD, ER ([id number]),  Katia Gonzalez ([id number]) on 10/17/2021 8:33:07 AM     Lipase 10/16/2021 52.0     Total Protein 10/16/2021 6.8     Albumin 10/16/2021 3.8     Alkaline Phosphatase 10/16/2021 127     ALT 10/16/2021 <5*    AST 10/16/2021 15     Total Bilirubin 10/16/2021 0.6     Bilirubin, Direct 10/16/2021 <0.2     Bilirubin, Indirect 10/16/2021 see below     Color, UA 10/16/2021 Yellow     Clarity, UA 10/16/2021 Clear     Glucose, Ur 10/16/2021 Negative     Bilirubin Urine 10/16/2021 Negative     Ketones, Urine 10/16/2021 Negative     Specific Gravity, UA 10/16/2021 1.010     Blood, Urine 10/16/2021 SMALL*    pH, UA 10/16/2021 7.0     Protein, UA 10/16/2021 Negative     Urobilinogen, Urine 10/16/2021 0.2     Nitrite, Urine 10/16/2021 Negative     Leukocyte Esterase, Urine 10/16/2021 Negative     Microscopic Examination 10/16/2021 YES     Urine Type 10/16/2021 NotGiven     Troponin 10/16/2021 <0.01     Pro-BNP 10/16/2021 147     WBC, UA 10/16/2021 None seen     RBC, UA 10/16/2021 None seen    Orders Only on 08/10/2021   Component Date Value    TSH 08/10/2021 <0.01*   Orders Only on 08/10/2021   Component Date Value    T4 Free 08/10/2021 2.9*   Orders Only on 08/10/2021   Component Date Value    Vit D, 25-Hydroxy 08/10/2021 21.4*         ASSESSMENT/PLAN  1. Dry skin  Lac-Hydrin appears to be helping    2. Essential hypertension  May 2022 BMP reassuring. She will continue felodipine atenolol spironolactone in the morning. 3. History of pulmonary embolism without acute cor pulmonale ([address] LLE DVT asymptomatic 5/2022  4.   Lower GI bleed. Patient has a single right upper lobe subsegmental pulmonary embolism of uncertain clinical significance June 2020, resolved on follow-up CT August 2020 patient has completed anticoagulation, but with most recent DVT only received 3weeks Eliquis before had to stop due to severe anemia, now s/p IVC filter. EGD negative for bleed source, heme positive. 5% iron saturation June 6, 2022. Did not continue iron after getting out of the nursing home. Recheck iron levels now, likely will resume at least once daily iron mindful of constipation will need to be managed. 4.  Distal stasis dermatitis  Distal bilateral lower extremities foot and ankle involvement primarily. Moisturization, triamcinolone cream described  for the foot and ankle of the right lower extremity. 5. Other allergic rhinitis  For postnasal drip symptoms we added ipratropium nasal spray,  use the Atrovent twice daily. Consider discontinuing Singulair. 6. B12 \"deficiency\". Recent folic acid deficiency. B12 levels historically supratherapeutic. She stopped the shots and now B12 levels in the middle of the normal range. Update folate levels. 7. Postherpetic neuralgia  At time of last visit, 200 AM 200PM 300Bedtime to provide better neuropathy coverage while reducing sedation. Since weaned 200 mg twice daily she continues to use topical lidocaine. Recommended trial topical CBD oil, again. 8. Irritable bowel syndrome with both constipation and diarrhea. History of C. difficile. Chart diagnosis of celiac disease. Current exam and history do not suggest recurrent C. difficile. Would not treat with antibiotic. Unsure how much fiber she is taking, I reinforced the benefit of Metamucil today. I am unable to verify EGD pathology biopsy diagnosis of celiac disease, duodenal aspirate culture, interestingly TTG IgA negative.      9.  Routine adult health maintenance  Tdap 2017 Pneumovax 23 2012 [address] plus booster 2021 Shingrix 2021 influenza 2021. Beyond age of Pap, mammogram, colonoscopy. 10.  History of multiple thyroid nodules. Patient has what she describes as a \"raw\" irritated area on examination superior to the thyroid isthmus line adjacent to the trachea. I could not appreciate a mass there. She believes this has been ongoing for months. She denies improvement or worsening with eating drinking flexing or twisting neck. She has not noticed any discrete mass. Discomfort is seemingly mild. Soft tissue ultrasound of the area did not disclose any discrete pathology other than her more posterior lying arthritis. Last thyroid ultrasound 2018 showed multiple bilateral thyroid nodules, some of which required follow-up in virtue of ultrasound appearance and size so she is due anyway. Unfortunately no comment on thyroid nodules. 11.  Macular degeneration. Her eyesight causes her to occasionally drop her medications on the floor where she cannot find them. She has to rely on pill shape, which is unreliable in case of formulary or  changes from her insurance and/or pharmacy. She takes another pill from the bottle if she drops 1. She is not letting the nurse set up her medicines at this time. Daughter will call to see if her mail order pharmacy can blister pack for a service charge   [facility] ([phone] general number, fax [phone])    12. multiple skin seborrheic keratoses. Patient somewhat troubled by the right lateral chest wall SK, reporting that it itches or sometimes. Referred to dermatology previously. 13.  Hyperthyroidism, adequately suppressed. On methimazole. December 2021 TSH consistent with adequate suppression, follows in endocrinology. 14. MGUS. Chart diagnosis since at least 2011, yet 2018 SPEP UPEP showed no monoclonal protein. SPEP 2013 negative for M spike. Seemingly no immunofixation on record. 15.  Dependent rubor. PAD.   Barely palpable pulses though capillary refill is brisk and feet are warm without any ischemic eschar. TUSHAR 0.6 right lower extremity unable to complete due to acute DVT left lower extremity. Therefore update status of DVT in left lower extremity and referred to vascular medicine for consideration of either arterial ultrasound versus CT angiogram with runoff. Currently not on statin or antiplatelet agent. 16.  Distal right calf edema  Exam does not suggest inflammatory arthropathy or cellulitis today. Previously reassuring uric acid, CRP. Ruled out distal DVT by venous ultrasound May 2022 however    No evidence of cellulitis on the unilateral right sided calf edema therefore no indication for antibiotics. No evidence of decompensated heart failure. Extrinsic compression limited by PAD, in fact suspect the edema may be related to either CAD and/or lymphatic disruption. 17.  Folic acid deficiency. We will level in the hospital.  Recheck level now. Patient has not taken morning dose yet. Continue supplementation for now. 18.  Right trochanteric bursitis. We will organize consultation with orthopedics for consideration of ultrasound-guided cortisone injection to the bursa, risk factors immobility, severe right knee OA. Return in about 4 weeks (around 8/11/2022). It was a pleasure to visit with Ms. Federico Drake today. Answered all questions as best I could.   Unknown MD Millie   Total time 45 minutes

## 2022-07-14 ENCOUNTER — OFFICE VISIT (OUTPATIENT)
Dept: PRIMARY CARE CLINIC | Age: 87
End: 2022-07-14
Payer: MEDICARE

## 2022-07-14 VITALS
SYSTOLIC BLOOD PRESSURE: 118 MMHG | HEART RATE: 66 BPM | WEIGHT: 143 LBS | BODY MASS INDEX: 21.12 KG/M2 | OXYGEN SATURATION: 99 % | TEMPERATURE: 97.3 F | DIASTOLIC BLOOD PRESSURE: 62 MMHG

## 2022-07-14 DIAGNOSIS — K92.2 LOWER GI BLEED: ICD-10-CM

## 2022-07-14 DIAGNOSIS — E05.90 HYPERTHYROIDISM: ICD-10-CM

## 2022-07-14 DIAGNOSIS — I10 ESSENTIAL HYPERTENSION: ICD-10-CM

## 2022-07-14 DIAGNOSIS — K58.2 IRRITABLE BOWEL SYNDROME WITH BOTH CONSTIPATION AND DIARRHEA: ICD-10-CM

## 2022-07-14 DIAGNOSIS — M17.11 PRIMARY OSTEOARTHRITIS OF RIGHT KNEE: Primary | ICD-10-CM

## 2022-07-14 DIAGNOSIS — M70.61 GREATER TROCHANTERIC BURSITIS OF RIGHT HIP: ICD-10-CM

## 2022-07-14 DIAGNOSIS — I82.412 DEEP VEIN THROMBOSIS (DVT) OF FEMORAL VEIN OF LEFT LOWER EXTREMITY, UNSPECIFIED CHRONICITY (HCC): Primary | ICD-10-CM

## 2022-07-14 DIAGNOSIS — E53.8 FOLATE DEFICIENCY: ICD-10-CM

## 2022-07-14 DIAGNOSIS — E55.9 VITAMIN D DEFICIENCY: ICD-10-CM

## 2022-07-14 DIAGNOSIS — I73.9 PAD (PERIPHERAL ARTERY DISEASE) (HCC): ICD-10-CM

## 2022-07-14 DIAGNOSIS — Z86.79: ICD-10-CM

## 2022-07-14 LAB
ANION GAP SERPL CALCULATED.3IONS-SCNC: 11 MMOL/L (ref 3–16)
BASOPHILS ABSOLUTE: 0.1 K/UL (ref 0–0.2)
BASOPHILS RELATIVE PERCENT: 0.7 %
BUN BLDV-MCNC: 10 MG/DL (ref 7–20)
CALCIUM SERPL-MCNC: 10.4 MG/DL (ref 8.3–10.6)
CHLORIDE BLD-SCNC: 101 MMOL/L (ref 99–110)
CO2: 26 MMOL/L (ref 21–32)
CREAT SERPL-MCNC: 1 MG/DL (ref 0.6–1.2)
EOSINOPHILS ABSOLUTE: 0.1 K/UL (ref 0–0.6)
EOSINOPHILS RELATIVE PERCENT: 0.7 %
FOLATE: 16.54 NG/ML (ref 4.78–24.2)
GFR AFRICAN AMERICAN: >60
GFR NON-AFRICAN AMERICAN: 51
GLUCOSE BLD-MCNC: 101 MG/DL (ref 70–99)
HCT VFR BLD CALC: 34.6 % (ref 36–48)
HEMOGLOBIN: 11 G/DL (ref 12–16)
HOMOCYSTEINE: 19 UMOL/L (ref 0–10)
IRON SATURATION: 14 % (ref 15–50)
IRON: 41 UG/DL (ref 37–145)
LYMPHOCYTES ABSOLUTE: 1.2 K/UL (ref 1–5.1)
LYMPHOCYTES RELATIVE PERCENT: 17 %
MCH RBC QN AUTO: 25.3 PG (ref 26–34)
MCHC RBC AUTO-ENTMCNC: 31.7 G/DL (ref 31–36)
MCV RBC AUTO: 79.9 FL (ref 80–100)
MONOCYTES ABSOLUTE: 0.8 K/UL (ref 0–1.3)
MONOCYTES RELATIVE PERCENT: 11.7 %
NEUTROPHILS ABSOLUTE: 5 K/UL (ref 1.7–7.7)
NEUTROPHILS RELATIVE PERCENT: 69.9 %
PDW BLD-RTO: 20 % (ref 12.4–15.4)
PLATELET # BLD: 206 K/UL (ref 135–450)
PMV BLD AUTO: 8.1 FL (ref 5–10.5)
POTASSIUM SERPL-SCNC: 4.5 MMOL/L (ref 3.5–5.1)
RBC # BLD: 4.33 M/UL (ref 4–5.2)
SODIUM BLD-SCNC: 138 MMOL/L (ref 136–145)
TOTAL IRON BINDING CAPACITY: 298 UG/DL (ref 260–445)
TSH REFLEX: 1.56 UIU/ML (ref 0.27–4.2)
VITAMIN D 25-HYDROXY: 30.6 NG/ML
WBC # BLD: 7.1 K/UL (ref 4–11)

## 2022-07-14 PROCEDURE — G8427 DOCREV CUR MEDS BY ELIG CLIN: HCPCS | Performed by: INTERNAL MEDICINE

## 2022-07-14 PROCEDURE — 1036F TOBACCO NON-USER: CPT | Performed by: INTERNAL MEDICINE

## 2022-07-14 PROCEDURE — 99215 OFFICE O/P EST HI 40 MIN: CPT | Performed by: INTERNAL MEDICINE

## 2022-07-14 PROCEDURE — 1123F ACP DISCUSS/DSCN MKR DOCD: CPT | Performed by: INTERNAL MEDICINE

## 2022-07-14 PROCEDURE — G8420 CALC BMI NORM PARAMETERS: HCPCS | Performed by: INTERNAL MEDICINE

## 2022-07-14 PROCEDURE — 1090F PRES/ABSN URINE INCON ASSESS: CPT | Performed by: INTERNAL MEDICINE

## 2022-07-14 RX ORDER — SIMETHICONE 180 MG
1 CAPSULE ORAL 4 TIMES DAILY PRN
Qty: 360 CAPSULE | Refills: 3 | Status: SHIPPED | OUTPATIENT
Start: 2022-07-14 | End: 2022-07-20 | Stop reason: SDUPTHER

## 2022-07-14 RX ORDER — ASPIRIN 81 MG/1
81 TABLET ORAL EVERY OTHER DAY
Qty: 45 TABLET | Refills: 1 | Status: SHIPPED | OUTPATIENT
Start: 2022-07-14 | End: 2022-08-16 | Stop reason: ALTCHOICE

## 2022-07-14 RX ORDER — FERROUS SULFATE 325(65) MG
325 TABLET ORAL
Qty: 90 TABLET | Refills: 1 | Status: SHIPPED | OUTPATIENT
Start: 2022-07-14 | End: 2022-08-16 | Stop reason: ALTCHOICE

## 2022-07-14 RX ORDER — GABAPENTIN 100 MG/1
100 CAPSULE ORAL 2 TIMES DAILY
Qty: 180 CAPSULE | Refills: 1 | Status: SHIPPED | OUTPATIENT
Start: 2022-07-14 | End: 2023-01-10

## 2022-07-14 RX ORDER — FOLIC ACID 1 MG/1
1 TABLET ORAL DAILY
Qty: 90 TABLET | Refills: 11 | Status: SHIPPED | OUTPATIENT
Start: 2022-07-14 | End: 2023-07-14

## 2022-07-14 RX ORDER — POLYETHYLENE GLYCOL 3350 17 G/17G
17 POWDER, FOR SOLUTION ORAL 2 TIMES DAILY
Qty: 100 EACH | Refills: 5 | Status: SHIPPED | OUTPATIENT
Start: 2022-07-14

## 2022-07-14 RX ORDER — PRENATAL VIT 91/IRON/FOLIC/DHA 28-975-200
COMBINATION PACKAGE (EA) ORAL
Qty: 42 G | Refills: 1 | Status: SHIPPED | OUTPATIENT
Start: 2022-07-14

## 2022-07-14 RX ORDER — ALBUTEROL SULFATE 90 UG/1
2 POWDER, METERED RESPIRATORY (INHALATION) EVERY 6 HOURS PRN
Qty: 1 EACH | Refills: 3 | Status: SHIPPED | OUTPATIENT
Start: 2022-07-14 | End: 2022-07-22

## 2022-07-14 RX ORDER — BUDESONIDE AND FORMOTEROL FUMARATE DIHYDRATE 160; 4.5 UG/1; UG/1
2 AEROSOL RESPIRATORY (INHALATION) 2 TIMES DAILY
Qty: 1 EACH | Refills: 1 | Status: SHIPPED | OUTPATIENT
Start: 2022-07-14

## 2022-07-14 RX ORDER — FELODIPINE 5 MG/1
5 TABLET, EXTENDED RELEASE ORAL DAILY
Qty: 90 TABLET | Refills: 3 | Status: SHIPPED | OUTPATIENT
Start: 2022-07-14

## 2022-07-14 SDOH — ECONOMIC STABILITY: FOOD INSECURITY: WITHIN THE PAST 12 MONTHS, YOU WORRIED THAT YOUR FOOD WOULD RUN OUT BEFORE YOU GOT MONEY TO BUY MORE.: NEVER TRUE

## 2022-07-14 SDOH — ECONOMIC STABILITY: FOOD INSECURITY: WITHIN THE PAST 12 MONTHS, THE FOOD YOU BOUGHT JUST DIDN'T LAST AND YOU DIDN'T HAVE MONEY TO GET MORE.: NEVER TRUE

## 2022-07-14 ASSESSMENT — PATIENT HEALTH QUESTIONNAIRE - PHQ9
SUM OF ALL RESPONSES TO PHQ QUESTIONS 1-9: 0
SUM OF ALL RESPONSES TO PHQ QUESTIONS 1-9: 0
SUM OF ALL RESPONSES TO PHQ9 QUESTIONS 1 & 2: 0
2. FEELING DOWN, DEPRESSED OR HOPELESS: 0
1. LITTLE INTEREST OR PLEASURE IN DOING THINGS: 0
SUM OF ALL RESPONSES TO PHQ QUESTIONS 1-9: 0
SUM OF ALL RESPONSES TO PHQ QUESTIONS 1-9: 0

## 2022-07-14 ASSESSMENT — SOCIAL DETERMINANTS OF HEALTH (SDOH): HOW HARD IS IT FOR YOU TO PAY FOR THE VERY BASICS LIKE FOOD, HOUSING, MEDICAL CARE, AND HEATING?: NOT HARD AT ALL

## 2022-07-14 NOTE — PATIENT INSTRUCTIONS
1. Ask daughter to call [facility] for blister pack  2. Folate levels were low in hospital, that's why started on pill. Rechecking level now  3. Hold off on iron and aspirin for now, need to see labs. Will call w instructions  4. Can take 1/2 instead of whole oxycodone if needed, to reduce \"spaciness\"  5. Left leg US to check clot, then see Dr Mik Machuca to see what can be done about the circulation  6. Dont use the lidex cream, just the triamcinolone when skin red and itchy on legs  7.  Blood test today

## 2022-07-18 ENCOUNTER — OFFICE VISIT (OUTPATIENT)
Dept: ORTHOPEDIC SURGERY | Age: 87
End: 2022-07-18
Payer: MEDICARE

## 2022-07-18 VITALS — HEIGHT: 69 IN | BODY MASS INDEX: 21.18 KG/M2 | RESPIRATION RATE: 16 BRPM | WEIGHT: 143 LBS

## 2022-07-18 DIAGNOSIS — M70.61 TROCHANTERIC BURSITIS OF RIGHT HIP: Primary | ICD-10-CM

## 2022-07-18 DIAGNOSIS — N39.498 OTHER URINARY INCONTINENCE: Primary | ICD-10-CM

## 2022-07-18 LAB — METHYLMALONIC ACID: 0.26 UMOL/L (ref 0–0.4)

## 2022-07-18 PROCEDURE — 1123F ACP DISCUSS/DSCN MKR DOCD: CPT | Performed by: ORTHOPAEDIC SURGERY

## 2022-07-18 PROCEDURE — 99213 OFFICE O/P EST LOW 20 MIN: CPT | Performed by: ORTHOPAEDIC SURGERY

## 2022-07-18 PROCEDURE — 20610 DRAIN/INJ JOINT/BURSA W/O US: CPT | Performed by: ORTHOPAEDIC SURGERY

## 2022-07-18 RX ORDER — BUPIVACAINE HYDROCHLORIDE 2.5 MG/ML
2 INJECTION, SOLUTION INFILTRATION; PERINEURAL ONCE
Status: COMPLETED | OUTPATIENT
Start: 2022-07-18 | End: 2022-07-18

## 2022-07-18 RX ORDER — TRIAMCINOLONE ACETONIDE 40 MG/ML
40 INJECTION, SUSPENSION INTRA-ARTICULAR; INTRAMUSCULAR ONCE
Status: COMPLETED | OUTPATIENT
Start: 2022-07-18 | End: 2022-07-18

## 2022-07-18 RX ADMIN — BUPIVACAINE HYDROCHLORIDE 5 MG: 2.5 INJECTION, SOLUTION INFILTRATION; PERINEURAL at 08:52

## 2022-07-18 RX ADMIN — TRIAMCINOLONE ACETONIDE 40 MG: 40 INJECTION, SUSPENSION INTRA-ARTICULAR; INTRAMUSCULAR at 08:52

## 2022-07-18 NOTE — PROGRESS NOTES
disease) of knee    Drusen (degenerative) of macula, bilateral    Drusen (degenerative) of macula, unspecified eye    Exposure keratoconjunctivitis, left eye    Osteoarthrosis    Exudative age-related macular degeneration (HCC)    Vitreous degeneration, bilateral    Mitral regurgitation    Chronic systolic congestive heart failure (HCC)    Coagulopathy (HCC)    Acute gastrointestinal bleeding    Anemia       ROS:  Constitutional: denies fever, chills, weight loss  MSK: denies pain in other joints, muscle aches  Neurological: denies numbness, tingling, weakness    Exam:  Resp. rate 16, height 5' 9\" (1.753 m), weight 143 lb (64.9 kg)    Appearance: sitting in exam room chair, appears to be in no acute distress, awake and alert  Resp: unlabored breathing on room air  Skin: warm, dry and intact with out erythema or significant increased temperature  Neuro: grossly intact both lower extremities. Intact sensation to light touch. Motor exam 4+ to 5/5 in all major motor groups. RLE: Mild tenderness over greater trochanter. Examination demonstrates negative logroll and negative Stinchfield. There is brisk capillary refill. Strength is 5/5 in hamstrings, quads, hip flexors. Imaging:  Prior right hip radiographs were reviewed and show no fractures or dislocations. She has mild degenerative changes due to osteoarthritis of both hips. Assessment:  Right greater trochanteric bursitis  Lumbar spine degenerative disc disease    Plan:  We discussed the diagnosis and treatment options. Her symptoms are likely coming from both greater trochanteric bursitis as well as from her lumbar spine. This likely started after positioning during her prior procedure. I recommended and performed a steroid injection of the right greater trochanteric bursa today as described below.   If she continues to be symptomatic in 1 to 2 weeks, she will call for referral to Dr. Jayne Perez for possible lumbar spine epidural injections. Procedure:  After verbal consent was obtained, the patient's right hip was prepped with alcohol and anesthetized with ethyl chloride. The right hip greater trochanter was then injected under sterile technique with 2mL of 0.25% marcaine and 1 mL of 40 mg/mL Kenalog. It was dressed with a bandage. The patient tolerated procedure well. There were no complications. Total time spent on today's encounter was at least 23 minutes. This time included reviewing prior notes, radiographs, and lab results when available, reviewing history obtained by medical assistant, performing history and physical exam, reviewing tests/radiographs with the patient, counseling the patient, ordering medications or tests, documentation in the electronic health record, and coordination of care. This dictation was done with Dragon dictation and may contain mechanical errors related to translation.

## 2022-07-20 ENCOUNTER — TELEPHONE (OUTPATIENT)
Dept: PRIMARY CARE CLINIC | Age: 87
End: 2022-07-20

## 2022-07-20 RX ORDER — SIMETHICONE 180 MG
1 CAPSULE ORAL 2 TIMES DAILY PRN
Qty: 180 CAPSULE | Refills: 3 | Status: SHIPPED | OUTPATIENT
Start: 2022-07-20

## 2022-07-20 NOTE — TELEPHONE ENCOUNTER
Samara from [address] [place] [address] called to verify a prescription for Simethicone (GAS RELIEF) 180 MG CAPS   States that the 180mg is a high dosage for 4x a day.     Ph# [phone]  Ref #[id number]    Please advise

## 2022-07-22 ENCOUNTER — TELEPHONE (OUTPATIENT)
Dept: PRIMARY CARE CLINIC | Age: 87
End: 2022-07-22

## 2022-07-22 RX ORDER — ALBUTEROL SULFATE 90 UG/1
2 AEROSOL, METERED RESPIRATORY (INHALATION) EVERY 6 HOURS PRN
Qty: 18 G | Refills: 3 | Status: SHIPPED | OUTPATIENT
Start: 2022-07-22

## 2022-07-28 ENCOUNTER — TELEPHONE (OUTPATIENT)
Dept: PRIMARY CARE CLINIC | Age: 87
End: 2022-07-28

## 2022-07-28 NOTE — TELEPHONE ENCOUNTER
Pharmacy called stating the need \"swish and swallow\" added to the directions for nystatin (MYCOSTATIN) 479134 UNIT/ML suspension before they can release to pt

## 2022-08-11 ENCOUNTER — OFFICE VISIT (OUTPATIENT)
Dept: PRIMARY CARE CLINIC | Age: 87
End: 2022-08-11
Payer: MEDICARE

## 2022-08-11 VITALS
SYSTOLIC BLOOD PRESSURE: 133 MMHG | OXYGEN SATURATION: 97 % | BODY MASS INDEX: 20.67 KG/M2 | DIASTOLIC BLOOD PRESSURE: 66 MMHG | HEART RATE: 56 BPM | WEIGHT: 140 LBS | TEMPERATURE: 97.3 F

## 2022-08-11 DIAGNOSIS — Z00.00 MEDICARE ANNUAL WELLNESS VISIT, SUBSEQUENT: Primary | ICD-10-CM

## 2022-08-11 PROCEDURE — G0439 PPPS, SUBSEQ VISIT: HCPCS | Performed by: INTERNAL MEDICINE

## 2022-08-11 PROCEDURE — 1123F ACP DISCUSS/DSCN MKR DOCD: CPT | Performed by: INTERNAL MEDICINE

## 2022-08-11 ASSESSMENT — PATIENT HEALTH QUESTIONNAIRE - PHQ9
SUM OF ALL RESPONSES TO PHQ QUESTIONS 1-9: 0
1. LITTLE INTEREST OR PLEASURE IN DOING THINGS: 0
2. FEELING DOWN, DEPRESSED OR HOPELESS: 0
SUM OF ALL RESPONSES TO PHQ9 QUESTIONS 1 & 2: 0

## 2022-08-11 ASSESSMENT — LIFESTYLE VARIABLES
HOW OFTEN DO YOU HAVE A DRINK CONTAINING ALCOHOL: NEVER
HOW MANY STANDARD DRINKS CONTAINING ALCOHOL DO YOU HAVE ON A TYPICAL DAY: PATIENT DOES NOT DRINK

## 2022-08-11 NOTE — PROGRESS NOTES
2022     Artie Valero (:  1926) is a 80 y.o. 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 She was started on outpatient Eliquis unfortunately bled from hemoglobin 14.4 down to 5.9, presented with complaint of fatigue and hypotension and subsequently hospitalized from May 29 to  8during which Eliquis was stopped and IVC filter was placed and she was transfused several units of packed red blood cells. Active bleeding based on hemoglobin values for several days, though no bleed source identified. EGD negative for bleed source, colonoscopy deferred given patient age. Significant fecal impaction required enema, large bowel movement resulted not grossly positive though Hemoccult positive    Discharge hemoglobin 8.2. Hemoglobin has recovered to baseline by 2022 labs. Referred to orthopedics for trochanteric bursitis. Working with PT to improve ambulation.     Review of Systems   Constitutional: Negative for activity change, appetite change, fatigue and unexpected weight change. HENT: Negative for dental problem, sinus pain, sore throat and trouble swallowing. Eyes: Negative for pain and visual disturbance. Respiratory: Negative for apnea, cough, chest tightness, shortness of breath and wheezing. Cardiovascular: Negative for chest pain and palpitations. Gastrointestinal: Negative for abdominal pain, blood in stool, constipation, diarrhea, nausea, rectal pain and vomiting. Endocrine: Negative for cold intolerance, heat intolerance, polydipsia, polyphagia and polyuria. Genitourinary: Negative for difficulty urinating, dysuria, flank pain, frequency, hematuria, pelvic pain, urgency, vaginal bleeding and vaginal discharge. Musculoskeletal: Negative for arthralgias, back pain, gait problem, joint swelling, myalgias, neck pain and neck stiffness. Skin: Negative for color change and rash. Neurological: Negative for dizziness, tremors, syncope, speech difficulty, weakness, light-headedness and headaches. Hematological: Negative for adenopathy. Does not bruise/bleed easily. Psychiatric/Behavioral: Negative for agitation, behavioral problems, decreased concentration, sleep disturbance and suicidal ideas. The patient is not nervous/anxious and is not hyperactive. Prior to Visit Medications    Medication Sig Taking? Authorizing Provider   nystatin (MYCOSTATIN) 887116 UNIT/ML suspension Take 5 mLs by mouth in the morning and 5 mLs at noon and 5 mLs in the evening and 5 mLs before bedtime. Swish and swallow. Vicente Elizabeth MD   albuterol sulfate HFA (PROVENTIL HFA) 108 (90 Base) MCG/ACT inhaler Inhale 2 puffs into the lungs every 6 hours as needed for Wheezing Yes Jamila Llamas MD   Simethicone (GAS RELIEF) 180 MG CAPS Take 180 mg by mouth 2 times daily as needed (bloating) Yes Jamila Llamas MD   budesonide-formoterol (SYMBICORT) 160-4.5 MCG/ACT AERO Inhale 2 puffs into the lungs 2 times daily Yes Jamila Llamas MD   gabapentin (NEURONTIN) 100 MG capsule Take 1 capsule by mouth in the morning and at bedtime for 180 days. Intended supply: 90 days Yes Eren Turner MD   folic acid (FOLVITE) 1 MG tablet Take 1 tablet by mouth daily Yes Eren Turner MD   felodipine (PLENDIL) 5 MG extended release tablet Take 1 tablet by mouth daily Yes Eren Turner MD   polyethylene glycol (GLYCOLAX) 17 g packet Take 17 g by mouth 2 times daily Yes Eren Turner MD   terbinafine (LAMISIL) 1 % cream Apply topically 2 times daily. Yes Eren Turner MD   ferrous sulfate (IRON 325) 325 (65 Fe) MG tablet Take 1 tablet by mouth daily (with breakfast) Yes Eren Turner MD   aspirin EC 81 MG EC tablet Take 1 tablet by mouth every other day Yes Eren Turner MD   montelukast (SINGULAIR) 10 MG tablet TAKE 1 TABLET BY MOUTH EVERY DAY AT NIGHT Yes Eren Turner MD   fluticasone (FLONASE) 50 MCG/ACT nasal spray PLACE 1 SPRAY IN EACH NOSTRIL DAILY Yes Eren Turner MD   triamcinolone (KENALOG) 0.1 % cream Apply topically 2 times daily to reddened area right calf. Yes Eren Turner MD   atenolol (TENORMIN) 50 MG tablet TAKE 1 TABLET DAILY Yes Eren Turner MD   pantoprazole (PROTONIX) 40 MG tablet TAKE 1 TABLET BY MOUTH TWICE A DAY Yes Eren Turner MD   spironolactone (ALDACTONE) 25 MG tablet TAKE 1 TABLET DAILY Yes Eren Turner MD   ipratropium (ATROVENT) 0.03 % nasal spray USE 1 SPRAY IN EACH NOSTRIL AT BEDTIME Yes Eren Turner MD   vitamin D (ERGOCALCIFEROL) 1.25 MG (68212 UT) CAPS capsule Take 1 capsule by mouth once a week Yes Eren Turner MD   hydrocortisone (ANUSOL-HC) 25 MG suppository Place 1 suppository rectally every 12 hours Yes Eren Turner MD   ammonium lactate (LAC-HYDRIN) 12 % lotion Apply topically daily. Yes Eren Turner MD   lidocaine (XYLOCAINE) 5 % ointment Apply topically three times a day, as needed.  Yes Eren Turner MD   methIMAzole (TAPAZOLE) 5 MG tablet Take 1 tablet by mouth See Admin Instructions 1 tablet by mouth daily except for Sunday, Monday, Wednesday take 1.5 tablets. Yes Epifanio Rosario MD        Allergies   Allergen Reactions    Ace Inhibitors Other (See Comments)     angioedema    Acyclovir      Severe nausea  And anorexia. It happened after taking each 800 mg tablet and patient took one one day and one the next, then stopped an no more    Creon [Pancrelipase (Lip-Prot-Amyl)]      Swollen gums. Dye [Iodides] Hives     Only reacts to dye that went into eyes, OK with IV contrast    Eggs Or Egg-Derived Products      Hives  this test in the [employer] [facility] Test Directory ([facility]). Egg White IgE 3. 59High   <=0.34 kU/L Final 02/10/2016  2:14 PM [facility]                  Gluten Meal      Abdominal pain and diarreha  Wheat IgE 0.53  <=0.34 kU/L Final 02/10/2016  2:14 PM [facility]       Milk-Related Compounds      Milk, Cow's IgE 1. 75High   <=0.34 kU/L Final 02/10/2016  2:14 PM [facility]    Fluorescein Other (See Comments)    Lisinopril Swelling    Macrobid [Nitrofurantoin Monohydrate Macrocrystals] Other (See Comments)    Pancrelipase (Lip-Prot-Amyl)     Ciprocinonide [Fluocinolone] Rash     Stiff, no nausea or rash    Sulfa Antibiotics Rash       Past Medical History:   Diagnosis Date    Adrenal gland cyst (HCC)     Arthritis     Asthma     Bilateral carpal tunnel syndrome 8/23/2018    Cataract     Chronic systolic congestive heart failure ([facility] [address] 4/12/2021    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    PCR    Clostridium difficile infection 1/27/15    AG positive    Degenerative joint disease of knee     Depression     Diverticulitis     GERD (gastroesophageal reflux disease)     Grave's disease 4/2/2012    Heart disease     Hypertension     IBS (irritable bowel syndrome)     Macular degeneration     MGUS (monoclonal gammopathy of unknown significance)     Osteoporosis     Poor vision     d/t macular degeneration    Simple cyst of kidney        Past Surgical History:   Procedure Laterality Date CHOLECYSTECTOMY      COLONOSCOPY  10/29/2009    **see scanned report- [facility]    HEMORRHOID SURGERY      HYSTERECTOMY (CERVIX STATUS UNKNOWN)  in 40's    rudolph/bso    HYSTERECTOMY, TOTAL ABDOMINAL (CERVIX REMOVED)      IR IVC FILTER PLACEMENT W IMAGING N/A 06/01/2022    [facility], [id number], sn [id number]    IR IVC FILTER PLACEMENT W IMAGING  6/1/2022    IR IVC FILTER PLACEMENT W IMAGING 6/1/2022 [facility] PROCEDURES    UPPER GASTROINTESTINAL ENDOSCOPY N/A 6/1/2022    EGD BIOPSY performed by Bernadine Morrow MD at [address] History     Socioeconomic History    Marital status: Single     Spouse name: Not on file    Number of children: Not on file    Years of education: Not on file    Highest education level: Not on file   Occupational History    Not on file   Tobacco Use    Smoking status: Never    Smokeless tobacco: Never   Vaping Use    Vaping Use: Never used   Substance and Sexual Activity    Alcohol use: No    Drug use: No    Sexual activity: Not Currently   Other Topics Concern    Not on file   Social History Narrative    Not on file     Social Determinants of Health     Financial Resource Strain: Low Risk     Difficulty of Paying Living Expenses: Not hard at all   Food Insecurity: No Food Insecurity    Worried About Running Out of Food in the Last Year: Never true    Ran Out of Food in the Last Year: Never true   Transportation Needs: Not on file   Physical Activity: Insufficiently Active    Days of Exercise per Week: 2 days    Minutes of Exercise per Session: 60 min   Stress: Not on file   Social Connections: Not on file   Intimate Partner Violence: Not on file   Housing Stability: Not on file        Family History   Problem Relation Age of Onset    Heart Disease Mother     High Blood Pressure Mother     Stroke Mother     Cancer Father         multiple myeloma       Vitals:    08/11/22 0907   BP: 133/66   Pulse: 56   Temp: 97.3 °F (36.3 °C)   TempSrc: Temporal   SpO2: 97%   Weight: 140 lb (63.5 kg)     Estimated body mass index is 20.67 kg/m² as calculated from the following:    Height as of 7/18/22: 5' 9\" (1.753 m). Weight as of this encounter: 140 lb (63.5 kg). PHYSICAL EXAM  GENERAL:  Pleasant  female who looks her stated age, awake alert and oriented x3, no acute distress. HEENT: Lateral gaze preference given macular degeneration. Pupils sluggishly reactive symmetrically. Posterior oropharynx mildly reddened. Turbinates mildly reddened. NECK:  Supple nontender. No carotid bruits. Brisk carotid upstrokes, mild JVD with V wave. Marked thyromegaly. LYMPH:  No supraclavicular cervical axillarylymphadenopathy. LUNGS:  Clear to auscultation bilaterally. Very good air entry. No inspiratory crackles or expiratory wheezes. No pleural friction rub. HEART:  Regular rate and rhythm without pathologic murmur rub gallop S3 or S4. ABDOMEN:  Soft, mildly tender. Normal bowel sounds. No guarding. No masses. Not appear to be distended. UROGENITAL:  Deferred  EXTREMITIES: Her left great toe tip callus without evidence of underlying ulcer shows no erythema or tenderness. Toe warm and well perfused without clubbing cyanosis or edema. 1+ pulses in lower extremities. Capillary refill 2-3 seconds with dependent rubor but no foot ulceration. NEURO:  Cranial nerves 2-12 grossly intact. Normal muscle bulk and tone. No resting tremor, cogwheeling, normal rapid alternating movements in the hands and feet. Vibration and monofilament sensation nearly absent below the midcalf. Significant skin changes stocking paresthesia. Normal gait and station. MUSCULOSKELETAL: Pronounced osteoarthritic changes, right knee grossly deformed. No active synovitis joint warmth or erythema. Proximal to the deformed right knee is her quite tender right trochanteric bursa. SKIN:  No worrisome lesions, skin woody, with scaling on the distal lower extremities.   Old right heel bulla and healed ulcer with what appears to be old blood, callused over. Innumerable SKs. PSYCH:  No psychomotor retardation or agitation. Good eye contact. Unrestricted affect range. Mood congruent with affect. Linear thought. LABS  Lab Review   Orders Only on 07/14/2022   Component Date Value    Homocysteine 07/14/2022 19 (A)    Methylmalonic Acid 07/14/2022 0.26     Folate 07/14/2022 16.54     TSH 07/14/2022 1.56     Vit D, 25-Hydroxy 07/14/2022 30.6     Sodium 07/14/2022 138     Potassium 07/14/2022 4.5     Chloride 07/14/2022 101     CO2 07/14/2022 26     Anion Gap 07/14/2022 11     Glucose 07/14/2022 101 (A)    BUN 07/14/2022 10     Creatinine 07/14/2022 1.0     GFR Non- 07/14/2022 51 (A)    GFR  07/14/2022 >60     Calcium 07/14/2022 10.4     Iron 07/14/2022 41     TIBC 07/14/2022 298     Iron Saturation 07/14/2022 14 (A)    WBC 07/14/2022 7.1     RBC 07/14/2022 4.33     Hemoglobin 07/14/2022 11.0 (A)    Hematocrit 07/14/2022 34.6 (A)    MCV 07/14/2022 79.9 (A)    MCH 07/14/2022 25.3 (A)    MCHC 07/14/2022 31.7     RDW 07/14/2022 20.0 (A)    Platelets 65/34/0063 206     MPV 07/14/2022 8.1     Neutrophils % 07/14/2022 69.9     Lymphocytes % 07/14/2022 17.0     Monocytes % 07/14/2022 11.7     Eosinophils % 07/14/2022 0.7     Basophils % 07/14/2022 0.7     Neutrophils Absolute 07/14/2022 5.0     Lymphocytes Absolute 07/14/2022 1.2     Monocytes Absolute 07/14/2022 0.8     Eosinophils Absolute 07/14/2022 0.1     Basophils Absolute 07/14/2022 0.1    No results displayed because visit has over 200 results.       Orders Only on 05/06/2022   Component Date Value    Uric Acid, Serum 05/06/2022 7.2 (A)    CRP 05/06/2022 <3.0     Sodium 05/06/2022 141     Potassium 05/06/2022 4.4     Chloride 05/06/2022 105     CO2 05/06/2022 24     Anion Gap 05/06/2022 12     Glucose 05/06/2022 102 (A)    BUN 05/06/2022 10     Creatinine 05/06/2022 0.9     GFR Non- 05/06/2022 58 (A)    GFR  American 05/06/2022 >60     Calcium 05/06/2022 10.1     WBC 05/06/2022 8.7     RBC 05/06/2022 5.43 (A)    Hemoglobin 05/06/2022 14.4     Hematocrit 05/06/2022 44.3     MCV 05/06/2022 81.6     MCH 05/06/2022 26.5     MCHC 05/06/2022 32.5     RDW 05/06/2022 15.9 (A)    Platelets 12/22/9143 219     MPV 05/06/2022 8.7     Neutrophils % 05/06/2022 57.2     Lymphocytes % 05/06/2022 32.3     Monocytes % 05/06/2022 9.0     Eosinophils % 05/06/2022 0.8     Basophils % 05/06/2022 0.7     Neutrophils Absolute 05/06/2022 5.0     Lymphocytes Absolute 05/06/2022 2.8     Monocytes Absolute 05/06/2022 0.8     Eosinophils Absolute 05/06/2022 0.1     Basophils Absolute 05/06/2022 0.1     D-Dimer, Quant 05/06/2022 594 (A)   Orders Only on 12/16/2021   Component Date Value    Sodium 12/16/2021 144     Potassium 12/16/2021 4.5     Chloride 12/16/2021 107     CO2 12/16/2021 26     Anion Gap 12/16/2021 11     Glucose 12/16/2021 96     BUN 12/16/2021 13     Creatinine 12/16/2021 1.1     GFR Non- 12/16/2021 46 (A)    GFR  12/16/2021 56 (A)    Calcium 12/16/2021 10.4     Vitamin B-12 12/16/2021 460     Methylmalonic Acid 12/16/2021 0.34     Vit D, 25-Hydroxy 12/16/2021 23.6 (A)    TSH 12/16/2021 3.28    Admission on 10/16/2021, Discharged on 10/16/2021   Component Date Value    WBC 10/16/2021 7.0     RBC 10/16/2021 5.44 (A)    Hemoglobin 10/16/2021 14.6     Hematocrit 10/16/2021 45.6     MCV 10/16/2021 83.8     MCH 10/16/2021 26.9     MCHC 10/16/2021 32.1     RDW 10/16/2021 15.4     Platelets 80/17/0897 190     MPV 10/16/2021 8.9     Neutrophils % 10/16/2021 63.2     Lymphocytes % 10/16/2021 24.2     Monocytes % 10/16/2021 10.5     Eosinophils % 10/16/2021 1.6     Basophils % 10/16/2021 0.5     Neutrophils Absolute 10/16/2021 4.4     Lymphocytes Absolute 10/16/2021 1.7     Monocytes Absolute 10/16/2021 0.7     Eosinophils Absolute 10/16/2021 0.1     Basophils Absolute 10/16/2021 0.0     Sodium 10/16/2021 140 Potassium reflex Magnesi* 10/16/2021 4.0     Chloride 10/16/2021 106     CO2 10/16/2021 22     Anion Gap 10/16/2021 12     Glucose 10/16/2021 113 (A)    BUN 10/16/2021 11     Creatinine 10/16/2021 0.8     GFR Non- 10/16/2021 >60     GFR  10/16/2021 >60     Calcium 10/16/2021 9.9     Ventricular Rate 10/16/2021 64     Atrial Rate 10/16/2021 64     P-R Interval 10/16/2021 250     QRS Duration 10/16/2021 82     Q-T Interval 10/16/2021 396     QTc Calculation (Bazett) 10/16/2021 408     P Axis 10/16/2021 61     R Axis 10/16/2021 -10     T Axis 10/16/2021 24     Diagnosis 10/16/2021 EKG performed in ER and to be interpreted by ER physician. Confirmed by MD, ER ([id number]),  Donnell Mednez ([id number]) on 10/17/2021 8:33:07 AM     Lipase 10/16/2021 52.0     Total Protein 10/16/2021 6.8     Albumin 10/16/2021 3.8     Alkaline Phosphatase 10/16/2021 127     ALT 10/16/2021 <5 (A)    AST 10/16/2021 15     Total Bilirubin 10/16/2021 0.6     Bilirubin, Direct 10/16/2021 <0.2     Bilirubin, Indirect 10/16/2021 see below     Color, UA 10/16/2021 Yellow     Clarity, UA 10/16/2021 Clear     Glucose, Ur 10/16/2021 Negative     Bilirubin Urine 10/16/2021 Negative     Ketones, Urine 10/16/2021 Negative     Specific Gravity, UA 10/16/2021 1.010     Blood, Urine 10/16/2021 SMALL (A)    pH, UA 10/16/2021 7.0     Protein, UA 10/16/2021 Negative     Urobilinogen, Urine 10/16/2021 0.2     Nitrite, Urine 10/16/2021 Negative     Leukocyte Esterase, Urine 10/16/2021 Negative     Microscopic Examination 10/16/2021 YES     Urine Type 10/16/2021 NotGiven     Troponin 10/16/2021 <0.01     Pro-BNP 10/16/2021 147     WBC, UA 10/16/2021 None seen     RBC, UA 10/16/2021 None seen    Orders Only on 08/10/2021   Component Date Value    TSH 08/10/2021 <0.01 (A)   Orders Only on 08/10/2021   Component Date Value    T4 Free 08/10/2021 2.9 (A)   Orders Only on 08/10/2021   Component Date Value    Vit D, 25-Hydroxy taking, I reinforced the benefit of Metamucil today. I am unable to verify EGD pathology biopsy diagnosis of celiac disease, duodenal aspirate culture, interestingly TTG IgA negative. 9.  Routine adult health maintenance  Tdap 2017 Pneumovax 23 2012 [address] plus booster 2021 Shingrix 2021 influenza 2021. Beyond age of Pap, mammogram, colonoscopy. 10.  History of multiple thyroid nodules. Patient has what she describes as a \"raw\" irritated area on examination superior to the thyroid isthmus line adjacent to the trachea. I could not appreciate a mass there. She believes this has been ongoing for months. She denies improvement or worsening with eating drinking flexing or twisting neck. She has not noticed any discrete mass. Discomfort is seemingly mild. Soft tissue ultrasound of the area did not disclose any discrete pathology other than her more posterior lying arthritis. Last thyroid ultrasound 2018 showed multiple bilateral thyroid nodules, some of which required follow-up in virtue of ultrasound appearance and size so she is due anyway. Unfortunately no comment on thyroid nodules. 11.  Macular degeneration. Her eyesight causes her to occasionally drop her medications on the floor where she cannot find them. She has to rely on pill shape, which is unreliable in case of formulary or  changes from her insurance and/or pharmacy. She takes another pill from the bottle if she drops 1. She is not letting the nurse set up her medicines at this time. Daughter will call to see if her mail order pharmacy can blister pack for a service charge   [facility] ([phone] general number, fax [phone])    12. multiple skin seborrheic keratoses. Patient somewhat troubled by the right lateral chest wall SK, reporting that it itches or sometimes. Referred to dermatology previously. 13.  Hyperthyroidism, adequately suppressed. On methimazole.  December 2021 TSH consistent with adequate suppression, follows in endocrinology. 14. MGUS. Chart diagnosis since at least 2011, yet 2018 SPEP UPEP showed no monoclonal protein. SPEP 2013 negative for M spike. Seemingly no immunofixation on record. 15.  Dependent rubor. PAD. Barely palpable pulses though capillary refill is brisk and feet are warm without any ischemic eschar. TUSHAR 0.6 right lower extremity unable to complete left leg due to acute DVT left lower extremity. Therefore update status of DVT with venous ultrasound in left lower extremity and referred to vascular medicine for consideration of either arterial ultrasound versus CT angiogram with runoff. Currently not on statin or antiplatelet agent. Patient has not yet completed the ultrasound. Encouraged to do so. 16.  Distal right calf edema  Exam does not suggest inflammatory arthropathy or cellulitis today. Previously reassuring uric acid, CRP. Ruled out distal DVT by venous ultrasound May 2022 however    No evidence of cellulitis on the unilateral right sided calf edema therefore no indication for antibiotics. No evidence of decompensated heart failure. Extrinsic compression limited by PAD, in fact suspect the edema may be related to either CAD and/or lymphatic disruption. 17.  Folic acid deficiency. We will level in the hospital.  Recheck level now. Patient has not taken morning dose yet. Continue supplementation for now. 18.  Right trochanteric bursitis. Gratitude extent to orthopedics for further care, risk factors immobility, severe right knee OA. 19.  Deconditioning. Working with PT at home twice weekly    20. Likely right heel bulla, nearly healed. Thickened callus over old blood over scooped out decubitus ulcer related to immobility, PAD. Asked patient to wear a thicker sock to provide more padding as she tends to pull herself along in the wheelchair using her heels. Her shoe likely scrapes the heel. Bigger sock tighter fit.   Suspect that she traumatized the area coupled with vascular insufficiency, bled into it on anticoagulation, but the bleeding and the ulcer have healed. 21.  CODE STATUS: DNR/DNI per patient request.  Has living will. No follow-ups on file. It was a pleasure to visit with Ms. Rollins December today. Answered all questions as best I could.   Toribio Pennington MD   Total time 25 minutes

## 2022-08-16 ENCOUNTER — OFFICE VISIT (OUTPATIENT)
Dept: CARDIOLOGY CLINIC | Age: 87
End: 2022-08-16
Payer: MEDICARE

## 2022-08-16 VITALS
BODY MASS INDEX: 20.67 KG/M2 | OXYGEN SATURATION: 98 % | HEART RATE: 64 BPM | HEIGHT: 69 IN | SYSTOLIC BLOOD PRESSURE: 120 MMHG | DIASTOLIC BLOOD PRESSURE: 50 MMHG

## 2022-08-16 DIAGNOSIS — I35.1 NONRHEUMATIC AORTIC VALVE INSUFFICIENCY: ICD-10-CM

## 2022-08-16 DIAGNOSIS — I10 ESSENTIAL HYPERTENSION: Primary | ICD-10-CM

## 2022-08-16 DIAGNOSIS — I73.9 PAD (PERIPHERAL ARTERY DISEASE) (HCC): ICD-10-CM

## 2022-08-16 PROCEDURE — G8420 CALC BMI NORM PARAMETERS: HCPCS | Performed by: INTERNAL MEDICINE

## 2022-08-16 PROCEDURE — 1123F ACP DISCUSS/DSCN MKR DOCD: CPT | Performed by: INTERNAL MEDICINE

## 2022-08-16 PROCEDURE — 99213 OFFICE O/P EST LOW 20 MIN: CPT | Performed by: INTERNAL MEDICINE

## 2022-08-16 PROCEDURE — 1036F TOBACCO NON-USER: CPT | Performed by: INTERNAL MEDICINE

## 2022-08-16 PROCEDURE — 93000 ELECTROCARDIOGRAM COMPLETE: CPT | Performed by: INTERNAL MEDICINE

## 2022-08-16 PROCEDURE — G8427 DOCREV CUR MEDS BY ELIG CLIN: HCPCS | Performed by: INTERNAL MEDICINE

## 2022-08-16 PROCEDURE — 1090F PRES/ABSN URINE INCON ASSESS: CPT | Performed by: INTERNAL MEDICINE

## 2022-08-16 NOTE — PROGRESS NOTES
d/t macular degeneration    Simple cyst of kidney      Past Surgical History:   Procedure Laterality Date    CHOLECYSTECTOMY      COLONOSCOPY  10/29/2009    **see scanned report- [facility]    HEMORRHOID SURGERY      HYSTERECTOMY (CERVIX STATUS UNKNOWN)  in 40's    rudolph/bso    HYSTERECTOMY, TOTAL ABDOMINAL (CERVIX REMOVED)      IR IVC FILTER PLACEMENT W IMAGING N/A 06/01/2022    lowell sheppard, lot [id number], sn [id number]    IR IVC FILTER PLACEMENT W IMAGING  6/1/2022    IR IVC FILTER PLACEMENT W IMAGING 6/1/2022 [facility] SPECIAL PROCEDURES    UPPER GASTROINTESTINAL ENDOSCOPY N/A 6/1/2022    EGD BIOPSY performed by Yonny Guzman MD at [facility] ENDOSCOPY     Family History   Problem Relation Age of Onset    Heart Disease Mother     High Blood Pressure Mother     Stroke Mother     Cancer Father         multiple myeloma     Social History     Tobacco Use    Smoking status: Never    Smokeless tobacco: Never   Vaping Use    Vaping Use: Never used   Substance Use Topics    Alcohol use: No    Drug use: No       Allergies   Allergen Reactions    Ace Inhibitors Other (See Comments)     angioedema    Acyclovir      Severe nausea  And anorexia. It happened after taking each 800 mg tablet and patient took one one day and one the next, then stopped an no more    Creon [Pancrelipase (Lip-Prot-Amyl)]      Swollen gums. Dye [Iodides] Hives     Only reacts to dye that went into eyes, OK with IV contrast    Eggs Or Egg-Derived Products      Hives  this test in the [facility] Test Directory ([email or web address]). Egg White IgE 3. 59High   <=0.34 kU/L Final 02/10/2016  2:14 PM [facility]                  Gluten Meal      Abdominal pain and diarreha  Wheat IgE 0.53  <=0.34 kU/L Final 02/10/2016  2:14 PM [facility]       Milk-Related Compounds      Milk, Cow's IgE 1. 75High   <=0.34 kU/L Final 02/10/2016  2:14 PM [facility]    Fluorescein Other (See Comments)    Lisinopril Swelling    Macrobid [Nitrofurantoin Monohydrate Macrocrystals] Other (See Comments)    Pancrelipase (Lip-Prot-Amyl)     Ciprocinonide [Fluocinolone] Rash     Stiff, no nausea or rash    Sulfa Antibiotics Rash     Current Outpatient Medications   Medication Sig Dispense Refill    nystatin (MYCOSTATIN) 186185 UNIT/ML suspension Take 5 mLs by mouth in the morning and 5 mLs at noon and 5 mLs in the evening and 5 mLs before bedtime. Swish and swallow. . 60 mL 1    albuterol sulfate HFA (PROVENTIL HFA) 108 (90 Base) MCG/ACT inhaler Inhale 2 puffs into the lungs every 6 hours as needed for Wheezing 18 g 3    Simethicone (GAS RELIEF) 180 MG CAPS Take 180 mg by mouth 2 times daily as needed (bloating) 180 capsule 3    budesonide-formoterol (SYMBICORT) 160-4.5 MCG/ACT AERO Inhale 2 puffs into the lungs 2 times daily 1 each 1    gabapentin (NEURONTIN) 100 MG capsule Take 1 capsule by mouth in the morning and at bedtime for 180 days. Intended supply: 90 days 565 capsule 1    folic acid (FOLVITE) 1 MG tablet Take 1 tablet by mouth daily 90 tablet 11    felodipine (PLENDIL) 5 MG extended release tablet Take 1 tablet by mouth daily 90 tablet 3    polyethylene glycol (GLYCOLAX) 17 g packet Take 17 g by mouth 2 times daily 100 each 5    terbinafine (LAMISIL) 1 % cream Apply topically 2 times daily. 42 g 1    montelukast (SINGULAIR) 10 MG tablet TAKE 1 TABLET BY MOUTH EVERY DAY AT NIGHT 90 tablet 3    fluticasone (FLONASE) 50 MCG/ACT nasal spray PLACE 1 SPRAY IN EACH NOSTRIL DAILY 3 each 3    triamcinolone (KENALOG) 0.1 % cream Apply topically 2 times daily to reddened area right calf.  80 g 0    atenolol (TENORMIN) 50 MG tablet TAKE 1 TABLET DAILY 90 tablet 3    pantoprazole (PROTONIX) 40 MG tablet TAKE 1 TABLET BY MOUTH TWICE A  tablet 1    spironolactone (ALDACTONE) 25 MG tablet TAKE 1 TABLET DAILY 90 tablet 3    ipratropium (ATROVENT) 0.03 % nasal spray USE 1 SPRAY IN EACH NOSTRIL AT BEDTIME 30 mL 1    vitamin D (ERGOCALCIFEROL) 1.25 MG (88750 UT) CAPS capsule Take 1 capsule by mouth once a week 12 capsule 3    hydrocortisone (ANUSOL-HC) 25 MG suppository Place 1 suppository rectally every 12 hours 24 suppository 0    ammonium lactate (LAC-HYDRIN) 12 % lotion Apply topically daily. 225 g 5    lidocaine (XYLOCAINE) 5 % ointment Apply topically three times a day, as needed. 2 Tube 1    methIMAzole (TAPAZOLE) 5 MG tablet Take 1 tablet by mouth See Admin Instructions 1 tablet by mouth daily except for Sunday, Monday, Wednesday take 1.5 tablets. 120 tablet 1     No current facility-administered medications for this visit. Review of Systems:  Constitutional: No unanticipated weight loss. There's been no change in energy level, sleep pattern, or activity level. No fevers, chills. Eyes: No visual changes or diplopia. No scleral icterus. ENT: No Headaches, hearing loss or vertigo. No mouth sores or sore throat. Cardiovascular: as reviewed in HPI  Respiratory: No cough or wheezing, no sputum production. No hemoptysis. Gastrointestinal: No abdominal pain, appetite loss, blood in stools. No change in bowel or bladder habits. Genitourinary: No dysuria, trouble voiding, or hematuria. Musculoskeletal:  No gait disturbance, no joint complaints. Integumentary: No rash or pruritis. Neurological: No headache, diplopia, change in muscle strength, numbness or tingling. Psychiatric: No anxiety or depression. Endocrine: No temperature intolerance. No excessive thirst, fluid intake, or urination. No tremor. Hematologic/Lymphatic: No abnormal bruising or bleeding, blood clots or swollen lymph nodes. Allergic/Immunologic: No nasal congestion or hives. Physical Exam:   BP (!) 120/50   Pulse 64   Ht 5' 9\" (1.753 m)   LMP  (LMP Unknown)   SpO2 98%   BMI 20.67 kg/m²   Wt Readings from Last 3 Encounters:   08/11/22 140 lb (63.5 kg)   07/18/22 143 lb (64.9 kg)   07/14/22 143 lb (64.9 kg)     Constitutional: She is oriented to person, place, and time. She appears well-developed and well-nourished. PT 0).  The common femoral artery demonstrates multiphasic flow indicating no  aortoiliac inflow disease. There is atherosclerotic plaque in the common femoral artery. < 50% stenosis is noted in the femoral-popliteal segment The tibio-peroneal trunk and peroneal artery are not visualized. There is artherosclerotic disease in the anterior and posterior tibial arteries. There are no previous studies available for comparison. Left Impression  The left TUSHAR is not obtained due to acute deep venous thrombosis diagnosed  5/6/22. All above diagnostic testing and laboratory data was independently visualized and reviewed by me (not simply review of report)       Assessment and Plan   1) PAD/intermittent claudication  -reviewed ABIs that showed moderated PAD  -would not pursue in invasive procedures with advanced age   -encouraged walking program   -discussed good foot hygiene     2) Essential hypertension  -controlled  -continue medical therapy    3) Aortic regurgitation  -moderate per last echo   -management per primary cardiology     Follow up as needed     Thank you very much for allowing me to participate in the care of your patient. Please do not hesitate to contact me if you have any questions. Jarod Herring MD [address] [facility], Interventional Cardiology, and Peripheral Vascular Disease   [facility]   Ph: [phone]  Fax: [phone]    This note was scribed in the presence of Dr Jane Bonilla MD by Randy West RN. Physician Attestation:  The scribes documentation has been prepared under my direction and personally reviewed by me in its entirety. I confirm the note above accurately reflects all work, treatment, procedures, and medical decision making performed by me.     Electronically signed by Olu Wu MD on 9/14/2022 at 7:21 AM

## 2022-08-18 ENCOUNTER — HOSPITAL ENCOUNTER (OUTPATIENT)
Dept: VASCULAR LAB | Age: 87
Discharge: HOME | End: 2022-08-18
Payer: MEDICARE

## 2022-08-18 ENCOUNTER — TELEPHONE (OUTPATIENT)
Dept: PRIMARY CARE CLINIC | Age: 87
End: 2022-08-18

## 2022-08-18 DIAGNOSIS — I82.412 DEEP VEIN THROMBOSIS (DVT) OF FEMORAL VEIN OF LEFT LOWER EXTREMITY, UNSPECIFIED CHRONICITY (HCC): ICD-10-CM

## 2022-08-18 DIAGNOSIS — I82.403 RECURRENT ACUTE DEEP VEIN THROMBOSIS (DVT) OF BOTH LOWER EXTREMITIES (HCC): Primary | ICD-10-CM

## 2022-08-18 PROCEDURE — 93971 EXTREMITY STUDY: CPT

## 2022-08-18 PROCEDURE — 93970 EXTREMITY STUDY: CPT

## 2022-08-18 NOTE — TELEPHONE ENCOUNTER
----- Message from Violette Galvan MD sent at 8/18/2022 11:01 AM EDT -----  Please let pt know that she has a blood clot in her Right leg, extending up into the thigh. If she can, please apply gentle thigh high compression hose during the day, remove at night, to both legs. Rx printed.

## 2022-08-19 ENCOUNTER — TELEPHONE (OUTPATIENT)
Dept: PRIMARY CARE CLINIC | Age: 87
End: 2022-08-19

## 2022-08-19 NOTE — TELEPHONE ENCOUNTER
----- Message from Janine Valdez sent at 8/19/2022  3:29 PM EDT -----  Subject: Message to Provider    QUESTIONS  Information for Provider? pt recently tested positive blood clots in both   legs --needs to know it needs to start taking aspirin again or another   blood thinner and should continue wearing the compression stocking with   any other restrictions please call Milka @ [employer] [phone]  ---------------------------------------------------------------------------  --------------  [id number] [employer]  [phone]; OK to leave message on voicemail  ---------------------------------------------------------------------------  --------------  SCRIPT ANSWERS  Relationship to Patient? Third Party  Third Party Type? Home Health Care? Representative Name?  Milka

## 2022-09-07 ENCOUNTER — TELEPHONE (OUTPATIENT)
Dept: PRIMARY CARE CLINIC | Age: 87
End: 2022-09-07

## 2022-09-07 NOTE — TELEPHONE ENCOUNTER
Notify Darling Mcmahan we can sign the visiting nurse orders    In regards to the heartburn  Pt needs to be given an OV for re evaluation  If pain is severe and cannot wait for office visit, should be evaluated in ER

## 2022-09-07 NOTE — TELEPHONE ENCOUNTER
Dorothea Queen states that patient ans unrelieved heartburn despite being on protonix. She also has a heel wound that is flaking and he wants to know if Dr. Donna Vincent will sign home care orders. Please advise.

## 2022-10-06 ENCOUNTER — TELEPHONE (OUTPATIENT)
Dept: PRIMARY CARE CLINIC | Age: 87
End: 2022-10-06

## 2022-10-10 ENCOUNTER — TELEPHONE (OUTPATIENT)
Dept: PRIMARY CARE CLINIC | Age: 87
End: 2022-10-10

## 2022-10-10 RX ORDER — PANTOPRAZOLE SODIUM 40 MG/1
TABLET, DELAYED RELEASE ORAL
Qty: 180 TABLET | Refills: 1 | Status: SHIPPED | OUTPATIENT
Start: 2022-10-10 | End: 2022-10-26 | Stop reason: SDUPTHER

## 2022-10-10 NOTE — TELEPHONE ENCOUNTER
Patient needs a refill on her   pantoprazole (PROTONIX) 40 MG tablet     [facility] pharmacy   Fax # [phone]

## 2022-10-11 RX ORDER — PANTOPRAZOLE SODIUM 40 MG/1
TABLET, DELAYED RELEASE ORAL
Qty: 60 TABLET | Refills: 5 | OUTPATIENT
Start: 2022-10-11

## 2022-10-12 ENCOUNTER — PATIENT MESSAGE (OUTPATIENT)
Dept: PRIMARY CARE CLINIC | Age: 87
End: 2022-10-12

## 2022-10-13 NOTE — TELEPHONE ENCOUNTER
From: Jeanne Tsai  To: Dr. Talya Mccarty: 10/12/2022 3:31 PM EDT  Subject: Protonix/Pantoprazole    Just checking to see if this medication was called into the pharmacy. I have been unable to get it thus far and I am running out.     Thanks

## 2022-10-25 ENCOUNTER — NURSE TRIAGE (OUTPATIENT)
Dept: OTHER | Facility: CLINIC | Age: 87
End: 2022-10-25

## 2022-10-25 NOTE — TELEPHONE ENCOUNTER
Location of patient: [place]    Received call from [facility] at [facility] with Red Flag Complaint. Subjective: Caller states \"pain and swelling in both legs\"     Current Symptoms: bilateral leg swelling and pain. Right leg more so than left. Onset: 1 week ago; worsening    Associated Symptoms: reduced activity    Pain Severity: 5/10; bone pain up and down the leg; intermittent    Temperature: No     What has been tried: BURTON hose    LMP: NA Pregnant: NA    Recommend  ed disposition: Go to ED/UCC Now (Or to Office with PCP Approval)    Care advice provided, patient verbalizes understanding; denies any other questions or concerns; instructed to call back for any new or worsening symptoms. RN called Joaquín Rodas at [address] for 2nd level triage. Dr Leta Escobar reports she wants pt to go to ED. Daughter reports pt will refuse to go to ED so would like to try and make an appointment. RN connected Daughter to Yamini Terry at office for further assistance     Attention Provider: Thank you for allowing me to participate in the care of your patient. The patient was connected to triage in response to information provided to the ECC/PSC. Please do not respond through this encounter as the response is not directed to a shared pool.       Reason for Disposition   Thigh, calf, or ankle swelling in only one leg    Protocols used: Leg Swelling and Edema-ADULT-[place]

## 2022-10-25 NOTE — TELEPHONE ENCOUNTER
Spoke with PT's daughter.  PT did not think her issue needed to be treated at the ER so PT is scheduled with Dr. Green Links for tomorrow @ 2:20 pm.

## 2022-10-26 ENCOUNTER — OFFICE VISIT (OUTPATIENT)
Dept: PRIMARY CARE CLINIC | Age: 87
End: 2022-10-26
Payer: MEDICARE

## 2022-10-26 VITALS
WEIGHT: 153 LBS | SYSTOLIC BLOOD PRESSURE: 130 MMHG | OXYGEN SATURATION: 97 % | HEART RATE: 70 BPM | BODY MASS INDEX: 22.59 KG/M2 | TEMPERATURE: 98.1 F | DIASTOLIC BLOOD PRESSURE: 64 MMHG

## 2022-10-26 DIAGNOSIS — M79.10 MUSCLE PAIN: ICD-10-CM

## 2022-10-26 DIAGNOSIS — M54.31 SCIATICA, RIGHT SIDE: ICD-10-CM

## 2022-10-26 DIAGNOSIS — E21.3 HYPERPARATHYROIDISM (HCC): ICD-10-CM

## 2022-10-26 DIAGNOSIS — I73.9 PAD (PERIPHERAL ARTERY DISEASE) (HCC): ICD-10-CM

## 2022-10-26 DIAGNOSIS — D68.9 COAGULOPATHY (HCC): ICD-10-CM

## 2022-10-26 DIAGNOSIS — I10 ESSENTIAL HYPERTENSION: ICD-10-CM

## 2022-10-26 DIAGNOSIS — Z13.1 DIABETES MELLITUS SCREENING: ICD-10-CM

## 2022-10-26 DIAGNOSIS — I26.99 PULMONARY EMBOLISM ON RIGHT (HCC): ICD-10-CM

## 2022-10-26 DIAGNOSIS — I27.82 OTHER CHRONIC PULMONARY EMBOLISM WITHOUT ACUTE COR PULMONALE (HCC): ICD-10-CM

## 2022-10-26 DIAGNOSIS — K21.00 GASTROESOPHAGEAL REFLUX DISEASE WITH ESOPHAGITIS WITHOUT HEMORRHAGE: ICD-10-CM

## 2022-10-26 DIAGNOSIS — Z23 NEED FOR INFLUENZA VACCINATION: ICD-10-CM

## 2022-10-26 DIAGNOSIS — M79.89 PAIN AND SWELLING OF RIGHT LOWER LEG: ICD-10-CM

## 2022-10-26 DIAGNOSIS — R30.0 DYSURIA: ICD-10-CM

## 2022-10-26 DIAGNOSIS — E46 PROTEIN MALNUTRITION (HCC): ICD-10-CM

## 2022-10-26 DIAGNOSIS — I50.22 CHRONIC SYSTOLIC CONGESTIVE HEART FAILURE (HCC): ICD-10-CM

## 2022-10-26 DIAGNOSIS — H35.3290 EXUDATIVE AGE-RELATED MACULAR DEGENERATION, UNSPECIFIED LATERALITY, UNSPECIFIED STAGE (HCC): ICD-10-CM

## 2022-10-26 DIAGNOSIS — D35.02 ADRENAL ADENOMA, LEFT: ICD-10-CM

## 2022-10-26 DIAGNOSIS — M79.661 PAIN AND SWELLING OF RIGHT LOWER LEG: ICD-10-CM

## 2022-10-26 LAB
A/G RATIO: 1.6 (ref 1.1–2.2)
ALBUMIN SERPL-MCNC: 4 G/DL (ref 3.4–5)
ALP BLD-CCNC: 106 U/L (ref 40–129)
ALT SERPL-CCNC: 7 U/L (ref 10–40)
ANION GAP SERPL CALCULATED.3IONS-SCNC: 14 MMOL/L (ref 3–16)
AST SERPL-CCNC: 16 U/L (ref 15–37)
BACTERIA: ABNORMAL /HPF
BASOPHILS ABSOLUTE: 0 K/UL (ref 0–0.2)
BASOPHILS RELATIVE PERCENT: 0.6 %
BILIRUB SERPL-MCNC: 0.8 MG/DL (ref 0–1)
BILIRUBIN URINE: NEGATIVE
BLOOD, URINE: ABNORMAL
BUN BLDV-MCNC: 11 MG/DL (ref 7–20)
CALCIUM SERPL-MCNC: 10.2 MG/DL (ref 8.3–10.6)
CHLORIDE BLD-SCNC: 104 MMOL/L (ref 99–110)
CLARITY: CLEAR
CO2: 22 MMOL/L (ref 21–32)
COLOR: YELLOW
CREAT SERPL-MCNC: 0.9 MG/DL (ref 0.6–1.2)
EOSINOPHILS ABSOLUTE: 0.1 K/UL (ref 0–0.6)
EOSINOPHILS RELATIVE PERCENT: 1.3 %
EPITHELIAL CELLS, UA: 0 /HPF (ref 0–5)
FOLATE: >20 NG/ML (ref 4.78–24.2)
GFR SERPL CREATININE-BSD FRML MDRD: 59 ML/MIN/{1.73_M2}
GLUCOSE BLD-MCNC: 108 MG/DL (ref 70–99)
GLUCOSE URINE: NEGATIVE MG/DL
HCT VFR BLD CALC: 38.2 % (ref 36–48)
HEMOGLOBIN: 11.9 G/DL (ref 12–16)
HYALINE CASTS: 0 /LPF (ref 0–8)
KETONES, URINE: NEGATIVE MG/DL
LEUKOCYTE ESTERASE, URINE: NEGATIVE
LYMPHOCYTES ABSOLUTE: 2.1 K/UL (ref 1–5.1)
LYMPHOCYTES RELATIVE PERCENT: 24.4 %
MCH RBC QN AUTO: 22.2 PG (ref 26–34)
MCHC RBC AUTO-ENTMCNC: 31.1 G/DL (ref 31–36)
MCV RBC AUTO: 71.3 FL (ref 80–100)
MICROSCOPIC EXAMINATION: YES
MONOCYTES ABSOLUTE: 1 K/UL (ref 0–1.3)
MONOCYTES RELATIVE PERCENT: 11.8 %
NEUTROPHILS ABSOLUTE: 5.3 K/UL (ref 1.7–7.7)
NEUTROPHILS RELATIVE PERCENT: 61.9 %
NITRITE, URINE: NEGATIVE
PDW BLD-RTO: 19.2 % (ref 12.4–15.4)
PH UA: 6.5 (ref 5–8)
PLATELET # BLD: 208 K/UL (ref 135–450)
PMV BLD AUTO: 9.3 FL (ref 5–10.5)
POTASSIUM SERPL-SCNC: 4.3 MMOL/L (ref 3.5–5.1)
PREALBUMIN: 17.3 MG/DL (ref 20–40)
PROTEIN UA: NEGATIVE MG/DL
RBC # BLD: 5.35 M/UL (ref 4–5.2)
RBC UA: 2 /HPF (ref 0–4)
SODIUM BLD-SCNC: 140 MMOL/L (ref 136–145)
SPECIFIC GRAVITY UA: 1.01 (ref 1–1.03)
TOTAL CK: 55 U/L (ref 26–192)
TOTAL PROTEIN: 6.5 G/DL (ref 6.4–8.2)
URINE TYPE: ABNORMAL
UROBILINOGEN, URINE: 0.2 E.U./DL
VITAMIN B-12: 483 PG/ML (ref 211–911)
WBC # BLD: 8.6 K/UL (ref 4–11)
WBC UA: 0 /HPF (ref 0–5)

## 2022-10-26 PROCEDURE — 99214 OFFICE O/P EST MOD 30 MIN: CPT | Performed by: INTERNAL MEDICINE

## 2022-10-26 PROCEDURE — 1090F PRES/ABSN URINE INCON ASSESS: CPT | Performed by: INTERNAL MEDICINE

## 2022-10-26 PROCEDURE — 90694 VACC AIIV4 NO PRSRV 0.5ML IM: CPT | Performed by: INTERNAL MEDICINE

## 2022-10-26 PROCEDURE — G8484 FLU IMMUNIZE NO ADMIN: HCPCS | Performed by: INTERNAL MEDICINE

## 2022-10-26 PROCEDURE — G8420 CALC BMI NORM PARAMETERS: HCPCS | Performed by: INTERNAL MEDICINE

## 2022-10-26 PROCEDURE — 1036F TOBACCO NON-USER: CPT | Performed by: INTERNAL MEDICINE

## 2022-10-26 PROCEDURE — G0008 ADMIN INFLUENZA VIRUS VAC: HCPCS | Performed by: INTERNAL MEDICINE

## 2022-10-26 PROCEDURE — G8427 DOCREV CUR MEDS BY ELIG CLIN: HCPCS | Performed by: INTERNAL MEDICINE

## 2022-10-26 PROCEDURE — 1123F ACP DISCUSS/DSCN MKR DOCD: CPT | Performed by: INTERNAL MEDICINE

## 2022-10-26 RX ORDER — PANTOPRAZOLE SODIUM 40 MG/1
TABLET, DELAYED RELEASE ORAL
Qty: 180 TABLET | Refills: 1 | Status: SHIPPED | OUTPATIENT
Start: 2022-10-26 | End: 2022-11-04 | Stop reason: SDUPTHER

## 2022-10-26 NOTE — PROGRESS NOTES
Areli Sotomayor (:  1926) is a 80 y.o. female,Established patient, here for evaluation of the following chief complaint(s):  Leg Swelling         ASSESSMENT/PLAN:  1. Need for influenza vaccination  -     Influenza, FLUAD, (age 72 y+), IM, Preservative Free, 0.5 mL  2. Exudative age-related macular degeneration, unspecified laterality, unspecified stage ([facility] stable under care of ophthalmology. 3. Protein malnutrition ([address] eating much better and weight stable will monitor prealbumin level. Wt Readings from Last 3 Encounters:   10/26/22 153 lb (69.4 kg)   22 140 lb (63.5 kg)   22 143 lb (64.9 kg)       -     Prealbumin; Future  4. Other chronic pulmonary embolism without acute cor pulmonale (HCC) currently no sign of recurrence. Anticoagulation stopped due to bleeding and anemia in 2022. No complaint of shortness of breath and pulse rate of 70 and oxygen saturation was 97% on room air. 5. Hyperparathyroidism ([facility]  Followed by endocrinology at [facility] health and stable. Patient declined injection for bone density preservation  6. Coagulopathy ([facility] with history of PE and DVT, see problem #4  -     Vitamin B12 & Folate; Future  7. Gastroesophageal reflux disease with esophagitis without hemorrhage, symptoms stable on Protonix twice a day. Unable to titrate dosage down. Will monitor CBC  -     pantoprazole (PROTONIX) 40 MG tablet; TAKE 1 TABLET BY MOUTH TWICE A DAY, Disp-180 tablet, R-1Normal  8. Sciatica, right side complaint since . Takes gabapentin 100 mg twice a day. Cannot tolerate more due to sleepiness. Also encouraged to take Tylenol 1000 mg twice a day and if not helpful consider home physical therapy but just completed a rehab program a few months ago. 9. Chronic systolic congestive heart failure ([address] stable on current medications continue  10.  Pulmonary embolism on right [facility]), see problem #4 and will remove from problem list so do not have pulmonary embolus on twice.  11. Dysuria we will further evaluate with urinalysis and culture  -     Culture, Urine; Future  -     Urinalysis with Microscopic; Future  12. Essential hypertension, controlled on current regimen monitor electrolytes and renal function  -     Comprehensive Metabolic Panel; Future  -     CBC with Auto Differential; Future  13. Adrenal adenoma, left, followed by endocrinology and monitor electrolytes  14. Muscle pain  -     CK; Future  15. PAD (peripheral artery disease) (HCC) with decreased pulses and sometimes leg pain at night will further evaluate with arterial Doppler concern for rest pain. No ulcerations. -     VL DUP LOWER EXTREMITY ARTERIES BILATERAL; Future  16. Pain and swelling of right lower leg, with history of PE we will rule out DVT. -     VL DUP LOWER EXTREMITY VENOUS RIGHT; Future  17. Diabetes mellitus screening not covered so we will check random glucose. A1c order removed      Return in about 4 weeks (around 11/23/2022). Subjective   SUBJECTIVE/OBJECTIVE:    FREE T4  GLUCOSE, RANDOM  PTH INTACT  TSH-3RD GENERATION  FREE T3  GLU  T4  CORTISOL  GLUCOSE  NORMETANEPHRINE  RENIN ACTIVITY  TSH, 3RD GENERATION  THYROID STIM IMMUNOGLOB  Component 08/23/2022 08/23/2022 08/23/2022 01/11/2022 01/11/2022 01/11/2022 12/01/2021 12/01/2021 12/01/2021 12/01/2021 09/01/2021 09/01/2021 09/01/2021 09/01/2021 08/10/2021 04/29/2021 04/29/2021 04/29/2021 03/22/2021 03/22/2021 03/22/2021 03/22/2021 02/08/2021 10/15/2020 10/15/2020 10/15/2020 10/15/2020                                 TSH 2.200 - -   4.680 High     - - - 1.920 - - - - -   <0.005 Low     - 2.150 - -   5.430 High     - - - - - - - - Load older lab results  FREE T4 - 1.05 -                                   Leg Pain   Incident onset: Right lower leg pain and swelling for two days with history of DVT. There was no injury mechanism. The pain is present in the right leg. The quality of the pain is described as aching.  The pain is at a severity of 3/10. The pain has been Intermittent since onset. Associated symptoms include a loss of motion. Pertinent negatives include no inability to bear weight or numbness. Associated symptoms comments: Loss of range of motion of right hip and lower back. . She reports no foreign bodies present. The symptoms are aggravated by palpation. She has tried rest for the symptoms. The treatment provided mild relief. Gastroesophageal Reflux  She complains of abdominal pain, chest pain and nausea. She reports no choking, no coughing, no sore throat or no wheezing. long history of severe GERD and unable to titrate dose of Protonix down. Currently on 40 mg twice a day. This is a chronic problem. The current episode started more than 1 year ago. The problem occurs frequently. The problem has been waxing and waning (Controlled as long as takes Protonix 40 mg twice a day). The symptoms are aggravated by certain foods. Associated symptoms include fatigue. Had anemia in June while on blood thinners. She has tried a PPI for the symptoms. The treatment provided significant relief. Past procedures include an EGD. EGD June 2022 at [facility]     Review of Systems   Constitutional:  Positive for fatigue. Negative for activity change, appetite change, chills, diaphoresis, fever and unexpected weight change. Decreasing fatigue   HENT: Negative. Negative for congestion, ear discharge, ear pain, facial swelling, hearing loss, nosebleeds, postnasal drip, rhinorrhea, sneezing, sore throat and trouble swallowing. Graves Disease being followed by endocrinology. Eyes:  Negative for pain, discharge, redness and itching. 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 Asthma, controlled with prn inhaler. Cardiovascular:  Positive for chest pain.  Negative for palpitations and leg swelling. Hypertension    Right chest and breast pain in area where she had shingles that remained after shingles resolved. Controlled with gabapentin, chronic   Gastrointestinal:  Positive for abdominal pain, constipation, diarrhea and nausea. Negative for anal bleeding, blood in stool and vomiting. IBS and GERD with intermittent pain.   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 Endocrine: Negative. Primary hyperparathyroidism, Graves' disease, managed by endocrinologist Dr. Reed Francis at [facility]   Genitourinary:  Positive for vaginal pain. Negative for dysuria, frequency, hematuria, urgency and vaginal discharge. Musculoskeletal: Negative. Negative for arthralgias, back pain, joint swelling, myalgias, neck pain and neck stiffness. Multijoint osteoarthritis. Will receive cartilage injections in both knees with ortho , this month on 10/17/14. Osteoporosis treated with Genistin  ( i cool )not a candidate for oral bisphosphonates due to GERD with frequent exacerbations. Skin:  Negative for pallor and rash. Allergic/Immunologic: Negative for environmental allergies and food allergies. Allergy on gluten-free diet   Neurological: Negative. Negative for dizziness, weakness, numbness and headaches. Still have a shingles pain but has not taken 100 mg gabapentin yet. Unfortunately when she had shingles she took 1 dose of valacyclovir and had nausea and discontinue. I gave her a low dose of Famvir but she could not tolerate that as well. The rash is now healed. Hematological: Negative. DVT and PE, should be on life long anticoagulation but had severe life-threatening hemorrhage in June 2022 and currently not on anticoagulation. 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 Objective   Physical Exam  Constitutional:       General: She is not in acute distress. Appearance: She is not toxic-appearing.       Comments: Very pleasant elderly woman who comes appointment with her home health aide ambulates with a rolling walker   HENT:      Head: Normocephalic and atraumatic. Nose: Nose normal.   Neck:      Comments: Normal range of motion of the neck was observed and no appearance of neck  masses  Cardiovascular:      Rate and Rhythm: Normal rate and regular rhythm. Heart sounds: Murmur heard. Comments: Decreased pulses in the feet. Pulmonary:      Effort: Pulmonary effort is normal.      Breath sounds: Normal breath sounds. Abdominal:      General: Abdomen is flat. There is no distension. Palpations: Abdomen is soft. There is no mass. Tenderness: There is no abdominal tenderness. There is no right CVA tenderness, left CVA tenderness or guarding. Musculoskeletal:         General: No swelling. Right lower leg: Edema present. Left lower leg: No edema. Comments: Swelling of right lower leg but no redness rashes and mildly tender calf   Neurological:      General: No focal deficit present. Mental Status: She is alert and oriented to person, place, and time. Cranial Nerves: No cranial nerve deficit. Motor: No weakness. Coordination: Coordination normal.      Gait: Gait abnormal.      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         Behavior: Behavior normal.         Thought Content: Thought content normal.         Judgment: Judgment normal.                An electronic signature was used to authenticate this note.     --Ceasar Raines MD

## 2022-10-27 ENCOUNTER — HOSPITAL ENCOUNTER (OUTPATIENT)
Dept: VASCULAR LAB | Age: 87
Discharge: HOME | End: 2022-10-27
Payer: MEDICARE

## 2022-10-27 ENCOUNTER — TELEPHONE (OUTPATIENT)
Dept: PRIMARY CARE CLINIC | Age: 87
End: 2022-10-27

## 2022-10-27 DIAGNOSIS — M79.89 PAIN AND SWELLING OF RIGHT LOWER LEG: ICD-10-CM

## 2022-10-27 DIAGNOSIS — M79.661 PAIN AND SWELLING OF RIGHT LOWER LEG: ICD-10-CM

## 2022-10-27 DIAGNOSIS — I82.401 DEEP VEIN THROMBOSIS (DVT) OF RIGHT LOWER EXTREMITY, UNSPECIFIED CHRONICITY, UNSPECIFIED VEIN (HCC): Primary | ICD-10-CM

## 2022-10-27 PROBLEM — Z95.828 PRESENCE OF VENA CAVA FILTER: Status: ACTIVE | Noted: 2022-10-27

## 2022-10-27 LAB — URINE CULTURE, ROUTINE: NORMAL

## 2022-10-27 PROCEDURE — 93970 EXTREMITY STUDY: CPT

## 2022-10-27 PROCEDURE — 93971 EXTREMITY STUDY: CPT

## 2022-10-27 ASSESSMENT — ENCOUNTER SYMPTOMS
DIARRHEA: 1
WHEEZING: 0
FACIAL SWELLING: 0
ABDOMINAL PAIN: 1
CHEST TIGHTNESS: 0
NAUSEA: 1
EYE REDNESS: 0
BLOOD IN STOOL: 0
RHINORRHEA: 0
EYE ITCHING: 0
VOMITING: 0
COUGH: 0
ALLERGIC/IMMUNOLOGIC COMMENTS: ALLERGY ON GLUTEN-FREE DIET
EYE DISCHARGE: 0
EYE PAIN: 0
CONSTIPATION: 1
CHOKING: 0
SHORTNESS OF BREATH: 0
TROUBLE SWALLOWING: 0
STRIDOR: 0
APNEA: 0
ANAL BLEEDING: 0
BACK PAIN: 0
SORE THROAT: 0

## 2022-10-27 NOTE — TELEPHONE ENCOUNTER
Sharyn WRIGHT/rep @ [facility] stated  that the stat order Venous Duplex done on today was done as bilateral and was unchanged from last test on 8/18/22; no propagation

## 2022-10-28 DIAGNOSIS — R10.31 RIGHT GROIN PAIN: Primary | ICD-10-CM

## 2022-10-28 DIAGNOSIS — D68.9 COAGULOPATHY (HCC): ICD-10-CM

## 2022-10-29 NOTE — RESULT ENCOUNTER NOTE
Let patient daughter know that the official doppler report suggests that there may be  some external compression on the femoral vein on the right. I will get a CT scan of the abdomen and pelvis to further evaluate. Orders placed.

## 2022-11-01 ENCOUNTER — HOSPITAL ENCOUNTER (OUTPATIENT)
Dept: VASCULAR LAB | Age: 87
Discharge: HOME | End: 2022-11-01
Payer: MEDICARE

## 2022-11-01 ENCOUNTER — TELEPHONE (OUTPATIENT)
Dept: PRIMARY CARE CLINIC | Age: 87
End: 2022-11-01

## 2022-11-01 DIAGNOSIS — I73.9 PAD (PERIPHERAL ARTERY DISEASE) (HCC): ICD-10-CM

## 2022-11-01 PROCEDURE — 93970 EXTREMITY STUDY: CPT

## 2022-11-01 PROCEDURE — 93925 LOWER EXTREMITY STUDY: CPT

## 2022-11-01 NOTE — TELEPHONE ENCOUNTER
Requesting prior authorization for Pantoprazole 40 mg BID. This isn't for the med itself, it's for the frequency.        Diagnosis:  GERD K21.9

## 2022-11-01 NOTE — TELEPHONE ENCOUNTER
Spoke with daughter and she will let me know if unable to get the medication and also the medical assistant placed another prior authorization request to the prior authorization team.

## 2022-11-02 NOTE — TELEPHONE ENCOUNTER
Submitted PA for Pantoprazole Sodium 40MG dr tablets. Key: [id number]. Via CMM STATUS: No PA required. 11/18/22-Called [facility] and spoke with Prashanth Fernandes. [id number]. She states no letter was generated due to no pa required. If this requires a response please respond to the pool ( P [facility] [address] [facility]). Thank you please advise patient.

## 2022-11-04 DIAGNOSIS — K21.00 GASTROESOPHAGEAL REFLUX DISEASE WITH ESOPHAGITIS WITHOUT HEMORRHAGE: ICD-10-CM

## 2022-11-04 RX ORDER — PANTOPRAZOLE SODIUM 40 MG/1
TABLET, DELAYED RELEASE ORAL
Qty: 180 TABLET | Refills: 1 | Status: SHIPPED | OUTPATIENT
Start: 2022-11-04

## 2022-11-05 NOTE — RESULT ENCOUNTER NOTE
Patient arrives to ED via EMS, patient called 911 this evening around 1845 with complaint of non radiating chest pain , patient was administered 4 baby aspirin and 1 nitro from EMS, patient arrives to ED , denies CP and SOB, vs wnl, patient reports cardiac hx with three stents, patient is A/O/X4, IV to right hand, call light within reach.        Genna Mendoza, [address] [place] [address]  09/15/17 1781 Patient has vena cava filter and cannot take anticoagulants due to hemorrhage. CT of abdomen pelvis ordered for potential external compression on the right.

## 2022-11-08 ENCOUNTER — PATIENT MESSAGE (OUTPATIENT)
Dept: PRIMARY CARE CLINIC | Age: 87
End: 2022-11-08

## 2022-11-08 NOTE — TELEPHONE ENCOUNTER
From: Becky Natarajan  To: Dr. Selma Moya: 11/8/2022 1:55 PM EST  Subject: Compression Stockings    I think Dr. Yaz Garcia wanted me to have some different compression stockings. I checked with the nurse at [facility] and they were not aware of any order. I checked with [facility] and they did not have an order. Dr. Yaz Garcia would need to place an order with them.   Phone # [phone]  Fax # [phone]  Thanks

## 2022-11-09 DIAGNOSIS — R60.0 BILATERAL LEG EDEMA: Primary | ICD-10-CM

## 2022-11-10 ENCOUNTER — HOSPITAL ENCOUNTER (OUTPATIENT)
Dept: CT IMAGING | Age: 87
Discharge: HOME | End: 2022-11-10
Payer: MEDICARE

## 2022-11-10 DIAGNOSIS — R10.31 RIGHT GROIN PAIN: ICD-10-CM

## 2022-11-10 DIAGNOSIS — D68.9 COAGULOPATHY (HCC): ICD-10-CM

## 2022-11-10 PROCEDURE — G1010 CDSM STANSON: HCPCS

## 2022-11-13 DIAGNOSIS — K63.9 SMALL BOWEL PROBLEM: ICD-10-CM

## 2022-11-13 DIAGNOSIS — M25.551 RIGHT HIP PAIN: Primary | ICD-10-CM

## 2022-11-13 NOTE — RESULT ENCOUNTER NOTE
Voicemail message left with patient's daughter that the CAT scan shows the vena cava will filter penetrates the small intestine and we will follow-up with the surgeon for further evaluation. No leakage or abscess formation. This will need to be followed.   Also will need a bone scan to further evaluate severe hip pain since x-rays only show mild arthritis      Scheduling [address] [facility] and [address], [place]   [address], [address] [address] [facility]   [phone]

## 2022-11-14 PROBLEM — I82.401 ACUTE EMBOLISM AND THOMBOS UNSP DEEP VEINS OF R LOW EXTREM (HCC): Status: ACTIVE | Noted: 2022-10-28

## 2022-11-14 PROBLEM — I50.9 HEART FAILURE, UNSPECIFIED (HCC): Status: ACTIVE | Noted: 2022-10-28

## 2022-11-14 RX ORDER — ALBUTEROL SULFATE 90 UG/1
AEROSOL, METERED RESPIRATORY (INHALATION)
COMMUNITY
Start: 2022-07-12

## 2022-11-14 RX ORDER — GABAPENTIN 100 MG/1
CAPSULE ORAL
COMMUNITY
Start: 2022-07-12 | End: 2023-01-05 | Stop reason: SDUPTHER

## 2022-11-14 RX ORDER — FLUTICASONE PROPIONATE 50 MCG
SPRAY, SUSPENSION (ML) NASAL
COMMUNITY
Start: 2022-07-12

## 2022-11-14 RX ORDER — SIMETHICONE 180 MG
CAPSULE ORAL
COMMUNITY
Start: 2022-07-12

## 2022-11-14 RX ORDER — SPIRONOLACTONE 25 MG/1
TABLET ORAL
COMMUNITY
Start: 2022-07-12

## 2022-11-14 RX ORDER — PANTOPRAZOLE SODIUM 40 MG/1
TABLET, DELAYED RELEASE ORAL
COMMUNITY
Start: 2022-07-12 | End: 2023-01-05 | Stop reason: SDUPTHER

## 2022-11-14 RX ORDER — POLYETHYLENE GLYCOL 3350 17 G/17G
POWDER, FOR SOLUTION ORAL
COMMUNITY
Start: 2022-07-12

## 2022-11-14 RX ORDER — ATENOLOL 25 MG/1
50 TABLET ORAL
COMMUNITY
Start: 2022-07-12

## 2022-11-14 RX ORDER — METHIMAZOLE 5 MG/1
TABLET ORAL
COMMUNITY
Start: 2022-07-12

## 2022-11-14 RX ORDER — FELODIPINE 5 MG/1
TABLET, EXTENDED RELEASE ORAL
COMMUNITY
Start: 2022-07-12

## 2022-11-14 RX ORDER — BUDESONIDE AND FORMOTEROL FUMARATE DIHYDRATE 160; 4.5 UG/1; UG/1
AEROSOL RESPIRATORY (INHALATION)
COMMUNITY
Start: 2022-07-12

## 2022-11-14 NOTE — TELEPHONE ENCOUNTER
Will you contact [facility] and find out what they need to fill the protonix prescription for 40 mg bid. Patient told me they are waiting to here from our office.

## 2022-11-16 PROBLEM — K21.01 GASTROESOPHAGEAL REFLUX DISEASE WITH ESOPHAGITIS AND HEMORRHAGE: Status: ACTIVE | Noted: 2022-11-16

## 2022-11-22 NOTE — PROGRESS NOTES
[facility] Surgical Oncology and General Surgery  [address] [place] [address] Ave  Phone: [phone]  Fax: [phone]    Visit Date: 12/6/2022    Subjective:   Zhane Villaseñor is a 80 y.o. female referred by Dr. Sebastian Winslow for right groin pain. Patient contacted her PCP on 10/25/22 with complaints of right groin pain and swelling which had been present and worsening for approximately 1 week. Patient tried BURTON hose with no relief. Patient was instructed to go to the ER but did not feel as if this was necessary. A CT was performed to evaluate groin pain and incidentally found that IVC filter is penetrating the small bowel. IVC filter was placed on 6/1/22 due to a history of DVT. Today reports she is feeling well other than right groin/hip pain. No abdominal pain, no blood in stools. No nausea or vomiting. Tolerating diet.      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    PCR    Clostridium difficile infection 1/27/15    AG positive    Degenerative joint disease of knee     Depression     Diverticulitis     GERD (gastroesophageal reflux disease)     Grave's disease 4/2/2012    Heart disease     Hypertension     IBS (irritable bowel syndrome)     Macular degeneration     MGUS (monoclonal gammopathy of unknown significance)     Osteoporosis     Poor vision     d/t macular degeneration    Simple cyst of kidney      Past Surgical History:   Procedure Laterality Date    CHOLECYSTECTOMY      COLONOSCOPY  10/29/2009    **see scanned report- OGI&LI    HEMORRHOID SURGERY      HYSTERECTOMY (CERVIX STATUS UNKNOWN)  in 40's    rudolph/bso    HYSTERECTOMY, TOTAL ABDOMINAL (CERVIX REMOVED)      IR IVC FILTER PLACEMENT W IMAGING N/A 06/01/2022    lowell sheppard, lot [id number], sn [id number]    IR IVC FILTER PLACEMENT W IMAGING  6/1/2022    IR IVC FILTER PLACEMENT W IMAGING 6/1/2022 [facility] SPECIAL PROCEDURES    UPPER GASTROINTESTINAL ENDOSCOPY N/A 6/1/2022    EGD BIOPSY performed by Christine Leon MD at [address] [facility] [address] History     Tobacco Use    Smoking status: Never    Smokeless tobacco: Never   Vaping Use    Vaping Use: Never used   Substance Use Topics    Alcohol use: No    Drug use: No     Family History   Problem Relation Age of Onset    Heart Disease Mother     High Blood Pressure Mother     Stroke Mother     Cancer Father         multiple myeloma       Allergies: Ace inhibitors, Acyclovir, Creon [pancrelipase (lip-prot-amyl)], Dye [iodides], Eggs or egg-derived products, Gluten meal, Milk-related compounds, Fluorescein, Lisinopril, Macrobid [nitrofurantoin monohydrate macrocrystals], Pancrelipase (lip-prot-amyl), Ciprocinonide [fluocinolone], and Sulfa antibiotics  Current Outpatient Medications   Medication Sig Dispense Refill    atenolol (TENORMIN) 25 MG tablet 50 mg      budesonide-formoterol (SYMBICORT) 160-4.5 MCG/ACT AERO 2 puff 2 TIMES DAILY (route: inhalation)      felodipine (PLENDIL) 5 MG extended release tablet 1 tablet EVERY PM (route: oral)      fluticasone (FLONASE) 50 MCG/ACT nasal spray 2 spray DAILY (route: nasal)      gabapentin (NEURONTIN) 100 MG capsule 1 capsule 2 TIMES DAILY (route: oral)      simethicone (MYLICON) 358 MG capsule 1 capsule DAILY (route: oral)      methIMAzole (TAPAZOLE) 5 MG tablet Per instructions DAILY (route: oral)      MONTELUKAST SODIUM PO 10 mg      Nystatin 2132265 units CAPS 5 mL 2 TIMES DAILY (route: oral)      pantoprazole (PROTONIX) 40 MG tablet 1 tablet 2 TIMES DAILY (route: oral)      polyethylene glycol (GLYCOLAX) 17 g packet 17 g DAILY (route: oral)      albuterol sulfate HFA (PROVENTIL;VENTOLIN;PROAIR) 108 (90 Base) MCG/ACT inhaler 2 puff AS NEEDED (route: inhalation)      spironolactone (ALDACTONE) 25 MG tablet 1 tablet DAILY (route: oral) Acetaminophen (TYLENOL) 325 MG CAPS 500 mg      Compression Stockings MISC by Does not apply route Give patient the knee-high compression wraps for both legs which will be much easier to apply and more comfortable for her feet. Phone # [phone]  Fax # [phone] pharmacy please fax prescription. 2 each 5    pantoprazole (PROTONIX) 40 MG tablet TAKE 1 TABLET BY MOUTH TWICE A  tablet 1    nystatin (MYCOSTATIN) 785590 UNIT/ML suspension Take 5 mLs by mouth in the morning and 5 mLs at noon and 5 mLs in the evening and 5 mLs before bedtime. Swish and swallow. . 60 mL 1    albuterol sulfate HFA (PROVENTIL HFA) 108 (90 Base) MCG/ACT inhaler Inhale 2 puffs into the lungs every 6 hours as needed for Wheezing 18 g 3    Simethicone (GAS RELIEF) 180 MG CAPS Take 180 mg by mouth 2 times daily as needed (bloating) 180 capsule 3    budesonide-formoterol (SYMBICORT) 160-4.5 MCG/ACT AERO Inhale 2 puffs into the lungs 2 times daily 1 each 1    gabapentin (NEURONTIN) 100 MG capsule Take 1 capsule by mouth in the morning and at bedtime for 180 days. Intended supply: 90 days 944 capsule 1    folic acid (FOLVITE) 1 MG tablet Take 1 tablet by mouth daily 90 tablet 11    felodipine (PLENDIL) 5 MG extended release tablet Take 1 tablet by mouth daily 90 tablet 3    polyethylene glycol (GLYCOLAX) 17 g packet Take 17 g by mouth 2 times daily 100 each 5    terbinafine (LAMISIL) 1 % cream Apply topically 2 times daily. 42 g 1    montelukast (SINGULAIR) 10 MG tablet TAKE 1 TABLET BY MOUTH EVERY DAY AT NIGHT 90 tablet 3    fluticasone (FLONASE) 50 MCG/ACT nasal spray PLACE 1 SPRAY IN EACH NOSTRIL DAILY 3 each 3    triamcinolone (KENALOG) 0.1 % cream Apply topically 2 times daily to reddened area right calf.  80 g 0    atenolol (TENORMIN) 50 MG tablet TAKE 1 TABLET DAILY 90 tablet 3    spironolactone (ALDACTONE) 25 MG tablet TAKE 1 TABLET DAILY 90 tablet 3    ipratropium (ATROVENT) 0.03 % nasal spray USE 1 SPRAY IN EACH NOSTRIL AT BEDTIME 30 mL 1    vitamin D (ERGOCALCIFEROL) 1.25 MG (31174 UT) CAPS capsule Take 1 capsule by mouth once a week 12 capsule 3    hydrocortisone (ANUSOL-HC) 25 MG suppository Place 1 suppository rectally every 12 hours 24 suppository 0    ammonium lactate (LAC-HYDRIN) 12 % lotion Apply topically daily. 225 g 5    lidocaine (XYLOCAINE) 5 % ointment Apply topically three times a day, as needed. 2 Tube 1    methIMAzole (TAPAZOLE) 5 MG tablet Take 1 tablet by mouth See Admin Instructions 1 tablet by mouth daily except for Sunday, Monday, Wednesday take 1.5 tablets. 120 tablet 1     No current facility-administered medications for this visit. Review of Systems: See HPI. All other systems reviewed and are negative. Objective:     Vitals:    12/06/22 1125   BP: (!) 152/65   Site: Right Upper Arm   Pulse: 62   Temp: 98.5 °F (36.9 °C)   TempSrc: Temporal   Weight: 153 lb (69.4 kg)   Height: 5' 9\" (1.753 m)       Physical Exam:   General:  Alert, oriented x 3, cooperative, no distress, appears stated age. Skin: Skin color, texture, turgor normal.    Lymph nodes: Cervical, supraclavicular, and axillary nodes normal.   HENT:  Normocephalic, without obvious abnormality. Moist mucus membranes. No icterus. Lungs: No respiratory distress. Heart:  Regular rate and rhythm. No murmur. Abdomen: Soft, non-tender. No masses,  No organomegaly. Extremities: Extremities normal, atraumatic, no cyanosis or edema. Neurologic: Grossly intact.    Psychiatric: Appropriate mood and thought process       Labs:    Lab Results   Component Value Date    WBC 8.6 10/26/2022    HGB 11.9 (L) 10/26/2022    HCT 38.2 10/26/2022    MCV 71.3 (L) 10/26/2022     10/26/2022     Lab Results   Component Value Date     10/26/2022    K 4.3 10/26/2022     10/26/2022    CO2 22 10/26/2022    BUN 11 10/26/2022    CREATININE 0.9 10/26/2022    GLUCOSE 108 (H) 10/26/2022    CALCIUM 10.2 10/26/2022    PROT 6.5 10/26/2022    LABALBU 4.0 10/26/2022    BILITOT 0.8 10/26/2022    ALKPHOS 106 10/26/2022    AST 16 10/26/2022    ALT 7 (L) 10/26/2022    LABGLOM 59 (A) 10/26/2022    GFRAA >60 07/14/2022    AGRATIO 1.6 10/26/2022    GLOB 2.8 10/15/2020       CT ABD/Pelvis WO 11/10/22:   No cause for right groin pain identified. IVC filter with progressive wall penetration of the struts-see comments. Assessment/Plan:      Diagnosis Orders   1. Presence of IVC filter        2. Right groin pain          Labs, current and old Imaging reviewed with patient and family. Discussed pros and cons of removal of IVC filter. Given that she is unable to tolerate anti-coagulation and that she is not experiencing any GI/abdominal symptoms, would not recommend surgical intervention or removal of filter. Any surgery in her is high risk due to her age and also surgery is complex involving IVC and duodenum. Risk of bleeding from GI tract discussed. Follow up as needed.     Tiffany Campuzano MD  Surgery Attending

## 2022-12-06 ENCOUNTER — OFFICE VISIT (OUTPATIENT)
Dept: SURGERY | Age: 87
End: 2022-12-06
Payer: MEDICARE

## 2022-12-06 VITALS
HEIGHT: 69 IN | SYSTOLIC BLOOD PRESSURE: 152 MMHG | BODY MASS INDEX: 22.66 KG/M2 | HEART RATE: 62 BPM | WEIGHT: 153 LBS | TEMPERATURE: 98.5 F | DIASTOLIC BLOOD PRESSURE: 65 MMHG

## 2022-12-06 DIAGNOSIS — Z95.828 PRESENCE OF IVC FILTER: Primary | ICD-10-CM

## 2022-12-06 DIAGNOSIS — R10.31 RIGHT GROIN PAIN: ICD-10-CM

## 2022-12-06 PROCEDURE — 1036F TOBACCO NON-USER: CPT | Performed by: SURGERY

## 2022-12-06 PROCEDURE — 1123F ACP DISCUSS/DSCN MKR DOCD: CPT | Performed by: SURGERY

## 2022-12-06 PROCEDURE — G8427 DOCREV CUR MEDS BY ELIG CLIN: HCPCS | Performed by: SURGERY

## 2022-12-06 PROCEDURE — G8420 CALC BMI NORM PARAMETERS: HCPCS | Performed by: SURGERY

## 2022-12-06 PROCEDURE — G8484 FLU IMMUNIZE NO ADMIN: HCPCS | Performed by: SURGERY

## 2022-12-06 PROCEDURE — 1090F PRES/ABSN URINE INCON ASSESS: CPT | Performed by: SURGERY

## 2022-12-06 PROCEDURE — 99203 OFFICE O/P NEW LOW 30 MIN: CPT | Performed by: SURGERY

## 2022-12-06 NOTE — RESULT ENCOUNTER NOTE
My chart message sent to daughter for patient to see surgeon about vena caval filter placement.     Jennifer Barrera [facility] Surgical Oncology and General Surgery - MD Chas [address], [place] [address]   [phone]

## 2022-12-13 ENCOUNTER — OFFICE VISIT (OUTPATIENT)
Dept: PRIMARY CARE CLINIC | Age: 87
End: 2022-12-13

## 2022-12-13 VITALS
OXYGEN SATURATION: 96 % | HEART RATE: 53 BPM | DIASTOLIC BLOOD PRESSURE: 80 MMHG | SYSTOLIC BLOOD PRESSURE: 160 MMHG | TEMPERATURE: 97.9 F

## 2022-12-13 DIAGNOSIS — B35.1 NAIL FUNGUS: ICD-10-CM

## 2022-12-13 DIAGNOSIS — Z86.711 HISTORY OF PULMONARY EMBOLISM: ICD-10-CM

## 2022-12-13 DIAGNOSIS — N63.10 MASS OF RIGHT BREAST, UNSPECIFIED QUADRANT: ICD-10-CM

## 2022-12-13 DIAGNOSIS — I10 ESSENTIAL HYPERTENSION: ICD-10-CM

## 2022-12-13 DIAGNOSIS — N64.4 BREAST PAIN, RIGHT: Primary | ICD-10-CM

## 2022-12-13 PROBLEM — I82.401 ACUTE EMBOLISM AND THOMBOS UNSP DEEP VEINS OF R LOW EXTREM (HCC): Status: RESOLVED | Noted: 2022-10-28 | Resolved: 2022-12-13

## 2022-12-13 PROBLEM — K92.2 ACUTE GASTROINTESTINAL BLEEDING: Status: RESOLVED | Noted: 2022-05-31 | Resolved: 2022-12-13

## 2022-12-13 RX ORDER — TERBINAFINE HYDROCHLORIDE 250 MG/1
250 TABLET ORAL DAILY
Qty: 42 TABLET | Refills: 0 | Status: SHIPPED | OUTPATIENT
Start: 2022-12-13 | End: 2023-01-24

## 2022-12-13 ASSESSMENT — PATIENT HEALTH QUESTIONNAIRE - PHQ9
SUM OF ALL RESPONSES TO PHQ QUESTIONS 1-9: 0
1. LITTLE INTEREST OR PLEASURE IN DOING THINGS: 0
SUM OF ALL RESPONSES TO PHQ QUESTIONS 1-9: 0
SUM OF ALL RESPONSES TO PHQ QUESTIONS 1-9: 0
SUM OF ALL RESPONSES TO PHQ9 QUESTIONS 1 & 2: 0
2. FEELING DOWN, DEPRESSED OR HOPELESS: 0
SUM OF ALL RESPONSES TO PHQ QUESTIONS 1-9: 0

## 2022-12-13 NOTE — PROGRESS NOTES
Viri Campos (:  1926) is a 80 y.o. female,Established patient, here for evaluation of the following chief complaint(s):  Establish Care         ASSESSMENT/PLAN:  1. Breast pain, right, mass present in the right superior breast around 12:00 with a pancake shaped that is not hard or fixed but tender and reproduces patient's pain. Patient states she always has pain in this area. Has not had a mammogram in years with advanced age. No axillary adenopathy on exam today. The skin is slightly swollen but not red. We will further evaluate with ultrasound mammogram and refer to breast doctor. -     US BREAST LIMITED RIGHT; Future  -     [facility] DEDE DIGITAL DIAGNOSTIC BILATERAL; Future  -     KYM Stoddard MD, Breast Surgery, [facility]  2. Mass of right breast, unspecified quadrant: Same as problem #1  -     US BREAST LIMITED RIGHT; Future  -     [facility] DEDE DIGITAL DIAGNOSTIC BILATERAL; Future  -     KYM Stoddard MD, Breast Surgery, [facility]  3. Nail fungus of great toenails we will give Lamisil for 6 weeks. -     terbinafine (LAMISIL) 250 MG tablet; Take 1 tablet by mouth daily, Disp-42 tablet, R-0Normal  4. 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 She has indwelling vena cava filter that the spikes had migrated to the small valve but no acute abdomen. She was seen by surgery who will follow her. Not a candidate for removal of the filter since she has ongoing clots and intolerant of anticoagulants. Surgery will observe in case of any bowel emergencies. Treatment options are limited with deconditioned status and advanced age. Concerning with the breast enlargement and pain in the mass if cancer there could be triggering very recurrent blood clots. 5. Essential hypertension: Controlled.   Patient tends to have orthostasis so did not want to keep her blood pressure low. She is also late taking her medications today. Return in about 3 months (around 3/13/2023). Great nail fungus right foot    Subjective   SUBJECTIVE/OBJECTIVE:  Breast Mass: Patient presents for evaluation of a breast mass. Change was noted noted pain for 6 months but has not noticed any mass. Patient does not routinely do self breast exams. Patient has not noted a change on breast exam. Breast cancer risk factors include infrequent SMEs. Age of menarche was does not know. Age of menopause was 48. Last menstrual period was over 40 years ago. . Patient denies hormonal therapy. Patient is . Patient denies nipple discharge. Patient denies to previous breast biopsy. Patient denies a personal history of breast cancer. Hypercoagulable for the past year with PEs and DVTs and did not tolerate blood thinners so has a vena cava filter in place that has been problematic with extension beyond the vena cava near the small bowel. No bowel obstruction or bowel infection symptoms. Hypertension  This is a chronic problem. The current episode started more than 1 year ago. The problem has been waxing and waning since onset. The problem is controlled (controlled for age. ). Pertinent negatives include no chest pain, headaches, malaise/fatigue, neck pain, orthopnea, palpitations, peripheral edema, PND, shortness of breath or sweats. (Breast pain , reproduced with palpating breast and not in chest wall.) There are no associated agents to hypertension. Risk factors for coronary artery disease include post-menopausal state and sedentary lifestyle. Past treatments include beta blockers, calcium channel blockers, diuretics and lifestyle changes. The current treatment provides significant improvement. There are no compliance problems. Hypertensive end-organ damage includes left ventricular hypertrophy. There is no history of kidney disease or heart failure.      Review of Systems   Constitutional:  Positive for fatigue. Negative for activity change, appetite change, chills, diaphoresis, fever, malaise/fatigue and unexpected weight change. Decreasing fatigue   HENT: Negative. Negative for congestion, ear discharge, ear pain, facial swelling, hearing loss, nosebleeds, postnasal drip, rhinorrhea, sneezing, sore throat and trouble swallowing. Graves Disease being followed by endocrinology. Eyes:  Negative for pain, discharge, redness and itching. 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 Asthma, controlled with prn inhaler. Cardiovascular:  Negative for chest pain, palpitations, orthopnea, leg swelling and PND. Hypertension    Right chest and breast pain in area where she had shingles that remained after shingles resolved. Controlled with gabapentin, chronic   Gastrointestinal:  Negative for abdominal pain, anal bleeding, blood in stool, constipation, diarrhea, nausea and vomiting. IBS and GERD with intermittent pain.   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 Endocrine: Negative. Primary hyperparathyroidism, Graves' disease, managed by endocrinologist Dr. Yojana Carreon at [facility]   Genitourinary:  Negative for dysuria, frequency, hematuria, urgency, vaginal discharge and vaginal pain. Musculoskeletal: Negative. Negative for arthralgias, back pain, joint swelling, myalgias, neck pain and neck stiffness. Multijoint osteoarthritis. Will receive cartilage injections in both knees with ortho , this month on 10/17/14. Osteoporosis treated with Genistin  ( i cool )not a candidate for oral bisphosphonates due to GERD with frequent exacerbations. Skin:  Negative for pallor and rash. Allergic/Immunologic: Negative for environmental allergies and food allergies. Allergy on gluten-free diet   Neurological: Negative. Negative for dizziness, weakness, numbness and headaches. Still have a shingles pain but has not taken 100 mg gabapentin yet. Unfortunately when she had shingles she took 1 dose of valacyclovir and had nausea and discontinue. I gave her a low dose of Famvir but she could not tolerate that as well. The rash is now healed. Hematological: Negative. DVT and PE, should be on life long anticoagulation but had severe life-threatening hemorrhage in June 2022 and currently not on anticoagulation. 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 Objective   Physical Exam  Constitutional:       General: She is not in acute distress. Appearance: She is not toxic-appearing. Comments: Very pleasant elderly woman who comes appointment with her home health aide ambulates with a rolling walker   HENT:      Head: Normocephalic and atraumatic. Nose: Nose normal.   Neck:      Comments: Normal range of motion of the neck was observed and no appearance of neck  masses  Cardiovascular:      Rate and Rhythm: Normal rate and regular rhythm. Heart sounds: Murmur heard. Comments: Decreased pulses in the feet. Pulmonary:      Effort: Pulmonary effort is normal.      Breath sounds: Normal breath sounds. Abdominal:      General: Abdomen is flat. There is no distension. Palpations: Abdomen is soft. There is no mass. Tenderness: There is no abdominal tenderness. There is no right CVA tenderness, left CVA tenderness or guarding. Musculoskeletal:         General: No swelling. Right lower leg: Edema present. Left lower leg: No edema. Comments: Swelling of right lower leg but no redness rashes and mildly tender calf   Neurological:      General: No focal deficit present. Mental Status: She is alert and oriented to person, place, and time. Cranial Nerves: No cranial nerve deficit. Motor: No weakness.       Coordination: Coordination normal.      Gait: Gait abnormal.      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         Behavior: Behavior normal.         Thought Content: Thought content normal.         Judgment: Judgment normal.              An electronic signature was used to authenticate this note.     --Jovanna Estrella MD

## 2022-12-14 ENCOUNTER — TELEPHONE (OUTPATIENT)
Dept: ADMINISTRATIVE | Age: 87
End: 2022-12-14

## 2022-12-14 NOTE — TELEPHONE ENCOUNTER
----- Message from Thomas Neal 12 sent at 8/17/2021  9:02 AM EDT -----  Subject: Refill Request    QUESTIONS  Name of Medication? pantoprazole (PROTONIX) 40 MG tablet  Patient-reported dosage and instructions? Take 1 tablet by mouth 2 times   daily  How many days do you have left? 0  Preferred Pharmacy? [id number]  Pharmacy phone number (if available)? [phone]  Additional Information for Provider? patient would like medication to be   sent over to pharmacy today  ---------------------------------------------------------------------------  --------------  [address]  What is the best way for the office to contact you? OK to leave message on   voicemail  Preferred Call Back Phone Number?  [phone] Helical Rim Advancement Flap Text: The defect edges were debeveled with a #15 blade scalpel.  Given the location of the defect and the proximity to free margins (helical rim) a double helical rim advancement flap was deemed most appropriate.  Using a sterile surgical marker, the appropriate advancement flaps were drawn incorporating the defect and placing the expected incisions between the helical rim and antihelix where possible.  The area thus outlined was incised through and through with a #15 scalpel blade.  With a skin hook and iris scissors, the flaps were gently and sharply undermined and freed up.

## 2022-12-14 NOTE — TELEPHONE ENCOUNTER
PA submitted VIA CM for  Terbinafine HCl 250MG tablets Laurent: Sheila Lynch - Rx #: [id number] PENDING

## 2022-12-16 NOTE — TELEPHONE ENCOUNTER
Received questions on Terbinafine HCl 250MG tablets. Faxed form back to [facility] Case Review Unit at [phone]. Form attached.

## 2022-12-18 ASSESSMENT — ENCOUNTER SYMPTOMS
APNEA: 0
TROUBLE SWALLOWING: 0
ORTHOPNEA: 0
VOMITING: 0
FACIAL SWELLING: 0
BLOOD IN STOOL: 0
ANAL BLEEDING: 0
CONSTIPATION: 0
ALLERGIC/IMMUNOLOGIC COMMENTS: ALLERGY ON GLUTEN-FREE DIET
WHEEZING: 0
EYE DISCHARGE: 0
STRIDOR: 0
DIARRHEA: 0
CHOKING: 0
BACK PAIN: 0
SHORTNESS OF BREATH: 0
COUGH: 0
EYE ITCHING: 0
SORE THROAT: 0
RHINORRHEA: 0
EYE PAIN: 0
NAUSEA: 0
EYE REDNESS: 0
ABDOMINAL PAIN: 0
CHEST TIGHTNESS: 0

## 2023-01-04 ENCOUNTER — HOSPITAL ENCOUNTER (OUTPATIENT)
Dept: MAMMOGRAPHY | Age: 88
Discharge: HOME | End: 2023-01-04
Payer: MEDICARE

## 2023-01-04 ENCOUNTER — HOSPITAL ENCOUNTER (OUTPATIENT)
Dept: ULTRASOUND IMAGING | Age: 88
Discharge: HOME | End: 2023-01-04
Payer: MEDICARE

## 2023-01-04 DIAGNOSIS — N63.10 MASS OF RIGHT BREAST, UNSPECIFIED QUADRANT: ICD-10-CM

## 2023-01-04 DIAGNOSIS — N64.4 BREAST PAIN, RIGHT: ICD-10-CM

## 2023-01-04 PROCEDURE — 77066 DX MAMMO INCL CAD BI: CPT

## 2023-01-04 PROCEDURE — 76642 ULTRASOUND BREAST LIMITED: CPT

## 2023-01-05 DIAGNOSIS — I35.1 NONRHEUMATIC AORTIC VALVE INSUFFICIENCY: ICD-10-CM

## 2023-01-05 DIAGNOSIS — H35.363 DRUSEN (DEGENERATIVE) OF MACULA, BILATERAL: ICD-10-CM

## 2023-01-05 DIAGNOSIS — E21.3 HYPERPARATHYROIDISM (HCC): ICD-10-CM

## 2023-01-05 DIAGNOSIS — R92.8 ABNORMAL MAMMOGRAM OF RIGHT BREAST: Primary | ICD-10-CM

## 2023-01-05 DIAGNOSIS — K58.1 IRRITABLE BOWEL SYNDROME WITH CONSTIPATION: ICD-10-CM

## 2023-01-05 DIAGNOSIS — E46 PROTEIN MALNUTRITION (HCC): ICD-10-CM

## 2023-01-05 DIAGNOSIS — K90.0 CELIAC DISEASE: ICD-10-CM

## 2023-01-05 DIAGNOSIS — Z95.828 HISTORY OF INFERIOR VENA CAVAL FILTER PLACEMENT: ICD-10-CM

## 2023-01-05 DIAGNOSIS — I10 ESSENTIAL HYPERTENSION: ICD-10-CM

## 2023-01-05 RX ORDER — GABAPENTIN 100 MG/1
CAPSULE ORAL
Qty: 180 CAPSULE | Refills: 3 | Status: SHIPPED | OUTPATIENT
Start: 2023-01-05 | End: 2024-01-04

## 2023-01-05 RX ORDER — PANTOPRAZOLE SODIUM 40 MG/1
TABLET, DELAYED RELEASE ORAL
Qty: 90 TABLET | Refills: 3 | Status: SHIPPED | OUTPATIENT
Start: 2023-01-05

## 2023-01-05 NOTE — RESULT ENCOUNTER NOTE
[address] [facility]    [facility], MD Rocky Reina W Vida ESTRADA, [address] [place] [address], [address] [place] [address]   Phone: [phone]   Fax: [phone]    There were no definite abnormalities on the mammogram.  We should repeat it in 6 months.   Make sure you make an appointment to be evaluated now by the breast surgeon to make sure no abnormalities are palpated on exam.

## 2023-01-06 ENCOUNTER — CARE COORDINATION (OUTPATIENT)
Dept: CARE COORDINATION | Age: 88
End: 2023-01-06

## 2023-01-06 NOTE — CARE COORDINATION
Ambulatory Care Coordination Note  1/6/2023    ACC: Bela Nunez RN    Acm out reach call placed to pt introduced self and reason for call agreeable to future calls  No current complaints lives in senior building   Uses assistive device with ambulation  Has home health coming in to assist  Plan  Continue care coordination for resource assistance  Offer disease specific education  Introduce zone tools    Offered patient enrollment in the Remote Patient Monitoring (RPM) program for in-home monitoring: Patient declined. Ambulatory Care Coordination Assessment    Care Coordination Protocol  Referral from Primary Care Provider: No  Week 1 - Initial Assessment     Do you have all of your prescriptions and are they filled?: Yes  Barriers to medication adherence: Complexity of regimen, Other, Does not understand need for medication  Other barriers to medication adherence: stomach pain  Are you able to afford your medications?: Yes  How often do you have trouble taking your medications the way you have been told to take them?: Sometimes I take them as prescribed. Do you have Home O2 Therapy?: No      Ability to seek help/take action for Emergent Urgent situations i.e. fire, crime, inclement weather or health crisis.: Needs Assistance  Ability to ambulate to restroom: Independent  Ability handle personal hygeine needs (bathing/dressing/grooming): Needs Assistance  Ability to manage Medications: Independent  Ability to prepare Food Preparation: Independent  Ability to maintain home (clean home, laundry): Needs Assistance  Ability to drive and/or has transportation: Dependent  Ability to do shopping: Needs Assistance  Ability to manage finances:  Independent  Is patient able to live independently?: Yes     Current Housing: Independent Living/Senior Housing, Apartment           Frequent urination at night?: No  Do you use rails/bars?: Yes  Do you have a non-slip tub mat?: Yes     Are you experiencing loss of meaning?: No  Are you experiencing loss of hope and peace?: No     Suggested Interventions and Community Resources                  Prior to Admission medications    Medication Sig Start Date End Date Taking?  Authorizing Provider   gabapentin (NEURONTIN) 100 MG capsule 1 capsule 2 TIMES DAILY (route: oral) 1/5/23 1/4/24  Cee Laguerre MD   pantoprazole (PROTONIX) 40 MG tablet 1 tablet by mouth daily 1/5/23   Cee Laguerre MD   terbinafine (LAMISIL) 250 MG tablet Take 1 tablet by mouth daily 12/13/22 1/24/23  Cee Laguerre MD   atenolol (TENORMIN) 25 MG tablet 50 mg 7/12/22   Historical Provider, MD   budesonide-formoterol (SYMBICORT) 160-4.5 MCG/ACT AERO 2 puff 2 TIMES DAILY (route: inhalation) 7/12/22   Historical Provider, MD   felodipine (PLENDIL) 5 MG extended release tablet 1 tablet EVERY PM (route: oral) 7/12/22   Historical Provider, MD   fluticasone (FLONASE) 50 MCG/ACT nasal spray 2 spray DAILY (route: nasal) 7/12/22   Historical Provider, MD   simethicone (MYLICON) 027 MG capsule 1 capsule DAILY (route: oral) 7/12/22   Historical Provider, MD   methIMAzole (TAPAZOLE) 5 MG tablet Per instructions DAILY (route: oral) 7/12/22   Historical Provider, MD   MONTELUKAST SODIUM PO 10 mg 7/12/22   Historical Provider, MD   Nystatin 6964319 units CAPS 5 mL 2 TIMES DAILY (route: oral) 7/12/22   Historical Provider, MD   polyethylene glycol (GLYCOLAX) 17 g packet 17 g DAILY (route: oral) 7/12/22   Historical Provider, MD   albuterol sulfate HFA (PROVENTIL;VENTOLIN;PROAIR) 108 (90 Base) MCG/ACT inhaler 2 puff AS NEEDED (route: inhalation) 7/12/22   Historical Provider, MD   spironolactone (ALDACTONE) 25 MG tablet 1 tablet DAILY (route: oral) 7/12/22   Historical Provider, MD   Acetaminophen (TYLENOL) 325 MG CAPS 500 mg 7/12/22   Historical Provider, MD   Compression Stockings MISC by Does not apply route Give patient the knee-high compression wraps for both legs which will be much easier to apply and more comfortable for her feet. Phone # [phone]  Fax # [phone] pharmacy please fax prescription. 11/9/22   Tera Farris MD   pantoprazole (PROTONIX) 40 MG tablet TAKE 1 TABLET BY MOUTH TWICE A DAY 11/4/22   Tera Farris MD   nystatin (MYCOSTATIN) 025880 UNIT/ML suspension Take 5 mLs by mouth in the morning and 5 mLs at noon and 5 mLs in the evening and 5 mLs before bedtime. Swish and swallow. . 7/28/22   Epifanio Rosario MD   albuterol sulfate HFA (PROVENTIL HFA) 108 (90 Base) MCG/ACT inhaler Inhale 2 puffs into the lungs every 6 hours as needed for Wheezing 7/22/22   Epifanio Rosario MD   Simethicone (GAS RELIEF) 180 MG CAPS Take 180 mg by mouth 2 times daily as needed (bloating) 7/20/22   Epifanio Rosario MD   budesonide-formoterol [facility]) 160-4.5 MCG/ACT AERO Inhale 2 puffs into the lungs 2 times daily 7/14/22   Epifanio Rosario MD   gabapentin (NEURONTIN) 100 MG capsule Take 1 capsule by mouth in the morning and at bedtime for 180 days. Intended supply: 90 days 7/14/22 1/10/23  Epifanio Rosario MD   folic acid (FOLVITE) 1 MG tablet Take 1 tablet by mouth daily 7/14/22 7/14/23  Epifanio Rosario MD   felodipine (PLENDIL) 5 MG extended release tablet Take 1 tablet by mouth daily 7/14/22   Epifanio Rosario MD   polyethylene glycol (GLYCOLAX) 17 g packet Take 17 g by mouth 2 times daily 7/14/22   Epifanio Rosario MD   terbinafine (LAMISIL) 1 % cream Apply topically 2 times daily. 7/14/22   Epifanio Rosario MD   montelukast (SINGULAIR) 10 MG tablet TAKE 1 TABLET BY MOUTH EVERY DAY AT NIGHT 6/23/22   Epifanio Rosario MD   fluticasone [facility]) 50 MCG/ACT nasal spray PLACE 1 SPRAY IN [facility] NOSTRIL DAILY 5/13/22   Epifanio Rosario MD   triamcinolone (KENALOG) 0.1 % cream Apply topically 2 times daily to reddened area right calf.  5/6/22   Epifanio Rosario MD   atenolol (TENORMIN) 50 MG tablet TAKE 1 TABLET DAILY 4/8/22   Epifanio Rosario MD   spironolactone (ALDACTONE) 25 MG tablet TAKE 1 TABLET DAILY 1/17/22   Venus Mendoza MD   ipratropium (ATROVENT) 0.03 % nasal spray USE 1 SPRAY IN EACH NOSTRIL AT BEDTIME 1/8/22   Venus Mendoza MD   vitamin D (ERGOCALCIFEROL) 1.25 MG (75340 UT) CAPS capsule Take 1 capsule by mouth once a week 12/23/21   Venus Mendoza MD   hydrocortisone (ANUSOL-HC) 25 MG suppository Place 1 suppository rectally every 12 hours 5/14/21   Venus Mendoza MD   ammonium lactate (LAC-HYDRIN) 12 % lotion Apply topically daily. 1/29/21   Venus Mendoza MD   lidocaine (XYLOCAINE) 5 % ointment Apply topically three times a day, as needed. 1/29/21   Venus Mendoza MD   methIMAzole (TAPAZOLE) 5 MG tablet Take 1 tablet by mouth See Admin Instructions 1 tablet by mouth daily except for Sunday, Monday, Wednesday take 1.5 tablets.  1/29/21   Venus Mendoza MD       Future Appointments   Date Time Provider Chas Antoine   7/12/2023 10:00 AM MAMMOGRAPHY [facility] ROOM 2 [facility] Radio      and   General Assessment

## 2023-01-17 ENCOUNTER — APPOINTMENT (OUTPATIENT)
Dept: GENERAL RADIOLOGY | Age: 88
End: 2023-01-17
Payer: MEDICARE

## 2023-01-17 ENCOUNTER — HOSPITAL ENCOUNTER (EMERGENCY)
Age: 88
Discharge: HOME | End: 2023-01-17
Attending: EMERGENCY MEDICINE
Payer: MEDICARE

## 2023-01-17 ENCOUNTER — APPOINTMENT (OUTPATIENT)
Dept: CT IMAGING | Age: 88
End: 2023-01-17
Payer: MEDICARE

## 2023-01-17 VITALS
TEMPERATURE: 97.1 F | DIASTOLIC BLOOD PRESSURE: 76 MMHG | HEART RATE: 71 BPM | RESPIRATION RATE: 20 BRPM | SYSTOLIC BLOOD PRESSURE: 147 MMHG | OXYGEN SATURATION: 93 %

## 2023-01-17 DIAGNOSIS — H54.3 VISION LOSS, BILATERAL: Primary | ICD-10-CM

## 2023-01-17 LAB
A/G RATIO: 1 (ref 1.1–2.2)
ALBUMIN SERPL-MCNC: 3.8 G/DL (ref 3.4–5)
ALP BLD-CCNC: 106 U/L (ref 40–129)
ALT SERPL-CCNC: 13 U/L (ref 10–40)
ANION GAP SERPL CALCULATED.3IONS-SCNC: 11 MMOL/L (ref 3–16)
ANION GAP SERPL CALCULATED.3IONS-SCNC: 9 MMOL/L (ref 3–16)
AST SERPL-CCNC: 53 U/L (ref 15–37)
BASOPHILS ABSOLUTE: 0.1 K/UL (ref 0–0.2)
BASOPHILS RELATIVE PERCENT: 1 %
BILIRUB SERPL-MCNC: 0.5 MG/DL (ref 0–1)
BUN BLDV-MCNC: 14 MG/DL (ref 7–20)
BUN BLDV-MCNC: 14 MG/DL (ref 7–20)
CALCIUM SERPL-MCNC: 10.2 MG/DL (ref 8.3–10.6)
CALCIUM SERPL-MCNC: 10.3 MG/DL (ref 8.3–10.6)
CHLORIDE BLD-SCNC: 101 MMOL/L (ref 99–110)
CHLORIDE BLD-SCNC: 106 MMOL/L (ref 99–110)
CO2: 23 MMOL/L (ref 21–32)
CO2: 24 MMOL/L (ref 21–32)
CREAT SERPL-MCNC: 1 MG/DL (ref 0.6–1.2)
CREAT SERPL-MCNC: 1.1 MG/DL (ref 0.6–1.2)
EKG ATRIAL RATE: 74 BPM
EKG DIAGNOSIS: NORMAL
EKG P AXIS: 63 DEGREES
EKG P-R INTERVAL: 266 MS
EKG Q-T INTERVAL: 410 MS
EKG QRS DURATION: 86 MS
EKG QTC CALCULATION (BAZETT): 455 MS
EKG R AXIS: -10 DEGREES
EKG T AXIS: 11 DEGREES
EKG VENTRICULAR RATE: 74 BPM
EOSINOPHILS ABSOLUTE: 0.2 K/UL (ref 0–0.6)
EOSINOPHILS RELATIVE PERCENT: 1.4 %
GFR SERPL CREATININE-BSD FRML MDRD: 46 ML/MIN/{1.73_M2}
GFR SERPL CREATININE-BSD FRML MDRD: 52 ML/MIN/{1.73_M2}
GLUCOSE BLD-MCNC: 100 MG/DL (ref 70–99)
GLUCOSE BLD-MCNC: 110 MG/DL (ref 70–99)
GLUCOSE BLD-MCNC: 119 MG/DL (ref 70–99)
HCT VFR BLD CALC: 41.4 % (ref 36–48)
HEMOGLOBIN: 13.4 G/DL (ref 12–16)
INR BLD: 1.01 (ref 0.87–1.14)
LYMPHOCYTES ABSOLUTE: 3.3 K/UL (ref 1–5.1)
LYMPHOCYTES RELATIVE PERCENT: 31.4 %
MCH RBC QN AUTO: 23.4 PG (ref 26–34)
MCHC RBC AUTO-ENTMCNC: 32.3 G/DL (ref 31–36)
MCV RBC AUTO: 72.3 FL (ref 80–100)
MONOCYTES ABSOLUTE: 1.2 K/UL (ref 0–1.3)
MONOCYTES RELATIVE PERCENT: 11.5 %
NEUTROPHILS ABSOLUTE: 5.7 K/UL (ref 1.7–7.7)
NEUTROPHILS RELATIVE PERCENT: 54.7 %
PDW BLD-RTO: 19 % (ref 12.4–15.4)
PERFORMED ON: ABNORMAL
PLATELET # BLD: 231 K/UL (ref 135–450)
PMV BLD AUTO: 9.4 FL (ref 5–10.5)
POTASSIUM REFLEX MAGNESIUM: 4.3 MMOL/L (ref 3.5–5.1)
POTASSIUM REFLEX MAGNESIUM: 5.7 MMOL/L (ref 3.5–5.1)
PROTHROMBIN TIME: 13.2 SEC (ref 11.7–14.5)
RBC # BLD: 5.73 M/UL (ref 4–5.2)
SODIUM BLD-SCNC: 135 MMOL/L (ref 136–145)
SODIUM BLD-SCNC: 139 MMOL/L (ref 136–145)
TOTAL PROTEIN: 7.7 G/DL (ref 6.4–8.2)
TROPONIN: <0.01 NG/ML
WBC # BLD: 10.5 K/UL (ref 4–11)

## 2023-01-17 PROCEDURE — 99285 EMERGENCY DEPT VISIT HI MDM: CPT

## 2023-01-17 PROCEDURE — 93005 ELECTROCARDIOGRAM TRACING: CPT | Performed by: EMERGENCY MEDICINE

## 2023-01-17 PROCEDURE — 85610 PROTHROMBIN TIME: CPT

## 2023-01-17 PROCEDURE — 6360000004 HC RX CONTRAST MEDICATION: Performed by: EMERGENCY MEDICINE

## 2023-01-17 PROCEDURE — 84484 ASSAY OF TROPONIN QUANT: CPT

## 2023-01-17 PROCEDURE — 36415 COLL VENOUS BLD VENIPUNCTURE: CPT

## 2023-01-17 PROCEDURE — 70498 CT ANGIOGRAPHY NECK: CPT

## 2023-01-17 PROCEDURE — 6370000000 HC RX 637 (ALT 250 FOR IP): Performed by: EMERGENCY MEDICINE

## 2023-01-17 PROCEDURE — 70450 CT HEAD/BRAIN W/O DYE: CPT

## 2023-01-17 PROCEDURE — 80053 COMPREHEN METABOLIC PANEL: CPT

## 2023-01-17 PROCEDURE — 85025 COMPLETE CBC W/AUTO DIFF WBC: CPT

## 2023-01-17 PROCEDURE — 71045 X-RAY EXAM CHEST 1 VIEW: CPT

## 2023-01-17 RX ORDER — TETRACAINE HYDROCHLORIDE 5 MG/ML
1 SOLUTION OPHTHALMIC ONCE
Status: COMPLETED | OUTPATIENT
Start: 2023-01-17 | End: 2023-01-17

## 2023-01-17 RX ORDER — ACETAMINOPHEN 500 MG
1000 TABLET ORAL ONCE
Status: COMPLETED | OUTPATIENT
Start: 2023-01-17 | End: 2023-01-17

## 2023-01-17 RX ORDER — GABAPENTIN 100 MG/1
100 CAPSULE ORAL ONCE
Status: COMPLETED | OUTPATIENT
Start: 2023-01-17 | End: 2023-01-17

## 2023-01-17 RX ADMIN — ACETAMINOPHEN 1000 MG: 500 TABLET ORAL at 19:26

## 2023-01-17 RX ADMIN — GABAPENTIN 100 MG: 100 CAPSULE ORAL at 19:26

## 2023-01-17 RX ADMIN — IOPAMIDOL 80 ML: 755 INJECTION, SOLUTION INTRAVENOUS at 17:28

## 2023-01-17 RX ADMIN — TETRACAINE HYDROCHLORIDE 1 DROP: 5 SOLUTION OPHTHALMIC at 18:41

## 2023-01-17 ASSESSMENT — PAIN DESCRIPTION - FREQUENCY: FREQUENCY: CONTINUOUS

## 2023-01-17 ASSESSMENT — PAIN SCALES - GENERAL
PAINLEVEL_OUTOF10: 3
PAINLEVEL_OUTOF10: 3

## 2023-01-17 ASSESSMENT — PAIN DESCRIPTION - PAIN TYPE: TYPE: ACUTE PAIN

## 2023-01-17 ASSESSMENT — PAIN DESCRIPTION - LOCATION: LOCATION: HEAD

## 2023-01-17 ASSESSMENT — PAIN - FUNCTIONAL ASSESSMENT: PAIN_FUNCTIONAL_ASSESSMENT: 0-10

## 2023-01-17 ASSESSMENT — PAIN DESCRIPTION - DESCRIPTORS: DESCRIPTORS: ACHING

## 2023-01-17 NOTE — ED PROVIDER NOTES
[address]          ATTENDING PHYSICIAN NOTE       Date of evaluation: 1/17/2023    Chief Complaint     Loss of Vision (Pt called 911 after sudden onset of \"vision almost went out, thought I was going to pass out\". States after getting into ambulance she started with headache and neck pain)      History of Present Illness     Luis Enrique Goldman is a 80 y.o. 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 Around 1 hour prior to presenting to the emergency department the patient was eating lunch when she began to feel lightheaded as though she might pass out, stated that her vision was going dark in both eyes. She had no palpitations, no chest pain, no dyspnea. Her feeling of lightheadedness is gone, but her decreased visual acuity is persistent. She also notes pain in the right posterior lateral neck that was present as she was being loaded into the ambulance, but which resolved prior to arrival here. She otherwise denies any numbness or weakness and all other complaints. ASSESSMENT / PLAN  (MEDICAL DECISION MAKING)     INITIAL VITALS: BP: 125/60, Temp: 97.1 °F (36.2 °C), Heart Rate: 68, Resp: 16, SpO2: 90 %      Luis Enrique Goldman is a 80 y.o. female who presented to the emergency department with bilateral visual deficit, acute onset. On presentation the patient was afebrile, hemodynamically stable, in no acute distress. Assessment for central cause of visual deficit demonstrated no signs of stroke on imaging, normal Noncon CT head, no large vessel occlusion on CTA noted. Patient had incidental finding of enlarged thyroid as well as a 4 mm intracerebral aneurysm. She was made aware of these findings and they were also included in her discharge summary for follow-up. I spoke with Dr. Carmenza Kohli with ophthalmology to discuss potential transfer for emergent evaluation. He stated that in the absence of evidence of stroke being the cause, her symptoms are likely due to an acute progression of her known exudative macular degeneration. He said that there would be no need for acute intervention overnight, but that the patient should call her retinal specialist first thing in the morning for an expedient follow-up appointment. I spoke with the patient's daughter over the phone and communicated this plan. She stated that she would be able to help the patient contact her ophthalmologist and arrange this follow-up appointment. The patient herself verbalized understanding and agreement with the rationale, plan, and return precautions. She was discharged in stable condition. Medical Decision Making  Amount and/or Complexity of Data Reviewed  External Data Reviewed: ECG and notes. Labs: ordered. Decision-making details documented in ED Course. Radiology: ordered. Decision-making details documented in ED Course. ECG/medicine tests: ordered. Decision-making details documented in ED Course. Risk  OTC drugs. Prescription drug management. Decision regarding hospitalization. Critical Care  Total time providing critical care: 30-74 minutes      Critical Care:  Due to the immediate potential for life-threatening deterioration due to potential stroke, I spent 45 minutes providing critical care. This time excludes time spent performing procedures but includes time spent on direct patient care, history retrieval, review of the chart, and discussions with patient, family, and consultant(s). Clinical Impression     1.  Vision loss, bilateral        Disposition     PATIENT REFERRED TO:  Your ophthalmologist  Call first thing in the morning to schedule a follow up visit. Call in 1 week      DISCHARGE MEDICATIONS:  Discharge Medication List as of 1/17/2023  8:27 PM          DISPOSITION Decision To Discharge 01/17/2023 08:07:53 PM        Diagnostic Results and Other Data       RADIOLOGY:  XR CHEST PORTABLE   Final Result      No acute pulmonary disease. CTA HEAD NECK W CONTRAST   Final Result      1. No vascular occlusions or intracranial stenoses identified. 2.  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 3.  No significant stenosis in the extracranial vertebral or carotid arteries. 4.  Thyroid goiter. CT HEAD WO CONTRAST   Final Result      1. No evidence for acute ischemia, hemorrhage, or mass effect. If further concern for ischemia, MRI recommended. 2.  Mild atrophy and chronic small vessel ischemic disease.           The above findings were discussed with Sarah Mayorga M.D., on 1/17/2023 at 5:40 PM.          LABS:   Results for orders placed or performed during the hospital encounter of 01/17/23   CBC with Auto Differential   Result Value Ref Range    WBC 10.5 4.0 - 11.0 K/uL    RBC 5.73 (H) 4.00 - 5.20 M/uL    Hemoglobin 13.4 12.0 - 16.0 g/dL    Hematocrit 41.4 36.0 - 48.0 %    MCV 72.3 (L) 80.0 - 100.0 fL    MCH 23.4 (L) 26.0 - 34.0 pg    MCHC 32.3 31.0 - 36.0 g/dL    RDW 19.0 (H) 12.4 - 15.4 %    Platelets 024 692 - 782 K/uL    MPV 9.4 5.0 - 10.5 fL    Neutrophils % 54.7 %    Lymphocytes % 31.4 %    Monocytes % 11.5 %    Eosinophils % 1.4 %    Basophils % 1.0 %    Neutrophils Absolute 5.7 1.7 - 7.7 K/uL    Lymphocytes Absolute 3.3 1.0 - 5.1 K/uL    Monocytes Absolute 1.2 0.0 - 1.3 K/uL    Eosinophils Absolute 0.2 0.0 - 0.6 K/uL    Basophils Absolute 0.1 0.0 - 0.2 K/uL   CMP w/ Reflex to MG   Result Value Ref Range    Sodium 135 (L) 136 - 145 mmol/L    Potassium reflex Magnesium 5.7 (H) 3.5 - 5.1 mmol/L    Chloride 101 99 - 110 mmol/L    CO2 23 21 - 32 mmol/L    Anion Gap 11 3 - 16    Glucose 119 (H) 70 - 99 mg/dL    BUN 14 7 - 20 mg/dL    Creatinine 1.0 0.6 - 1.2 mg/dL    Est, Glom Filt Rate 52 (A) >60    Calcium 10.3 8.3 - 10.6 mg/dL    Total Protein 7.7 6.4 - 8.2 g/dL    Albumin 3.8 3.4 - 5.0 g/dL    Albumin/Globulin Ratio 1.0 (L) 1.1 - 2.2    Total Bilirubin 0.5 0.0 - 1.0 mg/dL    Alkaline Phosphatase 106 40 - 129 U/L    ALT 13 10 - 40 U/L    AST 53 (H) 15 - 37 U/L   Protime-INR   Result Value Ref Range    Protime 13.2 11.7 - 14.5 sec    INR 1.01 0.87 - 1.14   Troponin   Result Value Ref Range    Troponin <0.01 <0.01 ng/mL   Basic Metabolic Panel w/ Reflex to MG   Result Value Ref Range    Sodium 139 136 - 145 mmol/L    Potassium reflex Magnesium 4.3 3.5 - 5.1 mmol/L    Chloride 106 99 - 110 mmol/L    CO2 24 21 - 32 mmol/L    Anion Gap 9 3 - 16    Glucose 110 (H) 70 - 99 mg/dL    BUN 14 7 - 20 mg/dL    Creatinine 1.1 0.6 - 1.2 mg/dL    Est, Glom Filt Rate 46 (A) >60    Calcium 10.2 8.3 - 10.6 mg/dL   POCT Glucose   Result Value Ref Range    POC Glucose 100 (H) 70 - 99 mg/dl    Performed on ACCU-CHEK    EKG 12 Lead   Result Value Ref Range    Ventricular Rate 74 BPM    Atrial Rate 74 BPM    P-R Interval 266 ms    QRS Duration 86 ms    Q-T Interval 410 ms    QTc Calculation (Bazett) 455 ms    P Axis 63 degrees    R Axis -10 degrees    T Axis 11 degrees    Diagnosis       EKG performed in ER and to be interpreted by ER physician.Confirmed by MD, SUSY ([id number]),  LEATHA ALEJANDRO ([id number]) on 1/17/2023 6:39:35 PM     EKG   Sinus rhythm at 74 bpm with normal axis, normal intervals.  Nonspecific T wave abnormality in lead III and V1 with no ST segment deviation, no signs of acute ischemia or strain.  Overall unchanged since the previous EKG from August 16, 2022.    ED BEDSIDE ULTRASOUND:  No results found.    MOST RECENT VITALS:  BP: (!) 147/76,Temp: 97.1 °F (36.2 °C), Heart Rate: 71, Resp: 20, SpO2: 93 %     Procedures     None    ED Course     Nursing Notes, Past  Medical Hx, Past Surgical Hx, Social Hx,Allergies, and Family Hx were reviewed. The patient was given the following medications:  Orders Placed This Encounter   Medications    tetracaine (TETRAVISC) 0.5 % ophthalmic solution 1 drop    iopamidol (ISOVUE-370) 76 % injection 80 mL    acetaminophen (TYLENOL) tablet 1,000 mg    gabapentin (NEURONTIN) capsule 100 mg       CONSULTS:  None    Review of Systems     Review of Systems    Pertinent positive and negative findings as documented in the HPI, otherwise other systems were reviewed and were negative. 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 She has a past surgical history that includes Cholecystectomy; Colonoscopy (10/29/2009); Hemorrhoid surgery; Hysterectomy (in 40's); Hysterectomy, total abdominal; Upper gastrointestinal endoscopy (N/A, 06/01/2022); IR GUIDED IVC FILTER PLACEMENT (N/A, 06/01/2022); IR GUIDED IVC FILTER PLACEMENT (06/01/2022); and Ovary removal.  Her family history includes Cancer in her father; Heart Disease in her mother; High Blood Pressure in her mother; Stroke in her mother. She reports that she has never smoked. She has never used smokeless tobacco. She reports that she does not drink alcohol and does not use drugs.     Medications     Discharge Medication List as of 1/17/2023  8:27 PM        CONTINUE these medications which have NOT CHANGED    Details   gabapentin (NEURONTIN) 100 MG capsule 1 capsule 2 TIMES DAILY (route: oral), Disp-180 capsule, R-3Normal      !! pantoprazole (PROTONIX) 40 MG tablet 1 tablet by mouth daily, Disp-90 tablet, R-3Normal      terbinafine (LAMISIL) 250 MG tablet Take 1 tablet by mouth daily, Disp-42 tablet, R-0Normal      !! atenolol (TENORMIN) 25 MG tablet 50 mgHistorical Med      !! budesonide-formoterol (SYMBICORT) 160-4.5 MCG/ACT AERO 2 puff 2 TIMES DAILY (route: inhalation)Historical Med      !! felodipine (PLENDIL) 5 MG extended release tablet 1 tablet EVERY PM (route: oral)Historical Med      !! fluticasone (FLONASE) 50 MCG/ACT nasal spray 2 spray DAILY (route: nasal)Historical Med      !! simethicone (MYLICON) 095 MG capsule 1 capsule DAILY (route: oral)Historical Med      !! methIMAzole (TAPAZOLE) 5 MG tablet Per instructions DAILY (route: oral)Historical Med      !! MONTELUKAST SODIUM PO 10 mgHistorical Med      Nystatin 5451325 units CAPS 5 mL 2 TIMES DAILY (route: oral)Historical Med      !! polyethylene glycol (GLYCOLAX) 17 g packet 17 g DAILY (route: oral)Historical Med      !! albuterol sulfate HFA (PROVENTIL;VENTOLIN;PROAIR) 108 (90 Base) MCG/ACT inhaler 2 puff AS NEEDED (route: inhalation)Historical Med      !! spironolactone (ALDACTONE) 25 MG tablet 1 tablet DAILY (route: oral)Historical Med      Acetaminophen (TYLENOL) 325 MG CAPS 500 mgHistorical Med      Compression Stockings MISC Starting Wed 11/9/2022, Disp-2 each, R-5, PrintGive patient the knee-high compression wraps for both legs which will be much easier to apply and more comfortable for her feet. Phone # [phone] Fax # [phone] pharmacy please fax prescript ion. !! pantoprazole (PROTONIX) 40 MG tablet TAKE 1 TABLET BY MOUTH TWICE A DAY, Disp-180 tablet, R-1Normal      nystatin (MYCOSTATIN) 129648 UNIT/ML suspension Take 5 mLs by mouth in the morning and 5 mLs at noon and 5 mLs in the evening and 5 mLs before bedtime. Swish and swallow. ., Oral, 4 TIMES DAILY Starting Thu 7/28/2022, Disp-60 mL, R-1, Normal      !! albuterol sulfate HFA (PROVENTIL HFA) 108 (90 Base) MCG/ACT inhaler Inhale 2 puffs into the lungs every 6 hours as needed for Wheezing, Disp-18 g, R-3Normal      !! Simethicone (GAS RELIEF) 180 MG CAPS Take 180 mg by mouth 2 times daily as needed (bloating), Disp-180 capsule, R-3DC 4 times daily prescriptionNormal      !! budesonide-formoterol (SYMBICORT) 160-4.5 MCG/ACT AERO Inhale 2 puffs into the lungs 2 times daily, Disp-1 each, [id number]      folic acid (FOLVITE) 1 MG tablet Take 1 tablet by mouth daily, Disp-90 tablet, R-11Normal      !! felodipine (PLENDIL) 5 MG extended release tablet Take 1 tablet by mouth daily, Disp-90 tablet, R-3Normal      !! polyethylene glycol (GLYCOLAX) 17 g packet Take 17 g by mouth 2 times daily, Disp-100 each, R-5Normal      terbinafine (LAMISIL) 1 % cream Apply topically 2 times daily. , Disp-42 g, R-1, Normal      !! montelukast (SINGULAIR) 10 MG tablet TAKE 1 TABLET BY MOUTH EVERY DAY AT NIGHT, Disp-90 tablet, R-3Normal      !! fluticasone (FLONASE) 50 MCG/ACT nasal spray PLACE 1 SPRAY IN EACH NOSTRIL DAILY, Disp-3 each, R-3Normal      triamcinolone (KENALOG) 0.1 % cream Apply topically 2 times daily to reddened area right calf., Disp-80 g, R-0, Normal      !! atenolol (TENORMIN) 50 MG tablet TAKE 1 TABLET DAILY, Disp-90 tablet, R-3Normal      !! spironolactone (ALDACTONE) 25 MG tablet TAKE 1 TABLET DAILY, Disp-90 tablet, R-3Normal      ipratropium (ATROVENT) 0.03 % nasal spray USE 1 SPRAY IN EACH NOSTRIL AT BEDTIME, Disp-30 mL, R-1Normal      vitamin D (ERGOCALCIFEROL) 1.25 MG (75231 UT) CAPS capsule Take 1 capsule by mouth once a week, Disp-12 capsule, R-3Normal      hydrocortisone (ANUSOL-HC) 25 MG suppository Place 1 suppository rectally every 12 hours, Disp-24 suppository, R-0Normal      ammonium lactate (LAC-HYDRIN) 12 % lotion Apply topically daily. , Disp-225 g, R-5, Normal      lidocaine (XYLOCAINE) 5 % ointment Apply topically three times a day, as needed. , Disp-2 Tube, R-1, Normal      !! methIMAzole (TAPAZOLE) 5 MG tablet Take 1 tablet by mouth See Admin Instructions 1 tablet by mouth daily except for Sunday, Monday, Wednesday take 1.5 tablets. , Disp-120 tablet, R-1Normal       !! - Potential duplicate medications found. Please discuss with provider. Allergies     She is allergic to ace inhibitors, creon [pancrelipase (lip-prot-amyl)], dye [iodides], eggs or egg-derived products, fluorescein, gluten meal, macrobid [nitrofurantoin monohydrate macrocrystals], milk-related compounds, sulfa antibiotics, acyclovir, and ciprocinonide [fluocinolone]. Physical Exam     INITIAL VITALS: BP: 125/60, Temp: 97.1 °F (36.2 °C), Heart Rate: 68, Resp: 16, SpO2: 90 %   Physical Exam    General: Well developed and well nourished. No acute distress. HEENT: NCAT, PERRL 3->2mm, moist mucous membranes. Visual acuity: OD light and movement only; OS light and movement only; OU light and movement only  IOP: OD avg 15, OS avg 14  Neck: Trachea midline, neck supple with FROM  Heart: Regular rate and rhythm. No murmurs, gallops, or rubs appreciated. Lungs: Clear to auscultation in all fields bilaterally. Normal effort. Abd: Nondistended, no signs of trauma. No tenderness to palpation, guarding, or rebound. MSK: No obvious deformities. Range of motion grossly intact. Extremities: No cyanosis or edema. Peripheral pulses intact. Skin: No rashes, abrasions, contusions, or lacerations noted. Neuro:   - Mental status: alert, oriented to person, place, time, and event. - Language: receptive and expressive language in tact: no aphasia or dysarthria.  - Motor: extension and flexion at elbow, wrist, hip, knee, and ankle 5/5.  - No pronator drift noted. - Sensory: SILT distally in hands and feet.   - Cranial Nerves: EOMI, PERRLA, hearing in tact and symmetric, face symmetric. evidenced by smile and forehead wrinkle, tongue midline, shoulder shrug 5/5. - Visual fields full to confrontational testing. Psych: Mood and affect appropriate.  Thought process and content normal.     Manuel Padgett MD  01/18/23 [id number]

## 2023-01-18 NOTE — DISCHARGE INSTRUCTIONS
We saw in the emergency department today for vision loss. With vision loss it is important to consider if the problem is with the eye or with the brain (in other words a stroke). Based on your tests and labs we think that your vision loss is probably related to your known macular degeneration. We spoke with the eye doctor over the phone, they said that there was no reason to transfer you to another hospital [facility] and that you would not need to stay overnight, but they want you to call first thing in the morning for an urgent follow-up visit because you might need to resume treatment for your macular degeneration. We spoke with your daughter, David Steinberg and told her about this plan. If you have new symptoms such as numbness or weakness especially if it is only on one side of your face or body, speech difficulty, trouble finding her words, chest pain or difficulty breathing then please come back to the emergency department to be seen again. During your Emergency Department visit today you had an image test (for example: an x-ray, CAT scan, MRI, or ultrasound). Image tests help your doctors look for problems inside the body that can be dangerous. Sometimes, these image tests show surprising findings. We do not know how these findings will affect your health. In most people, these surprising findings do not cause any health problems. However, in some people, they can be an early health problem that is just starting. We recommend that you talk about these findings with a doctor that can watch you over time. It is possible, but not likely, that this finding could be the early part of a problem that might affect your long-term health. In your case:    - The image test that showed an unexpected finding was: CT angiogram head and neck. - The finding that requires follow-up is: cerebral aneurysm and thyroid goiter.     - The recommended time you should follow-up with a doctor is: 3 months

## 2023-01-18 NOTE — ED NOTES
Unable to complete true visual aquity test due to patient not being able to distinguish a single line/letter on the eye chart.       Thomas Tesfaye RN  01/17/23 1930

## 2023-01-20 DIAGNOSIS — J30.89 OTHER ALLERGIC RHINITIS: Primary | ICD-10-CM

## 2023-01-20 RX ORDER — FLUTICASONE PROPIONATE 50 MCG
1 SPRAY, SUSPENSION (ML) NASAL DAILY
Qty: 16 G | Refills: 12 | Status: SHIPPED | OUTPATIENT
Start: 2023-01-20

## 2023-02-27 ENCOUNTER — CARE COORDINATION (OUTPATIENT)
Dept: CARE COORDINATION | Age: 88
End: 2023-02-27

## 2023-02-27 NOTE — CARE COORDINATION
Preparing for outreach - ACM received return callback from another pt   Will resume attempt for [facility] outreach again tomorrow

## 2023-02-28 NOTE — CARE COORDINATION
Ambulatory Care Coordination Note  2023    Patient Current Location:  Home: [address]  [address]     ACM contacted the patient's dtr, Addy Fofana, by telephone. Verified name and  with family as identifiers. Provided introduction to self, and explanation of the ACM role. Challenges to be reviewed by the provider   Additional needs identified to be addressed with provider: No  none   pt scheduled for hosp f/u appt w/PCP 3.8.23               Method of communication with provider: chart routing for provider review of [address]    ACM: Татьяна Carrington RN      Reviewed w/Mai, pt's dtr the following s/p pt return from hosp 23 -  [facility] visit completed by [facility]. HHC plan to include SN, PT/OT, HHA. Pt receiving HHA for nonskilled support as well, & family also helping during between service-provided hours for care. Pt using DME as appropriate. No falls/no near falls. Addy Fofana reports pt started on Rx Eliquis, which ACM did add to med list. Pt also taking diuretic daily, as prescribed - Addy Fofana notes mild BLE edema, but adds this is typical for pt & not new. Addy Fofana verbalizes understanding of s/s to report, monitoring and availability to outreach direct to PCP after hours as appropriate to any newly presenting concerns after hours, if needed. Addy Fofana receptive to resume [facility] outreach and willing to review further about any new support needs to be implemented once [facility] team has completed all assessments, likely by end of week. Mutually agree Interim ACM will report to [facility] for handoff & advise pt/family willingness to resume [facility] outreach. Offered patient enrollment in the Remote Patient Monitoring (RPM) program for in-home monitoring: Yes, but did not enroll at this time. - reviewed only in peripheral, not in depth d/t Addy Fofana awaiting further determination of [address] [place] [address].     Addy Fofana verbalized understanding of above and agreement with the following  Plan: pt will see PCP on 3.8.23, as scheduled, for hosp follow up visit. Resume [facility] support for health coaching, care collaboration, support w/community resources, and help with any newly presenting concerns as needed.   Pt agrees to outreach direct to ACM, as needed, between routine follow up outreach initiated by [facility]     Care Coordination Interventions    Referral from Primary Care Provider: No  Suggested Interventions and Community Resources       Congestive Heart Failure Assessment    Are you currently restricting fluids?: No Restriction  Do you understand a low sodium diet?: No  Do you understand how to read food labels?: No  How many restaurant meals do you eat per week?: 0  Do you salt your food before tasting it?: No     No patient-reported symptoms (Comment: 2.28.23 per review w/Mai, pt's dtr)      Symptoms:  None: Yes      Symptom course: stable  Weight trend: stable  Salt intake watch compared to last visit: stable         Goals Addressed                      This Visit's Progress      recover to optimal baseline health (pt-stated)   On track      Well recovery to optimal baseline health s/p recent IP stay end of Feb 2023, followed by Marylin Raymundo episode    Barriers: impairment:  physical: deconditioned from baseline health  Plan for overcoming my barriers: following w/home health plan of care; engaging in Care Coordination for added care team support  Confidence: 10/10  Anticipated Goal Completion Date: 4.28.23              Future Appointments   Date Time Provider Chas Antoine   3/8/2023  3:20 PM Jaun Gottron, MD [address]  [place] - [place]   3/14/2023 11:40 AM Jaun Gottron, MD [address]  [place] - [place]   7/12/2023 10:00 AM MAMMOGRAPHY [facility] ROOM 2 [address] [place] [address]    I agree with the Care Coordinator's Plan of Care

## 2023-03-07 ENCOUNTER — PATIENT MESSAGE (OUTPATIENT)
Dept: PRIMARY CARE CLINIC | Age: 88
End: 2023-03-07

## 2023-03-08 ENCOUNTER — OFFICE VISIT (OUTPATIENT)
Dept: PRIMARY CARE CLINIC | Age: 88
End: 2023-03-08

## 2023-03-08 VITALS
OXYGEN SATURATION: 94 % | SYSTOLIC BLOOD PRESSURE: 127 MMHG | WEIGHT: 158 LBS | TEMPERATURE: 97.1 F | HEART RATE: 65 BPM | BODY MASS INDEX: 23.33 KG/M2 | DIASTOLIC BLOOD PRESSURE: 74 MMHG

## 2023-03-08 DIAGNOSIS — D68.9 COAGULOPATHY (HCC): ICD-10-CM

## 2023-03-08 DIAGNOSIS — Z09 HOSPITAL DISCHARGE FOLLOW-UP: ICD-10-CM

## 2023-03-08 DIAGNOSIS — G62.9 PERIPHERAL NERVE DISORDER: ICD-10-CM

## 2023-03-08 DIAGNOSIS — I26.99 PULMONARY EMBOLISM ON RIGHT (HCC): ICD-10-CM

## 2023-03-08 DIAGNOSIS — E46 PROTEIN MALNUTRITION (HCC): ICD-10-CM

## 2023-03-08 DIAGNOSIS — E21.3 HYPERPARATHYROIDISM (HCC): ICD-10-CM

## 2023-03-08 DIAGNOSIS — I50.22 CHRONIC SYSTOLIC CONGESTIVE HEART FAILURE (HCC): ICD-10-CM

## 2023-03-08 DIAGNOSIS — I82.412 ACUTE DEEP VEIN THROMBOSIS (DVT) OF FEMORAL VEIN OF LEFT LOWER EXTREMITY (HCC): Primary | ICD-10-CM

## 2023-03-08 DIAGNOSIS — H35.3290 EXUDATIVE AGE-RELATED MACULAR DEGENERATION, UNSPECIFIED LATERALITY, UNSPECIFIED STAGE (HCC): ICD-10-CM

## 2023-03-08 SDOH — ECONOMIC STABILITY: FOOD INSECURITY: WITHIN THE PAST 12 MONTHS, THE FOOD YOU BOUGHT JUST DIDN'T LAST AND YOU DIDN'T HAVE MONEY TO GET MORE.: NEVER TRUE

## 2023-03-08 SDOH — ECONOMIC STABILITY: FOOD INSECURITY: WITHIN THE PAST 12 MONTHS, YOU WORRIED THAT YOUR FOOD WOULD RUN OUT BEFORE YOU GOT MONEY TO BUY MORE.: NEVER TRUE

## 2023-03-08 SDOH — ECONOMIC STABILITY: HOUSING INSECURITY
IN THE LAST 12 MONTHS, WAS THERE A TIME WHEN YOU DID NOT HAVE A STEADY PLACE TO SLEEP OR SLEPT IN A SHELTER (INCLUDING NOW)?: NO

## 2023-03-08 SDOH — ECONOMIC STABILITY: INCOME INSECURITY: HOW HARD IS IT FOR YOU TO PAY FOR THE VERY BASICS LIKE FOOD, HOUSING, MEDICAL CARE, AND HEATING?: NOT HARD AT ALL

## 2023-03-08 ASSESSMENT — PATIENT HEALTH QUESTIONNAIRE - PHQ9
SUM OF ALL RESPONSES TO PHQ QUESTIONS 1-9: 0
1. LITTLE INTEREST OR PLEASURE IN DOING THINGS: 0
2. FEELING DOWN, DEPRESSED OR HOPELESS: 0
SUM OF ALL RESPONSES TO PHQ9 QUESTIONS 1 & 2: 0
SUM OF ALL RESPONSES TO PHQ QUESTIONS 1-9: 0

## 2023-03-08 NOTE — PROGRESS NOTES
Post-Discharge Transitional Care Follow Up      Rosa Norris   YOB: 1926    Date of Office Visit:  3/8/2023  Date of Hospital Admission: 2/18/23  Date of Hospital Discharge: 2/24/23  Readmission Risk Score (high >=14%. Medium >=10%):Readmission Risk Score: 12.9 ( )      Care management risk score Rising risk (score 2-5) and Complex Care (Scores >=6): >6     Non face to face  following discharge, date last encounter closed (first attempt may have been earlier): family scheduled follow up appointment for 3/8/23. Call initiated 2 business days of discharge: see chart    Acute deep vein thrombosis (DVT) of femoral vein of left lower extremity (HCC)  Tolerating eliqis, monitor cbc, no signs of bleeding.-     apixaban (ELIQUIS) 2.5 MG TABS tablet; Take 1 tablet by mouth 2 times daily Take until 3/26/23 and evaluatel, Disp-60 tablet, R-0NO PRINT  -     Ambulatory Referral to Care Management with Device (Remote Patient Monitoring)  Hospital discharge follow-up  -     CT DISCHARGE MEDS RECONCILED W/ CURRENT OUTPATIENT MED LIST  -     Ambulatory Referral to Care Management with Device (Remote Patient Monitoring)  Peripheral nerve disorder continue gabapentin for post shingles right chest and breast pain with no breast masses. -     Ambulatory Referral to Care Management with Device (Remote Patient Monitoring)  Exudative age-related macular degeneration, unspecified laterality, unspecified stage ([address] legally blind, managing well with home care and family help. -     Ambulatory Referral to Care Management with Device (Remote Patient Monitoring)  Protein malnutrition ([address] will check prealbumin. Wt Readings from Last 3 Encounters:   03/08/23 158 lb (71.7 kg)   12/06/22 153 lb (69.4 kg)   10/26/22 153 lb (69.4 kg)       -     Ambulatory Referral to Care Management with Device (Remote Patient Monitoring)  Pulmonary embolism on right [facility]): resolved  has venal caval filter.   -     Ambulatory Referral to Care Management with Device (Remote Patient Monitoring)  Chronic systolic congestive heart failure (HCC) stable. -     Ambulatory Referral to Care Management with Device (Remote Patient Monitoring)  Hyperparathyroidism ([address] and Graves Disease monitor by Dr. Zoila Girard , endocrinology and controlled. -     Ambulatory Referral to Care Management with Device (Remote Patient Monitoring)  Coagulopathy ([address] may be due to renal complex cysts ( ? Carcinoma, not candidate for biopsy for at least 3 months. Too frail for surgery. Palliative care. -     Ambulatory Referral to Care Management with Device (Remote Patient Monitoring)    Medical Decision Making: moderate complexity  Return in 6 weeks (on 4/19/2023). Subjective:   Power wheel chair evaluation reviewed and signed. Will need due to limited ability to exert  due to Chronic systolic systolic heart failure. Also poor balance due to peripheral neuropathy      Inpatient course: Discharge summary reviewed- see chart. Interval history/Current status: admitted for bilateral severe leg pain and swelling and bilateral DVT in both common femoral veins. .  Patient has a venal caval filter that is known to extend beyond the venal caval but at this point no bowel complication. This will be monitored if bowel symptoms arise. She has seen surgery, but not a candidate , due to weakened condition for extensive surgery to remove and replace. Filter was placed due to severe GI hemorrhage with xarelto on May 31, 2022. During recent hospitalization patient was initially treated with heparin and transitions to eliquis after cleared by vascular surgery did tolerate eliquis 2.5 mg bid with decreased leg pain and swelling. NO signs of bleeding. Discharge instruction of continuing anti-coagulation for 30 days. Also :1.7 cm left lower pole kidney cystic nodule with enhancing septations.  This does not fulfill criteria for a benign simple cyst. Cystic neoplasm is not excluded. Further assessment with renal MRI should be considered if the patient is a candidate for treatment of a renal lesion. Not seen on non contrast study11/10/22. Contrast CT 6/6/22 1.8 By 1.1 complex left renal cysts unchanged in size compared to 2020. If this is a carcinoma stable size for 3 years, but could explain the hypercoagulable state. For this reason should continue anti-coagulation as long as tolerated. Not a surgical candidate and could not interrupt anticoagulation for biopsy for at least 3 months.        Patient Active Problem List   Diagnosis    Asthma    IBS (irritable bowel syndrome)    MGUS (monoclonal gammopathy of unknown significance)    Menopause    S/P colonoscopy    Left renal mass    Osteoporosis    Alkaline phosphatase elevation    Hearing loss sensory, bilateral    Bacterial overgrowth syndrome    Arthritis of knee, right    Essential hypertension    Multiple food allergies    Protein malnutrition (HCC)    Celiac disease    Aortic regurgitation    Primary osteoarthritis of both knees    Hyperparathyroidism ([address]    History of Graves' disease    Age-related osteoporosis without current pathological fracture    Bilateral carpal tunnel syndrome    Herpes zoster without complication    Multiple thyroid nodules    Adrenal adenoma, left    Adrenal adenoma, right    Postherpetic neuralgia    Chronic pain syndrome    Advanced age    Chest pain in adult    Pulmonary embolism on right (HCC)    Blindness of both eyes    Cataract    Corns and callosities    Cystoid macular degeneration, left eye    Dermatophytosis of nail    DJD (degenerative joint disease) of knee    Drusen (degenerative) of macula, bilateral    Drusen (degenerative) of macula, unspecified eye    Exposure keratoconjunctivitis, left eye    Osteoarthrosis    Exudative age-related macular degeneration (HCC)    Vitreous degeneration, bilateral    Mitral regurgitation    Chronic systolic heart failure (HCC)    Coagulopathy ([facility]    Anemia    Sciatica, right side    History of inferior vena caval filter placement    Heart failure, unspecified (HCC)    Gastroesophageal reflux disease with esophagitis and hemorrhage    History of pulmonary embolism    Peripheral nerve disorder    Acute deep vein thrombosis (DVT) of femoral vein of left lower extremity ([facility] [address]       Medications listed as ordered at the time of discharge from hospital     Medication List            Accurate as of March 8, 2023 11:59 PM. If you have any questions, ask your nurse or doctor. CHANGE how you take these medications      apixaban 2.5 MG Tabs tablet  Commonly known as: Eliquis  Take 1 tablet by mouth 2 times daily Take until 3/26/23 and evaluatel  What changed:   medication strength  when to take this  additional instructions  Changed by: Francine Rao MD            CONTINUE taking these medications      * albuterol sulfate  (90 Base) MCG/ACT inhaler  Commonly known as: PROVENTIL;VENTOLIN;PROAIR     * albuterol sulfate  (90 Base) MCG/ACT inhaler  Commonly known as: Proventil HFA  Inhale 2 puffs into the lungs every 6 hours as needed for Wheezing     ammonium lactate 12 % lotion  Commonly known as: Lac-Hydrin  Apply topically daily. * atenolol 50 MG tablet  Commonly known as: TENORMIN  TAKE 1 TABLET DAILY     * atenolol 25 MG tablet  Commonly known as: TENORMIN     * budesonide-formoterol 160-4.5 MCG/ACT Aero  Commonly known as: SYMBICORT     * budesonide-formoterol 160-4.5 MCG/ACT Aero  Commonly known as: Symbicort  Inhale 2 puffs into the lungs 2 times daily     Compression Stockings Misc  by Does not apply route Give patient the knee-high compression wraps for both legs which will be much easier to apply and more comfortable for her feet. Phone # [phone]  Fax # [phone] pharmacy please fax prescription.      * felodipine 5 MG extended release tablet  Commonly known as: PLENDIL     * felodipine 5 MG extended release tablet  Commonly known as: PLENDIL  Take 1 tablet by mouth daily     fluticasone 50 MCG/ACT nasal spray  Commonly known as: FLONASE  1 spray by Each Nostril route daily     folic acid 1 MG tablet  Commonly known as: FOLVITE  Take 1 tablet by mouth daily     * gabapentin 100 MG capsule  Commonly known as: NEURONTIN  Take 1 capsule by mouth in the morning and at bedtime for 180 days. Intended supply: 90 days     * gabapentin 100 MG capsule  Commonly known as: NEURONTIN  1 capsule 2 TIMES DAILY (route: oral)     hydrocortisone 25 MG suppository  Commonly known as: ANUSOL-HC  Place 1 suppository rectally every 12 hours     ipratropium 0.03 % nasal spray  Commonly known as: ATROVENT  USE 1 SPRAY IN EACH NOSTRIL AT BEDTIME     lidocaine 5 % ointment  Commonly known as: XYLOCAINE  Apply topically three times a day, as needed. * methIMAzole 5 MG tablet  Commonly known as: TAPAZOLE  Take 1 tablet by mouth See Admin Instructions 1 tablet by mouth daily except for Sunday, Monday, Wednesday take 1.5 tablets. * methIMAzole 5 MG tablet  Commonly known as: TAPAZOLE     * montelukast 10 MG tablet  Commonly known as: SINGULAIR  TAKE 1 TABLET BY MOUTH EVERY DAY AT NIGHT     * MONTELUKAST SODIUM PO     nystatin 024623 UNIT/ML suspension  Commonly known as: MYCOSTATIN  Take 5 mLs by mouth in the morning and 5 mLs at noon and 5 mLs in the evening and 5 mLs before bedtime. Swish and swallow. .     Nystatin 9257618 units Caps     * pantoprazole 40 MG tablet  Commonly known as: PROTONIX  TAKE 1 TABLET BY MOUTH TWICE A DAY     * pantoprazole 40 MG tablet  Commonly known as: PROTONIX  1 tablet by mouth daily     * polyethylene glycol 17 g packet  Commonly known as: GLYCOLAX     * polyethylene glycol 17 g packet  Commonly known as: GLYCOLAX  Take 17 g by mouth 2 times daily     * simethicone 180 MG capsule  Commonly known as: MYLICON     * simethicone 180 MG capsule  Commonly known as: Gas Relief  Take 180 mg by mouth 2 times daily as needed (bloating)     * spironolactone 25 MG tablet  Commonly known as: ALDACTONE  TAKE 1 TABLET DAILY     * spironolactone 25 MG tablet  Commonly known as: ALDACTONE     terbinafine 1 % cream  Commonly known as: LAMISIL  Apply topically 2 times daily. triamcinolone 0.1 % cream  Commonly known as: KENALOG  Apply topically 2 times daily to reddened area right calf. Tylenol 325 MG Caps  Generic drug: Acetaminophen     vitamin D 1.25 MG (75387 UT) Caps capsule  Commonly known as: ERGOCALCIFEROL  Take 1 capsule by mouth once a week           * This list has 22 medication(s) that are the same as other medications prescribed for you. Read the directions carefully, and ask your doctor or other care provider to review them with you.                    Where to Get Your Medications        Information about where to get these medications is not yet available    Ask your nurse or doctor about these medications  apixaban 2.5 MG Tabs tablet          Medications marked \"taking\" at this time  Outpatient Medications Marked as Taking for the 3/8/23 encounter (Office Visit) with Symone Paul MD   Medication Sig Dispense Refill    apixaban (ELIQUIS) 2.5 MG TABS tablet Take 1 tablet by mouth 2 times daily Take until 3/26/23 and evaluatel 60 tablet 0    fluticasone (FLONASE) 50 MCG/ACT nasal spray 1 spray by Each Nostril route daily 16 g 12    gabapentin (NEURONTIN) 100 MG capsule 1 capsule 2 TIMES DAILY (route: oral) 180 capsule 3    pantoprazole (PROTONIX) 40 MG tablet 1 tablet by mouth daily 90 tablet 3    atenolol (TENORMIN) 25 MG tablet 50 mg      budesonide-formoterol (SYMBICORT) 160-4.5 MCG/ACT AERO 2 puff 2 TIMES DAILY (route: inhalation)      felodipine (PLENDIL) 5 MG extended release tablet 1 tablet EVERY PM (route: oral)      simethicone (MYLICON) 272 MG capsule 1 capsule DAILY (route: oral)      methIMAzole (TAPAZOLE) 5 MG tablet Per instructions DAILY (route: oral)      MONTELUKAST SODIUM PO 10 mg      Nystatin 9966348 units CAPS 5 mL 2 TIMES DAILY (route: oral)      polyethylene glycol (GLYCOLAX) 17 g packet 17 g DAILY (route: oral)      albuterol sulfate HFA (PROVENTIL;VENTOLIN;PROAIR) 108 (90 Base) MCG/ACT inhaler 2 puff AS NEEDED (route: inhalation)      spironolactone (ALDACTONE) 25 MG tablet 1 tablet DAILY (route: oral)      Acetaminophen (TYLENOL) 325 MG CAPS 500 mg      Compression Stockings MISC by Does not apply route Give patient the knee-high compression wraps for both legs which will be much easier to apply and more comfortable for her feet. Phone # [phone]  Fax # [phone] pharmacy please fax prescription. 2 each 5    pantoprazole (PROTONIX) 40 MG tablet TAKE 1 TABLET BY MOUTH TWICE A  tablet 1    nystatin (MYCOSTATIN) 144875 UNIT/ML suspension Take 5 mLs by mouth in the morning and 5 mLs at noon and 5 mLs in the evening and 5 mLs before bedtime. Swish and swallow. . 60 mL 1    albuterol sulfate HFA (PROVENTIL HFA) 108 (90 Base) MCG/ACT inhaler Inhale 2 puffs into the lungs every 6 hours as needed for Wheezing 18 g 3    Simethicone (GAS RELIEF) 180 MG CAPS Take 180 mg by mouth 2 times daily as needed (bloating) 180 capsule 3    budesonide-formoterol (SYMBICORT) 160-4.5 MCG/ACT AERO Inhale 2 puffs into the lungs 2 times daily 1 each 1    folic acid (FOLVITE) 1 MG tablet Take 1 tablet by mouth daily 90 tablet 11    felodipine (PLENDIL) 5 MG extended release tablet Take 1 tablet by mouth daily 90 tablet 3    polyethylene glycol (GLYCOLAX) 17 g packet Take 17 g by mouth 2 times daily 100 each 5    terbinafine (LAMISIL) 1 % cream Apply topically 2 times daily. 42 g 1    montelukast (SINGULAIR) 10 MG tablet TAKE 1 TABLET BY MOUTH EVERY DAY AT NIGHT 90 tablet 3    triamcinolone (KENALOG) 0.1 % cream Apply topically 2 times daily to reddened area right calf.  80 g 0    atenolol (TENORMIN) 50 MG tablet TAKE 1 TABLET DAILY 90 tablet 3    spironolactone (ALDACTONE) 25 MG tablet TAKE 1 TABLET DAILY 90 tablet 3    ipratropium (ATROVENT) 0.03 % nasal spray USE 1 SPRAY IN EACH NOSTRIL AT BEDTIME 30 mL 1    vitamin D (ERGOCALCIFEROL) 1.25 MG (57029 UT) CAPS capsule Take 1 capsule by mouth once a week 12 capsule 3    hydrocortisone (ANUSOL-HC) 25 MG suppository Place 1 suppository rectally every 12 hours 24 suppository 0    ammonium lactate (LAC-HYDRIN) 12 % lotion Apply topically daily. 225 g 5    lidocaine (XYLOCAINE) 5 % ointment Apply topically three times a day, as needed. 2 Tube 1    methIMAzole (TAPAZOLE) 5 MG tablet Take 1 tablet by mouth See Admin Instructions 1 tablet by mouth daily except for Sunday, Monday, Wednesday take 1.5 tablets. 120 tablet 1        Medications patient taking as of now reconciled against medications ordered at time of hospital discharge: Yes    Review of Systems   Constitutional:  Negative for activity change, appetite change, chills, diaphoresis, fatigue, fever and unexpected weight change. Decreasing fatigue   HENT: Negative. Negative for congestion, ear discharge, ear pain, facial swelling, hearing loss, nosebleeds, postnasal drip, rhinorrhea, sneezing, sore throat and trouble swallowing. Graves Disease being followed by endocrinology. Eyes:  Negative for pain, discharge, redness and itching. 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 Asthma, controlled with prn inhaler. Cardiovascular:  Negative for chest pain, palpitations and leg swelling. Hypertension    Right chest and breast pain in area where she had shingles that remained after shingles resolved. Controlled with gabapentin, chronic   Gastrointestinal:  Negative for abdominal pain, anal bleeding, blood in stool, constipation, diarrhea, nausea and vomiting.         IBS and GERD with intermittent pain.   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 Endocrine: Negative. Primary hyperparathyroidism, Graves' disease, managed by endocrinologist Dr. Brigitte Martinez at [facility]   Genitourinary:  Negative for dysuria, frequency, hematuria, urgency, vaginal discharge and vaginal pain. Musculoskeletal: Negative. Negative for arthralgias, back pain, joint swelling, myalgias, neck pain and neck stiffness. Multijoint osteoarthritis. Will receive cartilage injections in both knees with ortho , this month on 10/17/14. Osteoporosis treated with Genistin  ( i cool )not a candidate for oral bisphosphonates due to GERD with frequent exacerbations. Skin:  Negative for pallor and rash. Allergic/Immunologic: Negative for environmental allergies and food allergies. Allergy on gluten-free diet   Neurological: Negative. Negative for dizziness, weakness, numbness and headaches. Still have a shingles pain but has not taken 100 mg gabapentin yet. Unfortunately when she had shingles she took 1 dose of valacyclovir and had nausea and discontinue. I gave her a low dose of Famvir but she could not tolerate that as well. The rash is now healed. Hematological: Negative. DVT and PE, should be on life long anticoagulation but had severe life-threatening hemorrhage in June 2022 and currently not on anticoagulation. Patient does have a vena cava filter    Venal caval filter extends beyond venal caval, close to bowel but no bowel perforation signs    Bilateral common femoral dvt 2/18/23 on eliquis   Psychiatric/Behavioral: Negative. Objective:    /74 (Site: Right Upper Arm, Position: Sitting, Cuff Size: Large Adult)   Pulse 65   Temp 97.1 °F (36.2 °C) (Temporal)   Wt 158 lb (71.7 kg)   LMP  (LMP Unknown)   SpO2 94%   BMI 23.33 kg/m²   Physical Exam  Constitutional:       General: She is not in acute distress. Appearance: She is not toxic-appearing.       Comments: Very pleasant elderly woman who comes appointment with her home health aide ambulates with a rolling walker   HENT:      Head: Normocephalic and atraumatic. Nose: Nose normal.   Eyes:      Comments: Legally blind with macular degeneration. Neck:      Comments: Normal range of motion of the neck was observed and no appearance of neck  masses  Cardiovascular:      Rate and Rhythm: Normal rate and regular rhythm. Heart sounds: Murmur heard. Comments: Decreased pulses in the feet. Pulmonary:      Effort: Pulmonary effort is normal.      Breath sounds: Normal breath sounds. Abdominal:      General: Abdomen is flat. There is no distension. Palpations: Abdomen is soft. There is no mass. Tenderness: There is no abdominal tenderness. There is no right CVA tenderness, left CVA tenderness or guarding. Musculoskeletal:         General: No swelling. Cervical back: Normal range of motion and neck supple. No tenderness. Right lower leg: Edema present. Left lower leg: No edema. Comments: Swelling of right lower leg but no redness rashes and mildly tender calf   Skin:     General: Skin is warm and dry. Neurological:      General: No focal deficit present. Mental Status: She is alert and oriented to person, place, and time. Cranial Nerves: No cranial nerve deficit. Motor: No weakness. Coordination: Coordination normal.      Gait: Gait abnormal.      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         Behavior: Behavior normal.         Thought Content: Thought content normal.         Judgment: Judgment normal.       An electronic signature was used to authenticate this note.   --William Mckeon MD

## 2023-03-08 NOTE — TELEPHONE ENCOUNTER
From: Ashwin Girard  To: Dr. Yamile Richards: 3/7/2023 10:27 PM EST  Subject: Power Wheelchair    The physical therapist from [facility] has recommended a power wheelchair. They have submitted the paperwork to your office and it needs to be completed at the office visit on 3/8/23. Also wondering if this visit will be a Medicare wellness visit or that will happen at another visit.     Thanks

## 2023-03-12 PROBLEM — I82.412 ACUTE DEEP VEIN THROMBOSIS (DVT) OF FEMORAL VEIN OF LEFT LOWER EXTREMITY (HCC): Status: ACTIVE | Noted: 2023-03-12

## 2023-03-12 ASSESSMENT — ENCOUNTER SYMPTOMS
SHORTNESS OF BREATH: 0
NAUSEA: 0
COUGH: 0
DIARRHEA: 0
EYE DISCHARGE: 0
ANAL BLEEDING: 0
BLOOD IN STOOL: 0
TROUBLE SWALLOWING: 0
WHEEZING: 0
BACK PAIN: 0
EYE REDNESS: 0
FACIAL SWELLING: 0
RHINORRHEA: 0
APNEA: 0
EYE ITCHING: 0
STRIDOR: 0
CHOKING: 0
SORE THROAT: 0
ALLERGIC/IMMUNOLOGIC COMMENTS: ALLERGY ON GLUTEN-FREE DIET
CONSTIPATION: 0
ABDOMINAL PAIN: 0
EYE PAIN: 0
CHEST TIGHTNESS: 0
VOMITING: 0

## 2023-03-18 ENCOUNTER — TELEPHONE (OUTPATIENT)
Dept: PRIMARY CARE CLINIC | Age: 88
End: 2023-03-18

## 2023-03-18 DIAGNOSIS — N39.0 URINARY TRACT INFECTION WITHOUT HEMATURIA, SITE UNSPECIFIED: Primary | ICD-10-CM

## 2023-03-18 RX ORDER — CIPROFLOXACIN 250 MG/1
TABLET, FILM COATED ORAL
Qty: 14 TABLET | Refills: 0 | Status: SHIPPED | OUTPATIENT
Start: 2023-03-18

## 2023-03-18 NOTE — TELEPHONE ENCOUNTER
Script for uti sent to phar at request of patient's daughter  Daughter also says pt will be running out of eliquis soon  And wanders if there are office samples.  She will call office back  Tomorrow to check on this

## 2023-03-21 DIAGNOSIS — I82.412 ACUTE DEEP VEIN THROMBOSIS (DVT) OF FEMORAL VEIN OF LEFT LOWER EXTREMITY (HCC): ICD-10-CM

## 2023-03-27 ENCOUNTER — TELEPHONE (OUTPATIENT)
Dept: PRIMARY CARE CLINIC | Age: 88
End: 2023-03-27

## 2023-04-18 ENCOUNTER — OFFICE VISIT (OUTPATIENT)
Dept: PRIMARY CARE CLINIC | Age: 88
End: 2023-04-18

## 2023-04-18 VITALS
WEIGHT: 156 LBS | SYSTOLIC BLOOD PRESSURE: 138 MMHG | BODY MASS INDEX: 23.04 KG/M2 | DIASTOLIC BLOOD PRESSURE: 78 MMHG | TEMPERATURE: 97.3 F | OXYGEN SATURATION: 98 % | HEART RATE: 87 BPM

## 2023-04-18 DIAGNOSIS — I82.412 ACUTE DEEP VEIN THROMBOSIS (DVT) OF FEMORAL VEIN OF LEFT LOWER EXTREMITY (HCC): ICD-10-CM

## 2023-04-18 DIAGNOSIS — M89.8X9 BONE PAIN: ICD-10-CM

## 2023-04-18 DIAGNOSIS — L85.3 DRY SKIN DERMATITIS: ICD-10-CM

## 2023-04-18 DIAGNOSIS — K59.01 SLOW TRANSIT CONSTIPATION: ICD-10-CM

## 2023-04-18 DIAGNOSIS — L20.89 OTHER ATOPIC DERMATITIS: ICD-10-CM

## 2023-04-18 DIAGNOSIS — I10 ESSENTIAL HYPERTENSION: ICD-10-CM

## 2023-04-18 DIAGNOSIS — J30.89 OTHER ALLERGIC RHINITIS: ICD-10-CM

## 2023-04-18 DIAGNOSIS — R35.0 URINARY FREQUENCY: ICD-10-CM

## 2023-04-18 DIAGNOSIS — J45.40 MODERATE PERSISTENT ASTHMA WITHOUT COMPLICATION: Primary | ICD-10-CM

## 2023-04-18 LAB
BILIRUBIN, POC: NEGATIVE
BLOOD URINE, POC: NORMAL
CLARITY, POC: CLEAR
COLOR, POC: YELLOW
GLUCOSE URINE, POC: NEGATIVE
KETONES, POC: NEGATIVE
LEUKOCYTE EST, POC: NEGATIVE
NITRITE, POC: NEGATIVE
PH, POC: 6
PROTEIN, POC: 30
SPECIFIC GRAVITY, POC: 1.02
UROBILINOGEN, POC: 0.2

## 2023-04-18 RX ORDER — BUDESONIDE AND FORMOTEROL FUMARATE DIHYDRATE 160; 4.5 UG/1; UG/1
AEROSOL RESPIRATORY (INHALATION)
Qty: 10.2 G | Refills: 12 | Status: SHIPPED | OUTPATIENT
Start: 2023-04-18

## 2023-04-18 RX ORDER — FLUTICASONE PROPIONATE 50 MCG
1 SPRAY, SUSPENSION (ML) NASAL DAILY
Qty: 16 G | Refills: 12 | Status: SHIPPED | OUTPATIENT
Start: 2023-04-18

## 2023-04-18 RX ORDER — MOMETASONE FUROATE 1 MG/G
CREAM TOPICAL
Qty: 15 G | Refills: 0 | Status: SHIPPED | OUTPATIENT
Start: 2023-04-18

## 2023-04-18 RX ORDER — WATER / MINERAL OIL / WHITE PETROLATUM 16 OZ
CREAM TOPICAL
Qty: 240 G | Refills: 5 | Status: SHIPPED | OUTPATIENT
Start: 2023-04-18

## 2023-04-18 RX ORDER — CALCIUM CARBONATE 200(500)MG
1 TABLET,CHEWABLE ORAL 2 TIMES DAILY
Qty: 60 TABLET | Refills: 0 | Status: SHIPPED | OUTPATIENT
Start: 2023-04-18 | End: 2023-05-18

## 2023-04-18 RX ORDER — FELODIPINE 5 MG/1
5 TABLET, EXTENDED RELEASE ORAL DAILY
Qty: 90 TABLET | Refills: 3 | Status: SHIPPED | OUTPATIENT
Start: 2023-04-18

## 2023-04-18 RX ORDER — DOCUSATE SODIUM 100 MG/1
100 CAPSULE, LIQUID FILLED ORAL 2 TIMES DAILY PRN
Qty: 60 CAPSULE | Refills: 12 | Status: SHIPPED | OUTPATIENT
Start: 2023-04-18

## 2023-04-18 NOTE — PROGRESS NOTES
throat, sweats, trouble swallowing or weight loss. Her symptoms are aggravated by change in weather and URI. Her symptoms are alleviated by steroid inhaler and beta-agonist. She reports significant improvement on treatment. Her past medical history is significant for asthma. Review of Systems   Constitutional:  Negative for activity change, appetite change, chills, diaphoresis, fatigue, fever, malaise/fatigue, unexpected weight change and weight loss. Decreasing fatigue   HENT: Negative. Negative for congestion, ear discharge, ear pain, facial swelling, hearing loss, hoarse voice, nosebleeds, postnasal drip, rhinorrhea, sneezing, sore throat and trouble swallowing. Graves Disease being followed by endocrinology. Eyes:  Negative for blurred vision, pain, discharge, redness and itching. 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 Asthma, controlled with prn inhaler. Cardiovascular:  Negative for chest pain, dyspnea on exertion, palpitations, orthopnea, leg swelling and PND. Hypertension    Right chest and breast pain in area where she had shingles that remained after shingles resolved. Controlled with gabapentin, chronic   Gastrointestinal:  Negative for abdominal pain, anal bleeding, blood in stool, constipation, diarrhea, heartburn, nausea and vomiting. IBS and GERD with intermittent pain.   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 Endocrine: Negative. Primary hyperparathyroidism, Graves' disease, managed by endocrinologist Dr. Tatiana Ortiz at [facility]   Genitourinary:  Negative for dysuria, frequency, hematuria, urgency, vaginal discharge and vaginal pain. Musculoskeletal: Negative. Negative for arthralgias, back pain, joint swelling, myalgias, neck pain and neck stiffness.         Multijoint

## 2023-04-19 LAB
BACTERIA UR CULT: ABNORMAL
ORGANISM: ABNORMAL

## 2023-04-20 ASSESSMENT — ENCOUNTER SYMPTOMS
DIFFICULTY BREATHING: 0
SPUTUM PRODUCTION: 0
FREQUENT THROAT CLEARING: 0
BACK PAIN: 0
BLURRED VISION: 0
FACIAL SWELLING: 0
SHORTNESS OF BREATH: 0
EYE ITCHING: 0
TROUBLE SWALLOWING: 0
ORTHOPNEA: 0
VOMITING: 0
WHEEZING: 0
BLOOD IN STOOL: 0
APNEA: 0
COUGH: 0
EYE REDNESS: 0
CHEST TIGHTNESS: 0
NAUSEA: 0
ABDOMINAL PAIN: 0
SORE THROAT: 0
EYE DISCHARGE: 0
HEARTBURN: 0
DIARRHEA: 0
HOARSE VOICE: 0
STRIDOR: 0
ALLERGIC/IMMUNOLOGIC COMMENTS: ALLERGY ON GLUTEN-FREE DIET
HEMOPTYSIS: 0
EYE PAIN: 0
ANAL BLEEDING: 0
CONSTIPATION: 0
CHOKING: 0
RHINORRHEA: 0

## 2023-05-10 ENCOUNTER — CARE COORDINATION (OUTPATIENT)
Dept: CARE COORDINATION | Age: 88
End: 2023-05-10

## 2023-05-10 NOTE — CARE COORDINATION
ACM out reach call placed to pt. And  spoke with daughter Jackline Severin, who states her mother is doing well at this time no issues, states her mother is getting used to maneuvering in her power chair. No further cc needs currently. Will end this episode.  Thanked writer for calling to check on mother

## 2023-05-13 DIAGNOSIS — B35.1 NAIL FUNGUS: ICD-10-CM

## 2023-05-16 NOTE — TELEPHONE ENCOUNTER
Future Appointments    Encounter Information    Provider Department Appt Notes   6/20/2023 Nkechi Cummins  [address] Primary Care 2 mth F/U (AWV after 08/12/2023)   7/26/2023 [facility] ROOM 2 Jg Ramos right dx br mmg     Past Visits    Date Provider Specialty Visit Type Primary Dx   04/18/2023 Nkechi Cummins MD Primary Care Office Visit Moderate persistent asthma without complication

## 2023-05-17 RX ORDER — TERBINAFINE HYDROCHLORIDE 250 MG/1
TABLET ORAL
Qty: 30 TABLET | Refills: 1 | Status: SHIPPED | OUTPATIENT
Start: 2023-05-17

## 2023-05-19 ENCOUNTER — TELEPHONE (OUTPATIENT)
Dept: FAMILY MEDICINE CLINIC | Age: 88
End: 2023-05-19

## 2023-05-30 DIAGNOSIS — L03.019 CELLULITIS OF FINGER, UNSPECIFIED LATERALITY: Primary | ICD-10-CM

## 2023-05-30 DIAGNOSIS — B35.1 NAIL FUNGUS: ICD-10-CM

## 2023-05-30 RX ORDER — DOXYCYCLINE HYCLATE 100 MG
100 TABLET ORAL 2 TIMES DAILY
Qty: 20 TABLET | Refills: 0 | Status: SHIPPED | OUTPATIENT
Start: 2023-05-30 | End: 2023-06-09

## 2023-06-19 DIAGNOSIS — L03.012 ABSCESS AROUND NAIL OF LEFT INDEX FINGER: Primary | ICD-10-CM

## 2023-06-20 ENCOUNTER — OFFICE VISIT (OUTPATIENT)
Dept: PRIMARY CARE CLINIC | Age: 88
End: 2023-06-20

## 2023-06-20 VITALS
BODY MASS INDEX: 22.59 KG/M2 | TEMPERATURE: 97.2 F | SYSTOLIC BLOOD PRESSURE: 139 MMHG | HEART RATE: 61 BPM | DIASTOLIC BLOOD PRESSURE: 70 MMHG | WEIGHT: 153 LBS | OXYGEN SATURATION: 98 %

## 2023-06-20 DIAGNOSIS — I82.412 ACUTE DEEP VEIN THROMBOSIS (DVT) OF FEMORAL VEIN OF LEFT LOWER EXTREMITY (HCC): ICD-10-CM

## 2023-06-20 DIAGNOSIS — E46 PROTEIN MALNUTRITION (HCC): ICD-10-CM

## 2023-06-20 DIAGNOSIS — L03.011 PARONYCHIA OF FINGER, RIGHT: Primary | ICD-10-CM

## 2023-06-20 DIAGNOSIS — E55.9 VITAMIN D DEFICIENCY: ICD-10-CM

## 2023-06-20 DIAGNOSIS — R73.9 BLOOD GLUCOSE ELEVATED: ICD-10-CM

## 2023-06-20 DIAGNOSIS — Z86.711 HISTORY OF PULMONARY EMBOLISM: ICD-10-CM

## 2023-06-20 LAB
25(OH)D3 SERPL-MCNC: 28 NG/ML
BASOPHILS # BLD: 0 K/UL (ref 0–0.2)
BASOPHILS NFR BLD: 0.3 %
DEPRECATED RDW RBC AUTO: 16.5 % (ref 12.4–15.4)
EOSINOPHIL # BLD: 0.1 K/UL (ref 0–0.6)
EOSINOPHIL NFR BLD: 1.1 %
HCT VFR BLD AUTO: 46.6 % (ref 36–48)
HGB BLD-MCNC: 15.2 G/DL (ref 12–16)
LYMPHOCYTES # BLD: 2.5 K/UL (ref 1–5.1)
LYMPHOCYTES NFR BLD: 30.3 %
MCH RBC QN AUTO: 27.3 PG (ref 26–34)
MCHC RBC AUTO-ENTMCNC: 32.6 G/DL (ref 31–36)
MCV RBC AUTO: 83.5 FL (ref 80–100)
MONOCYTES # BLD: 0.9 K/UL (ref 0–1.3)
MONOCYTES NFR BLD: 11 %
NEUTROPHILS # BLD: 4.8 K/UL (ref 1.7–7.7)
NEUTROPHILS NFR BLD: 57.3 %
PLATELET # BLD AUTO: 159 K/UL (ref 135–450)
PMV BLD AUTO: 9.7 FL (ref 5–10.5)
PREALB SERPL-MCNC: 15.9 MG/DL (ref 20–40)
RBC # BLD AUTO: 5.58 M/UL (ref 4–5.2)
VIT B12 SERPL-MCNC: 403 PG/ML (ref 211–911)
WBC # BLD AUTO: 8.4 K/UL (ref 4–11)

## 2023-06-20 RX ORDER — FEEDER CONTAINER WITH PUMP SET
1 EACH MISCELLANEOUS DAILY
Qty: 30 EACH | Refills: 12 | Status: SHIPPED | OUTPATIENT
Start: 2023-06-20

## 2023-06-20 ASSESSMENT — PATIENT HEALTH QUESTIONNAIRE - PHQ9
2. FEELING DOWN, DEPRESSED OR HOPELESS: 0
1. LITTLE INTEREST OR PLEASURE IN DOING THINGS: 0
SUM OF ALL RESPONSES TO PHQ QUESTIONS 1-9: 0
SUM OF ALL RESPONSES TO PHQ9 QUESTIONS 1 & 2: 0

## 2023-06-20 NOTE — PROGRESS NOTES
Pia Harkins (:  1926) is a 80 y.o. female,{New vs Established:[id number]::\"Established patient\"}, here for evaluation of the following chief complaint(s):  Follow-up         ASSESSMENT/PLAN:  {There are no diagnoses linked to this encounter. (Refresh or delete this SmartLink)}    No follow-ups on file. Subjective   SUBJECTIVE/OBJECTIVE:  Buttock  [id number] finger right hand nail debrided medially      Review of Systems       Objective   Physical Exam       On this date 2023 I have spent *** minutes reviewing previous notes, test results and face to face with the patient discussing the diagnosis and importance of compliance with the treatment plan as well as documenting on the day of the visit. An electronic signature was used to authenticate this note.     --Eliel Vicente MD

## 2023-06-22 LAB — FRUCTOSAMINE SERPL-SCNC: 248 UMOL/L (ref 205–285)

## 2023-06-24 DIAGNOSIS — E55.9 VITAMIN D DEFICIENCY: Primary | ICD-10-CM

## 2023-06-24 RX ORDER — ERGOCALCIFEROL 1.25 MG/1
50000 CAPSULE ORAL WEEKLY
Qty: 12 CAPSULE | Refills: 3 | Status: SHIPPED | OUTPATIENT
Start: 2023-06-24

## 2023-06-24 ASSESSMENT — ENCOUNTER SYMPTOMS
FACIAL SWELLING: 0
EYE REDNESS: 0
CONSTIPATION: 0
VOMITING: 0
STRIDOR: 0
NAUSEA: 0
BLOOD IN STOOL: 0
CHEST TIGHTNESS: 0
EYE ITCHING: 0
CHOKING: 0
DIARRHEA: 0
COUGH: 0
TROUBLE SWALLOWING: 0
EYE PAIN: 0
ALLERGIC/IMMUNOLOGIC COMMENTS: ALLERGY ON GLUTEN-FREE DIET
APNEA: 0
ABDOMINAL PAIN: 0
ANAL BLEEDING: 0
SHORTNESS OF BREATH: 0
WHEEZING: 0
EYE DISCHARGE: 0
BACK PAIN: 0
RHINORRHEA: 0
SORE THROAT: 0

## 2023-07-18 ENCOUNTER — OFFICE VISIT (OUTPATIENT)
Dept: ORTHOPEDIC SURGERY | Age: 88
End: 2023-07-18
Payer: MEDICARE

## 2023-07-18 VITALS — HEIGHT: 69 IN | BODY MASS INDEX: 22.66 KG/M2 | WEIGHT: 153 LBS | RESPIRATION RATE: 14 BRPM

## 2023-07-18 DIAGNOSIS — I10 ESSENTIAL HYPERTENSION: ICD-10-CM

## 2023-07-18 DIAGNOSIS — E46 PROTEIN MALNUTRITION (HCC): Primary | ICD-10-CM

## 2023-07-18 DIAGNOSIS — L60.0 INGROWN NAIL OF RIGHT RING FINGER: Primary | ICD-10-CM

## 2023-07-18 PROCEDURE — 1123F ACP DISCUSS/DSCN MKR DOCD: CPT | Performed by: ORTHOPAEDIC SURGERY

## 2023-07-18 PROCEDURE — 99204 OFFICE O/P NEW MOD 45 MIN: CPT | Performed by: ORTHOPAEDIC SURGERY

## 2023-07-18 RX ORDER — FEEDER CONTAINER WITH PUMP SET
1 EACH MISCELLANEOUS 2 TIMES DAILY
Qty: 60 EACH | Refills: 12 | Status: SHIPPED | OUTPATIENT
Start: 2023-07-18 | End: 2023-07-22

## 2023-07-18 RX ORDER — FELODIPINE 5 MG/1
5 TABLET, EXTENDED RELEASE ORAL DAILY
Qty: 90 TABLET | Refills: 3 | Status: SHIPPED | OUTPATIENT
Start: 2023-07-18

## 2023-07-18 NOTE — PROGRESS NOTES
finger, resolved following appropriate medical treatment. The nature of this medical problem is fully discussed with the patient and her care giver, including all treatment options. All questions are answered. No additional treatment is required at this time. They are instructed about activity precautions and skin care. If symptoms recur or worsen, the patient they are advised to contact me or return.

## 2023-07-22 DIAGNOSIS — E46 PROTEIN MALNUTRITION (HCC): Primary | ICD-10-CM

## 2023-07-26 ENCOUNTER — APPOINTMENT (OUTPATIENT)
Dept: CT IMAGING | Age: 88
End: 2023-07-26
Payer: MEDICARE

## 2023-07-26 ENCOUNTER — HOSPITAL ENCOUNTER (OUTPATIENT)
Dept: MAMMOGRAPHY | Age: 88
Discharge: HOME | End: 2023-07-26

## 2023-07-26 ENCOUNTER — APPOINTMENT (OUTPATIENT)
Dept: GENERAL RADIOLOGY | Age: 88
End: 2023-07-26
Payer: MEDICARE

## 2023-07-26 ENCOUNTER — HOSPITAL ENCOUNTER (EMERGENCY)
Age: 88
Discharge: HOME | End: 2023-07-26
Attending: EMERGENCY MEDICINE
Payer: MEDICARE

## 2023-07-26 VITALS
HEART RATE: 69 BPM | RESPIRATION RATE: 14 BRPM | BODY MASS INDEX: 24.07 KG/M2 | WEIGHT: 163 LBS | TEMPERATURE: 97.7 F | OXYGEN SATURATION: 96 % | SYSTOLIC BLOOD PRESSURE: 152 MMHG | DIASTOLIC BLOOD PRESSURE: 67 MMHG

## 2023-07-26 DIAGNOSIS — S80.02XA CONTUSION OF LEFT KNEE, INITIAL ENCOUNTER: ICD-10-CM
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
## 2023-07-26 LAB
BILIRUB UR QL STRIP.AUTO: NEGATIVE
CLARITY UR: CLEAR
COLOR UR: YELLOW
EPI CELLS #/AREA URNS HPF: ABNORMAL /HPF (ref 0–5)
GLUCOSE UR STRIP.AUTO-MCNC: NEGATIVE MG/DL
HGB UR QL STRIP.AUTO: ABNORMAL
KETONES UR STRIP.AUTO-MCNC: NEGATIVE MG/DL
LEUKOCYTE ESTERASE UR QL STRIP.AUTO: NEGATIVE
NITRITE UR QL STRIP.AUTO: NEGATIVE
PH UR STRIP.AUTO: 7.5 [PH] (ref 5–8)
PROT UR STRIP.AUTO-MCNC: NEGATIVE MG/DL
RBC #/AREA URNS HPF: ABNORMAL /HPF (ref 0–4)
SP GR UR STRIP.AUTO: 1.01 (ref 1–1.03)
UA DIPSTICK W REFLEX MICRO PNL UR: YES
URN SPEC COLLECT METH UR: ABNORMAL
UROBILINOGEN UR STRIP-ACNC: 0.2 E.U./DL
WBC #/AREA URNS HPF: ABNORMAL /HPF (ref 0–5)

## 2023-07-26 PROCEDURE — 99284 EMERGENCY DEPT VISIT MOD MDM: CPT

## 2023-07-26 PROCEDURE — 73560 X-RAY EXAM OF KNEE 1 OR 2: CPT

## 2023-07-26 PROCEDURE — 72125 CT NECK SPINE W/O DYE: CPT

## 2023-07-26 PROCEDURE — 6370000000 HC RX 637 (ALT 250 FOR IP): Performed by: EMERGENCY MEDICINE

## 2023-07-26 PROCEDURE — 70450 CT HEAD/BRAIN W/O DYE: CPT

## 2023-07-26 PROCEDURE — 73502 X-RAY EXAM HIP UNI 2-3 VIEWS: CPT

## 2023-07-26 PROCEDURE — 81001 URINALYSIS AUTO W/SCOPE: CPT

## 2023-07-26 RX ORDER — MAGNESIUM HYDROXIDE/ALUMINUM HYDROXICE/SIMETHICONE 120; 1200; 1200 MG/30ML; MG/30ML; MG/30ML
30 SUSPENSION ORAL ONCE
Status: COMPLETED | OUTPATIENT
Start: 2023-07-26 | End: 2023-07-26

## 2023-07-26 RX ORDER — ACETAMINOPHEN 160 MG/5ML
650 SOLUTION ORAL ONCE
Status: COMPLETED | OUTPATIENT
Start: 2023-07-26 | End: 2023-07-26

## 2023-07-26 RX ORDER — ACETAMINOPHEN 500 MG
1000 TABLET ORAL ONCE
Status: DISCONTINUED | OUTPATIENT
Start: 2023-07-26 | End: 2023-07-26 | Stop reason: HOSPADM

## 2023-07-26 RX ADMIN — ALUMINUM HYDROXIDE, MAGNESIUM HYDROXIDE, AND SIMETHICONE 30 ML: 200; 200; 20 SUSPENSION ORAL at 02:00

## 2023-07-26 RX ADMIN — ACETAMINOPHEN 650 MG: 650 SOLUTION ORAL at 03:23

## 2023-07-26 ASSESSMENT — ENCOUNTER SYMPTOMS
RESPIRATORY NEGATIVE: 1
GASTROINTESTINAL NEGATIVE: 1
EYES NEGATIVE: 1

## 2023-07-26 ASSESSMENT — PAIN SCALES - GENERAL
PAINLEVEL_OUTOF10: 10
PAINLEVEL_OUTOF10: 10

## 2023-07-26 ASSESSMENT — PAIN - FUNCTIONAL ASSESSMENT: PAIN_FUNCTIONAL_ASSESSMENT: 0-10

## 2023-07-26 NOTE — DISCHARGE INSTRUCTIONS
Your testing in the emergency department showed no evidence of fracture of your knee or hip, so most likely the pain in your leg is from a contusion from your fall. Your CT scan of the head and neck showed no traumatic abnormalities either. Please use Tylenol as needed for pain. Use ice over sore areas. Follow-up with your primary care provider for repeat evaluation and further testing if your symptoms continue.

## 2023-07-27 ENCOUNTER — CARE COORDINATION (OUTPATIENT)
Dept: PRIMARY CARE CLINIC | Age: 88
End: 2023-07-27

## 2023-07-27 NOTE — CARE COORDINATION
Ambulatory Care Coordination Note  2023    Patient Current Location:  Home: [address] [place] [address]  [address]    ACM contacted the patient by telephone. Verified name and  with patient as identifiers. Provided introduction to self, and explanation of the ACM role. ACM: Bela Nunez RN    Challenges to be reviewed by the provider   Additional needs identified to be addressed with provider: No  none               Method of communication with provider: none. Acm out reach call placed topt following recent ED visit for fall,  Introduced self and reason for call. Pt agreeable to future call. Pt reports she was sleeping in her manual chair and fell out onto floor. Pt states she did not have any broken bones or injury but still c/o soreness . States she does not have a seat belt  for her manual chair,  just her power chair,  Discussed contacting [employer] to see if it is possible to have one installed on her chair. Writer will contact [employer] to inquire for pt. Plan:  Contact [employer]  Introduce zone tools  Disease specific education  Continue ACM support for health coaching and resource assistance    Offered patient enrollment in the Remote Patient Monitoring (RPM) program for in-home monitoring: NA. Ambulatory Care Coordination Assessment    Care Coordination Protocol  Referral from Primary Care Provider: No  Week 1 - Initial Assessment     Do you have all of your prescriptions and are they filled?: Yes (Comment: reviewed 2.28.23)  Barriers to medication adherence: Complexity of regimen, Other, Does not understand need for medication  Other barriers to medication adherence: stomach pain  Are you able to afford your medications?: Yes  How often do you have trouble taking your medications the way you have been told to take them?: Sometimes I take them as prescribed.      Do you have Home O2 Therapy?: No      Ability to seek help/take action for Emergent Urgent situations i.e. fire,

## 2023-08-09 ENCOUNTER — PATIENT MESSAGE (OUTPATIENT)
Dept: PRIMARY CARE CLINIC | Age: 88
End: 2023-08-09

## 2023-08-09 DIAGNOSIS — J30.89 OTHER ALLERGIC RHINITIS: ICD-10-CM

## 2023-08-09 NOTE — TELEPHONE ENCOUNTER
From: Carl Kumar  To: Dr. Jessa Alva: 8/9/2023 2:06 AM EDT  Subject: medication    Dr. Adan Suresh could you please order my Singular from [facility] mail order pharmacy for me.   Thank you

## 2023-08-10 DIAGNOSIS — J30.89 OTHER ALLERGIC RHINITIS: ICD-10-CM

## 2023-08-10 RX ORDER — MONTELUKAST SODIUM 10 MG/1
TABLET ORAL
Qty: 90 TABLET | Refills: 3 | Status: SHIPPED | OUTPATIENT
Start: 2023-08-10 | End: 2023-08-22 | Stop reason: SDUPTHER

## 2023-08-10 RX ORDER — MONTELUKAST SODIUM 10 MG/1
TABLET ORAL
Qty: 90 TABLET | Refills: 3 | Status: SHIPPED | OUTPATIENT
Start: 2023-08-10 | End: 2023-08-10 | Stop reason: SDUPTHER

## 2023-08-22 ENCOUNTER — OFFICE VISIT (OUTPATIENT)
Dept: PRIMARY CARE CLINIC | Age: 88
End: 2023-08-22

## 2023-08-22 VITALS
TEMPERATURE: 97.5 F | BODY MASS INDEX: 23.63 KG/M2 | DIASTOLIC BLOOD PRESSURE: 60 MMHG | OXYGEN SATURATION: 99 % | SYSTOLIC BLOOD PRESSURE: 140 MMHG | HEART RATE: 59 BPM | WEIGHT: 160 LBS

## 2023-08-22 DIAGNOSIS — I26.99 PULMONARY EMBOLISM ON RIGHT (HCC): ICD-10-CM

## 2023-08-22 DIAGNOSIS — R10.84 GENERALIZED ABDOMINAL PAIN: ICD-10-CM

## 2023-08-22 DIAGNOSIS — Z20.822 EXPOSURE TO COVID-19 VIRUS: ICD-10-CM

## 2023-08-22 DIAGNOSIS — I10 ESSENTIAL HYPERTENSION: ICD-10-CM

## 2023-08-22 DIAGNOSIS — Z00.00 MEDICARE ANNUAL WELLNESS VISIT, SUBSEQUENT: Primary | ICD-10-CM

## 2023-08-22 DIAGNOSIS — R73.9 BLOOD GLUCOSE ELEVATED: ICD-10-CM

## 2023-08-22 DIAGNOSIS — I50.22 CHRONIC SYSTOLIC HEART FAILURE (HCC): ICD-10-CM

## 2023-08-22 DIAGNOSIS — D68.9 COAGULOPATHY (HCC): ICD-10-CM

## 2023-08-22 DIAGNOSIS — E46 PROTEIN MALNUTRITION (HCC): ICD-10-CM

## 2023-08-22 DIAGNOSIS — J30.89 OTHER ALLERGIC RHINITIS: ICD-10-CM

## 2023-08-22 DIAGNOSIS — R14.1 GAS PAIN: ICD-10-CM

## 2023-08-22 DIAGNOSIS — E21.3 HYPERPARATHYROIDISM (HCC): ICD-10-CM

## 2023-08-22 LAB
ALBUMIN SERPL-MCNC: 4.2 G/DL (ref 3.4–5)
ALBUMIN/GLOB SERPL: 1.5 {RATIO} (ref 1.1–2.2)
ALP SERPL-CCNC: 117 U/L (ref 40–129)
ALT SERPL-CCNC: 9 U/L (ref 10–40)
ANION GAP SERPL CALCULATED.3IONS-SCNC: 10 MMOL/L (ref 3–16)
AST SERPL-CCNC: 15 U/L (ref 15–37)
BASOPHILS # BLD: 0 K/UL (ref 0–0.2)
BASOPHILS NFR BLD: 0.4 %
BILIRUB SERPL-MCNC: 0.9 MG/DL (ref 0–1)
BUN SERPL-MCNC: 13 MG/DL (ref 7–20)
CALCIUM SERPL-MCNC: 10.6 MG/DL (ref 8.3–10.6)
CHLORIDE SERPL-SCNC: 106 MMOL/L (ref 99–110)
CO2 SERPL-SCNC: 27 MMOL/L (ref 21–32)
CREAT SERPL-MCNC: 0.9 MG/DL (ref 0.6–1.2)
DEPRECATED RDW RBC AUTO: 16.4 % (ref 12.4–15.4)
EOSINOPHIL # BLD: 0.1 K/UL (ref 0–0.6)
EOSINOPHIL NFR BLD: 1.2 %
GFR SERPLBLD CREATININE-BSD FMLA CKD-EPI: 58 ML/MIN/{1.73_M2}
GLUCOSE SERPL-MCNC: 90 MG/DL (ref 70–99)
HCT VFR BLD AUTO: 48.6 % (ref 36–48)
HGB BLD-MCNC: 15.9 G/DL (ref 12–16)
LIPASE SERPL-CCNC: 51 U/L (ref 13–60)
LYMPHOCYTES # BLD: 3.1 K/UL (ref 1–5.1)
LYMPHOCYTES NFR BLD: 30.8 %
MCH RBC QN AUTO: 27.6 PG (ref 26–34)
MCHC RBC AUTO-ENTMCNC: 32.7 G/DL (ref 31–36)
MCV RBC AUTO: 84.3 FL (ref 80–100)
MONOCYTES # BLD: 1.1 K/UL (ref 0–1.3)
MONOCYTES NFR BLD: 10.5 %
NEUTROPHILS # BLD: 5.8 K/UL (ref 1.7–7.7)
NEUTROPHILS NFR BLD: 57.1 %
PLATELET # BLD AUTO: 183 K/UL (ref 135–450)
PMV BLD AUTO: 9.4 FL (ref 5–10.5)
POTASSIUM SERPL-SCNC: 4.3 MMOL/L (ref 3.5–5.1)
PROT SERPL-MCNC: 7 G/DL (ref 6.4–8.2)
RBC # BLD AUTO: 5.77 M/UL (ref 4–5.2)
SODIUM SERPL-SCNC: 143 MMOL/L (ref 136–145)
WBC # BLD AUTO: 10.1 K/UL (ref 4–11)

## 2023-08-22 RX ORDER — MONTELUKAST SODIUM 10 MG/1
TABLET ORAL
Qty: 90 TABLET | Refills: 3 | Status: SHIPPED | OUTPATIENT
Start: 2023-08-22 | End: 2023-08-24 | Stop reason: SDUPTHER

## 2023-08-22 RX ORDER — FELODIPINE 2.5 MG/1
2.5 TABLET, EXTENDED RELEASE ORAL DAILY
Qty: 90 TABLET | Refills: 3 | Status: SHIPPED | OUTPATIENT
Start: 2023-08-22

## 2023-08-22 RX ORDER — SIMETHICONE 180 MG
180 CAPSULE ORAL 2 TIMES DAILY
Qty: 180 CAPSULE | Refills: 3 | Status: SHIPPED | OUTPATIENT
Start: 2023-08-22

## 2023-08-22 ASSESSMENT — PATIENT HEALTH QUESTIONNAIRE - PHQ9
SUM OF ALL RESPONSES TO PHQ QUESTIONS 1-9: 0
1. LITTLE INTEREST OR PLEASURE IN DOING THINGS: 0
2. FEELING DOWN, DEPRESSED OR HOPELESS: 0
SUM OF ALL RESPONSES TO PHQ QUESTIONS 1-9: 0
SUM OF ALL RESPONSES TO PHQ9 QUESTIONS 1 & 2: 0
SUM OF ALL RESPONSES TO PHQ QUESTIONS 1-9: 0
SUM OF ALL RESPONSES TO PHQ QUESTIONS 1-9: 0

## 2023-08-22 ASSESSMENT — LIFESTYLE VARIABLES: HOW OFTEN DO YOU HAVE A DRINK CONTAINING ALCOHOL: NEVER

## 2023-08-22 NOTE — PROGRESS NOTES
Medicare Annual Wellness Visit    Bernardo Beyer is here for Medicare AWV    Assessment & Plan   Medicare annual wellness visit, subsequent  -     Ambulatory Referral to Care Management with Device (Remote Patient Monitoring)  Other allergic rhinitis  -     montelukast (SINGULAIR) 10 MG tablet; TAKE 1 TABLET BY MOUTH EVERY DAY AT NIGHT, Disp-90 tablet, R-3Normal  -     Ambulatory Referral to Care Management with Device (Remote Patient Monitoring)  Protein malnutrition ([address])  -     Ambulatory Referral to Care Management with Device (Remote Patient Monitoring)  Essential hypertension  -     Ambulatory Referral to Care Management with Device (Remote Patient Monitoring)  -     felodipine (PLENDIL) 2.5 MG extended release tablet; Take 1 tablet by mouth daily Take with the 5 mg felodipine daily. , Disp-90 tablet, R-3Normal  -     Comprehensive Metabolic Panel; Future  Hyperparathyroidism ([address])  -     Ambulatory Referral to Care Management with Device (Remote Patient Monitoring)  Pulmonary embolism on right [facility])  -     Ambulatory Referral to Care Management with Device (Remote Patient Monitoring)  Coagulopathy ([address])  -     Ambulatory Referral to Care Management with Device (Remote Patient Monitoring)  Chronic systolic heart failure ([address])  -     Ambulatory Referral to Care Management with Device (Remote Patient Monitoring)  Exposure to COVID-19 virus  Gas pain  -     simethicone (MYLICON) 015 MG capsule; Take 1 capsule by mouth in the morning and at bedtime, Disp-180 capsule, R-3Normal  Generalized abdominal pain  -     Comprehensive Metabolic Panel; Future  -     CBC with Auto Differential; Future  -     Lipase; Future  -     Urinalysis with Microscopic; Future  Blood glucose elevated  -     simethicone (MYLICON) 848 MG capsule; Take 1 capsule by mouth in the morning and at bedtime, Disp-180 capsule, R-3Normal  -     Hemoglobin A1C; Future  -     Fructosamine;  Future  Recommendations for Preventive Services Due: see orders

## 2023-08-23 DIAGNOSIS — E46 PROTEIN MALNUTRITION (HCC): ICD-10-CM

## 2023-08-23 LAB
EST. AVERAGE GLUCOSE BLD GHB EST-MCNC: 116.9 MG/DL
HBA1C MFR BLD: 5.7 %
PREALB SERPL-MCNC: 17.5 MG/DL (ref 20–40)

## 2023-08-23 NOTE — RESULT ENCOUNTER NOTE
There is no anemia. So no sign of bleeding on the blood thinners.   The kidney function is back to normal.  Normal liver blood test.  Normal pancreas blood test.

## 2023-08-24 DIAGNOSIS — J30.89 OTHER ALLERGIC RHINITIS: ICD-10-CM

## 2023-08-24 LAB — FRUCTOSAMINE SERPL-SCNC: 266 UMOL/L (ref 205–285)

## 2023-08-24 RX ORDER — MONTELUKAST SODIUM 10 MG/1
TABLET ORAL
Qty: 90 TABLET | Refills: 3 | Status: SHIPPED | OUTPATIENT
Start: 2023-08-24

## 2023-08-24 NOTE — RESULT ENCOUNTER NOTE
The protein level is improving but needs to be higher. Increase your Ensure high-protein drinks to 2 a day and eat more fish and chicken and nuts.

## 2023-08-26 ENCOUNTER — ENROLLMENT (OUTPATIENT)
Dept: CARE COORDINATION | Age: 88
End: 2023-08-26

## 2023-09-14 ENCOUNTER — CARE COORDINATION (OUTPATIENT)
Dept: PRIMARY CARE CLINIC | Age: 88
End: 2023-09-14

## 2023-09-14 NOTE — CARE COORDINATION
Ambulatory Care Coordination Note  2023    Patient Current Location:  Home: [address] [place] [address]  [address]     ACM contacted the patient by telephone. Verified name and  with patient as identifiers. Provided introduction to self, and explanation of the ACM role. Challenges to be reviewed by the provider   Additional needs identified to be addressed with provider: No  none               Method of communication with provider: none. ACM: Bela Nunez RN    Acm out reach call placed to pt, for continuation of care coordination and education. Pt reports doing well denies any sob, denies any lower extremity swelling, wears sub hose daily. States she called to schedule flu vaccine with her pharmacy and planning to get Covid vaccine next month. Zone tool education provided with vu  Plan:  Continue acm support for care coordniation , health coaching   Follow up in couple weeks    Offered patient enrollment in the Remote Patient Monitoring (RPM) program for in-home monitoring: NA. Lab Results       None                 Goals Addressed                      This Visit's Progress      recover to optimal baseline health (pt-stated)   On track      Well recovery to optimal baseline health s/p recent IP stay end of 2023, followed by [facility] episode    Barriers: impairment:  physical: deconditioned from baseline health  Plan for overcoming my barriers: following w/home health plan of care; engaging in Care Coordination for added care team support  Confidence: 10/10  Anticipated Goal Completion Date: 23        Wellness Goal   On track      Patient Self-Management Goal for Health Maintenance  Goal: I will follow a healthy diet as discussed by my provider. I will schedule a yearly preventative care visit.   Barriers: overwhelmed by complexity of regimen and lack of education  Plan for overcoming my barriers: care coordination  Confidence: 6/10  Anticipated Goal Completion Date:

## 2023-09-21 PROBLEM — Z00.00 MEDICARE ANNUAL WELLNESS VISIT, SUBSEQUENT: Status: RESOLVED | Noted: 2023-08-22 | Resolved: 2023-09-21

## 2023-09-26 NOTE — TELEPHONE ENCOUNTER
Future Appointments    Encounter Information    Provider Department Appt Notes   10/3/2023 Areli Marcano MD [facility] Primary Care Return in 6 weeks (on 10/3/2023 ( wants to add a flu shot)     Past Visits    Date Provider Specialty Visit Type Primary Dx   08/22/2023 Areli Marcano MD Primary Care Office Visit Medicare annual wellness visit, subsequent

## 2023-09-28 ENCOUNTER — CARE COORDINATION (OUTPATIENT)
Dept: PRIMARY CARE CLINIC | Age: 88
End: 2023-09-28

## 2023-09-28 NOTE — CARE COORDINATION
Ambulatory Care Coordination Note  2023    Patient Current Location:  Home: [address] [place] [address]  [address]     ACM contacted the patient by telephone. Verified name and  with patient as identifiers. Provided introduction to self, and explanation of the ACM role. Challenges to be reviewed by the provider   Additional needs identified to be addressed with provider: No  none               Method of communication with provider: none. ACM: Bela Nunez RN        Offered patient enrollment in the Remote Patient Monitoring (RPM) program for in-home monitoring: NA.     Lab Results       None                 Goals Addressed    None         Future Appointments   Date Time Provider [address] [place] [address]   10/3/2023 11:20 AM Marilyn Hernandez MD [facility] [facility]

## 2023-10-03 ENCOUNTER — OFFICE VISIT (OUTPATIENT)
Dept: PRIMARY CARE CLINIC | Age: 88
End: 2023-10-03

## 2023-10-03 VITALS
DIASTOLIC BLOOD PRESSURE: 58 MMHG | HEART RATE: 57 BPM | BODY MASS INDEX: 23.63 KG/M2 | OXYGEN SATURATION: 96 % | WEIGHT: 160 LBS | TEMPERATURE: 97.2 F | SYSTOLIC BLOOD PRESSURE: 132 MMHG

## 2023-10-03 DIAGNOSIS — D58.2 ELEVATED HEMOGLOBIN (HCC): ICD-10-CM

## 2023-10-03 DIAGNOSIS — H92.03 OTALGIA OF BOTH EARS: ICD-10-CM

## 2023-10-03 DIAGNOSIS — R35.0 URINARY FREQUENCY: ICD-10-CM

## 2023-10-03 DIAGNOSIS — J30.89 OTHER ALLERGIC RHINITIS: ICD-10-CM

## 2023-10-03 DIAGNOSIS — R13.12 OROPHARYNGEAL DYSPHAGIA: Primary | ICD-10-CM

## 2023-10-03 PROBLEM — L03.011 PARONYCHIA OF FINGER, RIGHT: Status: RESOLVED | Noted: 2023-06-20 | Resolved: 2023-10-03

## 2023-10-03 LAB
BACTERIA URNS QL MICRO: ABNORMAL /HPF
BILIRUB UR QL STRIP.AUTO: NEGATIVE
CLARITY UR: CLEAR
COLOR UR: YELLOW
EPI CELLS #/AREA URNS AUTO: 0 /HPF (ref 0–5)
GLUCOSE UR STRIP.AUTO-MCNC: NEGATIVE MG/DL
HGB UR QL STRIP.AUTO: ABNORMAL
HYALINE CASTS #/AREA URNS AUTO: 0 /LPF (ref 0–8)
KETONES UR STRIP.AUTO-MCNC: NEGATIVE MG/DL
LEUKOCYTE ESTERASE UR QL STRIP.AUTO: NEGATIVE
NITRITE UR QL STRIP.AUTO: NEGATIVE
PH UR STRIP.AUTO: 6.5 [PH] (ref 5–8)
PROT UR STRIP.AUTO-MCNC: NEGATIVE MG/DL
RBC CLUMPS #/AREA URNS AUTO: 3 /HPF (ref 0–4)
SP GR UR STRIP.AUTO: 1 (ref 1–1.03)
UA DIPSTICK W REFLEX MICRO PNL UR: YES
URN SPEC COLLECT METH UR: ABNORMAL
UROBILINOGEN UR STRIP-ACNC: 0.2 E.U./DL
WBC #/AREA URNS AUTO: 0 /HPF (ref 0–5)

## 2023-10-03 RX ORDER — LORATADINE 10 MG/1
10 TABLET ORAL DAILY
Qty: 30 TABLET | Refills: 0 | Status: SHIPPED | OUTPATIENT
Start: 2023-10-03

## 2023-10-03 ASSESSMENT — ENCOUNTER SYMPTOMS
ANAL BLEEDING: 0
ALLERGIC/IMMUNOLOGIC COMMENTS: ALLERGY ON GLUTEN-FREE DIET
RHINORRHEA: 0
NAUSEA: 0
CONSTIPATION: 0
SORE THROAT: 0
FACIAL SWELLING: 0
EYE DISCHARGE: 0
DIARRHEA: 0
EYE REDNESS: 0
EYE ITCHING: 0
SHORTNESS OF BREATH: 0
WHEEZING: 0
EYE PAIN: 0
CHOKING: 0
STRIDOR: 0
BLOOD IN STOOL: 0
ABDOMINAL PAIN: 0
BACK PAIN: 0
APNEA: 0
VOMITING: 0
TROUBLE SWALLOWING: 0
COUGH: 0
CHEST TIGHTNESS: 0

## 2023-10-03 NOTE — PATIENT INSTRUCTIONS
Purchase and ear dropper,  apply 2 drops of olive oil twice a day to ears. Get annual covid shot at pharmacy. Get RSV vaccine at the pharmacy later.

## 2023-10-03 NOTE — PROGRESS NOTES
Negative for congestion, ear discharge, facial swelling, nosebleeds, postnasal drip, rhinorrhea, sneezing, sore throat and trouble swallowing. Graves Disease being followed by endocrinology. Eyes:  Negative for pain, discharge, redness and itching. 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 Asthma, controlled with prn inhaler. Cardiovascular:  Negative for chest pain, palpitations and leg swelling. Hypertension    Right chest and breast pain in area where she had shingles that remained after shingles resolved. Controlled with gabapentin, chronic   Gastrointestinal:  Negative for abdominal pain, anal bleeding, blood in stool, constipation, diarrhea, nausea and vomiting. IBS and GERD with intermittent pain.   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 Endocrine: Negative. Primary hyperparathyroidism, Graves' disease, managed by endocrinologist Dr. Jason Rodgers at [facility]   Genitourinary:  Positive for frequency. Negative for dysuria, flank pain, hematuria, urgency, vaginal discharge and vaginal pain. Musculoskeletal: Negative. Negative for arthralgias, back pain, joint swelling, myalgias, neck pain and neck stiffness. Multijoint osteoarthritis. Will receive cartilage injections in both knees with ortho , this month on 10/17/14. Osteoporosis treated not a candidate for oral bisphosphonates due to GERD with frequent exacerbations. Skin:  Negative for pallor and rash. Allergic/Immunologic: Negative for environmental allergies and food allergies. Allergy on gluten-free diet   Neurological: Negative. Negative for dizziness, weakness, numbness and headaches. Still have a shingles pain but has not taken 100 mg gabapentin yet.   Unfortunately when she had shingles she took 1 dose of valacyclovir and had nausea and

## 2023-10-04 LAB — BACTERIA UR CULT: NORMAL

## 2023-10-05 DIAGNOSIS — K21.00 GASTROESOPHAGEAL REFLUX DISEASE WITH ESOPHAGITIS WITHOUT HEMORRHAGE: ICD-10-CM

## 2023-10-06 RX ORDER — PANTOPRAZOLE SODIUM 40 MG/1
TABLET, DELAYED RELEASE ORAL
Qty: 180 TABLET | Refills: 1 | Status: SHIPPED | OUTPATIENT
Start: 2023-10-06

## 2023-10-06 NOTE — TELEPHONE ENCOUNTER
Future Appointments    Encounter Information    Provider Department Appt Notes   10/17/2023 Kevin Bradley MD [facility] ENT NP ref by Dr Tiffany Tenorio  ear pain PT WILL AUDIO     Past Visits    Date Provider Specialty Visit Type Primary Dx   10/03/2023 Mavis Ayon MD Primary Care Office Visit Oropharyngeal dysphagia   08/22/2023 Mavis Ayon MD Primary Care Office Visit Medicare annual wellness visit, subsequent

## 2023-10-10 NOTE — TELEPHONE ENCOUNTER
Daughter called and results reviewed. Will have mom come in any Monday , Wednesday or Friday , monthly for 1,000 mcg B 12 injection , subcutaneously. no

## 2023-10-12 ENCOUNTER — CARE COORDINATION (OUTPATIENT)
Dept: PRIMARY CARE CLINIC | Age: 88
End: 2023-10-12

## 2023-10-12 NOTE — CARE COORDINATION
Ambulatory Care Coordination Note  10/12/2023    Patient Current Location:  Home: [address] [place] [address]     ACM contacted the patient by telephone. Verified name and  with patient as identifiers. Provided introduction to self, and explanation of the ACM role. Challenges to be reviewed by the provider   Additional needs identified to be addressed with provider: No  none               Method of communication with provider: none. ACM: Bela Nunez RN        Offered patient enrollment in the Remote Patient Monitoring (RPM) program for in-home monitoring: NA. Lab Results       None                 Goals Addressed                      This Visit's Progress      recover to optimal baseline health (pt-stated)   On track      Well recovery to optimal baseline health s/p recent IP stay end of 2023, followed by [facility] episode    Barriers: impairment:  physical: deconditioned from baseline health  Plan for overcoming my barriers: following w/home health plan of care; engaging in Care Coordination for added care team support  Confidence: 10/10  Anticipated Goal Completion Date: 23        Wellness Goal   On track      Patient Self-Management Goal for Health Maintenance  Goal: I will follow a healthy diet as discussed by my provider. I will schedule a yearly preventative care visit.   Barriers: overwhelmed by complexity of regimen and lack of education  Plan for overcoming my barriers: care coordination  Confidence: 6/10  Anticipated Goal Completion Date: 2022              Future Appointments   Date Time Provider [address] [place] [address]   10/17/2023 10:00 AM Mary Anglin MD [id number]   10/19/2023  9:30 AM SCHEDULE, BAL MODIFIED SWALLOW [facility] OP SP [facility] HOD   10/19/2023  9:30 AM [address] [place] [address] [facility] FLUORO RM 1 [facility] RAD [facility]    and   Congestive Heart Failure Assessment    Are you currently restricting fluids?: No Restriction  Do you understand a low sodium

## 2023-10-24 ENCOUNTER — HOSPITAL ENCOUNTER (OUTPATIENT)
Dept: GENERAL RADIOLOGY | Age: 88
Discharge: HOME | End: 2023-10-24
Payer: MEDICARE

## 2023-10-24 DIAGNOSIS — R13.12 OROPHARYNGEAL DYSPHAGIA: ICD-10-CM

## 2023-10-24 PROCEDURE — 74230 X-RAY XM SWLNG FUNCJ C+: CPT

## 2023-10-24 PROCEDURE — 92611 MOTION FLUOROSCOPY/SWALLOW: CPT

## 2023-10-24 NOTE — PROCEDURES
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 Past Surgical History:  has a past surgical history that includes Cholecystectomy; Colonoscopy (10/29/2009); Hemorrhoid surgery; Hysterectomy (in 40's); Hysterectomy, total abdominal; Upper gastrointestinal endoscopy (N/A, 2022); IR GUIDED IVC FILTER PLACEMENT (N/A, 2022); IR GUIDED IVC FILTER PLACEMENT (2022); and Ovary removal.    CXR: no recent CXR found in chart     Prior MBS Results: n/a    Patient Complaints/Reason for Referral:  Jennifer Montes De Oca was referred for a MBS to assess the efficiency of his/her swallow function, assess for aspiration, and to make recommendations regarding safe dietary consistencies, effective compensatory strategies, and safe eating environment. Onset of problem:   Years ago- pt c/o difficulty with swallowing large pills    Behavior/Cognition:WFL- able to follow commands, respond to questions   Vision/Hearing:pt is legally blind, wears hearing aids      Impressions:  Oral Phase  WFL- no difficulty with mastication with solids.   No difficulty with oral containment or A-P transport of the bolus with any consistency  Pharyngeal Phase  WFL- no

## 2023-10-26 ENCOUNTER — CARE COORDINATION (OUTPATIENT)
Dept: PRIMARY CARE CLINIC | Age: 88
End: 2023-10-26

## 2023-10-26 ENCOUNTER — OFFICE VISIT (OUTPATIENT)
Dept: ENT CLINIC | Age: 88
End: 2023-10-26
Payer: MEDICARE

## 2023-10-26 VITALS
BODY MASS INDEX: 23.7 KG/M2 | SYSTOLIC BLOOD PRESSURE: 145 MMHG | HEIGHT: 69 IN | TEMPERATURE: 98.2 F | HEART RATE: 60 BPM | WEIGHT: 160 LBS | DIASTOLIC BLOOD PRESSURE: 61 MMHG

## 2023-10-26 DIAGNOSIS — M26.621 ARTHRALGIA OF RIGHT TEMPOROMANDIBULAR JOINT: Primary | ICD-10-CM

## 2023-10-26 PROCEDURE — 1123F ACP DISCUSS/DSCN MKR DOCD: CPT | Performed by: OTOLARYNGOLOGY

## 2023-10-26 PROCEDURE — 99202 OFFICE O/P NEW SF 15 MIN: CPT | Performed by: OTOLARYNGOLOGY

## 2023-10-26 ASSESSMENT — ENCOUNTER SYMPTOMS
EYE ITCHING: 0
SHORTNESS OF BREATH: 0
VOICE CHANGE: 0
NAUSEA: 0
SINUS PAIN: 0
TROUBLE SWALLOWING: 0
DIARRHEA: 0
SINUS PRESSURE: 0
EYE PAIN: 0
EYE REDNESS: 0
CHOKING: 0
RHINORRHEA: 0
FACIAL SWELLING: 0
SORE THROAT: 0
COUGH: 0

## 2023-10-26 NOTE — CARE COORDINATION
Ambulatory Care Coordination Note  10/26/2023    Patient Current Location:  Home: [address] [place] [address]  [address]     ACM contacted the patient by telephone. Verified name and  with patient as identifiers. Provided introduction to self, and explanation of the ACM role. Challenges to be reviewed by the provider   Additional needs identified to be addressed with provider: No  none               Method of communication with provider: none. ACM: Bela Nunez RN    Out reach call placed  to pt who reports doing well. Had Dr mcphersont today with the ENT. States they are calling her back to schedule an EGD  Denies any other issues or concerns ,reports good oral intake, taking meds as prescribed. Still wearing compression socks and elevating feet as much as possible   States she will call soon to schedule pcp appt   Will follow up in several weeks to check status    Offered patient enrollment in the Remote Patient Monitoring (RPM) program for in-home monitoring: NA. Lab Results       None            Care Coordination Interventions    Referral from Primary Care Provider: No  Suggested Interventions and Community Resources  Zone Management Tools: In Process          Goals Addressed    None         No future appointments.  and   General Assessment    Do you have any symptoms that are causing concern?: No

## 2023-10-26 NOTE — PROGRESS NOTES
external ear normal. No decreased hearing noted. No laceration, drainage, swelling or tenderness. No middle ear effusion. No foreign body. No mastoid tenderness. No hemotympanum. Tympanic membrane is not injected, perforated, retracted or bulging. Tympanic membrane has normal mobility. Ears:      Almodovar exam findings: Does not lateralize. Right Rinne: AC > BC. Left Rinne: AC > BC. Nose: No mucosal edema or rhinorrhea. Mouth/Throat:      Pharynx: No oropharyngeal exudate or posterior oropharyngeal erythema. Tonsils: No tonsillar abscesses. Eyes:      Extraocular Movements:      Right eye: Normal extraocular motion and no nystagmus. Left eye: Normal extraocular motion and no nystagmus. Pupils:      Right eye: Pupil is reactive. Left eye: Pupil is reactive. Neck:      Thyroid: No thyroid mass or thyromegaly. Musculoskeletal:      Cervical back: Normal range of motion and neck supple. Lymphadenopathy:      Head:      Right side of head: No preauricular, posterior auricular or occipital adenopathy. Left side of head: No preauricular, posterior auricular or occipital adenopathy. Cervical:      Right cervical: No superficial, deep or posterior cervical adenopathy. Left cervical: No superficial, deep or posterior cervical adenopathy. Skin:     Findings: No bruising, erythema or lesion. Neurological:      Mental Status: She is alert and oriented to person, place, and time. Psychiatric:         Attention and Perception: Attention normal.         Mood and Affect: Mood normal.         Speech: Speech normal.         Assessment:       Diagnosis Orders   1. Arthralgia of right temporomandibular joint                Plan:      Soft diet. Advil. Warm compresses. Call orthodontist or dentist for better fitting dentures. No Otolaryngological disease.          Keyona Nazario MD

## 2023-10-26 NOTE — RESULT ENCOUNTER NOTE
Normal swallowing tests which means you can drink any type of liquid you have 1 and eat any food consistency you desire.

## 2023-10-27 RX ORDER — FOLIC ACID 1 MG/1
1 TABLET ORAL DAILY
Qty: 90 TABLET | Refills: 3 | Status: SHIPPED | OUTPATIENT
Start: 2023-10-27 | End: 2024-10-26

## 2023-11-01 DIAGNOSIS — J30.89 OTHER ALLERGIC RHINITIS: ICD-10-CM

## 2023-11-01 NOTE — TELEPHONE ENCOUNTER
Medication:   Requested Prescriptions     Pending Prescriptions Disp Refills    loratadine (CLARITIN) 10 MG tablet 90 tablet 1     Sig: Take 1 tablet by mouth daily        Last Filled:      Patient Phone Number: [phone] (home) [phone] (work)    Last appt: 10/3/2023   Next appt: Visit date not found    Last OARRS:       9/11/2019    12:00 PM   RX Monitoring   Periodic Controlled Substance Monitoring Assessed functional status.

## 2023-11-02 RX ORDER — LORATADINE 10 MG/1
10 TABLET ORAL DAILY
Qty: 90 TABLET | Refills: 1 | Status: SHIPPED | OUTPATIENT
Start: 2023-11-02

## 2023-11-02 RX ORDER — LORATADINE 10 MG/1
10 TABLET ORAL DAILY
Qty: 30 TABLET | Refills: 0 | OUTPATIENT
Start: 2023-11-02

## 2023-11-11 DIAGNOSIS — J45.40 MODERATE PERSISTENT ASTHMA WITHOUT COMPLICATION: Primary | ICD-10-CM

## 2023-11-11 RX ORDER — FLUTICASONE PROPIONATE AND SALMETEROL XINAFOATE 115; 21 UG/1; UG/1
2 AEROSOL, METERED RESPIRATORY (INHALATION) 2 TIMES DAILY
Qty: 3 EACH | Refills: 3 | Status: SHIPPED | OUTPATIENT
Start: 2023-11-11

## 2023-11-21 ENCOUNTER — OFFICE VISIT (OUTPATIENT)
Dept: PRIMARY CARE CLINIC | Age: 88
End: 2023-11-21

## 2023-11-21 VITALS — OXYGEN SATURATION: 93 % | DIASTOLIC BLOOD PRESSURE: 64 MMHG | HEART RATE: 66 BPM | SYSTOLIC BLOOD PRESSURE: 132 MMHG

## 2023-11-21 DIAGNOSIS — M79.89 PAIN AND SWELLING OF RIGHT LOWER LEG: ICD-10-CM

## 2023-11-21 DIAGNOSIS — M79.89 PAIN AND SWELLING OF LEFT LOWER LEG: Primary | ICD-10-CM

## 2023-11-21 DIAGNOSIS — M79.661 PAIN AND SWELLING OF RIGHT LOWER LEG: ICD-10-CM

## 2023-11-21 DIAGNOSIS — M79.662 PAIN AND SWELLING OF LEFT LOWER LEG: Primary | ICD-10-CM

## 2023-11-21 DIAGNOSIS — M79.89 PAIN AND SWELLING OF LEFT LOWER LEG: ICD-10-CM

## 2023-11-21 DIAGNOSIS — M79.662 PAIN AND SWELLING OF LEFT LOWER LEG: ICD-10-CM

## 2023-11-21 LAB
ALBUMIN SERPL-MCNC: 4 G/DL (ref 3.4–5)
ANION GAP SERPL CALCULATED.3IONS-SCNC: 11 MMOL/L (ref 3–16)
BASOPHILS # BLD: 0.1 K/UL (ref 0–0.2)
BASOPHILS NFR BLD: 0.7 %
BUN SERPL-MCNC: 10 MG/DL (ref 7–20)
CALCIUM SERPL-MCNC: 10.2 MG/DL (ref 8.3–10.6)
CHLORIDE SERPL-SCNC: 105 MMOL/L (ref 99–110)
CO2 SERPL-SCNC: 23 MMOL/L (ref 21–32)
CREAT SERPL-MCNC: 1 MG/DL (ref 0.6–1.2)
DEPRECATED RDW RBC AUTO: 15.2 % (ref 12.4–15.4)
EOSINOPHIL # BLD: 0.1 K/UL (ref 0–0.6)
EOSINOPHIL NFR BLD: 1.3 %
GFR SERPLBLD CREATININE-BSD FMLA CKD-EPI: 51 ML/MIN/{1.73_M2}
GLUCOSE SERPL-MCNC: 99 MG/DL (ref 70–99)
HCT VFR BLD AUTO: 48.8 % (ref 36–48)
HGB BLD-MCNC: 16.2 G/DL (ref 12–16)
LYMPHOCYTES # BLD: 2.5 K/UL (ref 1–5.1)
LYMPHOCYTES NFR BLD: 28 %
MCH RBC QN AUTO: 28.1 PG (ref 26–34)
MCHC RBC AUTO-ENTMCNC: 33.1 G/DL (ref 31–36)
MCV RBC AUTO: 84.9 FL (ref 80–100)
MONOCYTES # BLD: 1 K/UL (ref 0–1.3)
MONOCYTES NFR BLD: 10.7 %
NEUTROPHILS # BLD: 5.4 K/UL (ref 1.7–7.7)
NEUTROPHILS NFR BLD: 59.3 %
PHOSPHATE SERPL-MCNC: 2.7 MG/DL (ref 2.5–4.9)
PLATELET # BLD AUTO: 159 K/UL (ref 135–450)
PMV BLD AUTO: 10.2 FL (ref 5–10.5)
POTASSIUM SERPL-SCNC: 4 MMOL/L (ref 3.5–5.1)
PREALB SERPL-MCNC: 16.4 MG/DL (ref 20–40)
RBC # BLD AUTO: 5.75 M/UL (ref 4–5.2)
SODIUM SERPL-SCNC: 139 MMOL/L (ref 136–145)
WBC # BLD AUTO: 9.1 K/UL (ref 4–11)

## 2023-11-21 NOTE — PROGRESS NOTES
Solo Arthur (:  1926) is a 80 y.o. female,Established patient, here for evaluation of the following chief complaint(s):  Leg Swelling         ASSESSMENT/PLAN:  1. Pain and swelling of left lower leg history of recurrent DVT and recent bilateral thigh swelling on Eliquis 2.5 mg twice a day will check stat venous Doppler to rule out acute DVT  -     VL DUP LOWER EXTREMITY VENOUS BILATERAL; Future  -     Renal Function Panel; Future  -     CBC with Auto Differential; Future  -     Prealbumin; Future  2. Pain and swelling of right lower leg is said swelling for 2 days of the right thigh on Eliquis and history of recurrent DVTs check stat venous Doppler  -     VL DUP LOWER EXTREMITY VENOUS BILATERAL; Future  -     Renal Function Panel; Future  -     CBC with Auto Differential; Future  -     Prealbumin; Future      Return in about 4 weeks (around 2023). Subjective   SUBJECTIVE/OBJECTIVE:  Edema  Chronicity: History of recurrent DVTs that presented with sudden onset of leg swelling and baseline is resolution of leg swelling for the past 2 days swelling of bilateral thighs and uncomfortable. Previous clots have been high femoral. The current episode started in the past 7 days. The problem occurs constantly. The problem has been unchanged. Associated symptoms include fatigue. Pertinent negatives include no abdominal pain, anorexia, arthralgias, change in bowel habit, chest pain, chills, congestion, coughing, diaphoresis, fever, headaches, joint swelling, myalgias, nausea, neck pain, numbness, rash, sore throat, swollen glands, urinary symptoms, vertigo, visual change, vomiting or weakness. Nothing aggravates the symptoms. She has tried nothing (She is on Eliquis 2.5 mg twice a day) for the symptoms. The treatment provided no relief. Review of Systems   Constitutional:  Positive for fatigue.  Negative for activity change, appetite change, chills, diaphoresis, fever and unexpected weight

## 2023-11-22 ASSESSMENT — ENCOUNTER SYMPTOMS
SWOLLEN GLANDS: 0
FACIAL SWELLING: 0
BACK PAIN: 0
STRIDOR: 0
NAUSEA: 0
SHORTNESS OF BREATH: 0
EYE DISCHARGE: 0
CHOKING: 0
VISUAL CHANGE: 0
EYE REDNESS: 0
ANAL BLEEDING: 0
WHEEZING: 0
CHEST TIGHTNESS: 0
APNEA: 0
TROUBLE SWALLOWING: 0
DIARRHEA: 0
BLOOD IN STOOL: 0
EYE PAIN: 0
EYE ITCHING: 0
CONSTIPATION: 0
VOMITING: 0
ABDOMINAL PAIN: 0
ALLERGIC/IMMUNOLOGIC COMMENTS: ALLERGY ON GLUTEN-FREE DIET
CHANGE IN BOWEL HABIT: 0
SORE THROAT: 0
RHINORRHEA: 0
COUGH: 0

## 2023-11-23 NOTE — RESULT ENCOUNTER NOTE
Stable kidney function.  The protein level has gone down which will cause more swelling.  Start drinking the 30 g protein drinks 1 a day and eat more chicken and fish and nuts/peanut butter.  There is no anemia.

## 2023-11-30 ENCOUNTER — HOSPITAL ENCOUNTER (OUTPATIENT)
Dept: VASCULAR LAB | Age: 88
Discharge: HOME | End: 2023-11-30
Payer: MEDICARE

## 2023-11-30 ENCOUNTER — TELEPHONE (OUTPATIENT)
Dept: PRIMARY CARE CLINIC | Age: 88
End: 2023-11-30

## 2023-11-30 DIAGNOSIS — M79.89 PAIN AND SWELLING OF RIGHT LOWER LEG: ICD-10-CM

## 2023-11-30 DIAGNOSIS — I82.403 RECURRENT ACUTE DEEP VEIN THROMBOSIS (DVT) OF BOTH LOWER EXTREMITIES (HCC): Primary | ICD-10-CM

## 2023-11-30 DIAGNOSIS — M79.661 PAIN AND SWELLING OF RIGHT LOWER LEG: ICD-10-CM

## 2023-11-30 DIAGNOSIS — I82.4Y1 ACUTE DEEP VEIN THROMBOSIS (DVT) OF PROXIMAL VEIN OF RIGHT LOWER EXTREMITY (HCC): ICD-10-CM

## 2023-11-30 DIAGNOSIS — R73.03 PREDIABETES: ICD-10-CM

## 2023-11-30 DIAGNOSIS — M79.89 PAIN AND SWELLING OF LEFT LOWER LEG: ICD-10-CM

## 2023-11-30 DIAGNOSIS — M79.662 PAIN AND SWELLING OF LEFT LOWER LEG: ICD-10-CM

## 2023-11-30 PROCEDURE — 93970 EXTREMITY STUDY: CPT

## 2023-11-30 NOTE — TELEPHONE ENCOUNTER
Acute dvt right leg on Eliquis 2.5 mg twice a day. History of recurrent DVTs. Previous left femoral DVT resolved. Will get cardiolipin antibody lupus anticoagulant, because if positive should switch over to warfarin. Increase Eliquis 5 mg twice a day. No clots with Xarelto in the past but she had bleeding with Xarelto. No bleeding now with normal hemoglobin. Lab Results   Component Value Date    WBC 9.1 11/21/2023    HGB 16.2 (H) 11/21/2023    HCT 48.8 (H) 11/21/2023    MCV 84.9 11/21/2023     11/21/2023   Monitor carefully. Spoke with daughter and explained results. She will adjust medications and prescription sent in.

## 2023-12-01 NOTE — RESULT ENCOUNTER NOTE
Discussed results with patient's daughter and will increase Eliquis to 5 mg twice a day. Will also order the lupus anticoagulant because if this is positive it would be more appropriate for patient to be treated with warfarin since she is continuing to form clots on anticoagulant therapy.

## 2023-12-02 DIAGNOSIS — N64.4 BREAST PAIN, RIGHT: Primary | ICD-10-CM

## 2023-12-06 DIAGNOSIS — R73.03 PREDIABETES: ICD-10-CM

## 2023-12-06 DIAGNOSIS — I82.403 RECURRENT ACUTE DEEP VEIN THROMBOSIS (DVT) OF BOTH LOWER EXTREMITIES (HCC): ICD-10-CM

## 2023-12-07 ENCOUNTER — CARE COORDINATION (OUTPATIENT)
Dept: PRIMARY CARE CLINIC | Age: 88
End: 2023-12-07

## 2023-12-07 LAB
EST. AVERAGE GLUCOSE BLD GHB EST-MCNC: 122.6 MG/DL
HBA1C MFR BLD: 5.9 %

## 2023-12-07 NOTE — CARE COORDINATION
Ambulatory Care Coordination Note  2023    Patient Current Location:  Home: [address] [place] [address]     ACM contacted the patient by telephone. Verified name and  with patient as identifiers. Provided introduction to self, and explanation of the ACM role. Challenges to be reviewed by the provider   Additional needs identified to be addressed with provider: No  none               Method of communication with provider: none. ACM: Bela Nunez RN    Acm out reach call placed pt doing well no issues or concerns, no issues with bowel or bladder. Adequate oral intake   Disease specific education completed with vu  Will follow up at later date    Offered patient enrollment in the Remote Patient Monitoring (RPM) program for in-home monitoring: Patient declined. Lab Results       None            Care Coordination Interventions    Referral from Primary Care Provider: No  Suggested Interventions and Community Resources  Zone Management Tools: In Process          Goals Addressed    None         No future appointments.

## 2023-12-08 LAB
CARDIOLIPIN IGG SER IA-ACNC: <10 GPL
CARDIOLIPIN IGM SER IA-ACNC: 17 MPL

## 2023-12-10 LAB
APTT IMM NP PPP: ABNORMAL SEC (ref 32–48)
APTT P HEP NEUT PPP: 46 SEC (ref 32–48)
CONFIRM APTT STACLOT: ABNORMAL
DRVVT SCREEN TO CONFIRM RATIO: ABNORMAL RATIO
LA 3 SCREEN W REFLEX-IMP: ABNORMAL
LA NT DPL PPP QL: ABNORMAL
MIXING DRVVT: ABNORMAL SEC (ref 33–44)
PROTHROMBIN TIME: 14.6 SEC (ref 12–15.5)
REPTILASE TIME: 18.3 SEC
SCREEN APTT: 51 SEC (ref 32–48)
SCREEN DRVVT: 42 SEC (ref 33–44)
THROMBIN TIME: 20.5 SEC (ref 14.7–19.5)

## 2023-12-11 DIAGNOSIS — I82.403 RECURRENT ACUTE DEEP VEIN THROMBOSIS (DVT) OF BOTH LOWER EXTREMITIES (HCC): Primary | ICD-10-CM

## 2023-12-13 DIAGNOSIS — L08.9 SKIN PUSTULE: Primary | ICD-10-CM

## 2023-12-13 RX ORDER — DOXYCYCLINE HYCLATE 100 MG
100 TABLET ORAL 2 TIMES DAILY
Qty: 14 TABLET | Refills: 0 | Status: SHIPPED | OUTPATIENT
Start: 2023-12-13 | End: 2023-12-20

## 2023-12-13 NOTE — PROGRESS NOTES
Skin pustules on lateral both legs suspicious for MRSA we will give doxycycline for a week and follow-up.

## 2023-12-27 ENCOUNTER — CARE COORDINATION (OUTPATIENT)
Dept: PRIMARY CARE CLINIC | Age: 88
End: 2023-12-27

## 2023-12-27 NOTE — CARE COORDINATION
Ambulatory Care Coordination Note  2023    Patient Current Location:  Home: [address] [place] [address]  [address]     ACM contacted the patient by telephone. Verified name and  with patient as identifiers. Provided introduction to self, and explanation of the ACM role. Challenges to be reviewed by the provider   Additional needs identified to be addressed with provider: No  none               Method of communication with provider: none. ACM: Bela Nunez RN    Acm out reach call placed to pt, who is doing well, denies any concerning issues. Reports adequate oral intake and hydration denies any  GI / issues. Reviewed symptoms that require pcp notification or seeking care   Plan to dc at next out reach pt agreeable    Offered patient enrollment in the Remote Patient Monitoring (RPM) program for in-home monitoring: Patient declined. Lab Results       None            Care Coordination Interventions    Referral from Primary Care Provider: No  Suggested Interventions and Community Resources  Zone Management Tools: In Process          Goals Addressed                      This Visit's Progress      recover to optimal baseline health (pt-stated)   On track      Well recovery to optimal baseline health s/p recent IP stay end of 2023, followed by [address] episode    Barriers: impairment:  physical: deconditioned from baseline health  Plan for overcoming my barriers: following w/home health plan of care; engaging in Care Coordination for added care team support  Confidence: 10/10  Anticipated Goal Completion Date: 23        Wellness Goal   On track      Patient Self-Management Goal for Health Maintenance  Goal: I will follow a healthy diet as discussed by my provider. I will schedule a yearly preventative care visit.   Barriers: overwhelmed by complexity of regimen and lack of education  Plan for overcoming my barriers: care coordination  Confidence: 6/10  Anticipated Goal

## 2024-01-02 ENCOUNTER — CARE COORDINATION (OUTPATIENT)
Dept: CARE COORDINATION | Age: 89
End: 2024-01-02

## 2024-01-02 RX ORDER — GABAPENTIN 100 MG/1
CAPSULE ORAL
Qty: 180 CAPSULE | Refills: 3 | Status: SHIPPED | OUTPATIENT
Start: 2024-01-02 | End: 2025-01-02

## 2024-01-02 NOTE — CARE COORDINATION
SW collaborated with [facility] and confirmed that SW referral was sent in error. No outreach planned at this time.     Francia Soares MSW, LSW     [phone]

## 2024-01-02 NOTE — TELEPHONE ENCOUNTER
Medication:   Requested Prescriptions     Pending Prescriptions Disp Refills    gabapentin (NEURONTIN) 100 MG capsule [Pharmacy Med Name: GABAPENTIN CAP 100MG] 180 capsule 3     Sig: TAKE 1 CAPSULE TWICE DAILY     Last Filled:  1/5/23    Last appt: 11/21/2023   Next appt: Visit date not found    Last OARRS:       9/11/2019    12:00 PM   RX Monitoring   Periodic Controlled Substance Monitoring Assessed functional status.

## 2024-01-06 RX ORDER — TRIAMCINOLONE ACETONIDE 1 MG/G
CREAM TOPICAL
Qty: 80 G | Refills: 0 | Status: SHIPPED | OUTPATIENT
Start: 2024-01-06

## 2024-01-10 DIAGNOSIS — I82.4Y1 ACUTE DEEP VEIN THROMBOSIS (DVT) OF PROXIMAL VEIN OF RIGHT LOWER EXTREMITY (HCC): ICD-10-CM

## 2024-01-22 ENCOUNTER — CARE COORDINATION (OUTPATIENT)
Dept: PRIMARY CARE CLINIC | Age: 89
End: 2024-01-22

## 2024-01-22 NOTE — TELEPHONE ENCOUNTER
Medication:   Requested Prescriptions     Pending Prescriptions Disp Refills    nystatin (MYCOSTATIN) 144165 UNIT/ML suspension [Pharmacy Med Name: NYSTATIN 100,000 UNIT/ML SUSP] 60 mL 1     Sig: SWISH AND SWALLOW 5 ML FOUR TIMES A DAY IN THE MORNING, AT NOON IN THE EVENING AND BEFORE BEDTIME        Last appt: 11/21/2023     Please advise

## 2024-02-13 ENCOUNTER — OFFICE VISIT (OUTPATIENT)
Dept: PRIMARY CARE CLINIC | Age: 89
End: 2024-02-13
Payer: MEDICARE

## 2024-02-13 VITALS
TEMPERATURE: 99 F | HEIGHT: 69 IN | BODY MASS INDEX: 22.22 KG/M2 | SYSTOLIC BLOOD PRESSURE: 132 MMHG | OXYGEN SATURATION: 96 % | HEART RATE: 68 BPM | WEIGHT: 150 LBS | DIASTOLIC BLOOD PRESSURE: 60 MMHG | RESPIRATION RATE: 15 BRPM

## 2024-02-13 DIAGNOSIS — R50.9 FEVER, UNSPECIFIED FEVER CAUSE: ICD-10-CM

## 2024-02-13 DIAGNOSIS — R05.1 ACUTE COUGH: Primary | ICD-10-CM

## 2024-02-13 DIAGNOSIS — R05.1 ACUTE COUGH: ICD-10-CM

## 2024-02-13 LAB
ALBUMIN SERPL-MCNC: 3.9 G/DL (ref 3.4–5)
ALBUMIN/GLOB SERPL: 1.4 {RATIO} (ref 1.1–2.2)
ALP SERPL-CCNC: 95 U/L (ref 40–129)
ALT SERPL-CCNC: 7 U/L (ref 10–40)
ANION GAP SERPL CALCULATED.3IONS-SCNC: 15 MMOL/L (ref 3–16)
ANISOCYTOSIS BLD QL SMEAR: ABNORMAL
AST SERPL-CCNC: 18 U/L (ref 15–37)
BASOPHILS # BLD: 0 K/UL (ref 0–0.2)
BASOPHILS NFR BLD: 0 %
BILIRUB SERPL-MCNC: 0.9 MG/DL (ref 0–1)
BUN SERPL-MCNC: 16 MG/DL (ref 7–20)
CALCIUM SERPL-MCNC: 9.9 MG/DL (ref 8.3–10.6)
CHLORIDE SERPL-SCNC: 102 MMOL/L (ref 99–110)
CO2 SERPL-SCNC: 21 MMOL/L (ref 21–32)
CREAT SERPL-MCNC: 1.1 MG/DL (ref 0.6–1.2)
DEPRECATED RDW RBC AUTO: 15.4 % (ref 12.4–15.4)
EOSINOPHIL # BLD: 0.1 K/UL (ref 0–0.6)
EOSINOPHIL NFR BLD: 1 %
GFR SERPLBLD CREATININE-BSD FMLA CKD-EPI: 46 ML/MIN/{1.73_M2}
GLUCOSE SERPL-MCNC: 126 MG/DL (ref 70–99)
HCT VFR BLD AUTO: 46.1 % (ref 36–48)
HGB BLD-MCNC: 15.1 G/DL (ref 12–16)
INFLUENZA A ANTIBODY: NEGATIVE
INFLUENZA B ANTIBODY: NEGATIVE
LYMPHOCYTES # BLD: 1.3 K/UL (ref 1–5.1)
LYMPHOCYTES NFR BLD: 16 %
MCH RBC QN AUTO: 28.1 PG (ref 26–34)
MCHC RBC AUTO-ENTMCNC: 32.7 G/DL (ref 31–36)
MCV RBC AUTO: 85.9 FL (ref 80–100)
MONOCYTES # BLD: 0.9 K/UL (ref 0–1.3)
MONOCYTES NFR BLD: 12 %
NEUTROPHILS # BLD: 5.5 K/UL (ref 1.7–7.7)
NEUTROPHILS NFR BLD: 70 %
NEUTS VAC BLD QL SMEAR: PRESENT
OVALOCYTES BLD QL SMEAR: ABNORMAL
PLATELET # BLD AUTO: 138 K/UL (ref 135–450)
PLATELET BLD QL SMEAR: ADEQUATE
PMV BLD AUTO: 9.9 FL (ref 5–10.5)
POLYCHROMASIA BLD QL SMEAR: ABNORMAL
POTASSIUM SERPL-SCNC: 4 MMOL/L (ref 3.5–5.1)
PROT SERPL-MCNC: 6.7 G/DL (ref 6.4–8.2)
RBC # BLD AUTO: 5.37 M/UL (ref 4–5.2)
SLIDE REVIEW: ABNORMAL
SODIUM SERPL-SCNC: 138 MMOL/L (ref 136–145)
TOXIC GRANULES BLD QL SMEAR: PRESENT
VARIANT LYMPHS NFR BLD MANUAL: 1 % (ref 0–6)
WBC # BLD AUTO: 7.9 K/UL (ref 4–11)

## 2024-02-13 PROCEDURE — 1123F ACP DISCUSS/DSCN MKR DOCD: CPT | Performed by: INTERNAL MEDICINE

## 2024-02-13 PROCEDURE — 99213 OFFICE O/P EST LOW 20 MIN: CPT | Performed by: INTERNAL MEDICINE

## 2024-02-13 PROCEDURE — 87804 INFLUENZA ASSAY W/OPTIC: CPT | Performed by: INTERNAL MEDICINE

## 2024-02-13 RX ORDER — CEFUROXIME AXETIL 250 MG/1
250 TABLET ORAL 2 TIMES DAILY
Qty: 20 TABLET | Refills: 0 | Status: SHIPPED | OUTPATIENT
Start: 2024-02-13 | End: 2024-02-23

## 2024-02-13 NOTE — PROGRESS NOTES
Shanon Hsieh (:  1926) is a 89 y.o. female,Established patient, here for evaluation of the following chief complaint(s):  Cough, Fever, and Otalgia         ASSESSMENT/PLAN:  1. Acute cough with scattered rhonchi negative flu test in office will also get COVID test and start Ceftin.  If COVID test is positive we will switch to Paxlovid.  -     COVID-19; Future  -     CBC with Auto Differential; Future  -     Comprehensive Metabolic Panel; Future  -     cefUROXime (CEFTIN) 250 MG tablet; Take 1 tablet by mouth 2 times daily for 10 days, Disp-20 tablet, R-0Normal      Return in about 4 weeks (around 3/12/2024) for AWV.         Subjective   SUBJECTIVE/OBJECTIVE:  Cough  This is a new (Patient's main caregiver caught the flu and patient was exposed and yesterday developed symptoms) problem. The current episode started yesterday. The problem has been unchanged. The problem occurs constantly. The cough is Non-productive. Associated symptoms include a fever, a sore throat and sweats. Pertinent negatives include no chest pain, chills, ear congestion, ear pain, eye redness, headaches, heartburn, hemoptysis, myalgias, nasal congestion, postnasal drip, rash, rhinorrhea, shortness of breath or wheezing. Nothing aggravates the symptoms. Risk factors: Negative smoking history no recent travel or animal exposure. She has tried nothing for the symptoms. The treatment provided no relief. There is no history of environmental allergies.       Review of Systems   Constitutional:  Positive for fatigue and fever. Negative for activity change, appetite change, chills, diaphoresis and unexpected weight change.        Decreasing fatigue   HENT:  Positive for hearing loss and sore throat. Negative for congestion, ear discharge, ear pain, facial swelling, nosebleeds, postnasal drip, rhinorrhea, sneezing and trouble swallowing.         Graves Disease being followed by endocrinology.       Eyes:  Positive for visual disturbance.

## 2024-02-14 NOTE — RESULT ENCOUNTER NOTE
White blood cell count is normal and no anemia.  Nonfasting blood sugar was mildly elevated will follow.  Kidney function is still good and stable.  Mildly reduced kidney function for age is stable.

## 2024-02-15 ENCOUNTER — CARE COORDINATION (OUTPATIENT)
Dept: CASE MANAGEMENT | Age: 89
End: 2024-02-15

## 2024-02-15 NOTE — CARE COORDINATION
Ambulatory Care Coordination Note  2/15/2024    Patient Current Location:  Home: [address] [place] [address]  [address]  [facility] [address]     LPN CC contacted the family by telephone. Verified name and  with family as identifiers. Provided introduction to self, and explanation of the LPN CC role.     Challenges to be reviewed by the provider   Additional needs identified to be addressed with provider: No  none               Method of communication with provider: none.    ACM: Rachael Manzanares LPN  Patients daughter Mai reports. Patient is doing good. She is on an antibiotic for URI. She denies sore throat, fever, chills, sob, congestion nvd or pain. Patient eating and drinking good. No urinary or bowel issues. No needs.    Plan:  F/U in a week  Assess needs  Possible graduation        Offered patient enrollment in the Remote Patient Monitoring (RPM) program for in-home monitoring:    .    Lab Results       None            Care Coordination Interventions    Referral from Primary Care Provider: No  Suggested Interventions and Community Resources  Zone Management Tools: In Process          Goals Addressed    None         Future Appointments   Date Time Provider Department Center   2024 10:00 AM [facility]  1 [facility] T [facility]       Rachael Manzanares LPN  Care Coordinator  [phone]

## 2024-02-16 ENCOUNTER — HOSPITAL ENCOUNTER (OUTPATIENT)
Dept: ULTRASOUND IMAGING | Age: 89
Discharge: HOME | End: 2024-02-16
Payer: MEDICARE

## 2024-02-16 DIAGNOSIS — N64.4 BREAST PAIN, RIGHT: ICD-10-CM

## 2024-02-16 PROCEDURE — 76641 ULTRASOUND BREAST COMPLETE: CPT

## 2024-02-22 ENCOUNTER — TRANSCRIBE ORDERS (OUTPATIENT)
Dept: ADMINISTRATIVE | Age: 89
End: 2024-02-22

## 2024-02-22 DIAGNOSIS — R22.41 LOCALIZED SWELLING, MASS AND LUMP, RIGHT LOWER LIMB: Primary | ICD-10-CM

## 2024-02-22 DIAGNOSIS — I82.401 ACUTE EMBOLISM AND THROMBOSIS OF DEEP VEIN OF LOWER EXTREMITY, RIGHT (HCC): Primary | ICD-10-CM

## 2024-02-22 DIAGNOSIS — R22.41 LOCALIZED SWELLING, MASS AND LUMP, RIGHT LOWER LIMB: ICD-10-CM

## 2024-02-22 DIAGNOSIS — I82.401 ACUTE EMBOLISM AND THROMBOSIS OF DEEP VEIN OF RIGHT LOWER EXTREMITY (HCC): ICD-10-CM

## 2024-02-23 ENCOUNTER — TELEPHONE (OUTPATIENT)
Dept: PRIMARY CARE CLINIC | Age: 89
End: 2024-02-23

## 2024-02-23 DIAGNOSIS — N63.10 MASS OF RIGHT BREAST, UNSPECIFIED QUADRANT: Primary | ICD-10-CM

## 2024-02-23 NOTE — TELEPHONE ENCOUNTER
Spoke with patient's daughter and explained the breast center discussed the mammogram findings and gave patient the option of biopsy versus follow-up ultrasound in 6 months and patient decided on doing the follow-up ultrasound.  Will make sure orders are placed.    Needs AWV exam in March.    Please schedule patient for annual wellness exam in March or April.

## 2024-02-29 ENCOUNTER — HOSPITAL ENCOUNTER (OUTPATIENT)
Dept: VASCULAR LAB | Age: 89
Discharge: HOME | End: 2024-02-29
Payer: MEDICARE

## 2024-02-29 DIAGNOSIS — R22.41 LOCALIZED SWELLING, MASS AND LUMP, RIGHT LOWER LIMB: ICD-10-CM

## 2024-02-29 PROCEDURE — 93971 EXTREMITY STUDY: CPT

## 2024-03-04 DIAGNOSIS — I10 ESSENTIAL HYPERTENSION: ICD-10-CM

## 2024-03-05 RX ORDER — ATENOLOL 50 MG/1
TABLET ORAL
Qty: 90 TABLET | Refills: 3 | Status: SHIPPED | OUTPATIENT
Start: 2024-03-05

## 2024-03-05 NOTE — TELEPHONE ENCOUNTER
Medication:   Requested Prescriptions     Pending Prescriptions Disp Refills    atenolol (TENORMIN) 50 MG tablet [Pharmacy Med Name: ATENOLOL TAB 50MG] 90 tablet 3     Sig: TAKE 1 TABLET DAILY. HOLD  FOR PULSE OF 60 OR LESS.        Last Filled:      Patient Phone Number: [phone] (home)     Last appt: 2/13/2024   Next appt: Visit date not found    Last OARRS:       9/11/2019    12:00 PM   RX Monitoring   Periodic Controlled Substance Monitoring Assessed functional status.

## 2024-03-14 ENCOUNTER — CARE COORDINATION (OUTPATIENT)
Dept: PRIMARY CARE CLINIC | Age: 89
End: 2024-03-14

## 2024-03-14 NOTE — CARE COORDINATION
Ambulatory Care Coordination Note  3/14/2024    Patient Current Location:  Home: [address] [place] [address]  [address]  [facility] [address]     ACM contacted the patient by telephone. Verified name and  with patient as identifiers. Provided introduction to self, and explanation of the ACM role.     Challenges to be reviewed by the provider   Additional needs identified to be addressed with provider: No  none               Method of communication with provider: none.    ACM: Bela Nunez RN    Acm out reach calll laced to pt, who reports she is doing ok she guess no new or concerning issues .no gi/ gu issues reported, adequate oral intake  Will graduate at t his time as no further care coordination needs    Offered patient enrollment in the Remote Patient Monitoring (RPM) program for in-home monitoring:     Lab Results       None            Care Coordination Interventions    Referral from Primary Care Provider: No  Suggested Interventions and Community Resources  Zone Management Tools: In Process          Goals Addressed    None         No future appointments. and   General Assessment    Do you have any symptoms that are causing concern?: No

## 2024-03-16 NOTE — RESULT ENCOUNTER NOTE
The protein level in the blood is low. Increase the fish and chicken and eggs and nuts in the diet. Low protein can cause your legs to swell. Your vitamin D level is low. Normally the vitamin D level should be above 30. A level of 50 is ideal.  Vitamin D is important for healthy bones and a healthy immune system. Low vitamin D levels making you more prone to getting viral infections. Also a chronically low vitamin D level is a risk factor for breast and colon cancer. I sent a vitamin D prescription to the pharmacy. 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

## 2024-04-05 DIAGNOSIS — K21.00 GASTROESOPHAGEAL REFLUX DISEASE WITH ESOPHAGITIS WITHOUT HEMORRHAGE: ICD-10-CM

## 2024-04-08 RX ORDER — PANTOPRAZOLE SODIUM 40 MG/1
TABLET, DELAYED RELEASE ORAL
Qty: 180 TABLET | Refills: 1 | Status: SHIPPED | OUTPATIENT
Start: 2024-04-08

## 2024-04-08 NOTE — TELEPHONE ENCOUNTER
Medication:   Requested Prescriptions     Pending Prescriptions Disp Refills    pantoprazole (PROTONIX) 40 MG tablet [Pharmacy Med Name: PANTOPRAZOLE SOD DR 40 MG TAB] 180 tablet 1     Sig: TAKE 1 TABLET BY MOUTH TWICE A DAY        Last Filled:      Patient Phone Number: [phone] (home)     Last appt: 2/13/2024   Next appt: Visit date not found    Last OARRS:       9/11/2019    12:00 PM   RX Monitoring   Periodic Controlled Substance Monitoring Assessed functional status.

## 2024-05-01 DIAGNOSIS — I82.4Y1 ACUTE DEEP VEIN THROMBOSIS (DVT) OF PROXIMAL VEIN OF RIGHT LOWER EXTREMITY (HCC): ICD-10-CM

## 2024-05-01 RX ORDER — APIXABAN 5 MG/1
5 TABLET, FILM COATED ORAL 2 TIMES DAILY
Qty: 180 TABLET | Refills: 0 | Status: SHIPPED | OUTPATIENT
Start: 2024-05-01

## 2024-05-01 NOTE — TELEPHONE ENCOUNTER
Medication:   Requested Prescriptions     Pending Prescriptions Disp Refills    ELIQUIS 5 MG TABS tablet [Pharmacy Med Name: ELIQUIS 5 MG TABLET] 180 tablet 0     Sig: TAKE 1 TABLET BY MOUTH TWICE A DAY        Last Filled:      Patient Phone Number: [phone] (home)     Last appt: 2/13/2024   Next appt: Visit date not found    Last OARRS:       9/11/2019    12:00 PM   RX Monitoring   Periodic Controlled Substance Monitoring Assessed functional status.

## 2024-05-13 NOTE — TELEPHONE ENCOUNTER
Medication:   Requested Prescriptions     Pending Prescriptions Disp Refills    nystatin (MYCOSTATIN) 061250 UNIT/ML suspension [Pharmacy Med Name: NYSTATIN 100,000 UNIT/ML SUSP] 60 mL 1     Sig: SWISH AND SWALLOW 5 ML FOUR TIMES A DAY IN THE MORNING, AT NOON IN THE EVENING AND BEFORE BEDTIME        Last Filled:      Patient Phone Number: [phone] (home)     Last appt: 2/13/2024   Next appt: Visit date not found    Last OARRS:       9/11/2019    12:00 PM   RX Monitoring   Periodic Controlled Substance Monitoring Assessed functional status.

## 2024-05-15 NOTE — TELEPHONE ENCOUNTER
Please send prescription for  hemmroids suppoditory & please give call to daughter John Brown) to discuss her mother medication send to [facility] pharmacy on [address] [facility] Pt appears well nourished with no physical signs of malnutrition noted

## 2024-05-23 RX ORDER — TRIAMCINOLONE ACETONIDE 1 MG/G
CREAM TOPICAL
Qty: 80 G | Refills: 0 | Status: SHIPPED | OUTPATIENT
Start: 2024-05-23

## 2024-05-23 NOTE — TELEPHONE ENCOUNTER
Medication:   Requested Prescriptions     Pending Prescriptions Disp Refills    triamcinolone (KENALOG) 0.1 % cream [Pharmacy Med Name: TRIAMCINOLONE 0.1% CREAM] 80 g 0     Sig: APPLY TOPICALLY 2 TIMES DAILY TO REDDENED AREA RIGHT CALF.        Last Filled:      Patient Phone Number: [phone] (home)     Last appt: 2/13/2024   Next appt: Visit date not found    Last OARRS:       9/11/2019    12:00 PM   RX Monitoring   Periodic Controlled Substance Monitoring Assessed functional status.

## 2024-06-03 ENCOUNTER — PATIENT MESSAGE (OUTPATIENT)
Dept: PRIMARY CARE CLINIC | Age: 89
End: 2024-06-03

## 2024-06-03 NOTE — PATIENT INSTRUCTIONS
1. Gabapentin 100mg at bedtime for 3 days, then  Gabapentin 100mg in AM and PM for 3 days,  Then 100/200 AM/PM x 3 days  Then 200/200 AM/PM x 3 days  Then 300/300 AM/PM x 3 days  Watch for \"hangover\" in morning    2. Xifaxan for IBS, bloating 2x/day for 21 days    3. Finish xarelto current prescription, then do not resume.     4.After 2nd covid shot recovery, check with pharmacy for Shingrix shot series (shot, then repeat shot in 2 months) Refer to the Assessment tab to view/cancel completed assessment.

## 2024-06-04 NOTE — TELEPHONE ENCOUNTER
Pt's daughter is unable to get pt in the office today. She needs a aide to bring her. Pt has been placed on the schedule for tomorrow.

## 2024-06-05 ENCOUNTER — OFFICE VISIT (OUTPATIENT)
Dept: PRIMARY CARE CLINIC | Age: 89
End: 2024-06-05

## 2024-06-05 VITALS
OXYGEN SATURATION: 95 % | HEART RATE: 64 BPM | DIASTOLIC BLOOD PRESSURE: 60 MMHG | SYSTOLIC BLOOD PRESSURE: 150 MMHG | TEMPERATURE: 97.8 F

## 2024-06-05 DIAGNOSIS — R30.0 DYSURIA: ICD-10-CM

## 2024-06-05 DIAGNOSIS — J30.89 NON-SEASONAL ALLERGIC RHINITIS, UNSPECIFIED TRIGGER: ICD-10-CM

## 2024-06-05 DIAGNOSIS — M25.451 SWELLING OF JOINT OF PELVIC REGION OR THIGH, RIGHT: ICD-10-CM

## 2024-06-05 DIAGNOSIS — M79.651 RIGHT THIGH PAIN: ICD-10-CM

## 2024-06-05 DIAGNOSIS — R06.09 DOE (DYSPNEA ON EXERTION): ICD-10-CM

## 2024-06-05 DIAGNOSIS — B37.0 ORAL THRUSH: ICD-10-CM

## 2024-06-05 DIAGNOSIS — Z71.89 ACP (ADVANCE CARE PLANNING): Primary | ICD-10-CM

## 2024-06-05 LAB
ALBUMIN SERPL-MCNC: 4.2 G/DL (ref 3.4–5)
ALBUMIN/GLOB SERPL: 1.4 {RATIO} (ref 1.1–2.2)
ALP SERPL-CCNC: 114 U/L (ref 40–129)
ALT SERPL-CCNC: 8 U/L (ref 10–40)
ANION GAP SERPL CALCULATED.3IONS-SCNC: 7 MMOL/L (ref 3–16)
ANISOCYTOSIS BLD QL SMEAR: ABNORMAL
AST SERPL-CCNC: 17 U/L (ref 15–37)
BASOPHILS # BLD: 0 K/UL (ref 0–0.2)
BASOPHILS NFR BLD: 0 %
BILIRUB SERPL-MCNC: 0.7 MG/DL (ref 0–1)
BUN SERPL-MCNC: 8 MG/DL (ref 7–20)
CALCIUM SERPL-MCNC: 10.7 MG/DL (ref 8.3–10.6)
CHLORIDE SERPL-SCNC: 102 MMOL/L (ref 99–110)
CO2 SERPL-SCNC: 29 MMOL/L (ref 21–32)
CREAT SERPL-MCNC: 1 MG/DL (ref 0.6–1.2)
DEPRECATED RDW RBC AUTO: 15.7 % (ref 12.4–15.4)
EOSINOPHIL # BLD: 0.3 K/UL (ref 0–0.6)
EOSINOPHIL NFR BLD: 3 %
GFR SERPLBLD CREATININE-BSD FMLA CKD-EPI: 51 ML/MIN/{1.73_M2}
GLUCOSE SERPL-MCNC: 78 MG/DL (ref 70–99)
HCT VFR BLD AUTO: 48.6 % (ref 36–48)
HGB BLD-MCNC: 15.9 G/DL (ref 12–16)
LYMPHOCYTES # BLD: 2.9 K/UL (ref 1–5.1)
LYMPHOCYTES NFR BLD: 29 %
MCH RBC QN AUTO: 28.3 PG (ref 26–34)
MCHC RBC AUTO-ENTMCNC: 32.7 G/DL (ref 31–36)
MCV RBC AUTO: 86.7 FL (ref 80–100)
MONOCYTES # BLD: 1.1 K/UL (ref 0–1.3)
MONOCYTES NFR BLD: 13 %
NEUTROPHILS # BLD: 4.3 K/UL (ref 1.7–7.7)
NEUTROPHILS NFR BLD: 50 %
NT-PROBNP SERPL-MCNC: 285 PG/ML (ref 0–449)
PLATELET # BLD AUTO: 154 K/UL (ref 135–450)
PLATELET BLD QL SMEAR: ADEQUATE
PMV BLD AUTO: 10.2 FL (ref 5–10.5)
POTASSIUM SERPL-SCNC: 4.2 MMOL/L (ref 3.5–5.1)
PREALB SERPL-MCNC: 15.3 MG/DL (ref 20–40)
PROT SERPL-MCNC: 7.2 G/DL (ref 6.4–8.2)
RBC # BLD AUTO: 5.61 M/UL (ref 4–5.2)
SLIDE REVIEW: ABNORMAL
SODIUM SERPL-SCNC: 138 MMOL/L (ref 136–145)
VARIANT LYMPHS NFR BLD MANUAL: 5 % (ref 0–6)
WBC # BLD AUTO: 8.5 K/UL (ref 4–11)

## 2024-06-05 RX ORDER — FLUCONAZOLE 150 MG/1
150 TABLET ORAL
Qty: 2 TABLET | Refills: 0 | Status: SHIPPED | OUTPATIENT
Start: 2024-06-05 | End: 2024-06-11

## 2024-06-05 RX ORDER — ZINC OXIDE 13 %
1 CREAM (GRAM) TOPICAL DAILY
Qty: 30 CAPSULE | Refills: 12 | Status: SHIPPED | OUTPATIENT
Start: 2024-06-05

## 2024-06-05 RX ORDER — LEVOCETIRIZINE DIHYDROCHLORIDE 5 MG/1
2.5 TABLET, FILM COATED ORAL NIGHTLY
Qty: 45 TABLET | Refills: 3 | Status: SHIPPED | OUTPATIENT
Start: 2024-06-05

## 2024-06-05 NOTE — PATIENT INSTRUCTIONS

## 2024-06-05 NOTE — PROGRESS NOTES
Advance Care Planning   Discussed the patient’s choices for care and treatment preferences in case of a health event that adversely affects decision-making abilities or is life-limiting. Recommended the patient document care preferences in state-specific advance directives. Also reviewed the process of designating a trusted capable adult as an Agent (or Health Care Power of ) to make health care decisions for the patient if the patient becomes unable due to incapacity. Patient was asked to complete advance directive forms, if not already done, and to provide a dated, signed and witnessed (or notarized) copy, per the forms' requirements, to the practice office.  Patient would like to be a non code.  Shanon Hsieh (:  1926) is a 89 y.o. female,Established patient, here for evaluation of the following chief complaint(s):  Edema and Leg Swelling (bilateral)      Assessment & Plan   ASSESSMENT/PLAN:  1. ACP (advance care planning) patient stated she did not want resuscitation if her heart were to stop.  She is here in the office with her caregiver.  Patient is alert and oriented and judgment is intact.  Will discuss with her daughter as well before placing the official order.  2. Right thigh pain over the past week with some tense swelling.  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  Patient is chronically on Eliquis 5 mg twice a day without signs of bleeding.  She did have bleeding with Xarelto in the past/GI bleeding.  On exam size was tense on the right and swollen and painful and mild swelling on the left.  The left thigh was not painful.  Patient wears her support stockings regularly and has them on and no swelling of the lower legs.  Suspect this is due to compliance with support stockings.  Will get stat Doppler to rule out DVT and high risk secondary to patient.  High risk because of past history of clots and sedentary.  -     Vascular duplex lower extremity venous bilateral; Future  3.

## 2024-06-06 ENCOUNTER — HOSPITAL ENCOUNTER (OUTPATIENT)
Dept: VASCULAR LAB | Age: 89
Discharge: HOME | End: 2024-06-08
Payer: MEDICARE

## 2024-06-06 DIAGNOSIS — M79.651 RIGHT THIGH PAIN: ICD-10-CM

## 2024-06-06 DIAGNOSIS — M25.451 SWELLING OF JOINT OF PELVIC REGION OR THIGH, RIGHT: ICD-10-CM

## 2024-06-06 PROCEDURE — 93970 EXTREMITY STUDY: CPT

## 2024-06-07 DIAGNOSIS — E83.52 HYPERCALCEMIA: Primary | ICD-10-CM

## 2024-06-07 NOTE — RESULT ENCOUNTER NOTE
Previous message sent about the Doppler with the blood.  No clot in the left leg and chronic clot in the right leg but no new clot.  Continue anticoagulation and swelling due to low protein levels and increase protein in diet.

## 2024-06-07 NOTE — RESULT ENCOUNTER NOTE
The blood test shows no anemia.  Normal platelets. These are usually elevated if there is a clot.  The Protein level shows protein malnutrition at 15.3 with normal 20-40.  I suspected that at our visit and counselled her to increase protein in the diet with more fish and chicken since she does not eat beef.  Peas and beans are a good source of plant protein to add in addition.   Stable kidney function test.  The liver is normal. The heart failure test is negative.    The calcium level was elevated.  WE need some additional blood test to evaluate this.  I place the orders.  The tests are not fasting.  [facility] to have [facility] soon and I will update you on the results.    The doppler study showed chronic clot in the right leg, but no acute clot and no clot in the left leg.  This means the swelling is due to the low protein. Water pills will not help this.  We will increase the protein in the diet.

## 2024-07-02 ENCOUNTER — OFFICE VISIT (OUTPATIENT)
Dept: PRIMARY CARE CLINIC | Age: 89
End: 2024-07-02

## 2024-07-02 VITALS
OXYGEN SATURATION: 98 % | RESPIRATION RATE: 16 BRPM | DIASTOLIC BLOOD PRESSURE: 64 MMHG | WEIGHT: 155 LBS | BODY MASS INDEX: 22.89 KG/M2 | TEMPERATURE: 97.2 F | SYSTOLIC BLOOD PRESSURE: 138 MMHG | HEART RATE: 62 BPM

## 2024-07-02 DIAGNOSIS — L03.011 PARONYCHIA OF FINGER OF RIGHT HAND: ICD-10-CM

## 2024-07-02 DIAGNOSIS — B37.0 THRUSH: Primary | ICD-10-CM

## 2024-07-02 DIAGNOSIS — M79.641 PAIN IN BOTH HANDS: ICD-10-CM

## 2024-07-02 DIAGNOSIS — M79.642 PAIN IN BOTH HANDS: ICD-10-CM

## 2024-07-02 DIAGNOSIS — R30.0 DYSURIA: ICD-10-CM

## 2024-07-02 RX ORDER — CLOTRIMAZOLE 10 MG/1
10 LOZENGE ORAL; TOPICAL
Qty: 50 TABLET | Refills: 0 | Status: SHIPPED | OUTPATIENT
Start: 2024-07-02 | End: 2024-07-12

## 2024-07-02 SDOH — ECONOMIC STABILITY: FOOD INSECURITY: WITHIN THE PAST 12 MONTHS, YOU WORRIED THAT YOUR FOOD WOULD RUN OUT BEFORE YOU GOT MONEY TO BUY MORE.: NEVER TRUE

## 2024-07-02 SDOH — ECONOMIC STABILITY: INCOME INSECURITY: HOW HARD IS IT FOR YOU TO PAY FOR THE VERY BASICS LIKE FOOD, HOUSING, MEDICAL CARE, AND HEATING?: NOT HARD AT ALL

## 2024-07-02 SDOH — ECONOMIC STABILITY: FOOD INSECURITY: WITHIN THE PAST 12 MONTHS, THE FOOD YOU BOUGHT JUST DIDN'T LAST AND YOU DIDN'T HAVE MONEY TO GET MORE.: NEVER TRUE

## 2024-07-02 ASSESSMENT — PATIENT HEALTH QUESTIONNAIRE - PHQ9
SUM OF ALL RESPONSES TO PHQ QUESTIONS 1-9: 0
1. LITTLE INTEREST OR PLEASURE IN DOING THINGS: NOT AT ALL
SUM OF ALL RESPONSES TO PHQ QUESTIONS 1-9: 0
2. FEELING DOWN, DEPRESSED OR HOPELESS: NOT AT ALL
SUM OF ALL RESPONSES TO PHQ9 QUESTIONS 1 & 2: 0

## 2024-07-24 ENCOUNTER — OFFICE VISIT (OUTPATIENT)
Dept: PRIMARY CARE CLINIC | Age: 89
End: 2024-07-24

## 2024-07-24 VITALS — SYSTOLIC BLOOD PRESSURE: 138 MMHG | HEART RATE: 58 BPM | DIASTOLIC BLOOD PRESSURE: 77 MMHG | OXYGEN SATURATION: 97 %

## 2024-07-24 DIAGNOSIS — I10 ESSENTIAL HYPERTENSION: ICD-10-CM

## 2024-07-24 DIAGNOSIS — R07.9 RIGHT-SIDED CHEST PAIN: ICD-10-CM

## 2024-07-24 DIAGNOSIS — N64.4 BREAST PAIN, RIGHT: Primary | ICD-10-CM

## 2024-07-24 DIAGNOSIS — K14.6 TONGUE PAIN: ICD-10-CM

## 2024-07-24 DIAGNOSIS — M79.2 NEUROPATHIC PAIN OF FINGER OF RIGHT HAND: ICD-10-CM

## 2024-07-24 LAB
ALBUMIN SERPL-MCNC: 4.3 G/DL (ref 3.4–5)
ANION GAP SERPL CALCULATED.3IONS-SCNC: 14 MMOL/L (ref 3–16)
BUN SERPL-MCNC: 11 MG/DL (ref 7–20)
CALCIUM SERPL-MCNC: 10.6 MG/DL (ref 8.3–10.6)
CHLORIDE SERPL-SCNC: 105 MMOL/L (ref 99–110)
CO2 SERPL-SCNC: 25 MMOL/L (ref 21–32)
CREAT SERPL-MCNC: 1 MG/DL (ref 0.6–1.2)
GFR SERPLBLD CREATININE-BSD FMLA CKD-EPI: 51 ML/MIN/{1.73_M2}
GLUCOSE SERPL-MCNC: 80 MG/DL (ref 70–99)
PHOSPHATE SERPL-MCNC: 3 MG/DL (ref 2.5–4.9)
POTASSIUM SERPL-SCNC: 4.5 MMOL/L (ref 3.5–5.1)
SODIUM SERPL-SCNC: 144 MMOL/L (ref 136–145)

## 2024-07-24 RX ORDER — CLOTRIMAZOLE 10 MG/1
10 LOZENGE ORAL; TOPICAL
Qty: 50 TABLET | Refills: 0 | Status: SHIPPED | OUTPATIENT
Start: 2024-07-24 | End: 2024-08-03

## 2024-07-26 ASSESSMENT — ENCOUNTER SYMPTOMS
EYE ITCHING: 0
ORTHOPNEA: 0
EYE DISCHARGE: 0
PHOTOPHOBIA: 0
BLURRED VISION: 0
COUGH: 0
BACK PAIN: 0
VOMITING: 0
DIARRHEA: 0
RHINORRHEA: 0
CONSTIPATION: 0
APNEA: 0
WHEEZING: 0
SORE THROAT: 0
CHEST TIGHTNESS: 0
ABDOMINAL PAIN: 0
EYE REDNESS: 0
CHOKING: 0
SHORTNESS OF BREATH: 0
FACIAL SWELLING: 0
ALLERGIC/IMMUNOLOGIC COMMENTS: ALLERGY ON GLUTEN-FREE DIET
TROUBLE SWALLOWING: 0
BREAST PAIN: 1
STRIDOR: 0
BLOOD IN STOOL: 0
EYE PAIN: 0
ANAL BLEEDING: 0
NAUSEA: 0

## 2024-08-08 ENCOUNTER — HOSPITAL ENCOUNTER (OUTPATIENT)
Dept: CT IMAGING | Age: 89
Discharge: HOME | End: 2024-08-08
Payer: MEDICARE

## 2024-08-08 DIAGNOSIS — R07.9 RIGHT-SIDED CHEST PAIN: ICD-10-CM

## 2024-08-08 PROCEDURE — 71250 CT THORAX DX C-: CPT

## 2024-08-08 NOTE — RESULT ENCOUNTER NOTE
No masses were seen in the breast or soft tissues of the chest.  The thyroid continues to enlarge so I will fax this result to Dr. Aparicio.  Make an additional appointment to see him to discuss.  I am aware that you recently saw him on July 30.  Edgar Aparicio MD   [address] [place] [id number] [phone] (Work)   [phone] (Fax)

## 2024-08-13 DIAGNOSIS — K59.01 SLOW TRANSIT CONSTIPATION: ICD-10-CM

## 2024-08-13 RX ORDER — DOCUSATE SODIUM 100 MG/1
100 CAPSULE, LIQUID FILLED ORAL 2 TIMES DAILY PRN
Qty: 60 CAPSULE | Refills: 12 | Status: SHIPPED | OUTPATIENT
Start: 2024-08-13

## 2024-08-13 NOTE — TELEPHONE ENCOUNTER
Medication:   Requested Prescriptions     Pending Prescriptions Disp Refills    docusate sodium (COLACE) 100 MG capsule [Pharmacy Med Name: DOCUSATE SODIUM 100 MG SOFTGEL] 60 capsule 12     Sig: TAKE 1 CAPSULE BY MOUTH 2 TIMES DAILY AS NEEDED FOR CONSTIPATION STOOL SOFTENER.        Last Filled:      Patient Phone Number: [phone] (home)     Last appt: 7/24/2024   Next appt: 8/20/2024    Last OARRS:       9/11/2019    12:00 PM   RX Monitoring   Periodic Controlled Substance Monitoring Assessed functional status.

## 2024-08-20 ENCOUNTER — OFFICE VISIT (OUTPATIENT)
Dept: PRIMARY CARE CLINIC | Age: 89
End: 2024-08-20
Payer: MEDICARE

## 2024-08-20 VITALS
SYSTOLIC BLOOD PRESSURE: 148 MMHG | BODY MASS INDEX: 21.47 KG/M2 | OXYGEN SATURATION: 97 % | HEART RATE: 83 BPM | RESPIRATION RATE: 16 BRPM | WEIGHT: 150 LBS | DIASTOLIC BLOOD PRESSURE: 72 MMHG | HEIGHT: 70 IN

## 2024-08-20 DIAGNOSIS — K14.8 MASS OF TONGUE: ICD-10-CM

## 2024-08-20 DIAGNOSIS — H10.13 ALLERGIC CONJUNCTIVITIS OF BOTH EYES: ICD-10-CM

## 2024-08-20 DIAGNOSIS — E07.9 THYROID MASS: ICD-10-CM

## 2024-08-20 DIAGNOSIS — Z00.00 MEDICARE ANNUAL WELLNESS VISIT, SUBSEQUENT: Primary | ICD-10-CM

## 2024-08-20 PROCEDURE — 1123F ACP DISCUSS/DSCN MKR DOCD: CPT | Performed by: INTERNAL MEDICINE

## 2024-08-20 PROCEDURE — G0439 PPPS, SUBSEQ VISIT: HCPCS | Performed by: INTERNAL MEDICINE

## 2024-08-20 RX ORDER — OLOPATADINE HYDROCHLORIDE 2 MG/ML
1 SOLUTION/ DROPS OPHTHALMIC DAILY PRN
Qty: 2.5 ML | Refills: 0 | Status: SHIPPED | OUTPATIENT
Start: 2024-08-20

## 2024-08-20 ASSESSMENT — PATIENT HEALTH QUESTIONNAIRE - PHQ9
2. FEELING DOWN, DEPRESSED OR HOPELESS: NOT AT ALL
SUM OF ALL RESPONSES TO PHQ QUESTIONS 1-9: 0
SUM OF ALL RESPONSES TO PHQ QUESTIONS 1-9: 0
SUM OF ALL RESPONSES TO PHQ9 QUESTIONS 1 & 2: 0
1. LITTLE INTEREST OR PLEASURE IN DOING THINGS: NOT AT ALL
SUM OF ALL RESPONSES TO PHQ QUESTIONS 1-9: 0
SUM OF ALL RESPONSES TO PHQ QUESTIONS 1-9: 0

## 2024-08-20 NOTE — PROGRESS NOTES
Shanon Hsieh (:  1926) is a 89 y.o. female,Established patient, here for evaluation of the following chief complaint(s):  Medicare AWV  poke to pt reviewed MD message with her. She expressed an understanding.    She is ok with having the thyroid US. Patient does not want to have surgery at 89 years old.  Electronically signed by Sophia Huff at 2024 10:56 AM EDT   A1c    Contains abnormal data CMP (BAMP,TP,ALB,TBIL,ALK,AST,ALT)  Specimen: Whole blood  Component  Ref Range & Units 3 wk ago Comments   SODIUM  135 - 145 mEq/L 146 High     POTASSIUM  3.6 - 5.1 mEq/L 4.5    CHLORIDE  98 - 111 mEq/L 107    CO2  21 - 31 mmol/L 30    GLUCOSE, RANDOM  70 - 99 mg/dL 91    BLD UREA NITROGEN  8 - 26 mg/dL 12    CREATININE  0.60 - 1.20 mg/dL 1.10    CALCIUM  8.5 - 10.4 mg/dL 10.4    TOTAL PROTEIN  6.0 - 8.0 g/dL 6.9    ALBUMIN  3.5 - 5.7 g/dL 4.0    TOTAL BILIRUBIN  0.0 - 1.2 mg/dL 0.8    ALK PHOSPHATASE  35 - 135 IU/L 93    AST  10 - 40 IU/L 15    ALT  10 - 60 IU/L 7 Low     ESTIMATED GFR  >59 mL/min/1.73 m2 45 Low  Estimated GFR was calculated using the CKD-EPI cr () equation refit without race.  The equation is recommended by the National Kidney Foundation - American Society of Nephrology Task Force.   ANION GAP  4 - 16 mmol/L 9 Tested at [facility] [address] [address]   Resulting Agency [facility]    Specimen Collected: 24 08:55    Performed by: [facility] Last Resulted: 24 14:01   Received From: [facility]  Result Received: 24 09:21      [facility]  Outside Information      Contains abnormal data CMP (BAMP,TP,ALB,TBIL,ALK,AST,ALT)  Specimen: Whole blood  Component  Ref Range & Units 3 wk ago Comments   SODIUM  135 - 145 mEq/L 146 High     POTASSIUM  3.6 - 5.1 mEq/L 4.5    CHLORIDE  98 - 111 mEq/L 107    CO2  21 - 31 mmol/L 30    GLUCOSE, RANDOM  70 - 99 mg/dL 91    BLD UREA NITROGEN  8 - 26 mg/dL 12    CREATININE  0.60 - 1.20 mg/dL 1.10

## 2024-09-03 ENCOUNTER — OFFICE VISIT (OUTPATIENT)
Dept: ENT CLINIC | Age: 89
End: 2024-09-03
Payer: MEDICARE

## 2024-09-03 VITALS
HEIGHT: 69 IN | DIASTOLIC BLOOD PRESSURE: 73 MMHG | WEIGHT: 150 LBS | BODY MASS INDEX: 22.22 KG/M2 | HEART RATE: 61 BPM | TEMPERATURE: 97.3 F | SYSTOLIC BLOOD PRESSURE: 161 MMHG

## 2024-09-03 DIAGNOSIS — M27.0 TORUS MANDIBULARIS: Primary | ICD-10-CM

## 2024-09-03 PROCEDURE — 1123F ACP DISCUSS/DSCN MKR DOCD: CPT | Performed by: OTOLARYNGOLOGY

## 2024-09-03 PROCEDURE — 99214 OFFICE O/P EST MOD 30 MIN: CPT | Performed by: OTOLARYNGOLOGY

## 2024-09-03 ASSESSMENT — ENCOUNTER SYMPTOMS
NAUSEA: 0
SORE THROAT: 0
FACIAL SWELLING: 0
COUGH: 0
SINUS PRESSURE: 0
EYE PAIN: 0
SHORTNESS OF BREATH: 0
EYE ITCHING: 0
SINUS PAIN: 0
RHINORRHEA: 0
TROUBLE SWALLOWING: 0
EYE REDNESS: 0
VOICE CHANGE: 0
CHOKING: 0
DIARRHEA: 0

## 2024-09-03 NOTE — PROGRESS NOTES
Subjective:      Patient ID: Shanon Hsieh is a 89 y.o. female.    HPI  Chief Complaint   Patient presents with    mass       History of Present Illness  Head/Neck    Shanon is a(n) 89 y.o. female who presents with concern for a hard mass floor of mouth. Non smoker. No bleeding. No change in voice. No weight loss. Uses inhalers. Also complains of post nasal drip.   Pain: No  Otalgia: No  Odynophagia: No  Dysphagia: No  Voice Changes: No  Lumps in neck: No  Modifying Factors: Non smoker  Associates Signs and Symptoms: none    Patient Active Problem List   Diagnosis    Asthma    IBS (irritable bowel syndrome)    MGUS (monoclonal gammopathy of unknown significance)    Menopause    S/P colonoscopy    Left renal mass    Osteoporosis    Alkaline phosphatase elevation    Hearing loss sensory, bilateral    Bacterial overgrowth syndrome    Arthritis of knee, right    Essential hypertension    Multiple food allergies    Protein malnutrition (HCC)    Celiac disease    Aortic regurgitation    Primary osteoarthritis of both knees    Hyperparathyroidism (HCC)    History of Graves' disease    Age-related osteoporosis without current pathological fracture    Bilateral carpal tunnel syndrome    Herpes zoster without complication    Multiple thyroid nodules    Adrenal adenoma, left    Adrenal adenoma, right    Postherpetic neuralgia    Chronic pain syndrome    Advanced age    Pulmonary embolism on right (HCC)    Blindness of both eyes    Cataract    Corns and callosities    Cystoid macular degeneration, left eye    DJD (degenerative joint disease) of knee    Drusen (degenerative) of macula, bilateral    Drusen (degenerative) of macula, unspecified eye    Exposure keratoconjunctivitis, left eye    Osteoarthrosis    Exudative age-related macular degeneration (HCC)    Vitreous degeneration, bilateral    Mitral regurgitation    Chronic systolic heart failure (HCC)    Coagulopathy (HCC)    Anemia    Sciatica, right side    History of

## 2024-09-16 DIAGNOSIS — K21.00 GASTROESOPHAGEAL REFLUX DISEASE WITH ESOPHAGITIS WITHOUT HEMORRHAGE: ICD-10-CM

## 2024-09-17 RX ORDER — PANTOPRAZOLE SODIUM 40 MG/1
TABLET, DELAYED RELEASE ORAL
Qty: 180 TABLET | Refills: 1 | Status: SHIPPED | OUTPATIENT
Start: 2024-09-17

## 2024-09-23 DIAGNOSIS — J30.89 OTHER ALLERGIC RHINITIS: ICD-10-CM

## 2024-09-24 RX ORDER — LORATADINE 10 MG/1
10 TABLET ORAL DAILY
Qty: 30 TABLET | Refills: 0 | Status: SHIPPED | OUTPATIENT
Start: 2024-09-24

## 2024-09-26 ENCOUNTER — HOSPITAL ENCOUNTER (OUTPATIENT)
Dept: INTERVENTIONAL RADIOLOGY/VASCULAR | Age: 89
Discharge: HOME | End: 2024-09-26
Attending: INTERNAL MEDICINE

## 2024-10-09 NOTE — TELEPHONE ENCOUNTER
Medication:   Requested Prescriptions     Pending Prescriptions Disp Refills    folic acid (FOLVITE) 1 MG tablet [Pharmacy Med Name: FOLIC ACID TAB 1MG] 90 tablet 3     Sig: TAKE 1 TABLET DAILY        Last Filled:      Patient Phone Number: [phone] (home)     Last appt: 8/20/2024   Next appt: 10/22/2024    Last OARRS:       9/11/2019    12:00 PM   RX Monitoring   Periodic Controlled Substance Monitoring Assessed functional status.

## 2024-10-10 RX ORDER — FOLIC ACID 1 MG/1
1000 TABLET ORAL DAILY
Qty: 90 TABLET | Refills: 3 | Status: SHIPPED | OUTPATIENT
Start: 2024-10-10

## 2024-10-11 DIAGNOSIS — J30.89 OTHER ALLERGIC RHINITIS: ICD-10-CM

## 2024-10-11 RX ORDER — MONTELUKAST SODIUM 10 MG/1
TABLET ORAL
Qty: 90 TABLET | Refills: 3 | Status: SHIPPED | OUTPATIENT
Start: 2024-10-11

## 2024-10-18 DIAGNOSIS — J30.89 OTHER ALLERGIC RHINITIS: ICD-10-CM

## 2024-10-18 RX ORDER — LORATADINE 10 MG/1
10 TABLET ORAL DAILY
Qty: 90 TABLET | Refills: 1 | Status: SHIPPED | OUTPATIENT
Start: 2024-10-18

## 2024-10-18 NOTE — TELEPHONE ENCOUNTER
Medication:   Requested Prescriptions     Pending Prescriptions Disp Refills    loratadine (CLARITIN) 10 MG tablet [Pharmacy Med Name: LORATADINE 10 MG TABLET] 90 tablet 1     Sig: TAKE 1 TABLET BY MOUTH EVERY DAY        Last Filled:      Patient Phone Number: [phone] (home)     Last appt: 8/20/2024   Next appt: 10/22/2024    Last OARRS:       9/11/2019    12:00 PM   RX Monitoring   Periodic Controlled Substance Monitoring Assessed functional status.

## 2024-10-22 ENCOUNTER — OFFICE VISIT (OUTPATIENT)
Dept: PRIMARY CARE CLINIC | Age: 89
End: 2024-10-22

## 2024-10-22 VITALS — SYSTOLIC BLOOD PRESSURE: 144 MMHG | DIASTOLIC BLOOD PRESSURE: 66 MMHG | HEART RATE: 60 BPM | OXYGEN SATURATION: 95 %

## 2024-10-22 DIAGNOSIS — E83.52 HYPERCALCEMIA: ICD-10-CM

## 2024-10-22 DIAGNOSIS — D58.2 ELEVATED HEMOGLOBIN (HCC): ICD-10-CM

## 2024-10-22 DIAGNOSIS — I73.9 PAD (PERIPHERAL ARTERY DISEASE) (HCC): Primary | ICD-10-CM

## 2024-10-22 DIAGNOSIS — E21.3 HYPERPARATHYROIDISM (HCC): ICD-10-CM

## 2024-10-22 DIAGNOSIS — E46 PROTEIN MALNUTRITION (HCC): ICD-10-CM

## 2024-10-22 DIAGNOSIS — Z23 NEEDS FLU SHOT: ICD-10-CM

## 2024-10-22 DIAGNOSIS — H35.3290 EXUDATIVE AGE-RELATED MACULAR DEGENERATION, UNSPECIFIED LATERALITY, UNSPECIFIED STAGE (HCC): ICD-10-CM

## 2024-10-22 PROBLEM — I50.9 HEART FAILURE, UNSPECIFIED (HCC): Status: RESOLVED | Noted: 2022-10-28 | Resolved: 2024-10-22

## 2024-10-22 PROBLEM — I26.99 PULMONARY EMBOLISM ON RIGHT (HCC): Status: RESOLVED | Noted: 2020-06-21 | Resolved: 2024-10-22

## 2024-10-22 PROBLEM — I50.22 CHRONIC SYSTOLIC HEART FAILURE (HCC): Status: RESOLVED | Noted: 2021-04-12 | Resolved: 2024-10-22

## 2024-10-22 RX ORDER — ROSUVASTATIN CALCIUM 10 MG/1
10 TABLET, COATED ORAL NIGHTLY
Qty: 30 TABLET | Refills: 3 | Status: SHIPPED | OUTPATIENT
Start: 2024-10-22

## 2024-10-22 RX ORDER — CILOSTAZOL 50 MG/1
50 TABLET ORAL 2 TIMES DAILY
Qty: 60 TABLET | Refills: 3 | Status: SHIPPED | OUTPATIENT
Start: 2024-10-22

## 2024-10-22 NOTE — PROGRESS NOTES
Shanon Hsieh (:  1926) is a 89 y.o. female,Established patient, here for evaluation of the following chief complaint(s):  Hypertension and Toe Pain (right)      Assessment & Plan   ASSESSMENT/PLAN:  1. PAD (peripheral artery disease) (HCC) is symptomatic with rest pain in left foot.  No ulcerations and no blue areas.  Slow capillary refill.  Chronic anticoagulation for recurrent DVT PE and history of PE.  Not a surgical candidate.  Comfort care measures.  Will add cilostazol 50 mg twice a day.  -     cilostazol (PLETAL) 50 MG tablet; Take 1 tablet by mouth 2 times daily, Disp-60 tablet, R-3Normal  -     Vascular duplex lower extremity arteries left; Future  -     LDL Cholesterol, Direct; Future  -     rosuvastatin (CRESTOR) 10 MG tablet; Take 1 tablet by mouth nightly, Disp-30 tablet, R-3Normal  2. Hypercalcemia mild will further evaluate not symptomatic.  Will monitor labs stable.  -     Comprehensive Metabolic Panel; Future  -     CBC with Auto Differential; Future  -     PTH, Intact; Future  -     Electrophoresis Protein, Serum; Future  -     Vitamin D 25 Hydroxy; Future  -     TSH with Reflex to FT4; Future  3. Exudative age-related macular degeneration, unspecified laterality, unspecified stage (HCC) with blindness.  Patient's niece sets up her Mediset weekly and patient is able to take her medications independently with close monitoring and has a home health aide with her daily.  4. Protein malnutrition (HCC) intermittently symptomatic with edema on increased protein in diet and supplements, continue.    5. Needs flu shot  -     Influenza, FLUAD Trivalent, (age 65 y+), IM, Preservative Free, 0.5mL      Return in about 4 weeks (around 2024) for pad and right foot pressure ulcer.         Subjective   SUBJECTIVE/OBJECTIVE:  Left fore arm pain reproduced with palpation no swelling, warmth or redness    Hypertension  This is a chronic problem. The current episode started more than 1 year ago. The

## 2024-11-02 ASSESSMENT — ENCOUNTER SYMPTOMS
FACIAL SWELLING: 0
WHEEZING: 0
ABDOMINAL PAIN: 0
SHORTNESS OF BREATH: 0
CONSTIPATION: 0
APNEA: 0
BLURRED VISION: 0
STRIDOR: 0
VOMITING: 0
BLOOD IN STOOL: 0
EYE PAIN: 0
EYE ITCHING: 0
DIARRHEA: 0
RHINORRHEA: 0
CHOKING: 0
TROUBLE SWALLOWING: 0
PHOTOPHOBIA: 0
CHEST TIGHTNESS: 0
EYE DISCHARGE: 0
SORE THROAT: 0
ORTHOPNEA: 0
NAUSEA: 0
BACK PAIN: 0
ALLERGIC/IMMUNOLOGIC COMMENTS: ALLERGY ON GLUTEN-FREE DIET
ANAL BLEEDING: 0
EYE REDNESS: 0
COUGH: 0

## 2024-11-07 ENCOUNTER — HOSPITAL ENCOUNTER (OUTPATIENT)
Dept: VASCULAR LAB | Age: 89
Discharge: HOME | End: 2024-11-09
Payer: MEDICARE

## 2024-11-07 DIAGNOSIS — I73.9 PAD (PERIPHERAL ARTERY DISEASE) (HCC): ICD-10-CM

## 2024-11-07 LAB
VAS LEFT ABI: 0.89
VAS LEFT ARM BP: 180 MMHG
VAS LEFT ATA DIST PSV: 144 CM/S
VAS LEFT CFA DIST PSV: 128 CM/S
VAS LEFT CFA PROX PSV: 126 CM/S
VAS LEFT DORSALIS PEDIS BP: 160 MMHG
VAS LEFT PERONEAL MID PSV: 66.6 CM/S
VAS LEFT PFA PROX PSV: 65.9 CM/S
VAS LEFT POP A DIST PSV: 74.7 CM/S
VAS LEFT POP A PROX PSV: 41.3 CM/S
VAS LEFT POP A PROX VEL RATIO: 0.48
VAS LEFT PTA BP: 160 MMHG
VAS LEFT PTA DIST PSV: 24.7 CM/S
VAS LEFT PTA MID PSV: 65.9 CM/S
VAS LEFT SFA DIST PSV: 86.9 CM/S
VAS LEFT SFA DIST VEL RATIO: 0.66
VAS LEFT SFA MID PSV: 132 CM/S
VAS LEFT SFA MID VEL RATIO: 1.56
VAS LEFT SFA PROX PSV: 84.8 CM/S
VAS LEFT SFA PROX VEL RATIO: 0.66
VAS RIGHT ABI: 0.78
VAS RIGHT ARM BP: 180 MMHG
VAS RIGHT DORSALIS PEDIS BP: 130 MMHG
VAS RIGHT PTA BP: 140 MMHG

## 2024-11-07 PROCEDURE — 93926 LOWER EXTREMITY STUDY: CPT

## 2024-11-08 NOTE — RESULT ENCOUNTER NOTE
The circulation test shows bilateral mild decreased circulation.  Will follow up on exam at next appointment.

## 2024-11-10 DIAGNOSIS — M79.662 PAIN AND SWELLING OF LEFT LOWER LEG: ICD-10-CM

## 2024-11-10 DIAGNOSIS — M79.89 PAIN AND SWELLING OF RIGHT LOWER LEG: Primary | ICD-10-CM

## 2024-11-10 DIAGNOSIS — M79.661 PAIN AND SWELLING OF RIGHT LOWER LEG: Primary | ICD-10-CM

## 2024-11-10 DIAGNOSIS — M79.89 PAIN AND SWELLING OF LEFT LOWER LEG: ICD-10-CM

## 2024-11-14 ENCOUNTER — HOSPITAL ENCOUNTER (OUTPATIENT)
Dept: VASCULAR LAB | Age: 89
Discharge: HOME | End: 2024-11-16
Payer: MEDICARE

## 2024-11-14 DIAGNOSIS — M79.661 PAIN AND SWELLING OF RIGHT LOWER LEG: ICD-10-CM

## 2024-11-14 DIAGNOSIS — M79.89 PAIN AND SWELLING OF RIGHT LOWER LEG: ICD-10-CM

## 2024-11-14 DIAGNOSIS — M79.662 PAIN AND SWELLING OF LEFT LOWER LEG: ICD-10-CM

## 2024-11-14 DIAGNOSIS — M79.89 PAIN AND SWELLING OF LEFT LOWER LEG: ICD-10-CM

## 2024-11-14 PROCEDURE — 93970 EXTREMITY STUDY: CPT

## 2024-11-20 ENCOUNTER — OFFICE VISIT (OUTPATIENT)
Dept: PRIMARY CARE CLINIC | Age: 88
End: 2024-11-20
Payer: MEDICARE

## 2024-11-20 VITALS
DIASTOLIC BLOOD PRESSURE: 68 MMHG | SYSTOLIC BLOOD PRESSURE: 124 MMHG | BODY MASS INDEX: 22.22 KG/M2 | HEIGHT: 69 IN | RESPIRATION RATE: 16 BRPM | WEIGHT: 150 LBS

## 2024-11-20 DIAGNOSIS — I73.9 PAD (PERIPHERAL ARTERY DISEASE) (HCC): ICD-10-CM

## 2024-11-20 DIAGNOSIS — R60.0 BILATERAL LEG EDEMA: Primary | ICD-10-CM

## 2024-11-20 DIAGNOSIS — E83.52 HYPERCALCEMIA: ICD-10-CM

## 2024-11-20 PROCEDURE — 1160F RVW MEDS BY RX/DR IN RCRD: CPT | Performed by: INTERNAL MEDICINE

## 2024-11-20 PROCEDURE — 1123F ACP DISCUSS/DSCN MKR DOCD: CPT | Performed by: INTERNAL MEDICINE

## 2024-11-20 PROCEDURE — 1159F MED LIST DOCD IN RCRD: CPT | Performed by: INTERNAL MEDICINE

## 2024-11-20 PROCEDURE — 99213 OFFICE O/P EST LOW 20 MIN: CPT | Performed by: INTERNAL MEDICINE

## 2024-11-20 NOTE — PROGRESS NOTES
Normal range of motion and neck supple. No tenderness.      Right lower leg: Edema present.      Left lower leg: Edema present.      Comments: Right leg and thigh swelling   Skin:     General: Skin is warm and dry.   Neurological:      General: No focal deficit present.      Mental Status: She is alert and oriented to person, place, and time.      Cranial Nerves: No cranial nerve deficit.      Motor: No weakness.      Coordination: Coordination normal.      Gait: Gait abnormal.      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         Behavior: Behavior normal.         Thought Content: Thought content normal.         Judgment: Judgment normal.                  An electronic signature was used to authenticate this note.    --APRYL COLEMAN MD

## 2024-11-21 LAB
25(OH)D3 SERPL-MCNC: 36.3 NG/ML
ALBUMIN SERPL-MCNC: 4.2 G/DL (ref 3.4–5)
ALBUMIN/GLOB SERPL: 1.6 {RATIO} (ref 1.1–2.2)
ALP SERPL-CCNC: 100 U/L (ref 40–129)
ALT SERPL-CCNC: 8 U/L (ref 10–40)
ANION GAP SERPL CALCULATED.3IONS-SCNC: 12 MMOL/L (ref 3–16)
AST SERPL-CCNC: 19 U/L (ref 15–37)
BASOPHILS # BLD: 0 K/UL (ref 0–0.2)
BASOPHILS NFR BLD: 0 %
BILIRUB SERPL-MCNC: 0.8 MG/DL (ref 0–1)
BUN SERPL-MCNC: 13 MG/DL (ref 7–20)
CALCIUM SERPL-MCNC: 10.4 MG/DL (ref 8.3–10.6)
CHLORIDE SERPL-SCNC: 103 MMOL/L (ref 99–110)
CO2 SERPL-SCNC: 26 MMOL/L (ref 21–32)
CREAT SERPL-MCNC: 1.1 MG/DL (ref 0.6–1.2)
DEPRECATED RDW RBC AUTO: 15.1 % (ref 12.4–15.4)
EOSINOPHIL # BLD: 0.2 K/UL (ref 0–0.6)
EOSINOPHIL NFR BLD: 2 %
GFR SERPLBLD CREATININE-BSD FMLA CKD-EPI: 45 ML/MIN/{1.73_M2}
GLUCOSE SERPL-MCNC: 85 MG/DL (ref 70–99)
HCT VFR BLD AUTO: 47.3 % (ref 36–48)
HGB BLD-MCNC: 15.7 G/DL (ref 12–16)
LDLC SERPL-MCNC: 66 MG/DL
LYMPHOCYTES # BLD: 2.5 K/UL (ref 1–5.1)
LYMPHOCYTES NFR BLD: 31 %
MCH RBC QN AUTO: 29.1 PG (ref 26–34)
MCHC RBC AUTO-ENTMCNC: 33.2 G/DL (ref 31–36)
MCV RBC AUTO: 87.6 FL (ref 80–100)
MONOCYTES # BLD: 0.6 K/UL (ref 0–1.3)
MONOCYTES NFR BLD: 8 %
NEUTROPHILS # BLD: 4.6 K/UL (ref 1.7–7.7)
NEUTROPHILS NFR BLD: 58 %
PLATELET # BLD AUTO: 161 K/UL (ref 135–450)
PMV BLD AUTO: 9.8 FL (ref 5–10.5)
POTASSIUM SERPL-SCNC: 4.1 MMOL/L (ref 3.5–5.1)
PROT SERPL-MCNC: 6.9 G/DL (ref 6.4–8.2)
PTH-INTACT SERPL-MCNC: 57.6 PG/ML (ref 14–72)
RBC # BLD AUTO: 5.4 M/UL (ref 4–5.2)
RBC MORPH BLD: NORMAL
SODIUM SERPL-SCNC: 141 MMOL/L (ref 136–145)
TSH SERPL DL<=0.005 MIU/L-ACNC: 1.05 UIU/ML (ref 0.27–4.2)
VARIANT LYMPHS NFR BLD MANUAL: 1 % (ref 0–6)
WBC # BLD AUTO: 7.9 K/UL (ref 4–11)

## 2024-11-22 DIAGNOSIS — N18.32 STAGE 3B CHRONIC KIDNEY DISEASE (HCC): Primary | ICD-10-CM

## 2024-11-22 LAB
ALBUMIN SERPL ELPH-MCNC: 3.1 G/DL (ref 3.1–4.9)
ALPHA1 GLOB SERPL ELPH-MCNC: 0.3 G/DL (ref 0.2–0.4)
ALPHA2 GLOB SERPL ELPH-MCNC: 0.8 G/DL (ref 0.4–1.1)
B-GLOBULIN SERPL ELPH-MCNC: 1 G/DL (ref 0.9–1.6)
GAMMA GLOB SERPL ELPH-MCNC: 1.5 G/DL (ref 0.6–1.8)
PROT SERPL-MCNC: 6.7 G/DL (ref 6.4–8.2)
SPE/IFE INTERPRETATION: NORMAL

## 2024-11-22 NOTE — RESULT ENCOUNTER NOTE
The kidney test slightly decreased.  Overall GFR is 80 or higher.  You were 51 and now 45.  That is still good because people on dialysis are 15 or less.  We want to keep your kidneys healthy.  I know you are not taking Motrin or Aleve or ibuprofen because you are on the blood thinner and that will cause bleeding.  I have mentioned that because those medications can harm the kidneys.  Drink plenty of water to stay hydrated and it looks like you were hydrated.  I would like to get a kidney ultrasound to make sure there is no blockage in the drainage of the kidneys.  Call [phone] to arrange.

## 2024-12-12 ENCOUNTER — HOSPITAL ENCOUNTER (OUTPATIENT)
Dept: ULTRASOUND IMAGING | Age: 88
Discharge: HOME | End: 2024-12-12
Payer: MEDICARE

## 2024-12-12 DIAGNOSIS — N18.32 STAGE 3B CHRONIC KIDNEY DISEASE (HCC): ICD-10-CM

## 2024-12-12 PROCEDURE — 76770 US EXAM ABDO BACK WALL COMP: CPT

## 2024-12-18 ENCOUNTER — OFFICE VISIT (OUTPATIENT)
Dept: PRIMARY CARE CLINIC | Age: 88
End: 2024-12-18
Payer: MEDICARE

## 2024-12-18 VITALS
WEIGHT: 150 LBS | DIASTOLIC BLOOD PRESSURE: 60 MMHG | HEART RATE: 59 BPM | RESPIRATION RATE: 16 BRPM | SYSTOLIC BLOOD PRESSURE: 142 MMHG | HEIGHT: 69 IN | OXYGEN SATURATION: 94 % | BODY MASS INDEX: 22.22 KG/M2

## 2024-12-18 DIAGNOSIS — Z71.89 ACP (ADVANCE CARE PLANNING): ICD-10-CM

## 2024-12-18 DIAGNOSIS — K06.8 PAIN IN GUMS: ICD-10-CM

## 2024-12-18 DIAGNOSIS — N18.32 STAGE 3B CHRONIC KIDNEY DISEASE (HCC): ICD-10-CM

## 2024-12-18 DIAGNOSIS — N64.4 BREAST PAIN, RIGHT: ICD-10-CM

## 2024-12-18 DIAGNOSIS — H61.23 BILATERAL IMPACTED CERUMEN: Primary | ICD-10-CM

## 2024-12-18 DIAGNOSIS — L84 FOOT CALLUS: ICD-10-CM

## 2024-12-18 PROCEDURE — 1159F MED LIST DOCD IN RCRD: CPT | Performed by: INTERNAL MEDICINE

## 2024-12-18 PROCEDURE — G8482 FLU IMMUNIZE ORDER/ADMIN: HCPCS | Performed by: INTERNAL MEDICINE

## 2024-12-18 PROCEDURE — 1123F ACP DISCUSS/DSCN MKR DOCD: CPT | Performed by: INTERNAL MEDICINE

## 2024-12-18 PROCEDURE — G8427 DOCREV CUR MEDS BY ELIG CLIN: HCPCS | Performed by: INTERNAL MEDICINE

## 2024-12-18 PROCEDURE — 1036F TOBACCO NON-USER: CPT | Performed by: INTERNAL MEDICINE

## 2024-12-18 PROCEDURE — 1160F RVW MEDS BY RX/DR IN RCRD: CPT | Performed by: INTERNAL MEDICINE

## 2024-12-18 PROCEDURE — 99214 OFFICE O/P EST MOD 30 MIN: CPT | Performed by: INTERNAL MEDICINE

## 2024-12-18 PROCEDURE — G8420 CALC BMI NORM PARAMETERS: HCPCS | Performed by: INTERNAL MEDICINE

## 2024-12-18 PROCEDURE — 1090F PRES/ABSN URINE INCON ASSESS: CPT | Performed by: INTERNAL MEDICINE

## 2024-12-18 SDOH — ECONOMIC STABILITY: INCOME INSECURITY: HOW HARD IS IT FOR YOU TO PAY FOR THE VERY BASICS LIKE FOOD, HOUSING, MEDICAL CARE, AND HEATING?: NOT HARD AT ALL

## 2024-12-18 SDOH — ECONOMIC STABILITY: FOOD INSECURITY: WITHIN THE PAST 12 MONTHS, YOU WORRIED THAT YOUR FOOD WOULD RUN OUT BEFORE YOU GOT MONEY TO BUY MORE.: NEVER TRUE

## 2024-12-18 SDOH — ECONOMIC STABILITY: FOOD INSECURITY: WITHIN THE PAST 12 MONTHS, THE FOOD YOU BOUGHT JUST DIDN'T LAST AND YOU DIDN'T HAVE MONEY TO GET MORE.: NEVER TRUE

## 2024-12-18 ASSESSMENT — PATIENT HEALTH QUESTIONNAIRE - PHQ9
SUM OF ALL RESPONSES TO PHQ QUESTIONS 1-9: 0
SUM OF ALL RESPONSES TO PHQ QUESTIONS 1-9: 0
1. LITTLE INTEREST OR PLEASURE IN DOING THINGS: NOT AT ALL
SUM OF ALL RESPONSES TO PHQ QUESTIONS 1-9: 0
SUM OF ALL RESPONSES TO PHQ QUESTIONS 1-9: 0
SUM OF ALL RESPONSES TO PHQ9 QUESTIONS 1 & 2: 0
2. FEELING DOWN, DEPRESSED OR HOPELESS: NOT AT ALL

## 2024-12-18 NOTE — PROGRESS NOTES
Shanon Hsieh (:  1926) is a 89 y.o. female,Established patient, here for evaluation of the following chief complaint(s):  Follow-up, Leg Swelling (Right/), Other (Sore mouth ), Arm Pain (Left /), and Toe Pain (Right/)      Assessment & Plan   ASSESSMENT/PLAN:  1. Bilateral impacted cerumen able to remove wax from the right ear with lighted ear curette but could not remove the wax against the left tympanic membrane.  Will give her Debrox solution to use daily for 3 days and evaluate upon return.  Able to hear because before for the eardrum is exposed.  -     carbamide peroxide (DEBROX) 6.5 % otic solution; Place 5 drops into the left ear 2 times daily For 5 day., Disp-15 mL, R-0Normal  2. Stage 3b chronic kidney disease (HCC) stable and up-to-date with renal function monitoring.  Medication list reviewed and the only potential nephrotoxic medication is pantoprazole 50 mg twice a day that patient becomes very ill when she reduces the dose or discontinues.  Encourage patient to stay well-hydrated and being on blood thinner she will not take aspirin or NSAIDs.  Continue to monitor.      Latest Ref Rng & Units 2024     1:56 PM 2024     1:28 PM 2024     1:32 PM 2024    11:25 AM 2024     9:03 AM   Labs Renal   BUN 7 - 20 mg/dL 13  11  8  16  14    Cr 0.6 - 1.2 mg/dL 1.1  1.0  1.0  1.1  0.93    K 3.5 - 5.1 mmol/L 4.1  4.5  4.2  4.0  4.0    Na 136 - 145 mmol/L 141  144  138  138  143       3. Breast pain, right chronic since shingles.  The right breast slightly swollen but no masses.  Patient is against any biopsy or more aggressive workup understandably at age 89.  No axillary adenopathy on exam.  Gabapentin helps a little but cannot increase dose due to drowsiness.  Will add Voltaren cream.  -     diclofenac sodium (VOLTAREN) 1 % GEL; Apply 4 g topically 4 times daily To area of pain, Topical, 4 TIMES DAILY Starting Wed 2024, Disp-50 g, R-5, Normal  4. Pain in gums, gums examined

## 2024-12-18 NOTE — PATIENT INSTRUCTIONS

## 2024-12-19 DIAGNOSIS — N32.89 BLADDER WALL THICKENING: Primary | ICD-10-CM

## 2024-12-20 RX ORDER — GABAPENTIN 100 MG/1
CAPSULE ORAL
Qty: 180 CAPSULE | Refills: 0 | Status: SHIPPED | OUTPATIENT
Start: 2024-12-20 | End: 2025-03-21

## 2024-12-20 ASSESSMENT — ENCOUNTER SYMPTOMS
CHOKING: 0
APNEA: 0
SORE THROAT: 0
EYE DISCHARGE: 0
BLOOD IN STOOL: 0
NAUSEA: 0
FACIAL SWELLING: 0
RHINORRHEA: 0
EYE REDNESS: 0
ANAL BLEEDING: 0
WHEEZING: 0
EYE ITCHING: 0
TROUBLE SWALLOWING: 0
ABDOMINAL PAIN: 0
DIARRHEA: 0
BREAST PAIN: 1
SHORTNESS OF BREATH: 0
STRIDOR: 0
CONSTIPATION: 0
COUGH: 0
VOMITING: 0
CHEST TIGHTNESS: 0
ALLERGIC/IMMUNOLOGIC COMMENTS: ALLERGY ON GLUTEN-FREE DIET
PHOTOPHOBIA: 0
BACK PAIN: 0
EYE PAIN: 0

## 2024-12-20 NOTE — TELEPHONE ENCOUNTER
Medication:   Requested Prescriptions     Pending Prescriptions Disp Refills    gabapentin (NEURONTIN) 100 MG capsule [Pharmacy Med Name: GABAPENTIN CAP 100MG] 180 capsule 3     Sig: TAKE 1 CAPSULE TWICE DAILY        Last Filled:      Patient Phone Number: [phone] (home)     Last appt: 12/18/2024   Next appt: 2/5/2025    Last OARRS:       9/11/2019    12:00 PM   RX Monitoring   Periodic Controlled Substance Monitoring Assessed functional status.

## 2025-01-15 ENCOUNTER — APPOINTMENT (OUTPATIENT)
Dept: GENERAL RADIOLOGY | Age: 89
End: 2025-01-15
Payer: MEDICARE

## 2025-01-15 ENCOUNTER — HOSPITAL ENCOUNTER (EMERGENCY)
Age: 89
Discharge: HOME | End: 2025-01-15
Attending: STUDENT IN AN ORGANIZED HEALTH CARE EDUCATION/TRAINING PROGRAM
Payer: MEDICARE

## 2025-01-15 VITALS
RESPIRATION RATE: 18 BRPM | DIASTOLIC BLOOD PRESSURE: 82 MMHG | HEART RATE: 57 BPM | OXYGEN SATURATION: 93 % | TEMPERATURE: 97.9 F | HEIGHT: 69 IN | WEIGHT: 166.1 LBS | SYSTOLIC BLOOD PRESSURE: 162 MMHG | BODY MASS INDEX: 24.6 KG/M2

## 2025-01-15 DIAGNOSIS — M79.602 LEFT ARM PAIN: ICD-10-CM

## 2025-01-15 DIAGNOSIS — I10 ELEVATED BLOOD PRESSURE READING WITH DIAGNOSIS OF HYPERTENSION: Primary | ICD-10-CM

## 2025-01-15 DIAGNOSIS — R00.0 TACHYCARDIA: ICD-10-CM

## 2025-01-15 LAB
ALBUMIN SERPL-MCNC: 3.9 G/DL (ref 3.4–5)
ALP SERPL-CCNC: 99 U/L (ref 40–129)
ALT SERPL-CCNC: <5 U/L (ref 10–40)
ALT SERPL-CCNC: ABNORMAL U/L (ref 10–40)
ANION GAP SERPL CALCULATED.3IONS-SCNC: 13 MMOL/L (ref 3–16)
AST SERPL-CCNC: 43 U/L (ref 15–37)
BASOPHILS # BLD: 0 K/UL (ref 0–0.2)
BASOPHILS NFR BLD: 0.5 %
BILIRUB DIRECT SERPL-MCNC: <0.1 MG/DL (ref 0–0.3)
BILIRUB INDIRECT SERPL-MCNC: 0.6 MG/DL (ref 0–1)
BILIRUB SERPL-MCNC: 0.7 MG/DL (ref 0–1)
BILIRUB UR QL STRIP.AUTO: NEGATIVE
BUN SERPL-MCNC: 14 MG/DL (ref 7–20)
CALCIUM SERPL-MCNC: 10.6 MG/DL (ref 8.3–10.6)
CHLORIDE SERPL-SCNC: 105 MMOL/L (ref 99–110)
CLARITY UR: CLEAR
CO2 SERPL-SCNC: 21 MMOL/L (ref 21–32)
COLOR UR: YELLOW
CREAT SERPL-MCNC: 1.1 MG/DL (ref 0.6–1.2)
DEPRECATED RDW RBC AUTO: 14.6 % (ref 12.4–15.4)
EKG ATRIAL RATE: 92 BPM
EKG DIAGNOSIS: NORMAL
EKG P AXIS: 56 DEGREES
EKG P-R INTERVAL: 244 MS
EKG Q-T INTERVAL: 348 MS
EKG QRS DURATION: 86 MS
EKG QTC CALCULATION (BAZETT): 430 MS
EKG R AXIS: -23 DEGREES
EKG T AXIS: 27 DEGREES
EKG VENTRICULAR RATE: 92 BPM
EOSINOPHIL # BLD: 0.1 K/UL (ref 0–0.6)
EOSINOPHIL NFR BLD: 0.6 %
GFR SERPLBLD CREATININE-BSD FMLA CKD-EPI: 45 ML/MIN/{1.73_M2}
GLUCOSE SERPL-MCNC: 129 MG/DL (ref 70–99)
GLUCOSE UR STRIP.AUTO-MCNC: NEGATIVE MG/DL
HCT VFR BLD AUTO: 49.4 % (ref 36–48)
HGB BLD-MCNC: 16 G/DL (ref 12–16)
HGB UR QL STRIP.AUTO: ABNORMAL
KETONES UR STRIP.AUTO-MCNC: NEGATIVE MG/DL
LACTATE BLDV-SCNC: 2.1 MMOL/L (ref 0.4–2)
LEUKOCYTE ESTERASE UR QL STRIP.AUTO: NEGATIVE
LIPASE SERPL-CCNC: 61 U/L (ref 13–60)
LYMPHOCYTES # BLD: 1.6 K/UL (ref 1–5.1)
LYMPHOCYTES NFR BLD: 19.1 %
MCH RBC QN AUTO: 28.4 PG (ref 26–34)
MCHC RBC AUTO-ENTMCNC: 32.4 G/DL (ref 31–36)
MCV RBC AUTO: 87.8 FL (ref 80–100)
MONOCYTES # BLD: 0.8 K/UL (ref 0–1.3)
MONOCYTES NFR BLD: 9.2 %
NEUTROPHILS # BLD: 5.9 K/UL (ref 1.7–7.7)
NEUTROPHILS NFR BLD: 70.6 %
NITRITE UR QL STRIP.AUTO: NEGATIVE
PH UR STRIP.AUTO: 7 [PH] (ref 5–8)
PLATELET # BLD AUTO: 141 K/UL (ref 135–450)
PMV BLD AUTO: 9.4 FL (ref 5–10.5)
POTASSIUM SERPL-SCNC: 4 MMOL/L (ref 3.5–5.1)
POTASSIUM SERPL-SCNC: ABNORMAL MMOL/L (ref 3.5–5.1)
PROT SERPL-MCNC: 7.6 G/DL (ref 6.4–8.2)
PROT UR STRIP.AUTO-MCNC: NEGATIVE MG/DL
RBC # BLD AUTO: 5.63 M/UL (ref 4–5.2)
RBC #/AREA URNS HPF: ABNORMAL /HPF (ref 0–4)
SODIUM SERPL-SCNC: 139 MMOL/L (ref 136–145)
SP GR UR STRIP.AUTO: 1.01 (ref 1–1.03)
TROPONIN, HIGH SENSITIVITY: 67 NG/L (ref 0–14)
TROPONIN, HIGH SENSITIVITY: 71 NG/L (ref 0–14)
UA DIPSTICK W REFLEX MICRO PNL UR: YES
URN SPEC COLLECT METH UR: ABNORMAL
UROBILINOGEN UR STRIP-ACNC: 0.2 E.U./DL
WBC # BLD AUTO: 8.4 K/UL (ref 4–11)
WBC #/AREA URNS HPF: ABNORMAL /HPF (ref 0–5)

## 2025-01-15 PROCEDURE — 93005 ELECTROCARDIOGRAM TRACING: CPT | Performed by: PHYSICIAN ASSISTANT

## 2025-01-15 PROCEDURE — 6370000000 HC RX 637 (ALT 250 FOR IP): Performed by: PHYSICIAN ASSISTANT

## 2025-01-15 PROCEDURE — 80076 HEPATIC FUNCTION PANEL: CPT

## 2025-01-15 PROCEDURE — 80048 BASIC METABOLIC PNL TOTAL CA: CPT

## 2025-01-15 PROCEDURE — 84484 ASSAY OF TROPONIN QUANT: CPT

## 2025-01-15 PROCEDURE — 84460 ALANINE AMINO (ALT) (SGPT): CPT

## 2025-01-15 PROCEDURE — 83605 ASSAY OF LACTIC ACID: CPT

## 2025-01-15 PROCEDURE — 99285 EMERGENCY DEPT VISIT HI MDM: CPT

## 2025-01-15 PROCEDURE — 71045 X-RAY EXAM CHEST 1 VIEW: CPT

## 2025-01-15 PROCEDURE — 83690 ASSAY OF LIPASE: CPT

## 2025-01-15 PROCEDURE — 81001 URINALYSIS AUTO W/SCOPE: CPT

## 2025-01-15 PROCEDURE — 85025 COMPLETE CBC W/AUTO DIFF WBC: CPT

## 2025-01-15 PROCEDURE — 84132 ASSAY OF SERUM POTASSIUM: CPT

## 2025-01-15 RX ORDER — SELENIUM 50 MCG
1 TABLET ORAL DAILY
COMMUNITY
Start: 2024-12-30

## 2025-01-15 RX ORDER — ACETAMINOPHEN 500 MG
1000 TABLET ORAL ONCE
Status: COMPLETED | OUTPATIENT
Start: 2025-01-15 | End: 2025-01-15

## 2025-01-15 RX ADMIN — ACETAMINOPHEN 1000 MG: 500 TABLET, FILM COATED ORAL at 21:04

## 2025-01-15 ASSESSMENT — ENCOUNTER SYMPTOMS
COLOR CHANGE: 0
SHORTNESS OF BREATH: 0
NAUSEA: 0
VOMITING: 0
COUGH: 0
CONSTIPATION: 1
ABDOMINAL PAIN: 1

## 2025-01-15 ASSESSMENT — PAIN - FUNCTIONAL ASSESSMENT
PAIN_FUNCTIONAL_ASSESSMENT: 0-10
PAIN_FUNCTIONAL_ASSESSMENT: 0-10

## 2025-01-15 ASSESSMENT — PAIN SCALES - GENERAL
PAINLEVEL_OUTOF10: 7
PAINLEVEL_OUTOF10: 2

## 2025-01-15 ASSESSMENT — PAIN DESCRIPTION - LOCATION
LOCATION: ARM
LOCATION: ARM

## 2025-01-15 ASSESSMENT — PAIN DESCRIPTION - ORIENTATION: ORIENTATION: LEFT

## 2025-01-15 NOTE — ED TRIAGE NOTES
Pt brought in by EMS from home for left shoulder pain since this afternoon. Pt states she takes her vitals at home and noted her blood pressure and heart rate were elevated after she tried using the restroom, c/o constipation. Pt states she tried enemas with no relief. Denies chest pain, SOB, abd pain, or fevers

## 2025-01-15 NOTE — ED PROVIDER NOTES
ED Attending Attestation Note     Date of evaluation: 1/15/2025    This patient was seen by the advance practice provider.  I have seen and examined the patient, agree with the workup, evaluation, management and diagnosis. The care plan has been discussed.  I have reviewed the ECG and concur with the PAT's interpretation.  My assessment reveals a 89-year-old female who presents predominantly because she was worried that her heart rate and blood pressures were elevated at home today, which she checks twice daily.  She is reporting a couple of chronic symptoms, including abdominal pain/constipation and left arm pain.  Otherwise she appears well, has no focal neurologic deficits and has a benign abdomen    Gen: Alert, awake, blind at baseline  Head: NCAT  Eyes: PERRL, EOMI  Neck: Supple, full ROM  Cardiac: RRR, no murmurs, rubs or gallops  Lungs: No respiratory distress, lungs CTAB  Abd: Soft, non distended, non tender to palpation  Extremities: No deformities, warm and well perfused, strong peripheral pulses  Neuro: A&Ox3, moving all extremities equally, no focal deficits other than vision     Peña Lowry MD  01/15/25 0966    
  Genitourinary:  Negative for decreased urine volume, dysuria and flank pain.   Musculoskeletal:  Positive for arthralgias (left shoulder/arm) and gait problem (uses walker and w/c at home).   Skin:  Negative for color change.   Neurological:  Negative for dizziness, light-headedness and headaches.   All other systems reviewed and are negative.      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, Clostridium difficile infection, Degenerative joint disease of knee, Depression, Diverticulitis, Dizziness, GERD (gastroesophageal reflux disease), GI bleed, Grave's disease, Hearing loss, Heart disease, History of pulmonary embolism, Hyperparathyroidism ([facility]), Hypertension, IBS (irritable bowel syndrome), Macular degeneration, MGUS (monoclonal gammopathy of unknown significance), Moderate mitral regurgitation, Osteoporosis, PAD (peripheral artery disease) ([facility]), Poor vision, Postherpetic neuralgia, Protein malnutrition ([facility]), Rash, S/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  She has a past surgical history that includes Cholecystectomy; Colonoscopy (10/29/2009); Hemorrhoid surgery; Hysterectomy (in 40's); Hysterectomy, total abdominal; Upper gastrointestinal endoscopy (N/A, 06/01/2022); IR GUIDED IVC FILTER PLACEMENT (N/A, 06/01/2022); IR GUIDED IVC FILTER PLACEMENT (06/01/2022); Ovary removal; and bronchoscopy.  Her family history includes Cancer in her father; Heart Disease in her mother; High Blood Pressure in her mother; Stroke in her mother.  She reports that she has never smoked. She has never used smokeless tobacco. She

## 2025-01-16 ENCOUNTER — CARE COORDINATION (OUTPATIENT)
Dept: CARE COORDINATION | Age: 89
End: 2025-01-16

## 2025-01-16 NOTE — CARE COORDINATION
Ambulatory Care Coordination Note     1/16/2025 4:29 PM     Patient outreach attempt by this ACM today to perform hospital follow up. ACM was unable to reach the patient by telephone today;   Unable to leave voicemail due to voicemail not being set up      ACM: Harper Voss RN     Care Summary Note: ED follow up     PCP/Specialist follow up:   Future Appointments         Provider Specialty Dept Phone    2/5/2025 10:30 AM America Holley MD Primary Care [phone]

## 2025-01-17 ENCOUNTER — CARE COORDINATION (OUTPATIENT)
Dept: CARE COORDINATION | Age: 89
End: 2025-01-17

## 2025-01-17 SDOH — ECONOMIC STABILITY: FOOD INSECURITY: WITHIN THE PAST 12 MONTHS, YOU WORRIED THAT YOUR FOOD WOULD RUN OUT BEFORE YOU GOT MONEY TO BUY MORE.: NEVER TRUE

## 2025-01-17 SDOH — ECONOMIC STABILITY: FOOD INSECURITY: WITHIN THE PAST 12 MONTHS, THE FOOD YOU BOUGHT JUST DIDN'T LAST AND YOU DIDN'T HAVE MONEY TO GET MORE.: NEVER TRUE

## 2025-01-17 SDOH — HEALTH STABILITY: PHYSICAL HEALTH: ON AVERAGE, HOW MANY MINUTES DO YOU ENGAGE IN EXERCISE AT THIS LEVEL?: 20 MIN

## 2025-01-17 SDOH — ECONOMIC STABILITY: INCOME INSECURITY: IN THE LAST 12 MONTHS, WAS THERE A TIME WHEN YOU WERE NOT ABLE TO PAY THE MORTGAGE OR RENT ON TIME?: NO

## 2025-01-17 SDOH — ECONOMIC STABILITY: TRANSPORTATION INSECURITY
IN THE PAST 12 MONTHS, HAS LACK OF TRANSPORTATION KEPT YOU FROM MEETINGS, WORK, OR FROM GETTING THINGS NEEDED FOR DAILY LIVING?: NO

## 2025-01-17 SDOH — HEALTH STABILITY: PHYSICAL HEALTH: ON AVERAGE, HOW MANY DAYS PER WEEK DO YOU ENGAGE IN MODERATE TO STRENUOUS EXERCISE (LIKE A BRISK WALK)?: 7 DAYS

## 2025-01-17 SDOH — ECONOMIC STABILITY: TRANSPORTATION INSECURITY
IN THE PAST 12 MONTHS, HAS THE LACK OF TRANSPORTATION KEPT YOU FROM MEDICAL APPOINTMENTS OR FROM GETTING MEDICATIONS?: NO

## 2025-01-17 ASSESSMENT — SOCIAL DETERMINANTS OF HEALTH (SDOH): HOW HARD IS IT FOR YOU TO PAY FOR THE VERY BASICS LIKE FOOD, HOUSING, MEDICAL CARE, AND HEATING?: NOT HARD AT ALL

## 2025-01-17 NOTE — CARE COORDINATION
Acceptance  Method: Explanation, Teachback  Response: Verbalizes Understanding    Adaptive Equipment, taught by Harper Voss, RN at 1/17/2025 11:06 AM.  Learner: Patient  Readiness: Acceptance  Method: Explanation, Teachback  Response: Verbalizes Understanding    Education Comments  No comments found.     ,    Goals Addressed                   This Visit's Progress     Conditions and Symptoms        I will schedule office visits, as directed by my provider.  I will keep my appointment or reschedule if I have to cancel.  I will notify my provider of any barriers to my plan of care.  I will follow my Zone Management tool to seek urgent or emergent care.  I will notify my provider of any symptoms that indicate a worsening of my condition.    Barriers: overwhelmed by complexity of regimen  Plan for overcoming my barriers: education and support   Confidence: 6/10  Anticipated Goal Completion Date: 4/17/25                 PCP/Specialist follow up:   Future Appointments         Provider Specialty Dept Phone    2/5/2025 10:30 AM America Holley MD Primary Care [phone]            Follow Up:   Plan for next ACM outreach in approximately 2 weeks to complete:  - goal progression  - education   -fu shoulder pain   -mail fall education and zones  -review mailings  Patient  is agreeable to this plan.

## 2025-01-28 ENCOUNTER — CARE COORDINATION (OUTPATIENT)
Dept: CARE COORDINATION | Age: 89
End: 2025-01-28

## 2025-01-28 NOTE — CARE COORDINATION
Ambulatory Care Coordination Note     1/28/2025 3:36 PM     Patient outreach attempt by this ACM today to perform care management follow up . ACM was unable to reach the patient by telephone today;   voicemail full and unable to leave a message.      ACM: Harper Voss RN     PCP/Specialist follow up:   Future Appointments         Provider Specialty Dept Phone    2/5/2025 10:30 AM America Holley MD Primary Care [phone]            Follow Up:   Plan for next ACM outreach in approximately 2 weeks to complete:  - goal progression  - education   -fu shoulder pain  -mail fall ed and zones  -review mailings.

## 2025-02-04 RX ORDER — TRIAMCINOLONE ACETONIDE 1 MG/G
CREAM TOPICAL
Qty: 80 G | Refills: 0 | Status: SHIPPED | OUTPATIENT
Start: 2025-02-04 | End: 2025-02-05

## 2025-02-05 ENCOUNTER — OFFICE VISIT (OUTPATIENT)
Dept: PRIMARY CARE CLINIC | Age: 89
End: 2025-02-05

## 2025-02-05 VITALS
BODY MASS INDEX: 21.47 KG/M2 | SYSTOLIC BLOOD PRESSURE: 140 MMHG | HEIGHT: 70 IN | OXYGEN SATURATION: 98 % | RESPIRATION RATE: 16 BRPM | WEIGHT: 150 LBS | DIASTOLIC BLOOD PRESSURE: 60 MMHG | HEART RATE: 66 BPM

## 2025-02-05 DIAGNOSIS — J30.89 NON-SEASONAL ALLERGIC RHINITIS, UNSPECIFIED TRIGGER: ICD-10-CM

## 2025-02-05 DIAGNOSIS — E55.9 VITAMIN D DEFICIENCY: ICD-10-CM

## 2025-02-05 DIAGNOSIS — I10 ESSENTIAL HYPERTENSION: ICD-10-CM

## 2025-02-05 DIAGNOSIS — L85.3 DRY SKIN: ICD-10-CM

## 2025-02-05 DIAGNOSIS — K21.00 GASTROESOPHAGEAL REFLUX DISEASE WITH ESOPHAGITIS WITHOUT HEMORRHAGE: ICD-10-CM

## 2025-02-05 DIAGNOSIS — Z71.89 ACP (ADVANCE CARE PLANNING): ICD-10-CM

## 2025-02-05 DIAGNOSIS — H35.3290 EXUDATIVE AGE-RELATED MACULAR DEGENERATION, UNSPECIFIED LATERALITY, UNSPECIFIED STAGE (HCC): ICD-10-CM

## 2025-02-05 DIAGNOSIS — Z86.39 HISTORY OF GRAVES' DISEASE: Primary | ICD-10-CM

## 2025-02-05 DIAGNOSIS — K59.01 SLOW TRANSIT CONSTIPATION: ICD-10-CM

## 2025-02-05 DIAGNOSIS — N18.32 STAGE 3B CHRONIC KIDNEY DISEASE (HCC): ICD-10-CM

## 2025-02-05 DIAGNOSIS — Z86.718 HISTORY OF DEEP VEIN THROMBOSIS: ICD-10-CM

## 2025-02-05 DIAGNOSIS — B02.9 ACUTE PAIN ASSOCIATED WITH HERPES ZOSTER: ICD-10-CM

## 2025-02-05 RX ORDER — DOCUSATE SODIUM 100 MG/1
100 CAPSULE, LIQUID FILLED ORAL 2 TIMES DAILY PRN
Qty: 180 CAPSULE | Refills: 3 | Status: SHIPPED | OUTPATIENT
Start: 2025-02-05

## 2025-02-05 RX ORDER — PREGABALIN 25 MG/1
25 CAPSULE ORAL 2 TIMES DAILY
Qty: 180 CAPSULE | Refills: 0 | Status: SHIPPED | OUTPATIENT
Start: 2025-02-05 | End: 2025-05-06

## 2025-02-05 RX ORDER — SUCRALFATE 1 G/1
1 TABLET ORAL
Qty: 270 TABLET | Refills: 3 | Status: SHIPPED | OUTPATIENT
Start: 2025-02-05

## 2025-02-05 RX ORDER — PANTOPRAZOLE SODIUM 40 MG/1
TABLET, DELAYED RELEASE ORAL
Qty: 180 TABLET | Refills: 1 | Status: SHIPPED | OUTPATIENT
Start: 2025-02-05

## 2025-02-05 RX ORDER — LEVOCETIRIZINE DIHYDROCHLORIDE 5 MG/1
2.5 TABLET, FILM COATED ORAL NIGHTLY
Qty: 45 TABLET | Refills: 3 | Status: SHIPPED | OUTPATIENT
Start: 2025-02-05

## 2025-02-05 RX ORDER — ATENOLOL 25 MG/1
25 TABLET ORAL DAILY
Qty: 90 TABLET | Refills: 3 | Status: SHIPPED | OUTPATIENT
Start: 2025-02-05

## 2025-02-05 RX ORDER — CHOLECALCIFEROL (VITAMIN D3) 25 MCG
2000 TABLET ORAL
Qty: 30 TABLET | Refills: 0 | Status: SHIPPED | OUTPATIENT
Start: 2025-02-05

## 2025-02-05 RX ORDER — FOLIC ACID 1 MG/1
1 TABLET ORAL DAILY
Qty: 90 TABLET | Refills: 1 | Status: SHIPPED | OUTPATIENT
Start: 2025-02-05

## 2025-02-05 RX ORDER — SELENIUM 50 MCG
1 TABLET ORAL DAILY
Qty: 90 CAPSULE | Refills: 3 | Status: SHIPPED | OUTPATIENT
Start: 2025-02-05

## 2025-02-05 RX ORDER — AMMONIUM LACTATE 12 G/100G
LOTION TOPICAL
Qty: 225 G | Refills: 5 | Status: SHIPPED | OUTPATIENT
Start: 2025-02-05

## 2025-02-05 SDOH — ECONOMIC STABILITY: FOOD INSECURITY: WITHIN THE PAST 12 MONTHS, THE FOOD YOU BOUGHT JUST DIDN'T LAST AND YOU DIDN'T HAVE MONEY TO GET MORE.: NEVER TRUE

## 2025-02-05 SDOH — ECONOMIC STABILITY: FOOD INSECURITY: WITHIN THE PAST 12 MONTHS, YOU WORRIED THAT YOUR FOOD WOULD RUN OUT BEFORE YOU GOT MONEY TO BUY MORE.: NEVER TRUE

## 2025-02-05 ASSESSMENT — PATIENT HEALTH QUESTIONNAIRE - PHQ9
SUM OF ALL RESPONSES TO PHQ QUESTIONS 1-9: 0
2. FEELING DOWN, DEPRESSED OR HOPELESS: NOT AT ALL
SUM OF ALL RESPONSES TO PHQ9 QUESTIONS 1 & 2: 0
1. LITTLE INTEREST OR PLEASURE IN DOING THINGS: NOT AT ALL
SUM OF ALL RESPONSES TO PHQ QUESTIONS 1-9: 0

## 2025-02-05 NOTE — PATIENT INSTRUCTIONS

## 2025-02-05 NOTE — ACP (ADVANCE CARE PLANNING)
Advance Care Planning     Advance Care Planning (ACP) Physician/NP/PA Conversation    Date of Conversation: 2/5/2025  Conducted with: Patient with Decision Making Capacity    Healthcare Decision Maker:      Primary Decision Maker: Mai Liu - Shavon - [phone]    Secondary Decision Maker: Jorge Alberto Ochoa - Grandchild - [phone]    Click here to complete Healthcare Decision Makers including selection of the Healthcare Decision Maker Relationship (ie \"Primary\")  Today we documented Decision Maker(s) consistent with Legal Next of Kin hierarchy.    Care Preferences:    Hospitalization:  \"If your health worsens and it becomes clear that your chance of recovery is unlikely, what would be your preference regarding hospitalization?\"  The patient would prefer hospitalization.    Ventilation:  \"If you were unable to breath on your own and your chance of recovery was unlikely, what would be your preference about the use of a ventilator (breathing machine) if it was available to you?\"  The patient would NOT desire the use of a ventilator.    Resuscitation:  \"In the event your heart stopped as a result of an underlying serious health condition, would you want attempts made to restart your heart, or would you prefer a natural death?\"  No, do NOT attempt to resuscitate.    treatment goals  Comfort care  Conversation Outcomes / Follow-Up Plan:    Reviewed DNR/DNI and patient elects Full Code (Attempt Resuscitation)    Length of Voluntary ACP Conversation in minutes:  <16 minutes (Non-Billable)    APRYL COLEMAN MD

## 2025-02-05 NOTE — PROGRESS NOTES
Shanon Hsieh (:  1926) is a 89 y.o. female,Established patient, here for evaluation of the following chief complaint(s):  Follow-up      Assessment & Plan   ASSESSMENT/PLAN:  1. History of Graves' disease managed by endocrinology and controlled with methimazole 5 mg daily.  2. Gastroesophageal reflux disease with esophagitis without hemorrhage is controlled with pantoprazole but has breakthrough symptoms so we will add Carafate.  -     pantoprazole (PROTONIX) 40 MG tablet; TAKE 1 TABLET BY MOUTH TWICE A DAY, Disp-180 tablet, R-1Normal  -     sucralfate (CARAFATE) 1 GM tablet; Take 1 tablet by mouth 3 times daily (before meals), Disp-270 tablet, R-3Normal  3.  History deep vein thrombosis (DVT) of proximal vein of right lower extremity (HCC), r and his insufficiency.  Ecurrent at least 3 episodes is on life time Eliquis.  At recurrence on 2.5 mg twice a day so despite age is on 5 mg twice a day.  No sign of acute DVT at present.  Also wear support stockings for the  -     apixaban (ELIQUIS) 5 MG TABS tablet; Take 1 tablet by mouth 2 times daily, Disp-180 tablet, R-0Normal  4. Exudative age-related macular degeneration, unspecified laterality, unspecified stage ([facility]) patient is legally blind and patient's niece who is a certified nurse practitioner sets up her Mediset weekly and she takes her medications appropriately and this is being monitored by her niece.  Due to vision problems her medications are set up for her.  5. Stage 3b chronic kidney disease (HCC) stable.  Avoid nephrotoxins.  Stay well-hydrated.      Latest Ref Rng & Units 1/15/2025     8:37 PM 1/15/2025     7:29 PM 2024     1:56 PM 2024     1:28 PM 2024     1:32 PM   Labs Renal   BUN 7 - 20 mg/dL  14  13  11  8    Cr 0.6 - 1.2 mg/dL  1.1  1.1  1.0  1.0    K 3.5 - 5.1 mmol/L 4.0  see below  4.1  4.5  4.2    Na 136 - 145 mmol/L  139  141  144  138       6. Dry skin did not refill the triamcinolone but will give her Lac-Hydrin to

## 2025-02-08 ASSESSMENT — ENCOUNTER SYMPTOMS
RHINORRHEA: 0
CHEST TIGHTNESS: 0
FACIAL SWELLING: 0
COUGH: 0
CONSTIPATION: 0
PHOTOPHOBIA: 0
EYE ITCHING: 0
BLOOD IN STOOL: 0
WHEEZING: 0
VOMITING: 0
APNEA: 0
DIARRHEA: 0
NAUSEA: 0
SORE THROAT: 0
ALLERGIC/IMMUNOLOGIC COMMENTS: ALLERGY ON GLUTEN-FREE DIET
ANAL BLEEDING: 0
STRIDOR: 0
SHORTNESS OF BREATH: 0
EYE REDNESS: 0
ABDOMINAL PAIN: 0
EYE PAIN: 0
BACK PAIN: 0
EYE DISCHARGE: 0
CHOKING: 0
TROUBLE SWALLOWING: 0

## 2025-02-12 ENCOUNTER — TELEPHONE (OUTPATIENT)
Dept: PRIMARY CARE CLINIC | Age: 89
End: 2025-02-12

## 2025-02-12 RX ORDER — SELENIUM 50 MCG
1 TABLET ORAL DAILY
Qty: 90 CAPSULE | Refills: 3 | Status: CANCELLED | OUTPATIENT
Start: 2025-02-12

## 2025-02-12 NOTE — TELEPHONE ENCOUNTER
Medication:   Requested Prescriptions     Pending Prescriptions Disp Refills    Lactobacillus (ACIDOPHILUS) CAPS capsule 90 capsule 3     Sig: Take 1 capsule by mouth daily        Last Filled:      Patient Phone Number: [phone] (home)     Last appt: 2/5/2025   Next appt: Visit date not found    Last OARRS:       9/11/2019    12:00 PM   RX Monitoring   Periodic Controlled Substance Monitoring Assessed functional status.     Please advise on dosage.

## 2025-02-12 NOTE — TELEPHONE ENCOUNTER
[place]/rep requested the medication formation or what's the strength in mg for Lactobacillus (ACIDOPHILUS) CAPS capsule please review and adjust.

## 2025-02-14 ENCOUNTER — CARE COORDINATION (OUTPATIENT)
Dept: CARE COORDINATION | Age: 89
End: 2025-02-14

## 2025-02-14 NOTE — CARE COORDINATION
Ambulatory Care Coordination Note     2025 10:44 AM     Patient Current Location:  Home: [address] [place] [address] [address]  [facility] [address]     ACM contacted the patient by telephone. Verified name and  with patient as identifiers.         ACM: Harper Voss RN     Challenges to be reviewed by the provider   Additional needs identified to be addressed with provider No  none               Method of communication with provider: none.    Utilization: Patient has not had any utilization since our last call.     Care Summary Note:     ACM made care coordination outreach and spoke with patient. Patient reported that she is doing \"pretty good.\" Denied any chest pain, shortness of breath or swelling. Discussed recent PCP visit notes and AVS. Patient reported that she is having some stomach issues, feeling gassy. Has not yet received her prescriptions from PCP appointment last week. Patient agreeable to call [facility] mail service for an update. Called [facility] and spoke with Ronnie NARVAEZ at length. Was informed that they received a number of prescriptions. Order on hold due to several issues. Some medications are not available for refill at this time which is delaying shipment. Eliquis will automatically be filled 3/6/25. Protonix on hold due to waiting on insurance. The other 8 medications have been approved and per Ronnie will be mailed out today or tomorrow.   Denied any chest pain, shortness of breath or swelling. Reported that her shoulder pain is medically managed with pain medication. Tolerable with medical management. Blood pressure this morning was higher than normal at 146/81 due to stomach pains/gassy. Denied any concerns with blood pressure at this time. Denied any other questions or concerns. Agreeable to future care coordination outreaches. ACM to follow up at a later date.     Offered patient enrollment in the Remote Patient Monitoring (RPM) program for in-home monitoring: Yes, but did not enroll at this time: already

## 2025-02-17 ENCOUNTER — TELEPHONE (OUTPATIENT)
Dept: ADMINISTRATIVE | Age: 89
End: 2025-02-17

## 2025-02-17 NOTE — TELEPHONE ENCOUNTER
Submitted PA for Pantoprazole Sodium 40MG dr tablets   Via CM (Key: [id number]) STATUS: PENDING.    Follow up done daily; if no decision with in three days we will refax.  If another three days goes by with no decision will call the insurance for status.

## 2025-02-17 NOTE — TELEPHONE ENCOUNTER
The medication is APPROVED THRU 02/17/2026    Your PA request has been approved. Additional information will be provided in the approval communication. (Message 1145)  Authorization Expiration Date: 2/17/2026    If this requires a response please respond to the pool ( P [facility]X P[facility] MEDICATION PRE-AUTH).      Thank you please advise patient.

## 2025-02-20 NOTE — TELEPHONE ENCOUNTER
Informed [employer] [facility].   They also informed me that [facility], it's not covered by pt's insurance. Pt will need to pay out of pocket.

## 2025-02-27 ENCOUNTER — CARE COORDINATION (OUTPATIENT)
Dept: CARE COORDINATION | Age: 89
End: 2025-02-27

## 2025-02-27 NOTE — CARE COORDINATION
Ambulatory Care Coordination Note     2025 1:05 PM     Patient Current Location:  Home: [address] [place] [address] [address]  [facility] [address]     ACM contacted the patient by telephone. Verified name and  with patient as identifiers.         ACM: Harper Voss RN     Challenges to be reviewed by the provider   Additional needs identified to be addressed with provider No  none               Method of communication with provider: none.    Utilization: Patient has not had any utilization since our last call.     Care Summary Note:     ACM made care coordination outreach and spoke with patient. Patient pleasant on the phone while conversing with ACM. Patient reported that she is \"hanging in there.\" Denied any chest pain, shortness of breath or swelling. Denied any issues with appetite or bowels at this time. Is gassy at times, but medically managed. Had a bowel movement this morning. Last blood pressure reading was around 130/70. Denied any HTN concerns at this time. Discussed HTN zone tools. Patient stated that she received her medications from [facility]. Her niece manages her medications. Politely declined working with clinical pharmacist. Stated that her niece is a nurse and has medical management under control. Declined any other questions or concerns at this time. Agreeable to future care coordination outreaches. ACM to follow up at a later date.     Offered patient enrollment in the Remote Patient Monitoring (RPM) program for in-home monitoring: Yes, but did not enroll at this time: already monitoring with home equipment.     Assessments Completed:   Hypertension - Encounter Level          ,   General Assessment    Do you have any symptoms that are causing concern?: No          Medications Reviewed:   Completed during a previous call     Advance Care Planning:   Reviewed during previous call      Care Planning:   Education Documentation  Educate transfer safety, taught by Harper Voss RN at 2025  1:05

## 2025-03-13 ENCOUNTER — CARE COORDINATION (OUTPATIENT)
Dept: CARE COORDINATION | Age: 89
End: 2025-03-13

## 2025-03-13 NOTE — CARE COORDINATION
Ambulatory Care Coordination Note     3/13/2025 3:28 PM     Patient Current Location:  Home: [address] [place] [address] [address]  [facility] [address]     ACM contacted the patient by telephone. Verified name and  with patient as identifiers.         ACM: Harper Voss RN     Challenges to be reviewed by the provider   Additional needs identified to be addressed with provider No  none               Method of communication with provider: none.    Utilization: Patient has not had any utilization since our last call.     Care Summary Note:     ACM made care coordination outreach and spoke with patient. Patient pleasant on the phone while conversing with ACM. Patient stated that she is \"hanging in there.\" Denied any chest pain, shortness of breath or swelling. Denied any new or worsening issues with bowels or appetite. Medications managed by dotty. Stated that all medications are filled and she is taking as prescribed. Takes her blood pressure every morning. Stated that blood pressure was 130/seventy something this morning. Stated that her blood pressure has been steady. Reviewed zone tools and fall education. Denied any HTN concerns at this time. 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 Reviewed upcoming PCP appointment 3/19/25 and patient is aware. Denied any other questions or concerns at this time. Agreeable to future care coordination outreaches. ACM to follow up at a later date.     Offered patient enrollment in the Remote Patient Monitoring (RPM) program for in-home monitoring: Yes, but did not enroll at this time: already monitoring with home equipment.     Assessments Completed:   Hypertension - Encounter Level          ,   General Assessment    Do you have any symptoms that are causing concern?: No          Medications Reviewed:   Completed during a previous call     Advance Care Planning:   Reviewed during previous call      Care Planning:   Education Documentation  Influenza Vaccine, 
n/a

## 2025-03-17 DIAGNOSIS — K21.00 GASTROESOPHAGEAL REFLUX DISEASE WITH ESOPHAGITIS WITHOUT HEMORRHAGE: ICD-10-CM

## 2025-03-17 NOTE — TELEPHONE ENCOUNTER
Medication:   Requested Prescriptions     Pending Prescriptions Disp Refills    pantoprazole (PROTONIX) 40 MG tablet [Pharmacy Med Name: PANTOPRAZOLE SOD DR 40 MG TAB] 180 tablet 1     Sig: TAKE 1 TABLET BY MOUTH TWICE A DAY        Last Filled:      Patient Phone Number: [phone] (home)     Last appt: 2/5/2025   Next appt: 3/19/2025    Last OARRS:       9/11/2019    12:00 PM   RX Monitoring   Periodic Controlled Substance Monitoring Assessed functional status.

## 2025-03-18 RX ORDER — PANTOPRAZOLE SODIUM 40 MG/1
40 TABLET, DELAYED RELEASE ORAL 2 TIMES DAILY
Qty: 180 TABLET | Refills: 1 | Status: SHIPPED | OUTPATIENT
Start: 2025-03-18 | End: 2025-03-19 | Stop reason: SDUPTHER

## 2025-03-19 ENCOUNTER — OFFICE VISIT (OUTPATIENT)
Dept: PRIMARY CARE CLINIC | Age: 89
End: 2025-03-19
Payer: MEDICARE

## 2025-03-19 VITALS
HEIGHT: 69 IN | TEMPERATURE: 98 F | RESPIRATION RATE: 16 BRPM | HEART RATE: 66 BPM | BODY MASS INDEX: 23.7 KG/M2 | SYSTOLIC BLOOD PRESSURE: 117 MMHG | OXYGEN SATURATION: 99 % | DIASTOLIC BLOOD PRESSURE: 62 MMHG | WEIGHT: 160 LBS

## 2025-03-19 DIAGNOSIS — Z86.711 HISTORY OF PULMONARY EMBOLISM: ICD-10-CM

## 2025-03-19 DIAGNOSIS — K14.8 TONGUE DISCOLORATION: ICD-10-CM

## 2025-03-19 DIAGNOSIS — I87.2 VENOUS STASIS DERMATITIS: Primary | ICD-10-CM

## 2025-03-19 DIAGNOSIS — K59.01 SLOW TRANSIT CONSTIPATION: ICD-10-CM

## 2025-03-19 DIAGNOSIS — K21.00 GASTROESOPHAGEAL REFLUX DISEASE WITH ESOPHAGITIS WITHOUT HEMORRHAGE: ICD-10-CM

## 2025-03-19 PROCEDURE — 1036F TOBACCO NON-USER: CPT | Performed by: INTERNAL MEDICINE

## 2025-03-19 PROCEDURE — 1090F PRES/ABSN URINE INCON ASSESS: CPT | Performed by: INTERNAL MEDICINE

## 2025-03-19 PROCEDURE — 1159F MED LIST DOCD IN RCRD: CPT | Performed by: INTERNAL MEDICINE

## 2025-03-19 PROCEDURE — 99214 OFFICE O/P EST MOD 30 MIN: CPT | Performed by: INTERNAL MEDICINE

## 2025-03-19 PROCEDURE — 1160F RVW MEDS BY RX/DR IN RCRD: CPT | Performed by: INTERNAL MEDICINE

## 2025-03-19 PROCEDURE — G8427 DOCREV CUR MEDS BY ELIG CLIN: HCPCS | Performed by: INTERNAL MEDICINE

## 2025-03-19 PROCEDURE — 1123F ACP DISCUSS/DSCN MKR DOCD: CPT | Performed by: INTERNAL MEDICINE

## 2025-03-19 PROCEDURE — G8420 CALC BMI NORM PARAMETERS: HCPCS | Performed by: INTERNAL MEDICINE

## 2025-03-19 RX ORDER — DOCUSATE SODIUM 100 MG/1
200 CAPSULE, LIQUID FILLED ORAL NIGHTLY
Qty: 180 CAPSULE | Refills: 3 | Status: SHIPPED | OUTPATIENT
Start: 2025-03-19

## 2025-03-19 RX ORDER — PANTOPRAZOLE SODIUM 40 MG/1
40 TABLET, DELAYED RELEASE ORAL 2 TIMES DAILY
Qty: 180 TABLET | Refills: 1 | Status: SHIPPED | OUTPATIENT
Start: 2025-03-19

## 2025-03-19 RX ORDER — BACILLUS COAGULANS/INULIN 1B-250 MG
1 CAPSULE ORAL DAILY
Qty: 90 CAPSULE | Refills: 3 | Status: SHIPPED | OUTPATIENT
Start: 2025-03-19

## 2025-03-19 RX ORDER — TRIAMCINOLONE ACETONIDE 0.25 MG/G
OINTMENT TOPICAL
Qty: 15 G | Refills: 1 | Status: SHIPPED | OUTPATIENT
Start: 2025-03-19 | End: 2025-03-26

## 2025-03-19 NOTE — ACP (ADVANCE CARE PLANNING)
Advance Care Planning     Advance Care Planning (ACP) Physician/NP/PA Conversation    Date of Conversation: 3/19/2025  Conducted with: Patient with Decision Making Capacity    Healthcare Decision Maker:      Primary Decision Maker: Mai Liu - Shavon - [phone]    Secondary Decision Maker: Jorge Alberto Ochoa - Grandchild - [phone]    Click here to complete Healthcare Decision Makers including selection of the Healthcare Decision Maker Relationship (ie \"Primary\")  Today we documented Decision Maker(s) consistent with Legal Next of Kin hierarchy.    Care Preferences:    Hospitalization:  \"If your health worsens and it becomes clear that your chance of recovery is unlikely, what would be your preference regarding hospitalization?\"  The patient would prefer hospitalization.    Ventilation:  \"If you were unable to breath on your own and your chance of recovery was unlikely, what would be your preference about the use of a ventilator (breathing machine) if it was available to you?\"  The patient would NOT desire the use of a ventilator.    Resuscitation:  \"In the event your heart stopped as a result of an underlying serious health condition, would you want attempts made to restart your heart, or would you prefer a natural death?\"  No, do NOT attempt to resuscitate.    treatment goals MAINTAIN COMFORT AND CURRENT STATE.  MENTALLY VERY ALERT AND INDEPENDENT.  NO CPR BUT PREFERS HOSPITAL TREATMENT IF ILL.   Conversation Outcomes / Follow-Up Plan:  ACP complete - no further action today  Reviewed DNR/DNI and patient elects DNR order - referred to ACP Clinical Specialist & placed order    Length of Voluntary ACP Conversation in minutes:  <16 minutes (Non-Billable)    APRYL COLEMAN MD

## 2025-03-20 ENCOUNTER — RESULTS FOLLOW-UP (OUTPATIENT)
Dept: PRIMARY CARE CLINIC | Age: 89
End: 2025-03-20

## 2025-03-21 DIAGNOSIS — Z86.711 HISTORY OF PULMONARY EMBOLISM: ICD-10-CM

## 2025-03-21 LAB
BACTERIA SPEC AEROBE CULT: ABNORMAL
BACTERIA SPEC ANAEROBE CULT: ABNORMAL
GRAM STN SPEC: ABNORMAL

## 2025-03-21 ASSESSMENT — ENCOUNTER SYMPTOMS
VOMITING: 0
SORE THROAT: 0
BACK PAIN: 0
TROUBLE SWALLOWING: 0
CONSTIPATION: 0
RHINORRHEA: 0
NAUSEA: 0
PHOTOPHOBIA: 0
SHORTNESS OF BREATH: 0
WHEEZING: 0
CHEST TIGHTNESS: 0
EYE REDNESS: 0
ALLERGIC/IMMUNOLOGIC COMMENTS: ALLERGY ON GLUTEN-FREE DIET
STRIDOR: 0
DIARRHEA: 0
BLOOD IN STOOL: 0
FACIAL SWELLING: 0
EYE PAIN: 0
EYE ITCHING: 0
ANAL BLEEDING: 0
EYE DISCHARGE: 0
CHOKING: 0
APNEA: 0
ABDOMINAL PAIN: 0
COUGH: 0

## 2025-03-21 NOTE — TELEPHONE ENCOUNTER
Cathy calling from [facility] pharm states patient need a refill on medication Eliquis 5 MG when calling back refer to reference number [id number] pls return call back @ 1 [phone] option 2

## 2025-03-21 NOTE — TELEPHONE ENCOUNTER
Medication:   Requested Prescriptions     Pending Prescriptions Disp Refills    apixaban (ELIQUIS) 5 MG TABS tablet 60 tablet 5     Sig: Take 1 tablet by mouth 2 times daily        Last Filled:      Patient Phone Number: [phone] (home)     Last appt: 3/19/2025   Next appt: 5/21/2025    Last OARRS:       9/11/2019    12:00 PM   RX Monitoring   Periodic Controlled Substance Monitoring Assessed functional status.

## 2025-03-21 NOTE — PROGRESS NOTES
Shanon Hsieh (:  1926) is a 89 y.o. female,Established patient, here for evaluation of the following chief complaint(s):  Follow-up and Dry Eye      Assessment & Plan   ASSESSMENT/PLAN:  1. Venous stasis dermatitis:Skin irritation.  She reports irritation from wearing thigh-high hose and prefers to use triamcinolone cream for itching. A prescription for triamcinolone cream will be provided for use as needed.  -     triamcinolone (KENALOG) 0.025 % ointment; Apply topically 2 times daily PRN RASH ON LEGS., Disp-15 g, R-1, Normal  2. Gastroesophageal reflux disease with esophagitis without hemorrhage stable on   -     pantoprazole (PROTONIX) 40 MG tablet; Take 1 tablet by mouth 2 times daily, Disp-180 tablet, R-1Normal  3. Slow transit constipation  -     docusate sodium (COLACE) 100 MG capsule; Take 2 capsules by mouth nightly Stool softener., Disp-180 capsule, R-3Normal  -     Bacillus Coagulans-Inulin (PROBIOTIC) 1-250 BILLION-MG CAPS; Take 1 capsule by mouth daily, Disp-90 capsule, R-3Normal  4. 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  She reports persistent swelling in one leg, which has not worsened. She is advised to continue her current management plan and ensure adequate protein intake by consuming a protein drink with 30 g of protein three times daily after each meal.  -     apixaban (ELIQUIS) 5 MG TABS tablet; Take 1 tablet by mouth 2 times daily, Disp-60 tablet, R-5Normal  5. Tongue discoloration, does not appear as thrush and has not responded to thrush treatments in the past.  Will culture and restart probiotics.  -     Culture, Aerobic and Anaerobic  6.   Advance care planning.  She prefers no CPR and no hospice care but would like to be hospitalized if necessary. These preferences will be documented in her medical record .               Return in about 2 months (around 2025) for PROTEIN MALNUTRION, RECURRENT DVT,  .         Subjective

## 2025-03-21 NOTE — RESULT ENCOUNTER NOTE
The tongue culture showed a heavy growth of normal mouth bacterial  We need to increase the good bacteria in your mouth.  At your last appointment, I refilled your probiotic that you have been out of due to refill problems.

## 2025-04-09 ENCOUNTER — CARE COORDINATION (OUTPATIENT)
Dept: CARE COORDINATION | Age: 89
End: 2025-04-09

## 2025-04-09 NOTE — CARE COORDINATION
Ambulatory Care Coordination Note     2025 3:36 PM     Patient Current Location:  Home: [address] [place] [address] [address]  [facility] [id number]     ACM contacted the patient by telephone. Verified name and  with patient as identifiers.         ACM: Harper Voss RN     Challenges to be reviewed by the provider   Additional needs identified to be addressed with provider No  none               Method of communication with provider: none.    Utilization: Patient has not had any utilization since our last call.     Care Summary Note:     ACM made care coordination outreach and spoke with patient. Patient pleasant on the phone while conversing with ACM. Patient stated that she has general aches and pains, but is hanging in there. Denied any chest pain, shortness of breath or swelling. Denied any concerns with appetite or bowels. Stated that she eats by the clock and recently increased her stool softener. Stated that blood pressure has been \"pretty good.\" Monitoring daily at home. Stated that she has all of her medications filled and is taking as presribed. Denied any other questions or concerns at this time. Agreeable to future care coordination outreaches. ACM to follow up at a later date.     Offered patient enrollment in the Remote Patient Monitoring (RPM) program for in-home monitoring: Yes, but did not enroll at this time: already monitoring with home equipment.     Assessments Completed:   Hypertension - Encounter Level          ,   General Assessment    Do you have any symptoms that are causing concern?: No          Medications Reviewed:   Completed during a previous call     Advance Care Planning:   Reviewed during previous call      Care Planning:   Education Documentation  Educate transfer safety, taught by Harper Voss RN at 2025  3:35 PM.  Learner: Patient  Readiness: Acceptance  Method: Explanation, Teachback  Response: Verbalizes Understanding    Provide High Risk Information for Falls Program, taught

## 2025-05-02 ENCOUNTER — CARE COORDINATION (OUTPATIENT)
Dept: CARE COORDINATION | Age: 89
End: 2025-05-02

## 2025-05-02 NOTE — CARE COORDINATION
any symptoms that are causing concern?: No          Medications Reviewed:   Completed during a previous call     Advance Care Planning:   Reviewed during previous call      Care Planning:   Education Documentation  No documentation found.  Education Comments  No comments found.     ,    Goals Addressed                   This Visit's Progress     COMPLETED: Conditions and Symptoms        I will schedule office visits, as directed by my provider.  I will keep my appointment or reschedule if I have to cancel.  I will notify my provider of any barriers to my plan of care.  I will follow my Zone Management tool to seek urgent or emergent care.  I will notify my provider of any symptoms that indicate a worsening of my condition.    Barriers: overwhelmed by complexity of regimen  Plan for overcoming my barriers: education and support   Confidence: 6/10  Anticipated Goal Completion Date: 4/17/25                 PCP/Specialist follow up:   Future Appointments         Provider Specialty Dept Phone    5/21/2025 9:45 AM America Holley MD Primary Care [phone]            Follow Up:   No further Ambulatory Care Management follow-up scheduled at this time.  Patient  has Ambulatory Care Manager's contact information for any further questions, concerns or needs.

## 2025-05-21 ENCOUNTER — TELEPHONE (OUTPATIENT)
Dept: PRIMARY CARE CLINIC | Age: 89
End: 2025-05-21

## 2025-05-21 ENCOUNTER — OFFICE VISIT (OUTPATIENT)
Dept: PRIMARY CARE CLINIC | Age: 89
End: 2025-05-21
Payer: MEDICARE

## 2025-05-21 VITALS
RESPIRATION RATE: 16 BRPM | OXYGEN SATURATION: 96 % | HEIGHT: 69 IN | WEIGHT: 160 LBS | DIASTOLIC BLOOD PRESSURE: 64 MMHG | BODY MASS INDEX: 23.7 KG/M2 | HEART RATE: 64 BPM | SYSTOLIC BLOOD PRESSURE: 140 MMHG

## 2025-05-21 DIAGNOSIS — R73.9 BLOOD GLUCOSE ELEVATED: ICD-10-CM

## 2025-05-21 DIAGNOSIS — R68.81 EARLY SATIETY: Primary | ICD-10-CM

## 2025-05-21 DIAGNOSIS — N18.32 STAGE 3B CHRONIC KIDNEY DISEASE (HCC): ICD-10-CM

## 2025-05-21 DIAGNOSIS — R11.0 NAUSEA: ICD-10-CM

## 2025-05-21 DIAGNOSIS — D68.9 COAGULOPATHY: ICD-10-CM

## 2025-05-21 DIAGNOSIS — K59.01 SLOW TRANSIT CONSTIPATION: ICD-10-CM

## 2025-05-21 DIAGNOSIS — R13.12 OROPHARYNGEAL DYSPHAGIA: ICD-10-CM

## 2025-05-21 DIAGNOSIS — R07.81 RIB PAIN ON LEFT SIDE: ICD-10-CM

## 2025-05-21 DIAGNOSIS — I10 ESSENTIAL HYPERTENSION: ICD-10-CM

## 2025-05-21 DIAGNOSIS — E46 PROTEIN MALNUTRITION: ICD-10-CM

## 2025-05-21 PROCEDURE — G8427 DOCREV CUR MEDS BY ELIG CLIN: HCPCS | Performed by: INTERNAL MEDICINE

## 2025-05-21 PROCEDURE — 1036F TOBACCO NON-USER: CPT | Performed by: INTERNAL MEDICINE

## 2025-05-21 PROCEDURE — 1090F PRES/ABSN URINE INCON ASSESS: CPT | Performed by: INTERNAL MEDICINE

## 2025-05-21 PROCEDURE — 1160F RVW MEDS BY RX/DR IN RCRD: CPT | Performed by: INTERNAL MEDICINE

## 2025-05-21 PROCEDURE — 1159F MED LIST DOCD IN RCRD: CPT | Performed by: INTERNAL MEDICINE

## 2025-05-21 PROCEDURE — G8420 CALC BMI NORM PARAMETERS: HCPCS | Performed by: INTERNAL MEDICINE

## 2025-05-21 PROCEDURE — 1123F ACP DISCUSS/DSCN MKR DOCD: CPT | Performed by: INTERNAL MEDICINE

## 2025-05-21 PROCEDURE — 99214 OFFICE O/P EST MOD 30 MIN: CPT | Performed by: INTERNAL MEDICINE

## 2025-05-21 RX ORDER — DOCUSATE SODIUM 100 MG/1
200 CAPSULE, LIQUID FILLED ORAL NIGHTLY
Qty: 180 CAPSULE | Refills: 3 | Status: SHIPPED | OUTPATIENT
Start: 2025-05-21

## 2025-05-21 RX ORDER — LACTOSE-REDUCED FOOD
1 LIQUID (ML) ORAL 2 TIMES DAILY
Qty: 60 EACH | Refills: 12 | Status: SHIPPED | OUTPATIENT
Start: 2025-05-21

## 2025-05-21 SDOH — ECONOMIC STABILITY: FOOD INSECURITY: WITHIN THE PAST 12 MONTHS, YOU WORRIED THAT YOUR FOOD WOULD RUN OUT BEFORE YOU GOT MONEY TO BUY MORE.: NEVER TRUE

## 2025-05-21 SDOH — ECONOMIC STABILITY: FOOD INSECURITY: WITHIN THE PAST 12 MONTHS, THE FOOD YOU BOUGHT JUST DIDN'T LAST AND YOU DIDN'T HAVE MONEY TO GET MORE.: NEVER TRUE

## 2025-05-21 ASSESSMENT — PATIENT HEALTH QUESTIONNAIRE - PHQ9
SUM OF ALL RESPONSES TO PHQ QUESTIONS 1-9: 0
2. FEELING DOWN, DEPRESSED OR HOPELESS: NOT AT ALL
1. LITTLE INTEREST OR PLEASURE IN DOING THINGS: NOT AT ALL

## 2025-05-22 ENCOUNTER — PATIENT MESSAGE (OUTPATIENT)
Dept: PRIMARY CARE CLINIC | Age: 89
End: 2025-05-22

## 2025-05-22 NOTE — TELEPHONE ENCOUNTER
[facility] message sent to daughter to find out which DME company to send Ensure prescription to.

## 2025-05-29 ENCOUNTER — HOSPITAL ENCOUNTER (OUTPATIENT)
Dept: GENERAL RADIOLOGY | Age: 89
Discharge: HOME | End: 2025-05-29
Payer: MEDICARE

## 2025-05-29 DIAGNOSIS — R73.9 BLOOD GLUCOSE ELEVATED: ICD-10-CM

## 2025-05-29 DIAGNOSIS — R13.12 OROPHARYNGEAL DYSPHAGIA: ICD-10-CM

## 2025-05-29 DIAGNOSIS — E46 PROTEIN MALNUTRITION: ICD-10-CM

## 2025-05-29 DIAGNOSIS — R68.81 EARLY SATIETY: ICD-10-CM

## 2025-05-29 DIAGNOSIS — R11.0 NAUSEA: ICD-10-CM

## 2025-05-29 PROCEDURE — 92611 MOTION FLUOROSCOPY/SWALLOW: CPT

## 2025-05-29 PROCEDURE — 74230 X-RAY XM SWLNG FUNCJ C+: CPT

## 2025-05-29 NOTE — PROCEDURES
INSTRUMENTAL SWALLOW REPORT  MODIFIED BARIUM SWALLOW    NAME: Shanon Hsieh     : 1926  MRN: [id number]       Date of Eval: 2025     Ordering Physician: Dr. 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, Clostridium difficile infection, Degenerative joint disease of knee, Depression, Diverticulitis, Dizziness, GERD (gastroesophageal reflux disease), GI bleed, Grave's disease, Hearing loss, Heart disease, History of pulmonary embolism, Hyperparathyroidism, Hypertension, IBS (irritable bowel syndrome), Macular degeneration, MGUS (monoclonal gammopathy of unknown significance), Moderate mitral regurgitation, Osteoporosis, PAD (peripheral artery disease), Poor vision, Postherpetic neuralgia, Protein malnutrition, Rash, S/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  Past Surgical History:  has a past surgical history that includes Cholecystectomy; Colonoscopy (10/29/2009); Hemorrhoid surgery; Hysterectomy (in 40's); Hysterectomy, total abdominal; Upper gastrointestinal endoscopy (N/A, 2022); IR GUIDED IVC FILTER PLACEMENT (N/A, 2022); IR GUIDED IVC FILTER PLACEMENT (2022); Ovary removal; and bronchoscopy.    Prior MBS Results: 10/24/2023  Oral Phase  WFL- no difficulty with mastication with solids.  No difficulty with oral containment or A-P transport of the bolus with any consistency  Pharyngeal Phase  WFL- no aspiration or penetration with any consistency. Pt had no residue remaining in valleculae or pyriform after the swallow with any consistency.   Upper Esophageal

## 2025-05-30 ENCOUNTER — RESULTS FOLLOW-UP (OUTPATIENT)
Dept: PRIMARY CARE CLINIC | Age: 89
End: 2025-05-30

## 2025-05-30 LAB
ALBUMIN SERPL-MCNC: 4.1 G/DL (ref 3.4–5)
ALBUMIN/GLOB SERPL: 1.5 {RATIO} (ref 1.1–2.2)
ALP SERPL-CCNC: 94 U/L (ref 40–129)
ALT SERPL-CCNC: 11 U/L (ref 10–40)
ANION GAP SERPL CALCULATED.3IONS-SCNC: 9 MMOL/L (ref 3–16)
AST SERPL-CCNC: 20 U/L (ref 15–37)
BASOPHILS # BLD: 0 K/UL (ref 0–0.2)
BASOPHILS NFR BLD: 0 %
BILIRUB SERPL-MCNC: 1 MG/DL (ref 0–1)
BUN SERPL-MCNC: 18 MG/DL (ref 7–20)
CALCIUM SERPL-MCNC: 10.4 MG/DL (ref 8.3–10.6)
CHLORIDE SERPL-SCNC: 108 MMOL/L (ref 99–110)
CO2 SERPL-SCNC: 25 MMOL/L (ref 21–32)
CREAT SERPL-MCNC: 1.1 MG/DL (ref 0.6–1.2)
DEPRECATED RDW RBC AUTO: 15.2 % (ref 12.4–15.4)
EOSINOPHIL # BLD: 0 K/UL (ref 0–0.6)
EOSINOPHIL NFR BLD: 0 %
EST. AVERAGE GLUCOSE BLD GHB EST-MCNC: 122.6 MG/DL
GFR SERPLBLD CREATININE-BSD FMLA CKD-EPI: 45 ML/MIN/{1.73_M2}
GLUCOSE SERPL-MCNC: 96 MG/DL (ref 70–99)
HBA1C MFR BLD: 5.9 %
HCT VFR BLD AUTO: 45.1 % (ref 36–48)
HGB BLD-MCNC: 14.9 G/DL (ref 12–16)
LIPASE SERPL-CCNC: 54 U/L (ref 13–60)
LYMPHOCYTES # BLD: 2 K/UL (ref 1–5.1)
LYMPHOCYTES NFR BLD: 29 %
MCH RBC QN AUTO: 27.6 PG (ref 26–34)
MCHC RBC AUTO-ENTMCNC: 33 G/DL (ref 31–36)
MCV RBC AUTO: 83.7 FL (ref 80–100)
MONOCYTES # BLD: 0.9 K/UL (ref 0–1.3)
MONOCYTES NFR BLD: 13 %
NEUTROPHILS # BLD: 3.9 K/UL (ref 1.7–7.7)
NEUTROPHILS NFR BLD: 58 %
NEUTS VAC BLD QL SMEAR: PRESENT
PLATELET # BLD AUTO: 131 K/UL (ref 135–450)
PMV BLD AUTO: 10.2 FL (ref 5–10.5)
POTASSIUM SERPL-SCNC: 4.4 MMOL/L (ref 3.5–5.1)
PREALB SERPL-MCNC: 14.2 MG/DL (ref 20–40)
PROT SERPL-MCNC: 6.8 G/DL (ref 6.4–8.2)
RBC # BLD AUTO: 5.39 M/UL (ref 4–5.2)
RBC MORPH BLD: NORMAL
SODIUM SERPL-SCNC: 142 MMOL/L (ref 136–145)
WBC # BLD AUTO: 6.8 K/UL (ref 4–11)

## 2025-05-30 NOTE — RESULT ENCOUNTER NOTE
The pancreas test is normal.  The A1c shows prediabetes.  Avoid the sweets.  Normal liver blood test and stable chronic kidney disease.  Overall your kidney function is good with a GFR of 45.  Normal is above 60.  People on dialysis are 15 or less so you are good.  Never take Motrin Aleve or ibuprofen that are harmful to the kidneys.    Her big problem is the low protein.  At your last visit I found out you are not finishing your 30 g protein drinks.  You need to drink to give these a day.  Your allergies limit a lot of the proteins you can consume.  Increase fish and chicken is helpful.  If there is problems getting enough protein down with chewing these proteins then the way to go is to drink 2 of the 30 g protein shakes daily.  Normal white blood cell count and no anemia.    The platelet count is slightly low but adequate not to cause problems.  Will continue to follow.

## 2025-06-06 NOTE — TELEPHONE ENCOUNTER
Medication:   Requested Prescriptions     Pending Prescriptions Disp Refills    felodipine (PLENDIL) 2.5 MG extended release tablet [Pharmacy Med Name: FELODIPINE ER 2.5 MG TABLET] 90 tablet 3     Sig: TAKE 1 TABLET BY MOUTH DAILY TAKE WITH THE 5 MG FELODIPINE DAILY.        Last Filled:      Patient Phone Number: [phone] (home)     Last appt: 5/21/2025   Next appt: 7/16/2025    Last OARRS:       9/11/2019    12:00 PM   RX Monitoring   Periodic Controlled Substance Monitoring Assessed functional status.

## 2025-06-08 RX ORDER — FELODIPINE 2.5 MG/1
TABLET, EXTENDED RELEASE ORAL
Qty: 90 TABLET | Refills: 3 | Status: SHIPPED | OUTPATIENT
Start: 2025-06-08

## 2025-07-16 ENCOUNTER — OFFICE VISIT (OUTPATIENT)
Dept: PRIMARY CARE CLINIC | Age: 89
End: 2025-07-16
Payer: MEDICARE

## 2025-07-16 ENCOUNTER — PATIENT MESSAGE (OUTPATIENT)
Dept: PRIMARY CARE CLINIC | Age: 89
End: 2025-07-16

## 2025-07-16 VITALS
HEART RATE: 66 BPM | RESPIRATION RATE: 18 BRPM | WEIGHT: 161 LBS | DIASTOLIC BLOOD PRESSURE: 65 MMHG | OXYGEN SATURATION: 94 % | SYSTOLIC BLOOD PRESSURE: 132 MMHG | BODY MASS INDEX: 23.78 KG/M2

## 2025-07-16 DIAGNOSIS — H60.22 ACUTE MALIGNANT OTITIS EXTERNA OF LEFT EAR: Primary | ICD-10-CM

## 2025-07-16 DIAGNOSIS — K64.0 GRADE I HEMORRHOIDS: ICD-10-CM

## 2025-07-16 DIAGNOSIS — E46 PROTEIN MALNUTRITION: ICD-10-CM

## 2025-07-16 DIAGNOSIS — R30.0 DYSURIA: ICD-10-CM

## 2025-07-16 DIAGNOSIS — I86.8 ABDOMINAL VARICOSITIES: ICD-10-CM

## 2025-07-16 PROCEDURE — G8420 CALC BMI NORM PARAMETERS: HCPCS | Performed by: INTERNAL MEDICINE

## 2025-07-16 PROCEDURE — 1123F ACP DISCUSS/DSCN MKR DOCD: CPT | Performed by: INTERNAL MEDICINE

## 2025-07-16 PROCEDURE — 1160F RVW MEDS BY RX/DR IN RCRD: CPT | Performed by: INTERNAL MEDICINE

## 2025-07-16 PROCEDURE — 1036F TOBACCO NON-USER: CPT | Performed by: INTERNAL MEDICINE

## 2025-07-16 PROCEDURE — 1159F MED LIST DOCD IN RCRD: CPT | Performed by: INTERNAL MEDICINE

## 2025-07-16 PROCEDURE — 99214 OFFICE O/P EST MOD 30 MIN: CPT | Performed by: INTERNAL MEDICINE

## 2025-07-16 PROCEDURE — G8427 DOCREV CUR MEDS BY ELIG CLIN: HCPCS | Performed by: INTERNAL MEDICINE

## 2025-07-16 PROCEDURE — 1090F PRES/ABSN URINE INCON ASSESS: CPT | Performed by: INTERNAL MEDICINE

## 2025-07-16 RX ORDER — HYDROCORTISONE ACETATE 25 MG/1
25 SUPPOSITORY RECTAL 2 TIMES DAILY PRN
Qty: 12 SUPPOSITORY | Refills: 5 | Status: SHIPPED | OUTPATIENT
Start: 2025-07-16

## 2025-07-16 RX ORDER — NEOMYCIN SULFATE, POLYMYXIN B SULFATE AND HYDROCORTISONE 3.5; 10000; 1 MG/ML; [IU]/ML; MG/ML
4 SOLUTION AURICULAR (OTIC) 3 TIMES DAILY
Qty: 1 EACH | Refills: 0 | Status: SHIPPED | OUTPATIENT
Start: 2025-07-16 | End: 2025-07-26

## 2025-07-16 ASSESSMENT — ENCOUNTER SYMPTOMS
DIARRHEA: 0
STRIDOR: 0
ANAL BLEEDING: 0
TROUBLE SWALLOWING: 0
FACIAL SWELLING: 0
ABDOMINAL PAIN: 0
BACK PAIN: 0
PHOTOPHOBIA: 0
APNEA: 0
ALLERGIC/IMMUNOLOGIC COMMENTS: ALLERGY ON GLUTEN-FREE DIET
CHOKING: 0
BLOOD IN STOOL: 0
CHEST TIGHTNESS: 0
CONSTIPATION: 0
EYE REDNESS: 0
SHORTNESS OF BREATH: 0
RHINORRHEA: 0
SORE THROAT: 0
EYE ITCHING: 0
VOMITING: 0
COUGH: 0
NAUSEA: 0
EYE PAIN: 0
WHEEZING: 0
EYE DISCHARGE: 0

## 2025-07-16 NOTE — PROGRESS NOTES
Statement Selected
Patient does have a vena cava filter    Venal caval filter extends beyond venal caval, close to bowel but no bowel perforation signs    Bilateral common femoral dvt 2/18/23 on eliquis   Psychiatric/Behavioral: Negative.            Objective   Physical Exam  Constitutional:       General: She is not in acute distress.     Appearance: She is not toxic-appearing.      Comments: Very pleasant elderly woman who comes appointment with her home health aide ambulates with a rolling walker   HENT:      Head: Normocephalic and atraumatic.      Right Ear: There is no impacted cerumen.      Left Ear: There is no impacted cerumen.      Ears:      Comments: Removed  from right tm and normal  Unable to remove from left tm     Nose: Nose normal.   Eyes:      Comments: Legally blind with macular degeneration.   Neck:      Comments: Normal range of motion of the neck was observed and no appearance of neck  masses  Cardiovascular:      Rate and Rhythm: Normal rate and regular rhythm.      Pulses:           Dorsalis pedis pulses are 0 on the right side and 0 on the left side.        Posterior tibial pulses are 0 on the right side and 0 on the left side.      Heart sounds: Murmur heard.      Comments: Decreased pulses in the feet.    Areas of breast pain and thickening.  Pulmonary:      Effort: Pulmonary effort is normal.      Breath sounds: No rhonchi.      Comments: Area of breast pain  Abdominal:      General: Abdomen is flat. There is no distension.      Palpations: Abdomen is soft. There is no mass.      Tenderness: There is no abdominal tenderness. There is no right CVA tenderness, left CVA tenderness or guarding.   Genitourinary:     Rectum: Normal. Guaiac result negative.      Comments: Could not palpate painful areas.  Musculoskeletal:         General: No swelling.      Cervical back: Normal range of motion and neck supple. No tenderness.      Right lower leg: Edema present.      Left lower leg: Edema present.      Comments:

## 2025-07-17 NOTE — TELEPHONE ENCOUNTER
I will call [facility] tomorrow to try and get them to attach our labs to that order.

## 2025-07-21 ENCOUNTER — PATIENT MESSAGE (OUTPATIENT)
Dept: PRIMARY CARE CLINIC | Age: 89
End: 2025-07-21

## 2025-07-22 DIAGNOSIS — Z86.711 HISTORY OF PULMONARY EMBOLISM: ICD-10-CM

## 2025-07-22 NOTE — TELEPHONE ENCOUNTER
I'm not sure what we did before,  but refill request sent to Dr Holley for a 90-day supply.  We'll go from there.

## 2025-07-24 LAB
ALBUMIN: NORMAL G/DL
ALP BLD-CCNC: NORMAL IU/L
ALT SERPL-CCNC: NORMAL IU/L
ANION GAP SERPL CALCULATED.3IONS-SCNC: NORMAL MMOL/L
AST SERPL-CCNC: NORMAL IU/L
BACTERIA, URINE: ABNORMAL /HPF
BASOPHILS ABSOLUTE: 0 THOU/MCL (ref 0–0.2)
BASOPHILS ABSOLUTE: 1 %
BILIRUBIN, URINE: NEGATIVE
BUN BLDV-MCNC: NORMAL MG/DL
CALCIUM SERPL-MCNC: NORMAL MG/DL
CHLORIDE BLD-SCNC: NORMAL MMOL/L
CLARITY, UA: CLEAR
CO2: NORMAL MMOL/L
COLOR, UA: YELLOW
CREAT SERPL-MCNC: NORMAL MG/DL
EGFR (CKD-EPI): NORMAL ML/MIN/1.73 M2
EOSINOPHILS ABSOLUTE: 0.1 THOU/MCL (ref 0.03–0.45)
EOSINOPHILS RELATIVE PERCENT: 2 %
EPITHELIAL CELLS, UA: ABNORMAL /HPF
ERYTHROCYTES URINE: <6 /HPF
GLUCOSE BLD-MCNC: NORMAL MG/DL
GLUCOSE URINE: NEGATIVE
HB: SOURCE: ABNORMAL
HCT VFR BLD CALC: 46 % (ref 36–46)
HEMOGLOBIN: 14.9 G/DL (ref 12–15.2)
KETONES, URINE: NEGATIVE
LEUKOCYTE ESTERASE, URINE: NEGATIVE
LEUKOCYTES, UA: 0 /HPF
LYMPHOCYTES ABSOLUTE: 1.8 THOU/MCL (ref 1–4)
LYMPHOCYTES RELATIVE PERCENT: 28 %
MCH RBC QN AUTO: 27 PG (ref 27–33)
MCHC RBC AUTO-ENTMCNC: 32.3 G/DL (ref 32–36)
MCV RBC AUTO: 83.7 FL (ref 82–97)
MONOCYTES ABSOLUTE: 0.9 THOU/MCL (ref 0.2–0.9)
MONOCYTES RELATIVE PERCENT: 13 %
NEUTROPHILS ABSOLUTE: 3.8 THOU/MCL (ref 1.8–7.7)
NITRITE, URINE: NEGATIVE
PDW BLD-RTO: 15.4 % (ref 12.3–17)
PH, URINE: 6 (ref 5–8)
PLATELET # BLD: 135 THOU/MCL (ref 140–375)
PMV BLD AUTO: 8.8 FL (ref 7–11.5)
POTASSIUM SERPL-SCNC: NORMAL MMOL/L
PREALBUMIN: 15.2 MG/DL (ref 20–40)
PROTEIN, URINE: NEGATIVE
RBC # BLD: 5.5 MIL/MCL (ref 3.8–5.2)
RBC URINE: ABNORMAL
SEG NEUTROPHILS: 56 %
SODIUM BLD-SCNC: NORMAL MMOL/L
SPECIFIC GRAVITY UA: 1.01 (ref 1–1.03)
TOTAL BILIRUBIN: NORMAL MG/DL
TOTAL PROTEIN: NORMAL G/DL
URINE TYPE: ABNORMAL
UROBILINOGEN, URINE: NORMAL MG/DL
WBC # BLD: 6.6 THOU/MCL (ref 3.6–10.5)

## 2025-07-31 DIAGNOSIS — Z91.81 AT HIGH RISK FOR FALLS: Primary | ICD-10-CM

## 2025-08-04 DIAGNOSIS — B02.9 ACUTE PAIN ASSOCIATED WITH HERPES ZOSTER: ICD-10-CM

## 2025-08-05 RX ORDER — PREGABALIN 25 MG/1
CAPSULE ORAL
Qty: 180 CAPSULE | Refills: 0 | Status: SHIPPED | OUTPATIENT
Start: 2025-08-05 | End: 2025-11-03

## 2025-08-11 RX ORDER — FOLIC ACID 1 MG/1
1000 TABLET ORAL DAILY
Qty: 90 TABLET | Refills: 1 | Status: SHIPPED | OUTPATIENT
Start: 2025-08-11

## 2025-09-07 DIAGNOSIS — I89.0 ACQUIRED LYMPHEDEMA OF LOWER EXTREMITY: Primary | ICD-10-CM

## (undated) DEVICE — FORCEPS BX L240CM JAW DIA2.4MM ORNG L CAP W/ NDL DISP RAD

## (undated) DEVICE — MASK CAPNOGRAPHY AD W35IN DIA58IN SAMP LN L10FT O2 LN